# Patient Record
Sex: FEMALE | Race: WHITE | NOT HISPANIC OR LATINO
[De-identification: names, ages, dates, MRNs, and addresses within clinical notes are randomized per-mention and may not be internally consistent; named-entity substitution may affect disease eponyms.]

---

## 2017-03-31 ENCOUNTER — APPOINTMENT (OUTPATIENT)
Dept: OBGYN | Facility: CLINIC | Age: 82
End: 2017-03-31

## 2017-03-31 VITALS
BODY MASS INDEX: 35.99 KG/M2 | DIASTOLIC BLOOD PRESSURE: 60 MMHG | HEART RATE: 71 BPM | HEIGHT: 69 IN | SYSTOLIC BLOOD PRESSURE: 142 MMHG | OXYGEN SATURATION: 99 % | WEIGHT: 243 LBS

## 2017-04-02 LAB — HPV HIGH+LOW RISK DNA PNL CVX: NEGATIVE

## 2017-04-09 LAB — CYTOLOGY CVX/VAG DOC THIN PREP: NORMAL

## 2017-12-31 ENCOUNTER — TRANSCRIPTION ENCOUNTER (OUTPATIENT)
Age: 82
End: 2017-12-31

## 2018-01-25 ENCOUNTER — OUTPATIENT (OUTPATIENT)
Dept: OUTPATIENT SERVICES | Facility: HOSPITAL | Age: 83
LOS: 1 days | End: 2018-01-25
Payer: MEDICARE

## 2018-01-25 DIAGNOSIS — M54.16 RADICULOPATHY, LUMBAR REGION: ICD-10-CM

## 2018-01-25 PROCEDURE — 77003 FLUOROGUIDE FOR SPINE INJECT: CPT

## 2018-01-25 PROCEDURE — 62323 NJX INTERLAMINAR LMBR/SAC: CPT

## 2018-02-13 ENCOUNTER — OUTPATIENT (OUTPATIENT)
Dept: OUTPATIENT SERVICES | Facility: HOSPITAL | Age: 83
LOS: 1 days | End: 2018-02-13
Payer: MEDICARE

## 2018-02-13 DIAGNOSIS — M54.16 RADICULOPATHY, LUMBAR REGION: ICD-10-CM

## 2018-02-13 PROCEDURE — 77003 FLUOROGUIDE FOR SPINE INJECT: CPT

## 2018-02-13 PROCEDURE — 62323 NJX INTERLAMINAR LMBR/SAC: CPT

## 2018-03-30 ENCOUNTER — APPOINTMENT (OUTPATIENT)
Dept: OBGYN | Facility: CLINIC | Age: 83
End: 2018-03-30

## 2018-04-16 ENCOUNTER — APPOINTMENT (OUTPATIENT)
Dept: ORTHOPEDIC SURGERY | Facility: CLINIC | Age: 83
End: 2018-04-16
Payer: MEDICARE

## 2018-04-16 VITALS
BODY MASS INDEX: 34.8 KG/M2 | WEIGHT: 235 LBS | HEART RATE: 83 BPM | SYSTOLIC BLOOD PRESSURE: 148 MMHG | HEIGHT: 69 IN | DIASTOLIC BLOOD PRESSURE: 69 MMHG

## 2018-04-16 DIAGNOSIS — Z82.61 FAMILY HISTORY OF ARTHRITIS: ICD-10-CM

## 2018-04-16 DIAGNOSIS — M51.26 OTHER INTERVERTEBRAL DISC DISPLACEMENT, LUMBAR REGION: ICD-10-CM

## 2018-04-16 DIAGNOSIS — Z78.9 OTHER SPECIFIED HEALTH STATUS: ICD-10-CM

## 2018-04-16 PROCEDURE — 99204 OFFICE O/P NEW MOD 45 MIN: CPT

## 2018-04-16 RX ORDER — OXYCODONE AND ACETAMINOPHEN 5; 325 MG/1; MG/1
5-325 TABLET ORAL
Qty: 60 | Refills: 0 | Status: DISCONTINUED | COMMUNITY
Start: 2018-01-16

## 2018-04-16 RX ORDER — MULTIVIT-MIN/FA/LYCOPEN/LUTEIN .4-300-25
TABLET ORAL
Refills: 0 | Status: ACTIVE | COMMUNITY

## 2018-04-16 RX ORDER — DOXYCYCLINE HYCLATE 100 MG/1
100 TABLET ORAL
Qty: 14 | Refills: 0 | Status: DISCONTINUED | COMMUNITY
Start: 2017-11-17

## 2018-04-16 RX ORDER — TRAMADOL HYDROCHLORIDE 50 MG/1
50 TABLET, COATED ORAL
Qty: 30 | Refills: 0 | Status: DISCONTINUED | COMMUNITY
Start: 2017-12-15

## 2018-04-16 RX ORDER — OXYCODONE 10 MG/1
10 TABLET ORAL
Qty: 50 | Refills: 0 | Status: DISCONTINUED | COMMUNITY
Start: 2018-02-21

## 2018-04-16 RX ORDER — BETAMETHASONE DIPROPIONATE 0.5 MG/G
0.05 CREAM, AUGMENTED TOPICAL
Qty: 30 | Refills: 0 | Status: DISCONTINUED | COMMUNITY
Start: 2017-11-17

## 2018-04-16 RX ORDER — METHYLPREDNISOLONE 4 MG/1
4 TABLET ORAL
Qty: 21 | Refills: 0 | Status: DISCONTINUED | COMMUNITY
Start: 2017-12-15

## 2018-04-16 RX ORDER — LEVOTHYROXINE SODIUM 0.12 MG/1
125 TABLET ORAL
Qty: 90 | Refills: 0 | Status: DISCONTINUED | COMMUNITY
Start: 2018-01-16

## 2018-04-16 RX ORDER — ATORVASTATIN CALCIUM 10 MG/1
10 TABLET, FILM COATED ORAL
Qty: 90 | Refills: 0 | Status: DISCONTINUED | COMMUNITY
Start: 2018-01-16

## 2018-07-23 ENCOUNTER — APPOINTMENT (OUTPATIENT)
Dept: OBGYN | Facility: CLINIC | Age: 83
End: 2018-07-23
Payer: MEDICARE

## 2018-07-23 VITALS
RESPIRATION RATE: 16 BRPM | OXYGEN SATURATION: 98 % | DIASTOLIC BLOOD PRESSURE: 68 MMHG | HEIGHT: 69 IN | SYSTOLIC BLOOD PRESSURE: 142 MMHG | HEART RATE: 76 BPM | WEIGHT: 235 LBS | BODY MASS INDEX: 34.8 KG/M2

## 2018-07-23 PROCEDURE — 99213 OFFICE O/P EST LOW 20 MIN: CPT

## 2018-08-20 ENCOUNTER — APPOINTMENT (OUTPATIENT)
Dept: OBGYN | Facility: CLINIC | Age: 83
End: 2018-08-20
Payer: MEDICARE

## 2018-08-20 VITALS
SYSTOLIC BLOOD PRESSURE: 128 MMHG | WEIGHT: 235 LBS | HEART RATE: 84 BPM | DIASTOLIC BLOOD PRESSURE: 62 MMHG | HEIGHT: 69 IN | RESPIRATION RATE: 16 BRPM | OXYGEN SATURATION: 98 % | BODY MASS INDEX: 34.8 KG/M2

## 2018-08-20 DIAGNOSIS — N64.4 MASTODYNIA: ICD-10-CM

## 2018-08-20 DIAGNOSIS — B00.89 OTHER HERPESVIRAL INFECTION: ICD-10-CM

## 2018-08-20 PROCEDURE — 99213 OFFICE O/P EST LOW 20 MIN: CPT

## 2019-04-01 ENCOUNTER — APPOINTMENT (OUTPATIENT)
Dept: OBGYN | Facility: CLINIC | Age: 84
End: 2019-04-01

## 2019-04-16 ENCOUNTER — APPOINTMENT (OUTPATIENT)
Dept: OBGYN | Facility: CLINIC | Age: 84
End: 2019-04-16
Payer: MEDICARE

## 2019-04-16 VITALS
HEIGHT: 69 IN | BODY MASS INDEX: 35.7 KG/M2 | WEIGHT: 241 LBS | HEART RATE: 76 BPM | DIASTOLIC BLOOD PRESSURE: 80 MMHG | OXYGEN SATURATION: 96 % | SYSTOLIC BLOOD PRESSURE: 124 MMHG

## 2019-04-16 DIAGNOSIS — Z01.419 ENCOUNTER FOR GYNECOLOGICAL EXAMINATION (GENERAL) (ROUTINE) W/OUT ABNORMAL FINDINGS: ICD-10-CM

## 2019-04-16 PROCEDURE — G0101: CPT

## 2019-04-16 NOTE — CHIEF COMPLAINT
[Annual Visit] : annual visit [FreeTextEntry1] : CHeck up  Without complaint  Well since last visit Bilateral knee joint injections pending

## 2019-04-16 NOTE — REVIEW OF SYSTEMS
[Nasal Discharge] : nasal discharge [Diarrhea] : diarrhea [Joint Pain] : joint pain [Skin Lesions] : skin lesion [Fever] : no fever [Chills] : no chills [Feeling Tired] : not feeling tired [Recent Wt Gain ___ Lbs] : no recent weight gain [Pain] : no pain [Redness] : no redness [Sight Problems] : no sight problems [Discharge] : no discharge [Dec Hearing] : no hearing loss [Nosebleeds] : no nosebleeds [Sore Throat] : no sore throat [Heart Rate Is Fast] : the heart rate was not fast [Chest Pain] : no chest pain [Palpitations] : no palpitations [Lower Ext Edema] : no lower extremity edema [Dyspnea] : no shortness of breath [Wheezing] : no wheezing [Cough] : no cough [SOB on Exertion] : no shortness of breath during exertion [Abdominal Pain] : no abdominal pain [Constipation] : no constipation [Vomiting] : no vomiting [Heartburn] : no heartburn [Melena] : no melena [Dysuria] : no dysuria [Incontinence] : no incontinence [Pelvic Pain] : no pelvic pain [Frequency] : no frequency [Abn Vag Bleeding] : no abnormal vaginal bleeding [Urgency] : no urgency [Urethral Discharge] : no abnormal urethral discharge [Arthralgias] : no arthralgias [Joint Swelling] : no joint swelling [Change In A Mole] : no change in a mole [Joint Stiffness] : no joint stiffness [Breast Pain] : no breast pain [Breast Lump] : no breast lump [Convulsions] : no convulsions [Dizziness] : no dizziness [Fainting] : no fainting [Headache] : no headache [Sleep Disturbances] : no sleep disturbances [Anxiety] : no anxiety [Depression] : no depression [Hot Flashes] : no hot flashes [Muscle Weakness] : no muscle weakness [Deepening Voice] : no deepening of the voice [Easy Bleeding] : does not bleed easily [Easy Bruising] : does not bruise easily [Swollen Glands] : no swollen glands [Swollen Glands In The Neck] : no swollen glands in the neck [Feeling Weak] : no feelings of weakness

## 2019-04-16 NOTE — HISTORY OF PRESENT ILLNESS
[Hot Flashes] : no hot flashes [Night Sweats] : no night sweats [Vaginal Itching] : no vaginal itching [Dyspareunia] : no dyspareunia [Mood Changes] : no mood changes [Headache] : no headache [Fatigue] : no fatigue [Weight Change] : no weight change [Irritability] : no irritability [Forgetfulness] : no forgetfulness [Loss of Libido] : no loss of libido [Depression] : no depression [Anxiety] : no anxiety [FreeTextEntry8] : + vaginal dryness [Normal Amount/Duration] : was of a normal amount and duration [Regular Cycle Intervals] : periods have been regular [Menarche Age: ____] : age at menarche was [unfilled] [Menopause Age: ____] : age at menopause was [unfilled] [Sexually Active] : is not sexually active

## 2019-04-19 LAB — HPV HIGH+LOW RISK DNA PNL CVX: NOT DETECTED

## 2019-04-20 LAB — CYTOLOGY CVX/VAG DOC THIN PREP: NORMAL

## 2019-07-16 ENCOUNTER — LABORATORY RESULT (OUTPATIENT)
Age: 84
End: 2019-07-16

## 2019-07-17 ENCOUNTER — APPOINTMENT (OUTPATIENT)
Dept: INTERNAL MEDICINE | Facility: CLINIC | Age: 84
End: 2019-07-17
Payer: MEDICARE

## 2019-07-17 VITALS
DIASTOLIC BLOOD PRESSURE: 81 MMHG | OXYGEN SATURATION: 95 % | HEART RATE: 78 BPM | RESPIRATION RATE: 16 BRPM | HEIGHT: 68 IN | BODY MASS INDEX: 37.89 KG/M2 | WEIGHT: 250 LBS | TEMPERATURE: 98.5 F | SYSTOLIC BLOOD PRESSURE: 145 MMHG

## 2019-07-17 VITALS — SYSTOLIC BLOOD PRESSURE: 140 MMHG | DIASTOLIC BLOOD PRESSURE: 80 MMHG

## 2019-07-17 PROCEDURE — 36415 COLL VENOUS BLD VENIPUNCTURE: CPT

## 2019-07-17 PROCEDURE — 99213 OFFICE O/P EST LOW 20 MIN: CPT | Mod: 25

## 2019-07-17 NOTE — HISTORY OF PRESENT ILLNESS
[FreeTextEntry8] : Patient comes for FBW and med renewal\par  Pt feels well currently no respiratory problems

## 2019-07-17 NOTE — PHYSICAL EXAM
[Well Nourished] : well nourished [Normal Sclera/Conjunctiva] : normal sclera/conjunctiva [Normal Outer Ear/Nose] : the outer ears and nose were normal in appearance [No Lymphadenopathy] : no lymphadenopathy [No Respiratory Distress] : no respiratory distress  [No Accessory Muscle Use] : no accessory muscle use [Clear to Auscultation] : lungs were clear to auscultation bilaterally [Normal Rate] : normal rate  [Regular Rhythm] : with a regular rhythm [No Edema] : there was no peripheral edema [Soft] : abdomen soft [Non Tender] : non-tender [Non-distended] : non-distended

## 2019-07-17 NOTE — REVIEW OF SYSTEMS
[Fever] : no fever [Chills] : no chills [Chest Pain] : no chest pain [Palpitations] : no palpitations [Lower Ext Edema] : no lower extremity edema [Shortness Of Breath] : no shortness of breath [Wheezing] : no wheezing [Cough] : no cough [Abdominal Pain] : no abdominal pain

## 2019-07-23 ENCOUNTER — TRANSCRIPTION ENCOUNTER (OUTPATIENT)
Age: 84
End: 2019-07-23

## 2019-08-12 LAB
25(OH)D3 SERPL-MCNC: 35.2 NG/ML
ALBUMIN SERPL ELPH-MCNC: 4.3 G/DL
ALP BLD-CCNC: 81 U/L
ALT SERPL-CCNC: 15 U/L
ANION GAP SERPL CALC-SCNC: 16 MMOL/L
AST SERPL-CCNC: 26 U/L
BASOPHILS # BLD AUTO: 0.05 K/UL
BASOPHILS NFR BLD AUTO: 0.9 %
BILIRUB SERPL-MCNC: 0.3 MG/DL
BUN SERPL-MCNC: 24 MG/DL
CALCIUM SERPL-MCNC: 9.9 MG/DL
CHLORIDE SERPL-SCNC: 102 MMOL/L
CHOLEST SERPL-MCNC: 192 MG/DL
CHOLEST/HDLC SERPL: 4.8 RATIO
CO2 SERPL-SCNC: 29 MMOL/L
CREAT SERPL-MCNC: 1.12 MG/DL
EOSINOPHIL # BLD AUTO: 0.62 K/UL
EOSINOPHIL NFR BLD AUTO: 11.2 %
ESTIMATED AVERAGE GLUCOSE: 137 MG/DL
GLUCOSE SERPL-MCNC: 147 MG/DL
HBA1C MFR BLD HPLC: 6.4 %
HCT VFR BLD CALC: 47.3 %
HDLC SERPL-MCNC: 40 MG/DL
HGB BLD-MCNC: 13.5 G/DL
IMM GRANULOCYTES NFR BLD AUTO: 0.2 %
LDLC SERPL CALC-MCNC: 104 MG/DL
LYMPHOCYTES # BLD AUTO: 1.21 K/UL
LYMPHOCYTES NFR BLD AUTO: 21.8 %
MAN DIFF?: NORMAL
MCHC RBC-ENTMCNC: 28.5 GM/DL
MCHC RBC-ENTMCNC: 31 PG
MCV RBC AUTO: 108.5 FL
MONOCYTES # BLD AUTO: 0.42 K/UL
MONOCYTES NFR BLD AUTO: 7.6 %
NEUTROPHILS # BLD AUTO: 3.23 K/UL
NEUTROPHILS NFR BLD AUTO: 58.3 %
PLATELET # BLD AUTO: 223 K/UL
POTASSIUM SERPL-SCNC: 4.1 MMOL/L
PROT SERPL-MCNC: 7 G/DL
RBC # BLD: 4.36 M/UL
RBC # FLD: 14.6 %
SODIUM SERPL-SCNC: 147 MMOL/L
T4 SERPL-MCNC: 8 UG/DL
TRIGL SERPL-MCNC: 238 MG/DL
TSH SERPL-ACNC: 9.29 UIU/ML
WBC # FLD AUTO: 5.54 K/UL

## 2019-09-18 ENCOUNTER — APPOINTMENT (OUTPATIENT)
Dept: INTERNAL MEDICINE | Facility: CLINIC | Age: 84
End: 2019-09-18
Payer: MEDICARE

## 2019-09-18 VITALS — OXYGEN SATURATION: 92 % | WEIGHT: 250 LBS | BODY MASS INDEX: 37.89 KG/M2 | HEART RATE: 74 BPM | HEIGHT: 68 IN

## 2019-09-18 VITALS — DIASTOLIC BLOOD PRESSURE: 80 MMHG | SYSTOLIC BLOOD PRESSURE: 128 MMHG

## 2019-09-18 PROCEDURE — 36415 COLL VENOUS BLD VENIPUNCTURE: CPT

## 2019-09-18 PROCEDURE — 99213 OFFICE O/P EST LOW 20 MIN: CPT | Mod: 25

## 2019-09-18 PROCEDURE — 90662 IIV NO PRSV INCREASED AG IM: CPT

## 2019-09-18 PROCEDURE — G0008: CPT

## 2019-09-18 NOTE — PHYSICAL EXAM
[Normal Sclera/Conjunctiva] : normal sclera/conjunctiva [No Acute Distress] : no acute distress [No JVD] : no jugular venous distention [No Lymphadenopathy] : no lymphadenopathy [Supple] : supple [No Respiratory Distress] : no respiratory distress  [No Accessory Muscle Use] : no accessory muscle use [Regular Rhythm] : with a regular rhythm [Normal Rate] : normal rate  [No Edema] : there was no peripheral edema [Normal S1, S2] : normal S1 and S2 [Soft] : abdomen soft [No Extremity Clubbing/Cyanosis] : no extremity clubbing/cyanosis [Non-distended] : non-distended [Non Tender] : non-tender [No Masses] : no abdominal mass palpated

## 2019-09-18 NOTE — REVIEW OF SYSTEMS
[Fever] : no fever [Hoarseness] : no hoarseness [Chills] : no chills [Palpitations] : no palpitations [Chest Pain] : no chest pain [Shortness Of Breath] : no shortness of breath [Lower Ext Edema] : no lower extremity edema [Wheezing] : no wheezing [Cough] : no cough [Abdominal Pain] : no abdominal pain

## 2019-09-20 LAB
T4 SERPL-MCNC: 9 UG/DL
TSH SERPL-ACNC: 1.68 UIU/ML

## 2019-10-16 ENCOUNTER — APPOINTMENT (OUTPATIENT)
Dept: INTERNAL MEDICINE | Facility: CLINIC | Age: 84
End: 2019-10-16
Payer: MEDICARE

## 2019-10-16 VITALS
RESPIRATION RATE: 16 BRPM | WEIGHT: 247 LBS | HEART RATE: 85 BPM | HEIGHT: 68 IN | BODY MASS INDEX: 37.44 KG/M2 | OXYGEN SATURATION: 97 %

## 2019-10-16 VITALS — DIASTOLIC BLOOD PRESSURE: 80 MMHG | SYSTOLIC BLOOD PRESSURE: 128 MMHG

## 2019-10-16 PROCEDURE — 90732 PPSV23 VACC 2 YRS+ SUBQ/IM: CPT

## 2019-10-16 PROCEDURE — G0009: CPT

## 2019-10-16 PROCEDURE — 36415 COLL VENOUS BLD VENIPUNCTURE: CPT

## 2019-10-16 PROCEDURE — 99213 OFFICE O/P EST LOW 20 MIN: CPT | Mod: 25

## 2019-10-16 NOTE — PHYSICAL EXAM
[No Acute Distress] : no acute distress [Normal Sclera/Conjunctiva] : normal sclera/conjunctiva [Normal Outer Ear/Nose] : the outer ears and nose were normal in appearance [Normal Oropharynx] : the oropharynx was normal [No JVD] : no jugular venous distention [No Lymphadenopathy] : no lymphadenopathy [Supple] : supple [No Respiratory Distress] : no respiratory distress  [No Accessory Muscle Use] : no accessory muscle use [Clear to Auscultation] : lungs were clear to auscultation bilaterally [Normal Rate] : normal rate  [Regular Rhythm] : with a regular rhythm [Normal S1, S2] : normal S1 and S2 [No Edema] : there was no peripheral edema [No Extremity Clubbing/Cyanosis] : no extremity clubbing/cyanosis [Soft] : abdomen soft [Non Tender] : non-tender [Non-distended] : non-distended [No Masses] : no abdominal mass palpated [Normal Posterior Cervical Nodes] : no posterior cervical lymphadenopathy [Normal Anterior Cervical Nodes] : no anterior cervical lymphadenopathy

## 2019-10-16 NOTE — REVIEW OF SYSTEMS
[Hoarseness] : hoarseness [Postnasal Drip] : postnasal drip [Fever] : no fever [Chills] : no chills [Chest Pain] : no chest pain [Palpitations] : no palpitations [Lower Ext Edema] : no lower extremity edema [Shortness Of Breath] : no shortness of breath [Wheezing] : no wheezing [Cough] : no cough [Abdominal Pain] : no abdominal pain [Nausea] : no nausea

## 2019-10-18 LAB
25(OH)D3 SERPL-MCNC: 42.2 NG/ML
ALBUMIN SERPL ELPH-MCNC: 4.5 G/DL
ALP BLD-CCNC: 87 U/L
ALT SERPL-CCNC: 14 U/L
ANION GAP SERPL CALC-SCNC: 16 MMOL/L
AST SERPL-CCNC: 23 U/L
BASOPHILS # BLD AUTO: 0.07 K/UL
BASOPHILS NFR BLD AUTO: 1 %
BILIRUB SERPL-MCNC: 0.3 MG/DL
BUN SERPL-MCNC: 23 MG/DL
CALCIUM SERPL-MCNC: 10.3 MG/DL
CHLORIDE SERPL-SCNC: 100 MMOL/L
CHOLEST SERPL-MCNC: 186 MG/DL
CHOLEST/HDLC SERPL: 4.3 RATIO
CO2 SERPL-SCNC: 29 MMOL/L
CREAT SERPL-MCNC: 1 MG/DL
EOSINOPHIL # BLD AUTO: 0.3 K/UL
EOSINOPHIL NFR BLD AUTO: 4.5 %
ESTIMATED AVERAGE GLUCOSE: 140 MG/DL
GLUCOSE SERPL-MCNC: 106 MG/DL
HBA1C MFR BLD HPLC: 6.5 %
HCT VFR BLD CALC: 45.3 %
HDLC SERPL-MCNC: 43 MG/DL
HGB BLD-MCNC: 13.6 G/DL
IMM GRANULOCYTES NFR BLD AUTO: 0.3 %
LDLC SERPL CALC-MCNC: 103 MG/DL
LYMPHOCYTES # BLD AUTO: 1.4 K/UL
LYMPHOCYTES NFR BLD AUTO: 21 %
MAN DIFF?: NORMAL
MCHC RBC-ENTMCNC: 30 GM/DL
MCHC RBC-ENTMCNC: 30 PG
MCV RBC AUTO: 99.8 FL
MONOCYTES # BLD AUTO: 0.45 K/UL
MONOCYTES NFR BLD AUTO: 6.7 %
NEUTROPHILS # BLD AUTO: 4.43 K/UL
NEUTROPHILS NFR BLD AUTO: 66.5 %
PLATELET # BLD AUTO: 270 K/UL
POTASSIUM SERPL-SCNC: 4.1 MMOL/L
PROT SERPL-MCNC: 7.1 G/DL
RBC # BLD: 4.54 M/UL
RBC # FLD: 14.1 %
SODIUM SERPL-SCNC: 145 MMOL/L
T4 SERPL-MCNC: 9.2 UG/DL
TRIGL SERPL-MCNC: 198 MG/DL
TSH SERPL-ACNC: 3.46 UIU/ML
WBC # FLD AUTO: 6.67 K/UL

## 2019-11-12 ENCOUNTER — APPOINTMENT (OUTPATIENT)
Dept: ORTHOPEDIC SURGERY | Facility: CLINIC | Age: 84
End: 2019-11-12
Payer: MEDICARE

## 2019-11-12 ENCOUNTER — OTHER (OUTPATIENT)
Age: 84
End: 2019-11-12

## 2019-11-12 VITALS
DIASTOLIC BLOOD PRESSURE: 74 MMHG | HEART RATE: 83 BPM | HEIGHT: 69 IN | SYSTOLIC BLOOD PRESSURE: 141 MMHG | BODY MASS INDEX: 36.58 KG/M2 | WEIGHT: 247 LBS

## 2019-11-12 PROCEDURE — 73562 X-RAY EXAM OF KNEE 3: CPT | Mod: 50

## 2019-11-12 PROCEDURE — 99213 OFFICE O/P EST LOW 20 MIN: CPT

## 2019-11-12 PROCEDURE — 99204 OFFICE O/P NEW MOD 45 MIN: CPT

## 2019-11-12 NOTE — END OF VISIT
[FreeTextEntry3] : I saw and evaluated this patient. I discussed with the PA and agree with the PAs findings and plans as documented in the PAs note.\par \par ________________\par Neil Juárez MD

## 2019-11-12 NOTE — HISTORY OF PRESENT ILLNESS
[Worsening] : worsening [7] : an average pain level of 7/10 [9] : a current pain level of 9/10 [10] : a maximum pain level of 10/10 [6] : a minimum pain level of 6/10 [Standing] : standing [Daily] : ~He/She~ states the symptoms seem to be occuring daily [Direct Pressure] : worsened by direct pressure [Bending] : worsened by bending [Lifting] : worsened by lifting [Sitting] : worsened by sitting [Running] : worsened by running [Knee Flexion] : worsened with knee flexion [Walking] : worsened by walking [Knee Extension] : worsened with knee extension [Acetaminophen] : relieved by acetaminophen [Ice] : relieved by ice [Exercise Regimen] : relieved by exercise regimen [NSAIDs] : relieved by nonsteroidal anti-inflammatory drugs [Physical Therapy] : relieved by physical therapy [Rest] : relieved by rest [None] : No relieving factors are noted [de-identified] : The patient is an 84-year-old female who presents today with bilateral knee pain right more so than left many years getting progressively worse over the past several months. The patient is ambulating with a cane. She has difficulty standing for long periods of time, walking for long periods of time, negotiating stairs. The patient is taking anti-inflammatories, all without relief. [Incontinence] : no incontinence [Ataxia] : no ataxia [Loss of Dexterity] : good dexterity [Urinary Ret.] : no urinary retention

## 2019-11-12 NOTE — PHYSICAL EXAM
[Antalgic] : antalgic [Cane] : ambulates with cane [UE/LE] : Sensory: Intact in bilateral upper & lower extremities [2+] : left 2+ [1+] : left 1+ [Normal RLE] : Right Lower Extremity: No scars, rashes, lesions, ulcers, skin intact [Normal RUE] : Right Upper Extremity: No scars, rashes, lesions, ulcers, skin intact [Normal LUE] : Left Upper Extremity: No scars, rashes, lesions, ulcers, skin intact [Obese] : obese [Normal LLE] : Left Lower Extremity: No scars, rashes, lesions, ulcers, skin intact [Normal] : Oriented to person, place, and time, insight and judgement were intact and the affect was normal [de-identified] : Impact [de-identified] : Left Knee: Skin is clean, dry, and intact\par Swelling: Moderate\par Effusion: Moderate no extensor lag. No flexion contractures. Negative anteroposterior draw.\par Alignment: Varus malalignment\par Tenderness: All 3 compartments\par ROM: Flexion: 130deg.; Extension: 0 deg,\par Laxity: 6° laxity with varus valgus tibial stress\par PF crepitus: Trent crepitation; associated pain\par Quadricep formation: Attenuated in Extension & Flexion:\par Quad/Ham St:4/5 slight lymphedema left lower extremity calves soft, nontender, no evidence of DVT at this time. Diminished distal pulses. Slight erythema left lower extremity consistent with lymphedemat keratosisLower extremity\par \par Right  Knee: Skin is clean, dry, intact of the knee. Psoriatic-type lesions right lower extremity\par Swelling: Moderate\par Effusion: Moderate negative anterior-posterior drawer\par Alignment: Varus malalignment no extension lag, no flexion contractures\par Tenderness: All 3 compartments\par ROM: Flexion: 1:30 deg.; Extension: 0deg,\par Laxity: 6° laxity with varus valgus tibial stress\par PF crepitus: Marked crepitation; associated pain\par Quadricep formation: Attenuated in Extension & Flexion:\par Quad/Ham St: 4/5 calf soft, nontender, no evidence of DVT at this time. Patient has diminished pulses right lower extremity. 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

## 2019-11-12 NOTE — DISCUSSION/SUMMARY
[Medication Risks Reviewed] : Medication risks reviewed [Surgical risks reviewed] : Surgical risks reviewed [de-identified] : Dr. Juárez evaluated this patient and is guiding this patient's entire orthopedic management plan. The patient was advised that based upon their clinical presentation and radiographic findings they meet inclusion criteria for benefitting from a right total knee arthroplasty as a treatment option for severe arthritis of the knee.\par The spectrum of treatments for severe knee DJD were reviewed in detail. Dr. Juárez reviewed in detail the goals, alternatives, risks and benefits of total knee arthroplasty. \par The patient was advised of the possible complications which are inclusive of but not exclusive to VTE-related, infectious-related, anesthetic-related, surgically-related, medically-related, and implant-related. \par The patient was advised of the goals, alternatives, risks and benefits of autologous, directed and banked blood product transfusions for perioperative blood losses.\par The patient was advised that Dr. Juárez operates as a surgical team with his associate Dr. MICHELLE Sheppard.\par The patient was advised that they will require a medical preoperative risk evaluation by their PCP. Further medical subspecialty clearances such as cardiac may be indicated if felt needed by their PCP.\par The patient was advised he/she may call our office after careful consideration of their treatment options and further consultation with any other physician, to begin the process of preoperative planning for elective right TKR at a time of their choosing. The patient was educated using models and the actual implant. The patient need clearance from medicine, her immunologist, and vascular clearance. All of her questions were answered to her satisfaction. She would like to do the right knee and at a later date which is healed to the left side. Patient agrees to the plan of care and the use of implants are in the right total knee replacement surgery\par

## 2019-11-12 NOTE — REASON FOR VISIT
[Initial Visit] : an initial visit for [Knee Pain] : knee pain [Osteoarthritis, Knee] : osteoarthritis, knee [FreeTextEntry2] : Bilateral knee pain right more so thanleft

## 2019-12-31 ENCOUNTER — OUTPATIENT (OUTPATIENT)
Dept: OUTPATIENT SERVICES | Facility: HOSPITAL | Age: 84
LOS: 1 days | End: 2019-12-31
Payer: MEDICARE

## 2019-12-31 VITALS
HEART RATE: 74 BPM | OXYGEN SATURATION: 97 % | HEIGHT: 66 IN | TEMPERATURE: 98 F | SYSTOLIC BLOOD PRESSURE: 110 MMHG | WEIGHT: 245.82 LBS | DIASTOLIC BLOOD PRESSURE: 70 MMHG | RESPIRATION RATE: 16 BRPM

## 2019-12-31 DIAGNOSIS — Z90.49 ACQUIRED ABSENCE OF OTHER SPECIFIED PARTS OF DIGESTIVE TRACT: Chronic | ICD-10-CM

## 2019-12-31 DIAGNOSIS — M17.11 UNILATERAL PRIMARY OSTEOARTHRITIS, RIGHT KNEE: ICD-10-CM

## 2019-12-31 DIAGNOSIS — Z98.890 OTHER SPECIFIED POSTPROCEDURAL STATES: Chronic | ICD-10-CM

## 2019-12-31 DIAGNOSIS — Z92.241 PERSONAL HISTORY OF SYSTEMIC STEROID THERAPY: Chronic | ICD-10-CM

## 2019-12-31 DIAGNOSIS — Z01.818 ENCOUNTER FOR OTHER PREPROCEDURAL EXAMINATION: ICD-10-CM

## 2019-12-31 DIAGNOSIS — Z90.12 ACQUIRED ABSENCE OF LEFT BREAST AND NIPPLE: Chronic | ICD-10-CM

## 2019-12-31 LAB
ALBUMIN SERPL ELPH-MCNC: 3.6 G/DL — SIGNIFICANT CHANGE UP (ref 3.3–5)
ALP SERPL-CCNC: 83 U/L — SIGNIFICANT CHANGE UP (ref 30–120)
ALT FLD-CCNC: 26 U/L DA — SIGNIFICANT CHANGE UP (ref 10–60)
ANION GAP SERPL CALC-SCNC: 7 MMOL/L — SIGNIFICANT CHANGE UP (ref 5–17)
APTT BLD: 30.1 SEC — SIGNIFICANT CHANGE UP (ref 28.5–37)
AST SERPL-CCNC: 27 U/L — SIGNIFICANT CHANGE UP (ref 10–40)
BILIRUB SERPL-MCNC: 0.4 MG/DL — SIGNIFICANT CHANGE UP (ref 0.2–1.2)
BLD GP AB SCN SERPL QL: SIGNIFICANT CHANGE UP
BUN SERPL-MCNC: 35 MG/DL — HIGH (ref 7–23)
CALCIUM SERPL-MCNC: 9.2 MG/DL — SIGNIFICANT CHANGE UP (ref 8.4–10.5)
CHLORIDE SERPL-SCNC: 104 MMOL/L — SIGNIFICANT CHANGE UP (ref 96–108)
CO2 SERPL-SCNC: 32 MMOL/L — HIGH (ref 22–31)
CREAT SERPL-MCNC: 1.26 MG/DL — SIGNIFICANT CHANGE UP (ref 0.5–1.3)
GLUCOSE SERPL-MCNC: 149 MG/DL — HIGH (ref 70–99)
HCT VFR BLD CALC: 41.7 % — SIGNIFICANT CHANGE UP (ref 34.5–45)
HGB BLD-MCNC: 13.1 G/DL — SIGNIFICANT CHANGE UP (ref 11.5–15.5)
INR BLD: 0.98 RATIO — SIGNIFICANT CHANGE UP (ref 0.88–1.16)
MCHC RBC-ENTMCNC: 30.4 PG — SIGNIFICANT CHANGE UP (ref 27–34)
MCHC RBC-ENTMCNC: 31.4 GM/DL — LOW (ref 32–36)
MCV RBC AUTO: 96.8 FL — SIGNIFICANT CHANGE UP (ref 80–100)
MRSA PCR RESULT.: SIGNIFICANT CHANGE UP
NRBC # BLD: 0 /100 WBCS — SIGNIFICANT CHANGE UP (ref 0–0)
PLATELET # BLD AUTO: 216 K/UL — SIGNIFICANT CHANGE UP (ref 150–400)
POTASSIUM SERPL-MCNC: 3.8 MMOL/L — SIGNIFICANT CHANGE UP (ref 3.5–5.3)
POTASSIUM SERPL-SCNC: 3.8 MMOL/L — SIGNIFICANT CHANGE UP (ref 3.5–5.3)
PROT SERPL-MCNC: 7.3 G/DL — SIGNIFICANT CHANGE UP (ref 6–8.3)
PROTHROM AB SERPL-ACNC: 10.7 SEC — SIGNIFICANT CHANGE UP (ref 10–12.9)
RBC # BLD: 4.31 M/UL — SIGNIFICANT CHANGE UP (ref 3.8–5.2)
RBC # FLD: 13.9 % — SIGNIFICANT CHANGE UP (ref 10.3–14.5)
S AUREUS DNA NOSE QL NAA+PROBE: SIGNIFICANT CHANGE UP
SODIUM SERPL-SCNC: 143 MMOL/L — SIGNIFICANT CHANGE UP (ref 135–145)
WBC # BLD: 5.95 K/UL — SIGNIFICANT CHANGE UP (ref 3.8–10.5)
WBC # FLD AUTO: 5.95 K/UL — SIGNIFICANT CHANGE UP (ref 3.8–10.5)

## 2019-12-31 PROCEDURE — 71046 X-RAY EXAM CHEST 2 VIEWS: CPT

## 2019-12-31 PROCEDURE — 71046 X-RAY EXAM CHEST 2 VIEWS: CPT | Mod: 26

## 2019-12-31 PROCEDURE — G0463: CPT

## 2019-12-31 PROCEDURE — 93010 ELECTROCARDIOGRAM REPORT: CPT

## 2019-12-31 PROCEDURE — 93005 ELECTROCARDIOGRAM TRACING: CPT

## 2019-12-31 RX ORDER — HUMAN IMMUNOGLOBULIN G 0.2 G/ML
11 LIQUID SUBCUTANEOUS
Qty: 0 | Refills: 0 | DISCHARGE

## 2019-12-31 NOTE — H&P PST ADULT - NSICDXFAMILYHX_GEN_ALL_CORE_FT
FAMILY HISTORY:  Family history of hypertension in mother,   Family history of hypertension in son, son- living - age 57yo  Family history of lung cancer, sister-   Family history of lymphoma, brother-  Family history of ulcerative colitis, son- living- age 58yo  FH: dementia, father-   FHx: cerebral aneurysm, mother-  @ age 66 FAMILY HISTORY:  Family history of hypertension in mother,   Family history of hypertension in son, son- living - age 57yo  Family history of lung cancer, sister-   Family history of lymphoma, brother-  Family history of ulcerative colitis, son- living- age 56yo  FH: dementia, father-   FHx: cerebral aneurysm, mother-  @ age 66

## 2019-12-31 NOTE — H&P PST ADULT - OTHER CARE PROVIDERS
Vascular: Dr. Chavarria- [phone] Vascular: Dr. Chavarria- [phone]/ Dr. Arellano- [phone] Vascular: Dr. Chavarria- [phone]/ Immunologist: Dr. Arellano- [phone]

## 2019-12-31 NOTE — H&P PST ADULT - HISTORY OF PRESENT ILLNESS
83yo female patient with approximately 18 month history of progressively worsening right knee pain. She had 4 gel injections this year, with no improvement. She has not had any other treatment. She rates the pain at 0/10 when seated, but with ambulation it goes to 9-10/10. She is taking Tylenol 1000mg if needed for pain with some relief, up to twice daily. She was told that TKR is recommended and she presents today for PSTs.

## 2019-12-31 NOTE — H&P PST ADULT - NSICDXPASTMEDICALHX_GEN_ALL_CORE_FT
PAST MEDICAL HISTORY:  Bronchitis     Cancer of female breast 2012- left breast- tx with surgery, radiation and Letrozole    Chronic cough     Class 2 obesity with body mass index (BMI) of 39.0 to 39.9 in adult     DDD (degenerative disc disease), lumbar     Environmental allergies     H/O actinic keratosis     Hyperlipidemia     Hypothyroidism     Osteoarthritis     Pneumonia     Primary immunodeficiency disorder being treated with Hizentra    Skin cancer     Vitamin D deficiency PAST MEDICAL HISTORY:  Bronchitis     Cancer of female breast 2012- left breast- tx with surgery, radiation and Letrozole    Chronic cough     Class 2 obesity with body mass index (BMI) of 39.0 to 39.9 in adult     DDD (degenerative disc disease), lumbar     Environmental allergies     H/O actinic keratosis     H/O seborrheic keratosis     Hyperlipidemia     Hypothyroidism     Osteoarthritis     Pneumonia     Primary immunodeficiency disorder being treated with Hizentra    Skin cancer     Vitamin D deficiency

## 2019-12-31 NOTE — H&P PST ADULT - NSICDXPASTSURGICALHX_GEN_ALL_CORE_FT
PAST SURGICAL HISTORY:  S/P appendectomy 1950    S/P cataract surgery approx 2006- bilateral    S/P epidural steroid injection x2- Dr. Martin- for Lumbar Disc Disease    S/P partial mastectomy, left 2012    S/P skin cancer resection x11

## 2019-12-31 NOTE — H&P PST ADULT - ASSESSMENT
83yo female patient scheduled for surgery on 1/13/20. She will obtain Medical and Vascular Clearances. She will be NPO as per Anesthesia and will take Levothyroxine on AM of surgery with a sip of water. All pre-op instructions reviewed with patient. She denies any metal allergies. 85yo female patient scheduled for surgery on 1/13/20. She will obtain Medical and Vascular Clearances. She will be NPO as per Anesthesia and will take Levothyroxine on AM of surgery with a sip of water. All pre-op instructions reviewed with patient. She denies any metal allergies. She will meet with Respiratory and Pharmacy today. 83yo female patient scheduled for surgery on 1/13/20. She will obtain Medical and Vascular Clearances. She will be NPO as per Anesthesia and will take Levothyroxine on AM of surgery with a sip of water. All pre-op instructions reviewed with patient. She denies any metal allergies. She will meet with Respiratory and Pharmacy today. She denies any metal allergies.

## 2019-12-31 NOTE — H&P PST ADULT - NSICDXPROBLEM_GEN_ALL_CORE_FT
PROBLEM DIAGNOSES  Problem: Primary osteoarthritis of right knee  Assessment and Plan: RIGHT Total Knee Replacement on  01/13/20

## 2019-12-31 NOTE — H&P PST ADULT - MUSCULOSKELETAL
details… detailed exam decreased ROM/no calf tenderness/right knee/decreased ROM due to pain/joint swelling/diminished strength/no joint erythema/no joint warmth

## 2020-01-06 ENCOUNTER — APPOINTMENT (OUTPATIENT)
Dept: INTERNAL MEDICINE | Facility: CLINIC | Age: 85
End: 2020-01-06
Payer: MEDICARE

## 2020-01-06 VITALS
HEART RATE: 80 BPM | HEIGHT: 69 IN | WEIGHT: 247 LBS | BODY MASS INDEX: 36.58 KG/M2 | OXYGEN SATURATION: 97 % | TEMPERATURE: 97.9 F

## 2020-01-06 VITALS — SYSTOLIC BLOOD PRESSURE: 134 MMHG | DIASTOLIC BLOOD PRESSURE: 80 MMHG

## 2020-01-06 DIAGNOSIS — Z01.818 ENCOUNTER FOR OTHER PREPROCEDURAL EXAMINATION: ICD-10-CM

## 2020-01-06 PROCEDURE — 99214 OFFICE O/P EST MOD 30 MIN: CPT

## 2020-01-06 NOTE — PHYSICAL EXAM
[Normal Sclera/Conjunctiva] : normal sclera/conjunctiva [No Acute Distress] : no acute distress [No JVD] : no jugular venous distention [Normal Outer Ear/Nose] : the outer ears and nose were normal in appearance [No Respiratory Distress] : no respiratory distress  [Clear to Auscultation] : lungs were clear to auscultation bilaterally [No Accessory Muscle Use] : no accessory muscle use [Regular Rhythm] : with a regular rhythm [Normal Rate] : normal rate  [Normal S1, S2] : normal S1 and S2 [No Focal Deficits] : no focal deficits [de-identified] : brawny edema

## 2020-01-06 NOTE — REVIEW OF SYSTEMS
[Fever] : no fever [Chills] : no chills [Chest Pain] : no chest pain [Palpitations] : no palpitations [Wheezing] : no wheezing [Shortness Of Breath] : no shortness of breath [Cough] : no cough [Abdominal Pain] : no abdominal pain [Dysuria] : no dysuria [FreeTextEntry5] : brwany edema

## 2020-01-13 ENCOUNTER — INPATIENT (INPATIENT)
Facility: HOSPITAL | Age: 85
LOS: 1 days | Discharge: NURSING, REHAB OR LONG-TERM CARE | DRG: 470 | End: 2020-01-15
Attending: ORTHOPAEDIC SURGERY | Admitting: ORTHOPAEDIC SURGERY
Payer: MEDICARE

## 2020-01-13 ENCOUNTER — APPOINTMENT (OUTPATIENT)
Dept: ORTHOPEDIC SURGERY | Facility: HOSPITAL | Age: 85
End: 2020-01-13

## 2020-01-13 ENCOUNTER — RESULT REVIEW (OUTPATIENT)
Age: 85
End: 2020-01-13

## 2020-01-13 ENCOUNTER — TRANSCRIPTION ENCOUNTER (OUTPATIENT)
Age: 85
End: 2020-01-13

## 2020-01-13 VITALS
WEIGHT: 238.1 LBS | HEART RATE: 68 BPM | OXYGEN SATURATION: 98 % | DIASTOLIC BLOOD PRESSURE: 68 MMHG | SYSTOLIC BLOOD PRESSURE: 138 MMHG | RESPIRATION RATE: 19 BRPM | TEMPERATURE: 98 F | HEIGHT: 69 IN

## 2020-01-13 DIAGNOSIS — Z92.241 PERSONAL HISTORY OF SYSTEMIC STEROID THERAPY: Chronic | ICD-10-CM

## 2020-01-13 DIAGNOSIS — Z90.12 ACQUIRED ABSENCE OF LEFT BREAST AND NIPPLE: Chronic | ICD-10-CM

## 2020-01-13 DIAGNOSIS — Z90.49 ACQUIRED ABSENCE OF OTHER SPECIFIED PARTS OF DIGESTIVE TRACT: Chronic | ICD-10-CM

## 2020-01-13 DIAGNOSIS — Z98.890 OTHER SPECIFIED POSTPROCEDURAL STATES: Chronic | ICD-10-CM

## 2020-01-13 DIAGNOSIS — M17.11 UNILATERAL PRIMARY OSTEOARTHRITIS, RIGHT KNEE: ICD-10-CM

## 2020-01-13 PROBLEM — E78.5 HYPERLIPIDEMIA, UNSPECIFIED: Chronic | Status: ACTIVE | Noted: 2019-12-31

## 2020-01-13 PROBLEM — Z91.09 OTHER ALLERGY STATUS, OTHER THAN TO DRUGS AND BIOLOGICAL SUBSTANCES: Chronic | Status: ACTIVE | Noted: 2019-12-31

## 2020-01-13 PROBLEM — C50.919 MALIGNANT NEOPLASM OF UNSPECIFIED SITE OF UNSPECIFIED FEMALE BREAST: Chronic | Status: ACTIVE | Noted: 2019-12-31

## 2020-01-13 PROBLEM — Z87.2 PERSONAL HISTORY OF DISEASES OF THE SKIN AND SUBCUTANEOUS TISSUE: Chronic | Status: ACTIVE | Noted: 2019-12-31

## 2020-01-13 PROBLEM — D84.9 IMMUNODEFICIENCY, UNSPECIFIED: Chronic | Status: ACTIVE | Noted: 2019-12-31

## 2020-01-13 PROBLEM — M51.36 OTHER INTERVERTEBRAL DISC DEGENERATION, LUMBAR REGION: Chronic | Status: ACTIVE | Noted: 2019-12-31

## 2020-01-13 PROBLEM — E55.9 VITAMIN D DEFICIENCY, UNSPECIFIED: Chronic | Status: ACTIVE | Noted: 2019-12-31

## 2020-01-13 PROBLEM — E66.9 OBESITY, UNSPECIFIED: Chronic | Status: ACTIVE | Noted: 2019-12-31

## 2020-01-13 LAB
ABO RH CONFIRMATION: SIGNIFICANT CHANGE UP
ANION GAP SERPL CALC-SCNC: 10 MMOL/L — SIGNIFICANT CHANGE UP (ref 5–17)
BUN SERPL-MCNC: 24 MG/DL — HIGH (ref 7–23)
CALCIUM SERPL-MCNC: 8.9 MG/DL — SIGNIFICANT CHANGE UP (ref 8.4–10.5)
CHLORIDE SERPL-SCNC: 103 MMOL/L — SIGNIFICANT CHANGE UP (ref 96–108)
CO2 SERPL-SCNC: 26 MMOL/L — SIGNIFICANT CHANGE UP (ref 22–31)
CREAT SERPL-MCNC: 1.31 MG/DL — HIGH (ref 0.5–1.3)
GLUCOSE SERPL-MCNC: 268 MG/DL — HIGH (ref 70–99)
HCT VFR BLD CALC: 37.5 % — SIGNIFICANT CHANGE UP (ref 34.5–45)
HGB BLD-MCNC: 11.9 G/DL — SIGNIFICANT CHANGE UP (ref 11.5–15.5)
POTASSIUM SERPL-MCNC: 4.2 MMOL/L — SIGNIFICANT CHANGE UP (ref 3.5–5.3)
POTASSIUM SERPL-SCNC: 4.2 MMOL/L — SIGNIFICANT CHANGE UP (ref 3.5–5.3)
SODIUM SERPL-SCNC: 139 MMOL/L — SIGNIFICANT CHANGE UP (ref 135–145)
TROPONIN I SERPL-MCNC: 0.01 NG/ML — LOW (ref 0.02–0.06)

## 2020-01-13 PROCEDURE — 73562 X-RAY EXAM OF KNEE 3: CPT | Mod: 26,RT

## 2020-01-13 PROCEDURE — 27447 TOTAL KNEE ARTHROPLASTY: CPT | Mod: 82,RT

## 2020-01-13 PROCEDURE — 93010 ELECTROCARDIOGRAM REPORT: CPT

## 2020-01-13 PROCEDURE — 88311 DECALCIFY TISSUE: CPT | Mod: 26

## 2020-01-13 PROCEDURE — 99223 1ST HOSP IP/OBS HIGH 75: CPT

## 2020-01-13 PROCEDURE — 88305 TISSUE EXAM BY PATHOLOGIST: CPT | Mod: 26

## 2020-01-13 PROCEDURE — 27447 TOTAL KNEE ARTHROPLASTY: CPT | Mod: RT

## 2020-01-13 RX ORDER — PANTOPRAZOLE SODIUM 20 MG/1
40 TABLET, DELAYED RELEASE ORAL
Refills: 0 | Status: DISCONTINUED | OUTPATIENT
Start: 2020-01-13 | End: 2020-01-15

## 2020-01-13 RX ORDER — ATORVASTATIN CALCIUM 80 MG/1
10 TABLET, FILM COATED ORAL AT BEDTIME
Refills: 0 | Status: DISCONTINUED | OUTPATIENT
Start: 2020-01-13 | End: 2020-01-15

## 2020-01-13 RX ORDER — OXYCODONE HYDROCHLORIDE 5 MG/1
5 TABLET ORAL
Refills: 0 | Status: DISCONTINUED | OUTPATIENT
Start: 2020-01-13 | End: 2020-01-13

## 2020-01-13 RX ORDER — TRANEXAMIC ACID 100 MG/ML
1000 INJECTION, SOLUTION INTRAVENOUS ONCE
Refills: 0 | Status: COMPLETED | OUTPATIENT
Start: 2020-01-13 | End: 2020-01-13

## 2020-01-13 RX ORDER — ACETAMINOPHEN 500 MG
1000 TABLET ORAL ONCE
Refills: 0 | Status: COMPLETED | OUTPATIENT
Start: 2020-01-13 | End: 2020-01-13

## 2020-01-13 RX ORDER — TRAMADOL HYDROCHLORIDE 50 MG/1
50 TABLET ORAL EVERY 4 HOURS
Refills: 0 | Status: DISCONTINUED | OUTPATIENT
Start: 2020-01-13 | End: 2020-01-15

## 2020-01-13 RX ORDER — CEFAZOLIN SODIUM 1 G
2000 VIAL (EA) INJECTION ONCE
Refills: 0 | Status: COMPLETED | OUTPATIENT
Start: 2020-01-13 | End: 2020-01-13

## 2020-01-13 RX ORDER — TRAMADOL HYDROCHLORIDE 50 MG/1
100 TABLET ORAL EVERY 4 HOURS
Refills: 0 | Status: DISCONTINUED | OUTPATIENT
Start: 2020-01-13 | End: 2020-01-15

## 2020-01-13 RX ORDER — POLYETHYLENE GLYCOL 3350 17 G/17G
17 POWDER, FOR SOLUTION ORAL DAILY
Refills: 0 | Status: DISCONTINUED | OUTPATIENT
Start: 2020-01-13 | End: 2020-01-15

## 2020-01-13 RX ORDER — SENNA PLUS 8.6 MG/1
2 TABLET ORAL AT BEDTIME
Refills: 0 | Status: DISCONTINUED | OUTPATIENT
Start: 2020-01-13 | End: 2020-01-15

## 2020-01-13 RX ORDER — GABAPENTIN 400 MG/1
200 CAPSULE ORAL THREE TIMES A DAY
Refills: 0 | Status: DISCONTINUED | OUTPATIENT
Start: 2020-01-13 | End: 2020-01-15

## 2020-01-13 RX ORDER — ACETAMINOPHEN 500 MG
1000 TABLET ORAL EVERY 8 HOURS
Refills: 0 | Status: DISCONTINUED | OUTPATIENT
Start: 2020-01-14 | End: 2020-01-15

## 2020-01-13 RX ORDER — CELECOXIB 200 MG/1
200 CAPSULE ORAL EVERY 12 HOURS
Refills: 0 | Status: DISCONTINUED | OUTPATIENT
Start: 2020-01-13 | End: 2020-01-15

## 2020-01-13 RX ORDER — CEFAZOLIN SODIUM 1 G
2000 VIAL (EA) INJECTION EVERY 8 HOURS
Refills: 0 | Status: COMPLETED | OUTPATIENT
Start: 2020-01-13 | End: 2020-01-13

## 2020-01-13 RX ORDER — APREPITANT 80 MG/1
40 CAPSULE ORAL ONCE
Refills: 0 | Status: COMPLETED | OUTPATIENT
Start: 2020-01-13 | End: 2020-01-13

## 2020-01-13 RX ORDER — CHLORHEXIDINE GLUCONATE 213 G/1000ML
1 SOLUTION TOPICAL ONCE
Refills: 0 | Status: COMPLETED | OUTPATIENT
Start: 2020-01-13 | End: 2020-01-13

## 2020-01-13 RX ORDER — OXYCODONE HYDROCHLORIDE 5 MG/1
10 TABLET ORAL
Refills: 0 | Status: DISCONTINUED | OUTPATIENT
Start: 2020-01-13 | End: 2020-01-13

## 2020-01-13 RX ORDER — MAGNESIUM HYDROXIDE 400 MG/1
30 TABLET, CHEWABLE ORAL DAILY
Refills: 0 | Status: DISCONTINUED | OUTPATIENT
Start: 2020-01-13 | End: 2020-01-15

## 2020-01-13 RX ORDER — LEVOTHYROXINE SODIUM 125 MCG
125 TABLET ORAL DAILY
Refills: 0 | Status: DISCONTINUED | OUTPATIENT
Start: 2020-01-13 | End: 2020-01-15

## 2020-01-13 RX ORDER — FLUTICASONE PROPIONATE 50 MCG
1 SPRAY, SUSPENSION NASAL
Refills: 0 | Status: DISCONTINUED | OUTPATIENT
Start: 2020-01-13 | End: 2020-01-15

## 2020-01-13 RX ORDER — ONDANSETRON 8 MG/1
4 TABLET, FILM COATED ORAL ONCE
Refills: 0 | Status: DISCONTINUED | OUTPATIENT
Start: 2020-01-13 | End: 2020-01-13

## 2020-01-13 RX ORDER — SODIUM CHLORIDE 9 MG/ML
1000 INJECTION, SOLUTION INTRAVENOUS
Refills: 0 | Status: DISCONTINUED | OUTPATIENT
Start: 2020-01-13 | End: 2020-01-13

## 2020-01-13 RX ORDER — APIXABAN 2.5 MG/1
2.5 TABLET, FILM COATED ORAL EVERY 12 HOURS
Refills: 0 | Status: DISCONTINUED | OUTPATIENT
Start: 2020-01-14 | End: 2020-01-15

## 2020-01-13 RX ORDER — ACETAMINOPHEN 500 MG
1000 TABLET ORAL EVERY 6 HOURS
Refills: 0 | Status: COMPLETED | OUTPATIENT
Start: 2020-01-13 | End: 2020-01-14

## 2020-01-13 RX ORDER — SODIUM CHLORIDE 9 MG/ML
1000 INJECTION, SOLUTION INTRAVENOUS
Refills: 0 | Status: DISCONTINUED | OUTPATIENT
Start: 2020-01-13 | End: 2020-01-15

## 2020-01-13 RX ORDER — ONDANSETRON 8 MG/1
4 TABLET, FILM COATED ORAL EVERY 6 HOURS
Refills: 0 | Status: DISCONTINUED | OUTPATIENT
Start: 2020-01-13 | End: 2020-01-15

## 2020-01-13 RX ORDER — HYDROMORPHONE HYDROCHLORIDE 2 MG/ML
0.5 INJECTION INTRAMUSCULAR; INTRAVENOUS; SUBCUTANEOUS
Refills: 0 | Status: DISCONTINUED | OUTPATIENT
Start: 2020-01-13 | End: 2020-01-15

## 2020-01-13 RX ORDER — HYDROMORPHONE HYDROCHLORIDE 2 MG/ML
0.5 INJECTION INTRAMUSCULAR; INTRAVENOUS; SUBCUTANEOUS
Refills: 0 | Status: DISCONTINUED | OUTPATIENT
Start: 2020-01-13 | End: 2020-01-13

## 2020-01-13 RX ADMIN — CELECOXIB 200 MILLIGRAM(S): 200 CAPSULE ORAL at 21:15

## 2020-01-13 RX ADMIN — ATORVASTATIN CALCIUM 10 MILLIGRAM(S): 80 TABLET, FILM COATED ORAL at 21:15

## 2020-01-13 RX ADMIN — CELECOXIB 200 MILLIGRAM(S): 200 CAPSULE ORAL at 21:51

## 2020-01-13 RX ADMIN — Medication 1 SPRAY(S): at 19:06

## 2020-01-13 RX ADMIN — Medication 1000 MILLIGRAM(S): at 15:28

## 2020-01-13 RX ADMIN — SODIUM CHLORIDE 100 MILLILITER(S): 9 INJECTION, SOLUTION INTRAVENOUS at 21:16

## 2020-01-13 RX ADMIN — ONDANSETRON 4 MILLIGRAM(S): 8 TABLET, FILM COATED ORAL at 17:30

## 2020-01-13 RX ADMIN — Medication 400 MILLIGRAM(S): at 20:19

## 2020-01-13 RX ADMIN — TRAMADOL HYDROCHLORIDE 50 MILLIGRAM(S): 50 TABLET ORAL at 15:15

## 2020-01-13 RX ADMIN — GABAPENTIN 200 MILLIGRAM(S): 400 CAPSULE ORAL at 21:15

## 2020-01-13 RX ADMIN — Medication 100 MILLIGRAM(S): at 23:42

## 2020-01-13 RX ADMIN — APREPITANT 40 MILLIGRAM(S): 80 CAPSULE ORAL at 07:00

## 2020-01-13 RX ADMIN — SODIUM CHLORIDE 100 MILLILITER(S): 9 INJECTION, SOLUTION INTRAVENOUS at 14:14

## 2020-01-13 RX ADMIN — SODIUM CHLORIDE 75 MILLILITER(S): 9 INJECTION, SOLUTION INTRAVENOUS at 10:52

## 2020-01-13 RX ADMIN — CHLORHEXIDINE GLUCONATE 1 APPLICATION(S): 213 SOLUTION TOPICAL at 07:00

## 2020-01-13 RX ADMIN — Medication 400 MILLIGRAM(S): at 14:35

## 2020-01-13 RX ADMIN — TRAMADOL HYDROCHLORIDE 50 MILLIGRAM(S): 50 TABLET ORAL at 14:14

## 2020-01-13 RX ADMIN — Medication 1000 MILLIGRAM(S): at 21:51

## 2020-01-13 RX ADMIN — SENNA PLUS 2 TABLET(S): 8.6 TABLET ORAL at 21:15

## 2020-01-13 RX ADMIN — Medication 100 MILLIGRAM(S): at 15:42

## 2020-01-13 RX ADMIN — Medication 30 MILLILITER(S): at 17:35

## 2020-01-13 NOTE — CONSULT NOTE ADULT - SUBJECTIVE AND OBJECTIVE BOX
Patient is a 84y old  Female who presents with a chief complaint of Total knee replacement (13 Jan 2020 12:55)  83yo female patient with approximately 18 month history of progressively worsening right knee pain. She had 4 gel injections this year, with no improvement. She has not had any other treatment. She rates the pain at 0/10 when seated, but with ambulation it goes to 9-10/10. She is taking Tylenol 1000mg if needed for pain with some relief, up to twice daily.     INTERVAL HPI/OVERNIGHT EVENTS:  Pt is seen and examined.    Pain Location & Control:     MEDICATIONS  (STANDING):  acetaminophen  IVPB .. 1000 milliGRAM(s) IV Intermittent every 6 hours  atorvastatin 10 milliGRAM(s) Oral at bedtime  ceFAZolin   IVPB 2000 milliGRAM(s) IV Intermittent every 8 hours  celecoxib 200 milliGRAM(s) Oral every 12 hours  fluticasone propionate 50 MICROgram(s)/spray Nasal Spray 1 Spray(s) Both Nostrils two times a day  gabapentin 200 milliGRAM(s) Oral three times a day  lactated ringers. 1000 milliLiter(s) (100 mL/Hr) IV Continuous <Continuous>  levothyroxine 125 MICROGram(s) Oral daily  pantoprazole    Tablet 40 milliGRAM(s) Oral before breakfast  polyethylene glycol 3350 17 Gram(s) Oral daily  senna 2 Tablet(s) Oral at bedtime    MEDICATIONS  (PRN):  aluminum hydroxide/magnesium hydroxide/simethicone Suspension 30 milliLiter(s) Oral four times a day PRN Indigestion  HYDROmorphone  Injectable 0.5 milliGRAM(s) IV Push every 3 hours PRN Severe Pain (7 - 10)  magnesium hydroxide Suspension 30 milliLiter(s) Oral daily PRN Constipation  ondansetron Injectable 4 milliGRAM(s) IV Push every 6 hours PRN Nausea and/or Vomiting  traMADol 50 milliGRAM(s) Oral every 4 hours PRN Mild Pain (1 - 3)  traMADol 100 milliGRAM(s) Oral every 4 hours PRN Moderate Pain (4 - 6)      Allergies    No Known Drug Allergies  Vinegar- hands break out in a rash (Rash)    Intolerances            Vital Signs Last 24 Hrs  T(C): 36.3 (13 Jan 2020 11:59), Max: 36.6 (13 Jan 2020 06:33)  T(F): 97.4 (13 Jan 2020 11:59), Max: 97.8 (13 Jan 2020 06:33)  HR: 64 (13 Jan 2020 11:59) (64 - 68)  BP: 136/58 (13 Jan 2020 11:59) (99/41 - 138/68)  BP(mean): --  RR: 16 (13 Jan 2020 11:59) (12 - 20)  SpO2: 99% (13 Jan 2020 11:59) (94% - 99%)        RADIOLOGY & ADDITIONAL TESTS:    Imaging Personally Reviewed:  [ ] YES  [ ] NO    Consultant(s) Notes Reviewed:  [ ] YES  [ ] NO    Care Discussed with Consultants/Other Providers [x YES  [ ] NO Patient is a 84y old  Female who presents with a chief complaint of Total knee replacement (2020 12:55)  83yo female patient with approximately 18 month history of progressively worsening right knee pain. She had 4 gel injections this year, with no improvement. She has not had any other treatment. She rates the pain at 0/10 when seated, but with ambulation it goes to 9-10/10. She is taking Tylenol 1000mg if needed for pain with some relief, up to twice daily.     HPI:  Patient is seen and examined.  Pain is controlled.      PAST MEDICAL & SURGICAL HISTORY:  H/O seborrheic keratosis  H/O actinic keratosis  Primary immunodeficiency disorder: being treated with Hizentra  DDD (degenerative disc disease), lumbar  Cancer of female breast: 2012- left breast- tx with surgery, radiation and Letrozole  Class 2 obesity with body mass index (BMI) of 39.0 to 39.9 in adult  Hyperlipidemia  Vitamin D deficiency  Environmental allergies  Osteoarthritis  Chronic cough  Pneumonia  Bronchitis  Hypothyroidism  Skin cancer  S/P skin cancer resection: x11  S/P epidural steroid injection: x2- Dr. Martin- for Lumbar Disc Disease  S/P partial mastectomy, left:   S/P appendectomy: 1950  S/P cataract surgery: approx 2006- bilateral      MEDICATIONS  (STANDING):  acetaminophen  IVPB .. 1000 milliGRAM(s) IV Intermittent every 6 hours  atorvastatin 10 milliGRAM(s) Oral at bedtime  ceFAZolin   IVPB 2000 milliGRAM(s) IV Intermittent every 8 hours  celecoxib 200 milliGRAM(s) Oral every 12 hours  fluticasone propionate 50 MICROgram(s)/spray Nasal Spray 1 Spray(s) Both Nostrils two times a day  gabapentin 200 milliGRAM(s) Oral three times a day  lactated ringers. 1000 milliLiter(s) (100 mL/Hr) IV Continuous <Continuous>  levothyroxine 125 MICROGram(s) Oral daily  pantoprazole    Tablet 40 milliGRAM(s) Oral before breakfast  polyethylene glycol 3350 17 Gram(s) Oral daily  senna 2 Tablet(s) Oral at bedtime    MEDICATIONS  (PRN):  aluminum hydroxide/magnesium hydroxide/simethicone Suspension 30 milliLiter(s) Oral four times a day PRN Indigestion  HYDROmorphone  Injectable 0.5 milliGRAM(s) IV Push every 3 hours PRN Severe Pain (7 - 10)  magnesium hydroxide Suspension 30 milliLiter(s) Oral daily PRN Constipation  ondansetron Injectable 4 milliGRAM(s) IV Push every 6 hours PRN Nausea and/or Vomiting  traMADol 50 milliGRAM(s) Oral every 4 hours PRN Mild Pain (1 - 3)  traMADol 100 milliGRAM(s) Oral every 4 hours PRN Moderate Pain (4 - 6)      Allergies    No Known Drug Allergies  Vinegar- hands break out in a rash (Rash)    Intolerances    SOCIAL HISTORY:  Smoker:   NO        PACK YEARS:                         WHEN QUIT?  ETOH use:   NO               FREQUENCY / QUANTITY:  Ilicit Drug use:  NO        FAMILY HISTORY:  Family history of ulcerative colitis: son- living- age 56yo  Family history of hypertension in son: son- living - age 57yo  Family history of lymphoma: brother-  Family history of lung cancer: sister-   Family history of hypertension in mother:   FHx: cerebral aneurysm: mother-  @ age 66  FH: dementia: father-       Vital Signs Last 24 Hrs  T(C): 36.3 (2020 11:59), Max: 36.6 (2020 06:33)  T(F): 97.4 (2020 11:59), Max: 97.8 (2020 06:33)  HR: 64 (2020 11:59) (64 - 68)  BP: 136/58 (2020 11:59) (99/41 - 138/68)  BP(mean): --  RR: 16 (2020 11:59) (12 - 20)  SpO2: 99% (2020 11:59) (94% - 99%)

## 2020-01-13 NOTE — DISCHARGE NOTE PROVIDER - NSDCCPTREATMENT_GEN_ALL_CORE_FT
PRINCIPAL PROCEDURE  Procedure: Right total knee replacement  Findings and Treatment: PRINCIPAL PROCEDURE  Procedure: Right total knee replacement  Findings and Treatment: end stage DJD

## 2020-01-13 NOTE — DISCHARGE NOTE PROVIDER - NSDCFUADDAPPT_GEN_ALL_CORE_FT
1/27 @ 1:40pm  Neil Juárez MD  [address] [facility],   [address]  [place] [address]  Tel: [phone]  Fax: [phone]

## 2020-01-13 NOTE — DISCHARGE NOTE PROVIDER - NSDCMRMEDTOKEN_GEN_ALL_CORE_FT
atorvastatin 10 mg oral tablet: 1 tab(s) orally once a day (at bedtime)  Flonase 50 mcg/inh nasal spray: 1 spray(s) nasal once a day  gabapentin: 200 milligram(s) orally 3 times a day  Hizentra 20% subcutaneous solution: 11 gram(s) subcutaneous once a week- every Saturday.  letrozole 2.5 mg oral tablet: 1 tab(s) orally once a day  levothyroxine 125 mcg (0.125 mg) oral tablet: 1 tab(s) orally once a day  triamterene: 25 milligram(s) orally once a day  Tylenol Extra Strength 500 mg oral tablet: 2 tab(s) orally every 8 hours, As Needed  Vitamin D3 2000 intl units (50 mcg) oral tablet: 1 tab(s) orally once a day acetaminophen 500 mg oral tablet: 2 tab(s) orally every 12 hours for 2 to 3 weeks.  apixaban 2.5 mg oral tablet: 1 tab(s) orally every 12 hours for 12 days TOTAL (started 1/14); then start    Ecotrin 81mg po every 12 hours for 4 additional weeks.  atorvastatin 10 mg oral tablet: 1 tab(s) orally once a day (at bedtime)  celecoxib 200 mg oral capsule: 1 cap(s) orally every 12 hours  Flonase 50 mcg/inh nasal spray: 1 spray(s) nasal once a day  gabapentin: 200 milligram(s) orally 3 times a day  Hizentra 20% subcutaneous solution: 11 gram(s) subcutaneous once a week- every Saturday.  HYDROmorphone 2 mg oral tablet: 1 tab(s) orally every 4 hours, As Needed for mild to moderate pain - 3)  HYDROmorphone 4 mg oral tablet: 1 tab(s) orally every 4 hours, As Needed for moderate to severe pain - 6)  letrozole 2.5 mg oral tablet: 1 tab(s) orally once a day  levothyroxine 125 mcg (0.125 mg) oral tablet: 1 tab(s) orally once a day  pantoprazole 40 mg oral delayed release tablet: 1 tab(s) orally once a day (in the morning) while on DVT prevention  polyethylene glycol 3350 oral powder for reconstitution: 17 gram(s) orally once a day  senna oral tablet: 2 tab(s) orally once a day (at bedtime)  triamterene-hydrochlorothiazide 37.5 mg-25 mg oral tablet: 1 tab(s) orally once a day  Vitamin D3 2000 intl units (50 mcg) oral tablet: 1 tab(s) orally once a day

## 2020-01-13 NOTE — PHYSICAL THERAPY INITIAL EVALUATION ADULT - RANGE OF MOTION EXAMINATION, REHAB EVAL
Left LE ROM was WFL (within functional limits)/deficits as listed below/right knee flexion 60 deg in sitting

## 2020-01-13 NOTE — BRIEF OPERATIVE NOTE - NSICDXBRIEFPOSTOP_GEN_ALL_CORE_FT
POST-OP DIAGNOSIS:  Primary localized osteoarthritis of right knee 13-Jan-2020 10:14:58  Cristobal Bright

## 2020-01-13 NOTE — DISCHARGE NOTE PROVIDER - NSDCACTIVITY_GEN_ALL_CORE
Walking - Outdoors allowed/Showering allowed/Do not drive or operate machinery/Stairs allowed/Walking - Indoors allowed

## 2020-01-13 NOTE — DISCHARGE NOTE PROVIDER - NSDCFUADDINST_GEN_ALL_CORE_FT
Physical Therapy/Occupational Therapy for: ambulation, transfers, stairs, ADL's (activities of daily living), and range of motion exercises  -Activity  • Weight Bearing as tolerated with rolling walker.  • Take short, frequent walks increasing the distance that you walk each day as tolerated.  • Change your position every hour to decrease pain and stiffness.  • Continue the exercises taught to you by your physical therapist.  • No driving until cleared by the doctor.  • No tub baths, hot tubs, or swimming pools until instructed by your doctor.  • Do not squat down on the floor.  • Do not kneel or twist your knee.  • Range of Motion Goals: Flexion= 120 degrees, Extension = 0 degrees  Keep incision area clean and dry.  You may shower post operative day 5 if no drainage present.  Prineo removal in 2 weeks in Surgeon's office  Follow up with your primary care physician within 2-3 weeks of discharge home Call surgeon ASAP for fever/chills, excessive pain, or persistent wound drainage or redness.    It is advisable to follow up with your primary care provider within the next 2-3 weeks to ensure your medications are appropriate and there are no underlying problems after your procedure.

## 2020-01-13 NOTE — PROGRESS NOTE ADULT - SUBJECTIVE AND OBJECTIVE BOX
Ortho PA - Post Op Check - S/P - TKR- right           Pt alert and comfortable with no complaints, pain controlled  Denies nausea     Vital Signs Last 24 Hrs  T(C): 36.3 (01-13-20 @ 11:59), Max: 36.4 (01-13-20 @ 09:36)  T(F): 97.4 (01-13-20 @ 11:59), Max: 97.5 (01-13-20 @ 09:36)  HR: 64 (01-13-20 @ 11:59) (64 - 68)  BP: 136/58 (01-13-20 @ 11:59) (99/41 - 136/58)  BP(mean): --  RR: 16 (01-13-20 @ 11:59) (12 - 20)  SpO2: 99% (01-13-20 @ 11:59) (94% - 99%)  I&O's Detail    13 Jan 2020 07:01  -  13 Jan 2020 12:55  --------------------------------------------------------  IN:    lactated ringers.: 1800 mL    Oral Fluid: 75 mL  Total IN: 1875 mL    OUT:    Estimated Blood Loss: 150 mL  Total OUT: 150 mL    Total NET: 1725 mL        I&O's Summary    13 Jan 2020 07:01  -  13 Jan 2020 12:55  --------------------------------------------------------  IN: 1875 mL / OUT: 150 mL / NET: 1725 mL                 PE:  *Knee-primary surgical bandage dry and intact.  Feet mobile and sensate.  *EHLs/ant.tibs. 5/5  PAS on LE's.  Calves soft and nontender.    A/P: Ortho stable  - Continue post-op orders; pain management with  - Check labs today and in A.M.  - DVT prevention with   - PT /OT for OOB, full WBAT  - Medical consult  -Discharge planning- TBD  -Will continue to monitor closely with attendings.  -Changed oxycodone to tramadol as per patient stating she does not do well with oxycodone.  We discussed changing tramadol to dilaudid if tramadol is not providing enough pain control.  Patient and family agree to this plan.

## 2020-01-13 NOTE — OCCUPATIONAL THERAPY INITIAL EVALUATION ADULT - ADDITIONAL COMMENTS
Lives with 2 sons, one who at home and can assist.   3steps to enter with handrail  6 inside with handrail. Tub with doors. Has a Rw and cane

## 2020-01-13 NOTE — DISCHARGE NOTE PROVIDER - CARE PROVIDER_API CALL
Neil Juárez)  Orthopaedic Surgery  [address] [facility], [address]  [place] [address]  Phone: [phone]  Fax: [phone]  Follow Up Time:

## 2020-01-13 NOTE — DISCHARGE NOTE PROVIDER - NSDCCPCAREPLAN_GEN_ALL_CORE_FT
PRINCIPAL DISCHARGE DIAGNOSIS  Diagnosis: Primary osteoarthritis of right knee  Assessment and Plan of Treatment: PRINCIPAL DISCHARGE DIAGNOSIS  Diagnosis: Primary osteoarthritis of right knee  Assessment and Plan of Treatment: Your new total knee requires proper care.  Your care provider is your best resource for care information.  Please follow these basic guidelines.  Use pain medication if needed as prescribed.  Ice packs are a helpful addition to your comfort.  Applying a  waterproof ice pack over a cloth or thin towel to the site for 30 minutes per hour may provide benefit by reducing swelling and discomfort.  Your Physical Therapy/Occupational Therapy may include ambulation, transfers, stairs, ADL's (activities of daily living), range of motion exercises, and isometrics.  Your participation is vital to your recovery.  -Your Activity  • Weight Bearing as tolerated with rolling walker.  • Take short, frequent walks increasing the distance that you walk each day as tolerated.  • Change your position every hour to decrease pain and stiffness.  • Continue the exercises taught to you by your physical therapist.  • No driving until cleared by the doctor.  • No tub baths, hot tubs, or swimming pools until instructed by your doctor.  • Do not squat down on the floor.  • Do not kneel or twist your knee.  Keep incision clean and dry. Change the dressing daily if there is drainage noted from surgical wound. When there is no drainage, the wound may be left open to air.  Salves, ointments or other topical treatments are not to be used on the wound unless specifically recommended by your doctor.   If you have sutures (stiches) and no drainage form the incision you may shower allowing water to rinse the incision and pat it  dry afterward with a clean towel.  If you have Prineo (tape/glue) you may shower and pat the wound dry.  Prineo removal is done in the office 2 weeks after surgery.  If you have further questions or problems call your doctor's office.

## 2020-01-13 NOTE — PHYSICAL THERAPY INITIAL EVALUATION ADULT - GAIT TRAINING, PT EVAL
Goals 2-4 days, Pt will ambulate 150 ft w/ rolling walker independently.    Pt will negotiate 10 steps with rail and straight cane with supervision

## 2020-01-13 NOTE — DISCHARGE NOTE PROVIDER - NSDCFUSCHEDAPPT_GEN_ALL_CORE_FT
MARGARITA POWERS ; 01/27/2020 ; [facility] [address] [place] [address]  MARGARITA POWERS ; 03/17/2020 ; [facility] MARGARITA POWERS ; 01/27/2020 ; hospitals [address] [place] [address]  MARGARITA POWERS ; 03/17/2020 ; [facility] MARGARITA POWERS ; 01/27/2020 ; [facility] [address] [place] [address]  MARGARITA POWERS ; 03/17/2020 ; [facility]

## 2020-01-13 NOTE — BRIEF OPERATIVE NOTE - NSICDXBRIEFPREOP_GEN_ALL_CORE_FT
PRE-OP DIAGNOSIS:  Primary localized osteoarthritis of right knee 13-Jan-2020 10:15:02  Cristobal Bright

## 2020-01-13 NOTE — DISCHARGE NOTE PROVIDER - HOSPITAL COURSE
This patient was admitted to [facility] with a history of severe degenerative joint disease of the right knee.  Patient went to Pre-Surgical Testing at [facility] and was medically cleared to undergo a right total knee replacement by Dr. Juárez.  No operative or gladys-operative complications arose during patients hospital course.  Patient received antibiotic according to SCIP guidelines for infection prevention.  Eliquis was given for DVT prophylaxis.  Anesthesia, Medical Hospitalist, Physical Therapy and Occupational Therapy were consulted. Patient is stable for discharge with a good prognosis.  Appropriate discharge instructions and medications are provided in this document. This 85yo female patient was admitted to [facility] on 1/13/20 with a history of severe degenerative joint disease of the right knee and for elective total joint replacement.  Patient had appropriate preop medical evaluation and testing.    Patient received antibiotics according to SCIP guidelines for infection prevention.  The patient underwent an uncomplicated right total knee replacement by Dr. Neil Juárez on 1/13.   Eliquis was given for DVT prophylaxis.  Anesthesia, Medical Hospitalist, Physical Therapy and Occupational Therapy were consulted.  Patient is stable for discharge to Rehab on 1/15/20 with a good prognosis.  Appropriate discharge instructions and medications are provided in this document.

## 2020-01-13 NOTE — DISCHARGE NOTE PROVIDER - INSTRUCTIONS
For Constipation :   • Increase your water intake. Drink at least 8 glasses of water daily.  • Try adding fiber to your diet by eating fruits, vegetables and foods that are rich in grains.  • If you do experience constipation, you may take an over-the-counter stool softener/laxative such as Stephanie Colace, Senekot or Milk of Magnesia.  - Call your doctor if you experience:  • An increase in pain not controlled by pain medication or change in activity or  position.  • Temperature greater than 101° F.  • Redness, increased swelling or foul smelling drainage from or around the  incision.  • Numbness, tingling or a change in color or temperature of the operative leg.  • Call your doctor immediately if you experience chest pain, shortness of breath or calf pain. Regular diet  For Constipation :   • Increase your water intake. Drink at least 8 glasses of water daily.  • Try adding fiber to your diet by eating fruits, vegetables and foods that are rich in grains.  • If you do experience constipation, you may take an over-the-counter stool softener/laxative such as Colace, Senokot or Milk of Magnesia.  medication or change in activity or  position.

## 2020-01-13 NOTE — RESEARCH COMMUNICATION NOTE - NS AS RESEARCH COMMUNICATION NOTE FT
Patient seen in the preoperative unit.  Patient agrees to continue participation in the Bipolar Sealer Clinical Trial.    Re-consent obtained by the surgeon as the previous consent was >28  days from the date of surgery.  A copy of the consent was provided to the patient and a copy was placed in the patient chart.   Inclusion /Exclusion Criteria reviewed with the surgeon.  The patient meets the study criteria for enrollment.  Randomization completed via the [employer] database.  The surgeon and operating room notified of the treatment arm. Laurie Griffin, Research RN

## 2020-01-13 NOTE — CONSULT NOTE ADULT - ASSESSMENT
Aftercare following surgery    pain meds dilaudid and oxycodone. Moniter for respiratory depression and lethargy.  PT/OT.  DVT prophylaxis.  DVT ppx: [ ]ASA81 [ ] CHI464 [ ] Lovenox [ ] Coumadin   [x ] Eliquis [  ] Heparin  Dispo:     Home [ ]     Acute Rehab [ ]     LYNETTE [ ]     TBD [x ]    Dyslipidemia  simvastatin.    Obesity    Hypothyroidism  synthroid. Aftercare following right TKR 1/13    pain meds dilaudid and oxycodone. Moniter for respiratory depression and lethargy.  PT/OT.  DVT prophylaxis.  DVT ppx: [ ]ASA81 [ ] TDT708 [ ] Lovenox [ ] Coumadin   [x ] Eliquis [  ] Heparin  Dispo:     Home [ ]     Acute Rehab [ ]     LYNETTE [ ]     TBD [x ]    Dyslipidemia  simvastatin.    Obesity    Hypothyroidism  synthroid.    Plan of care was discuss with patient, all questions were answered, seems understand and in agreement. Aftercare following right TKR 1/13    pain meds dilaudid and oxycodone. Moniter for respiratory depression and lethargy.  PT/OT.  DVT prophylaxis.  DVT ppx: [ ]ASA81 [ ] DOL907 [ ] Lovenox [ ] Coumadin   [x ] Eliquis [  ] Heparin  Dispo:     Home [ ]     Acute Rehab [ ]     LYNETTE [ ]     TBD [x ]    Dyslipidemia  simvastatin.    Obesity    Hypothyroidism  synthroid.    Plan of care was discuss with patient, all questions were answered, seems understand and in agreement.      Addendem- c/o retrosternal burning chest pain, nausea.  likly due to GERD.  EKG- poor quality but no acute ST T wave changes.  protonix, mallox.  check troponin level.

## 2020-01-13 NOTE — PHYSICAL THERAPY INITIAL EVALUATION ADULT - ADDITIONAL COMMENTS
Pt lives with 2 sons in a house with 2 steps to enter with bilateral rails, 6+6 steps inside with rail.  Pt has straight cane and rolling walker

## 2020-01-14 LAB
ANION GAP SERPL CALC-SCNC: 3 MMOL/L — LOW (ref 5–17)
BUN SERPL-MCNC: 25 MG/DL — HIGH (ref 7–23)
CALCIUM SERPL-MCNC: 8.6 MG/DL — SIGNIFICANT CHANGE UP (ref 8.4–10.5)
CHLORIDE SERPL-SCNC: 103 MMOL/L — SIGNIFICANT CHANGE UP (ref 96–108)
CO2 SERPL-SCNC: 34 MMOL/L — HIGH (ref 22–31)
CREAT SERPL-MCNC: 1.1 MG/DL — SIGNIFICANT CHANGE UP (ref 0.5–1.3)
GLUCOSE SERPL-MCNC: 138 MG/DL — HIGH (ref 70–99)
HCT VFR BLD CALC: 32.3 % — LOW (ref 34.5–45)
HGB BLD-MCNC: 10.4 G/DL — LOW (ref 11.5–15.5)
MCHC RBC-ENTMCNC: 30.7 PG — SIGNIFICANT CHANGE UP (ref 27–34)
MCHC RBC-ENTMCNC: 32.2 GM/DL — SIGNIFICANT CHANGE UP (ref 32–36)
MCV RBC AUTO: 95.3 FL — SIGNIFICANT CHANGE UP (ref 80–100)
NRBC # BLD: 0 /100 WBCS — SIGNIFICANT CHANGE UP (ref 0–0)
PLATELET # BLD AUTO: 216 K/UL — SIGNIFICANT CHANGE UP (ref 150–400)
POTASSIUM SERPL-MCNC: 4 MMOL/L — SIGNIFICANT CHANGE UP (ref 3.5–5.3)
POTASSIUM SERPL-SCNC: 4 MMOL/L — SIGNIFICANT CHANGE UP (ref 3.5–5.3)
RBC # BLD: 3.39 M/UL — LOW (ref 3.8–5.2)
RBC # FLD: 13.8 % — SIGNIFICANT CHANGE UP (ref 10.3–14.5)
SODIUM SERPL-SCNC: 140 MMOL/L — SIGNIFICANT CHANGE UP (ref 135–145)
WBC # BLD: 9.32 K/UL — SIGNIFICANT CHANGE UP (ref 3.8–10.5)
WBC # FLD AUTO: 9.32 K/UL — SIGNIFICANT CHANGE UP (ref 3.8–10.5)

## 2020-01-14 PROCEDURE — 99233 SBSQ HOSP IP/OBS HIGH 50: CPT

## 2020-01-14 RX ADMIN — TRAMADOL HYDROCHLORIDE 100 MILLIGRAM(S): 50 TABLET ORAL at 07:26

## 2020-01-14 RX ADMIN — SODIUM CHLORIDE 100 MILLILITER(S): 9 INJECTION, SOLUTION INTRAVENOUS at 15:57

## 2020-01-14 RX ADMIN — Medication 400 MILLIGRAM(S): at 02:27

## 2020-01-14 RX ADMIN — CELECOXIB 200 MILLIGRAM(S): 200 CAPSULE ORAL at 21:47

## 2020-01-14 RX ADMIN — Medication 1000 MILLIGRAM(S): at 15:45

## 2020-01-14 RX ADMIN — GABAPENTIN 200 MILLIGRAM(S): 400 CAPSULE ORAL at 21:47

## 2020-01-14 RX ADMIN — CELECOXIB 200 MILLIGRAM(S): 200 CAPSULE ORAL at 21:30

## 2020-01-14 RX ADMIN — Medication 1000 MILLIGRAM(S): at 08:12

## 2020-01-14 RX ADMIN — Medication 1000 MILLIGRAM(S): at 15:19

## 2020-01-14 RX ADMIN — POLYETHYLENE GLYCOL 3350 17 GRAM(S): 17 POWDER, FOR SOLUTION ORAL at 08:10

## 2020-01-14 RX ADMIN — GABAPENTIN 200 MILLIGRAM(S): 400 CAPSULE ORAL at 15:18

## 2020-01-14 RX ADMIN — Medication 1000 MILLIGRAM(S): at 02:28

## 2020-01-14 RX ADMIN — ONDANSETRON 4 MILLIGRAM(S): 8 TABLET, FILM COATED ORAL at 09:25

## 2020-01-14 RX ADMIN — ATORVASTATIN CALCIUM 10 MILLIGRAM(S): 80 TABLET, FILM COATED ORAL at 21:47

## 2020-01-14 RX ADMIN — Medication 1000 MILLIGRAM(S): at 08:09

## 2020-01-14 RX ADMIN — TRAMADOL HYDROCHLORIDE 100 MILLIGRAM(S): 50 TABLET ORAL at 14:15

## 2020-01-14 RX ADMIN — Medication 125 MICROGRAM(S): at 05:22

## 2020-01-14 RX ADMIN — TRAMADOL HYDROCHLORIDE 100 MILLIGRAM(S): 50 TABLET ORAL at 13:35

## 2020-01-14 RX ADMIN — GABAPENTIN 200 MILLIGRAM(S): 400 CAPSULE ORAL at 05:22

## 2020-01-14 RX ADMIN — PANTOPRAZOLE SODIUM 40 MILLIGRAM(S): 20 TABLET, DELAYED RELEASE ORAL at 05:22

## 2020-01-14 RX ADMIN — ONDANSETRON 4 MILLIGRAM(S): 8 TABLET, FILM COATED ORAL at 17:31

## 2020-01-14 RX ADMIN — SENNA PLUS 2 TABLET(S): 8.6 TABLET ORAL at 21:47

## 2020-01-14 RX ADMIN — APIXABAN 2.5 MILLIGRAM(S): 2.5 TABLET, FILM COATED ORAL at 21:46

## 2020-01-14 RX ADMIN — Medication 1 SPRAY(S): at 05:22

## 2020-01-14 RX ADMIN — TRAMADOL HYDROCHLORIDE 100 MILLIGRAM(S): 50 TABLET ORAL at 07:07

## 2020-01-14 RX ADMIN — CELECOXIB 200 MILLIGRAM(S): 200 CAPSULE ORAL at 08:10

## 2020-01-14 RX ADMIN — APIXABAN 2.5 MILLIGRAM(S): 2.5 TABLET, FILM COATED ORAL at 09:22

## 2020-01-14 RX ADMIN — CELECOXIB 200 MILLIGRAM(S): 200 CAPSULE ORAL at 08:12

## 2020-01-14 RX ADMIN — Medication 200 MILLIGRAM(S): at 15:20

## 2020-01-14 NOTE — PROGRESS NOTE ADULT - SUBJECTIVE AND OBJECTIVE BOX
Patient is a 84y old  Female who presents with a chief complaint of Total knee replacement (13 Jan 2020 12:55)      INTERVAL HPI/OVERNIGHT EVENTS:  Pt is seen and examined.  pain is controlled. c/o nausea this morning.    Pain Location & Control:     MEDICATIONS  (STANDING):  acetaminophen   Tablet .. 1000 milliGRAM(s) Oral every 8 hours  apixaban 2.5 milliGRAM(s) Oral every 12 hours  atorvastatin 10 milliGRAM(s) Oral at bedtime  celecoxib 200 milliGRAM(s) Oral every 12 hours  fluticasone propionate 50 MICROgram(s)/spray Nasal Spray 1 Spray(s) Both Nostrils two times a day  gabapentin 200 milliGRAM(s) Oral three times a day  lactated ringers. 1000 milliLiter(s) (100 mL/Hr) IV Continuous <Continuous>  levothyroxine 125 MICROGram(s) Oral daily  pantoprazole    Tablet 40 milliGRAM(s) Oral before breakfast  polyethylene glycol 3350 17 Gram(s) Oral daily  senna 2 Tablet(s) Oral at bedtime    MEDICATIONS  (PRN):  aluminum hydroxide/magnesium hydroxide/simethicone Suspension 30 milliLiter(s) Oral four times a day PRN Indigestion  HYDROmorphone  Injectable 0.5 milliGRAM(s) IV Push every 3 hours PRN Severe Pain (7 - 10)  magnesium hydroxide Suspension 30 milliLiter(s) Oral daily PRN Constipation  ondansetron Injectable 4 milliGRAM(s) IV Push every 6 hours PRN Nausea and/or Vomiting  traMADol 50 milliGRAM(s) Oral every 4 hours PRN Mild Pain (1 - 3)  traMADol 100 milliGRAM(s) Oral every 4 hours PRN Moderate Pain (4 - 6)      Allergies    No Known Drug Allergies  Vinegar- hands break out in a rash (Rash)    Intolerances        Vital Signs Last 24 Hrs  T(C): 36.7 (14 Jan 2020 08:05), Max: 36.7 (14 Jan 2020 08:05)  T(F): 98.1 (14 Jan 2020 08:05), Max: 98.1 (14 Jan 2020 08:05)  HR: 68 (14 Jan 2020 08:05) (64 - 92)  BP: 100/54 (14 Jan 2020 08:05) (90/48 - 154/88)  BP(mean): --  RR: 18 (14 Jan 2020 08:05) (15 - 20)  SpO2: 90% (14 Jan 2020 08:05) (90% - 99%)        LABS:                        10.4   9.32  )-----------( 216      ( 14 Jan 2020 07:18 )             32.3     14 Jan 2020 07:18    140    |  103    |  25     ----------------------------<  138    4.0     |  34     |  1.10     Ca    8.6        14 Jan 2020 07:18          CAPILLARY BLOOD GLUCOSE        CARDIAC MARKERS ( 13 Jan 2020 17:51 )  .012 ng/mL / x     / x     / x     / x          Cultures          RADIOLOGY & ADDITIONAL TESTS:    Imaging Personally Reviewed:  [ ] YES  [ ] NO    Consultant(s) Notes Reviewed:  [ ] YES  [ ] NO    Care Discussed with Consultants/Other Providers [x ] YES  [ ] NO

## 2020-01-14 NOTE — PROGRESS NOTE ADULT - SUBJECTIVE AND OBJECTIVE BOX
POST OPERATIVE DAY #:  [ 1]   STATUS POST: [ ] Left [x ] Right  [x ]TKR [ ]THR                        Patient is a 84y old  Female who presents with a chief complaint of Total knee replacement (13 Jan 2020 12:55)      Pain well controlled    OBJECTIVE:     Vital Signs Last 24 Hrs  T(C): 36.5 (14 Jan 2020 03:06), Max: 36.6 (13 Jan 2020 15:00)  T(F): 97.7 (14 Jan 2020 03:06), Max: 97.8 (13 Jan 2020 15:00)  HR: 67 (14 Jan 2020 03:49) (64 - 92)  BP: 94/56 (14 Jan 2020 03:49) (90/48 - 154/88)  BP(mean): --  RR: 16 (14 Jan 2020 03:06) (12 - 20)  SpO2: 90% (14 Jan 2020 03:06) (90% - 99%)    Affected extremity:          Dressing:  clean/dry/intact         Sensation; intact to light touch           Motor exam: Toes warm and mobile well perfused;  +capillary refill         No calf tenderness bilateral LE's    LABS:                        10.4   9.32  )-----------( 216      ( 14 Jan 2020 07:18 )             32.3     01-13    139  |  103  |  24<H>  ----------------------------<  268<H>  4.2   |  26  |  1.31<H>    Ca    8.9      13 Jan 2020 17:49              A/P :    -    Analgesics PRN  -    DVT ppx: [ ]ASA 81 bid [ ] Lovenox [ ] Coumadin   [ x] Eliquis   -    Check AM labs  -    Weight bearing status: WBAT [x ]        PWB    [ ]     TTWB  [ ]      NWB  [ ]  -    Physical Therapy  -    Occupational Therapy  -    Dispo: Home [ ]     Rehab [ ]      LYNETTE [ ]      To be determined [x ]

## 2020-01-15 ENCOUNTER — TRANSCRIPTION ENCOUNTER (OUTPATIENT)
Age: 85
End: 2020-01-15

## 2020-01-15 VITALS
TEMPERATURE: 97 F | DIASTOLIC BLOOD PRESSURE: 63 MMHG | SYSTOLIC BLOOD PRESSURE: 107 MMHG | OXYGEN SATURATION: 90 % | RESPIRATION RATE: 18 BRPM | HEART RATE: 66 BPM

## 2020-01-15 LAB
ANION GAP SERPL CALC-SCNC: 1 MMOL/L — LOW (ref 5–17)
BUN SERPL-MCNC: 23 MG/DL — SIGNIFICANT CHANGE UP (ref 7–23)
CALCIUM SERPL-MCNC: 8.5 MG/DL — SIGNIFICANT CHANGE UP (ref 8.4–10.5)
CHLORIDE SERPL-SCNC: 107 MMOL/L — SIGNIFICANT CHANGE UP (ref 96–108)
CO2 SERPL-SCNC: 34 MMOL/L — HIGH (ref 22–31)
CREAT SERPL-MCNC: 0.98 MG/DL — SIGNIFICANT CHANGE UP (ref 0.5–1.3)
GLUCOSE SERPL-MCNC: 119 MG/DL — HIGH (ref 70–99)
HCT VFR BLD CALC: 29.4 % — LOW (ref 34.5–45)
HGB BLD-MCNC: 9.1 G/DL — LOW (ref 11.5–15.5)
MCHC RBC-ENTMCNC: 30.1 PG — SIGNIFICANT CHANGE UP (ref 27–34)
MCHC RBC-ENTMCNC: 31 GM/DL — LOW (ref 32–36)
MCV RBC AUTO: 97.4 FL — SIGNIFICANT CHANGE UP (ref 80–100)
NRBC # BLD: 0 /100 WBCS — SIGNIFICANT CHANGE UP (ref 0–0)
PLATELET # BLD AUTO: 156 K/UL — SIGNIFICANT CHANGE UP (ref 150–400)
POTASSIUM SERPL-MCNC: 3.6 MMOL/L — SIGNIFICANT CHANGE UP (ref 3.5–5.3)
POTASSIUM SERPL-SCNC: 3.6 MMOL/L — SIGNIFICANT CHANGE UP (ref 3.5–5.3)
RBC # BLD: 3.02 M/UL — LOW (ref 3.8–5.2)
RBC # FLD: 14.1 % — SIGNIFICANT CHANGE UP (ref 10.3–14.5)
SODIUM SERPL-SCNC: 142 MMOL/L — SIGNIFICANT CHANGE UP (ref 135–145)
WBC # BLD: 7.22 K/UL — SIGNIFICANT CHANGE UP (ref 3.8–10.5)
WBC # FLD AUTO: 7.22 K/UL — SIGNIFICANT CHANGE UP (ref 3.8–10.5)

## 2020-01-15 PROCEDURE — 88311 DECALCIFY TISSUE: CPT

## 2020-01-15 PROCEDURE — 73562 X-RAY EXAM OF KNEE 3: CPT

## 2020-01-15 PROCEDURE — 94640 AIRWAY INHALATION TREATMENT: CPT

## 2020-01-15 PROCEDURE — 85027 COMPLETE CBC AUTOMATED: CPT

## 2020-01-15 PROCEDURE — 88305 TISSUE EXAM BY PATHOLOGIST: CPT

## 2020-01-15 PROCEDURE — 80048 BASIC METABOLIC PNL TOTAL CA: CPT

## 2020-01-15 PROCEDURE — 84484 ASSAY OF TROPONIN QUANT: CPT

## 2020-01-15 PROCEDURE — C1713: CPT

## 2020-01-15 PROCEDURE — 99238 HOSP IP/OBS DSCHRG MGMT 30/<: CPT

## 2020-01-15 PROCEDURE — C1889: CPT

## 2020-01-15 PROCEDURE — 97165 OT EVAL LOW COMPLEX 30 MIN: CPT

## 2020-01-15 PROCEDURE — 97161 PT EVAL LOW COMPLEX 20 MIN: CPT

## 2020-01-15 PROCEDURE — 97116 GAIT TRAINING THERAPY: CPT

## 2020-01-15 PROCEDURE — C1776: CPT

## 2020-01-15 PROCEDURE — 97530 THERAPEUTIC ACTIVITIES: CPT

## 2020-01-15 PROCEDURE — 97535 SELF CARE MNGMENT TRAINING: CPT

## 2020-01-15 PROCEDURE — 93005 ELECTROCARDIOGRAM TRACING: CPT

## 2020-01-15 PROCEDURE — 36415 COLL VENOUS BLD VENIPUNCTURE: CPT

## 2020-01-15 PROCEDURE — 97110 THERAPEUTIC EXERCISES: CPT

## 2020-01-15 PROCEDURE — 85018 HEMOGLOBIN: CPT

## 2020-01-15 PROCEDURE — 85014 HEMATOCRIT: CPT

## 2020-01-15 RX ORDER — ACETAMINOPHEN 500 MG
2 TABLET ORAL
Qty: 0 | Refills: 0 | DISCHARGE

## 2020-01-15 RX ORDER — ONDANSETRON 8 MG/1
4 TABLET, FILM COATED ORAL ONCE
Refills: 0 | Status: COMPLETED | OUTPATIENT
Start: 2020-01-15 | End: 2020-01-15

## 2020-01-15 RX ORDER — HYDROMORPHONE HYDROCHLORIDE 2 MG/ML
1 INJECTION INTRAMUSCULAR; INTRAVENOUS; SUBCUTANEOUS
Qty: 30 | Refills: 0
Start: 2020-01-15 | End: 2020-01-19

## 2020-01-15 RX ORDER — HYDROMORPHONE HYDROCHLORIDE 2 MG/ML
1 INJECTION INTRAMUSCULAR; INTRAVENOUS; SUBCUTANEOUS
Qty: 0 | Refills: 0 | DISCHARGE
Start: 2020-01-15

## 2020-01-15 RX ORDER — HYDROMORPHONE HYDROCHLORIDE 2 MG/ML
2 INJECTION INTRAMUSCULAR; INTRAVENOUS; SUBCUTANEOUS
Refills: 0 | Status: DISCONTINUED | OUTPATIENT
Start: 2020-01-15 | End: 2020-01-15

## 2020-01-15 RX ORDER — TRIAMTERENE 100 MG/1
25 CAPSULE ORAL
Qty: 0 | Refills: 0 | DISCHARGE

## 2020-01-15 RX ORDER — ACETAMINOPHEN 500 MG
2 TABLET ORAL
Qty: 0 | Refills: 0 | DISCHARGE
Start: 2020-01-15

## 2020-01-15 RX ORDER — HYDROMORPHONE HYDROCHLORIDE 2 MG/ML
4 INJECTION INTRAMUSCULAR; INTRAVENOUS; SUBCUTANEOUS
Refills: 0 | Status: DISCONTINUED | OUTPATIENT
Start: 2020-01-15 | End: 2020-01-15

## 2020-01-15 RX ORDER — APIXABAN 2.5 MG/1
1 TABLET, FILM COATED ORAL
Qty: 0 | Refills: 0 | DISCHARGE
Start: 2020-01-15

## 2020-01-15 RX ORDER — SENNA PLUS 8.6 MG/1
2 TABLET ORAL
Qty: 0 | Refills: 0 | DISCHARGE
Start: 2020-01-15

## 2020-01-15 RX ORDER — PANTOPRAZOLE SODIUM 20 MG/1
1 TABLET, DELAYED RELEASE ORAL
Qty: 0 | Refills: 0 | DISCHARGE
Start: 2020-01-15

## 2020-01-15 RX ORDER — CELECOXIB 200 MG/1
1 CAPSULE ORAL
Qty: 0 | Refills: 0 | DISCHARGE
Start: 2020-01-15

## 2020-01-15 RX ORDER — POLYETHYLENE GLYCOL 3350 17 G/17G
17 POWDER, FOR SOLUTION ORAL
Qty: 0 | Refills: 0 | DISCHARGE
Start: 2020-01-15

## 2020-01-15 RX ADMIN — GABAPENTIN 200 MILLIGRAM(S): 400 CAPSULE ORAL at 05:39

## 2020-01-15 RX ADMIN — Medication 1000 MILLIGRAM(S): at 05:39

## 2020-01-15 RX ADMIN — Medication 1 SPRAY(S): at 05:38

## 2020-01-15 RX ADMIN — ONDANSETRON 4 MILLIGRAM(S): 8 TABLET, FILM COATED ORAL at 12:10

## 2020-01-15 RX ADMIN — CELECOXIB 200 MILLIGRAM(S): 200 CAPSULE ORAL at 09:03

## 2020-01-15 RX ADMIN — Medication 1000 MILLIGRAM(S): at 06:15

## 2020-01-15 RX ADMIN — HYDROMORPHONE HYDROCHLORIDE 2 MILLIGRAM(S): 2 INJECTION INTRAMUSCULAR; INTRAVENOUS; SUBCUTANEOUS at 09:27

## 2020-01-15 RX ADMIN — APIXABAN 2.5 MILLIGRAM(S): 2.5 TABLET, FILM COATED ORAL at 09:02

## 2020-01-15 RX ADMIN — CELECOXIB 200 MILLIGRAM(S): 200 CAPSULE ORAL at 09:02

## 2020-01-15 RX ADMIN — PANTOPRAZOLE SODIUM 40 MILLIGRAM(S): 20 TABLET, DELAYED RELEASE ORAL at 05:39

## 2020-01-15 RX ADMIN — ONDANSETRON 4 MILLIGRAM(S): 8 TABLET, FILM COATED ORAL at 11:32

## 2020-01-15 RX ADMIN — HYDROMORPHONE HYDROCHLORIDE 2 MILLIGRAM(S): 2 INJECTION INTRAMUSCULAR; INTRAVENOUS; SUBCUTANEOUS at 09:57

## 2020-01-15 RX ADMIN — Medication 125 MICROGRAM(S): at 05:39

## 2020-01-15 NOTE — PROGRESS NOTE ADULT - ASSESSMENT
83 YO F with osteoarthriis , breast ca, hyperlipidemia,  obesity, Vitamin D deficiency, Hypothyroidism admited to [facility] for Aftercare following right TKR 1/13      Osteoarthritis of Right Knee  Aftercare following Right TKR  WBAT  PT/OT  Early ambulation  Pain management  Incentive Spirometry  DVT ppx as per ortho  Dyslipidemia  simvastatin.      Hypothyroidism  synthroid.    GERD  Patient had some chest pain two days ago  Trop negative  Chest pain resolved  started protonix - doing well      Post op anemia due to acute blood loss  trend cbc. asymptomatics.    CKD 3  trend bmp.  avoid nephrotoxic meds.    Dispo: as per PT
Neurovascular status in tact
Aftercare following right TKR 1/13    pain meds dilaudid and oxycodone. Moniter for respiratory depression and lethargy.  PT/OT.  DVT prophylaxis.  DVT ppx: [ ]ASA81 [ ] JYA666 [ ] Lovenox [ ] Coumadin   [x ] Eliquis [  ] Heparin  Dispo:     Home [ ]     Acute Rehab [ ]     LYNETTE [ ]     TBD [x ]    Dyslipidemia  simvastatin.    Obesity    Hypothyroidism  synthroid.    Nausea  zofran prn.    Post op anemia due to acute blood loss  trend cbc. asymptomatics.    CKD 3  trend bmp.  avoid nephrotoxic meds.    Plan of care was discuss with patient, all questions were answered, seems understand and in agreement.      Addendem- c/o retrosternal burning chest pain, nausea 1/13.  likly due to GERD.  EKG- poor quality but no acute ST T wave changes.  protonix, mallox.  troponin -negative.

## 2020-01-15 NOTE — DISCHARGE NOTE NURSING/CASE MANAGEMENT/SOCIAL WORK - NSDCFUADDAPPT_GEN_ALL_CORE_FT
1/27 @ 1:40pm  Neil Juárez MD  [address] [facility],   [address]  [place] [address]  Tel: [phone]  Fax: [phone]

## 2020-01-15 NOTE — PROGRESS NOTE ADULT - SUBJECTIVE AND OBJECTIVE BOX
Patient seen and examined at bedside. doing well    Review of Systems:  General:denies fever chills, headache, weakness  HEENT: denies blurry vision,diffculty swallowing, difficulty hearing, tinnitus  Cardiovascular: denies chest pain  ,palpitations  Pulmonary:denies shortness of breath, cough, wheezing, hemoptysis  Gastrointestinal: denies abdominal pain, constipation, diarrhea,nausea , vomiting, hematochezia  : denies hematuria, dysuria, or incontinence  Neurological: denies weakness, numbness , tingling, dizziness, tremors  MSK: denies muscle pain, difficulty ambulating, swelling, back pain  skin: denies skin rash, itching, burning, or  skin lesions  Psychiatrical: denies mood disturbances, anxierty, feeling depressed, depression , or difficulty sleeping    Objective:  Vitals  T(C): 36.2 (01-15-20 @ 07:53), Max: 37.1 (01-14-20 @ 19:00)  HR: 66 (01-15-20 @ 07:53) (60 - 72)  BP: 107/63 (01-15-20 @ 07:53) (107/61 - 127/65)  RR: 18 (01-15-20 @ 07:53) (16 - 18)  SpO2: 90% (01-15-20 @ 07:53) (83% - 97%)    Physical Exam:  General: comfortable, no acute distress, well nourished  HEENT: Atraumatic, no LAD, trachea midline, PERRLA  Cardiovascular: normal s1s2, no murmurs, gallops or fricition rubs  Pulmonary: clear to ausculation Bilaterally, no wheezing , rhonchi  Gastrointestinal: soft non tender non distended, no masses felt, no organomegally  Muscloskeletal: no lower extremity edema, intact bilateral lower extremity pulses  Neurological: CN II-12 intact. No focal weakness  Psychiatrical: normal mood, cooperative  SKIN: no rash, lesions or ulcers        Labs:                          9.1    7.22  )-----------( 156      ( 15 Zoltan 2020 07:07 )             29.4     01-14    140  |  103  |  25<H>  ----------------------------<  138<H>  4.0   |  34<H>  |  1.10    Ca    8.6      14 Jan 2020 07:18              Active Medications  MEDICATIONS  (STANDING):  acetaminophen   Tablet .. 1000 milliGRAM(s) Oral every 8 hours  apixaban 2.5 milliGRAM(s) Oral every 12 hours  atorvastatin 10 milliGRAM(s) Oral at bedtime  celecoxib 200 milliGRAM(s) Oral every 12 hours  fluticasone propionate 50 MICROgram(s)/spray Nasal Spray 1 Spray(s) Both Nostrils two times a day  gabapentin 200 milliGRAM(s) Oral three times a day  lactated ringers. 1000 milliLiter(s) (100 mL/Hr) IV Continuous <Continuous>  levothyroxine 125 MICROGram(s) Oral daily  pantoprazole    Tablet 40 milliGRAM(s) Oral before breakfast  polyethylene glycol 3350 17 Gram(s) Oral daily  senna 2 Tablet(s) Oral at bedtime    MEDICATIONS  (PRN):  aluminum hydroxide/magnesium hydroxide/simethicone Suspension 30 milliLiter(s) Oral four times a day PRN Indigestion  bisacodyl Suppository 10 milliGRAM(s) Rectal daily PRN If no bowel movement by postoperative day #2  HYDROmorphone   Tablet 2 milliGRAM(s) Oral every 3 hours PRN Mild Pain (1 - 3)  HYDROmorphone   Tablet 4 milliGRAM(s) Oral every 3 hours PRN Moderate Pain (4 - 6)  HYDROmorphone  Injectable 0.5 milliGRAM(s) IV Push every 3 hours PRN Severe Pain (7 - 10)  magnesium hydroxide Suspension 30 milliLiter(s) Oral daily PRN Constipation  ondansetron Injectable 4 milliGRAM(s) IV Push every 6 hours PRN Nausea and/or Vomiting

## 2020-01-15 NOTE — PROGRESS NOTE ADULT - SUBJECTIVE AND OBJECTIVE BOX
MARGARITA POWERS  [id number]    Pt is a 84y year old Female POD#2 s/p right TKR. Pain is described as moderate to severe while at rest. c/o nausea after taking oxycodone but tramadol is ineffective.  (+) Voids, tolerating regular diet. Denies chest pain/shortness of breath.     Vital Signs Last 24 Hrs  T(C): 36.7 (15 Zoltan 2020 03:30), Max: 37.1 (14 Jan 2020 19:00)  T(F): 98 (15 Zoltan 2020 03:30), Max: 98.7 (14 Jan 2020 19:00)  HR: 63 (15 Zoltan 2020 03:30) (60 - 72)  BP: 120/60 (15 Zoltan 2020 03:30) (100/54 - 127/65)  BP(mean): --  RR: 16 (15 Zoltan 2020 03:30) (16 - 18)  SpO2: 91% (15 Zoltan 2020 03:30) (83% - 97%)      01-14 @ 07:01  -  01-15 @ 07:00  --------------------------------------------------------  IN: 3050 mL / OUT: 2000 mL / NET: 1050 mL                              9.1    7.22  )-----------( 156      ( 15 Zoltan 2020 07:07 )             29.4     01-14    140  |  103  |  25<H>  ----------------------------<  138<H>  4.0   |  34<H>  |  1.10    Ca    8.6      14 Jan 2020 07:18          PE:   RLE: Dressing changed, incision clean with scant bloody drainage, no erythema, right foot intact SILT distally, (+2) DP pulses, EHL/FHL/TA intact, . negative calf tenderness. PAS on.      A: 84y year old Female s/p right TKR POD#2    Plan:   -DVT ppx = Eliquis  -PT/OT = OOB  -Pain control - trial of dilaudid as pain control on tramadol is poor and patient does not tolerate oxycodone.  She says she has tried dilaudid before with good effect and minimal side effects.  -Medicine to follow- BUN/creatinine stable while on celebrex.  Blood loss anemia with stable vital signs.     -continue to follow Labs  -continue use of PAS  -Dispo: LYNETTE

## 2020-01-15 NOTE — PROGRESS NOTE ADULT - SUBJECTIVE AND OBJECTIVE BOX
Discharge medication calendar:  Eliquis 2.5mg q12h x 12 days then ASA EC 81mg q12h x 4 weeks  APAP 1000mg q12h x 2-3 weeks  Celecoxib 200mg q12h x 2-3 weeks  Pantoprazole 40mg QAM x 6 weeks  Narcotic PRN  Docusate 100mg TID while taking narcotic  Miralax, Senna, or Bisacodyl PRN for treatment of constipation

## 2020-01-15 NOTE — DISCHARGE NOTE NURSING/CASE MANAGEMENT/SOCIAL WORK - PATIENT PORTAL LINK FT
You can access the FollowMyHealth Patient Portal offered by [facility] by registering at the following website: [email or web address]. By joining [facility]’s FollowMyHealth portal, you will also be able to view your health information using other applications (apps) compatible with our system.

## 2020-01-23 RX ORDER — PANTOPRAZOLE SODIUM 20 MG/1
1 TABLET, DELAYED RELEASE ORAL
Qty: 30 | Refills: 0
Start: 2020-01-23 | End: 2020-02-21

## 2020-01-23 RX ORDER — HYDROCODONE BITARTRATE AND ACETAMINOPHEN 7.5; 325 MG/15ML; MG/15ML
1 SOLUTION ORAL
Qty: 28 | Refills: 0
Start: 2020-01-23 | End: 2020-01-29

## 2020-01-23 RX ORDER — CELECOXIB 200 MG/1
1 CAPSULE ORAL
Qty: 60 | Refills: 0
Start: 2020-01-23 | End: 2020-02-21

## 2020-01-23 RX ORDER — ATORVASTATIN CALCIUM 80 MG/1
1 TABLET, FILM COATED ORAL
Qty: 30 | Refills: 0
Start: 2020-01-23 | End: 2020-02-21

## 2020-01-23 RX ORDER — ATORVASTATIN CALCIUM 80 MG/1
1 TABLET, FILM COATED ORAL
Qty: 0 | Refills: 0 | DISCHARGE

## 2020-01-23 RX ORDER — APIXABAN 2.5 MG/1
1 TABLET, FILM COATED ORAL
Qty: 6 | Refills: 0
Start: 2020-01-23 | End: 2020-01-25

## 2020-01-27 ENCOUNTER — APPOINTMENT (OUTPATIENT)
Dept: ORTHOPEDIC SURGERY | Facility: CLINIC | Age: 85
End: 2020-01-27
Payer: MEDICARE

## 2020-01-27 VITALS — DIASTOLIC BLOOD PRESSURE: 74 MMHG | SYSTOLIC BLOOD PRESSURE: 134 MMHG | HEART RATE: 80 BPM

## 2020-01-27 PROCEDURE — 99024 POSTOP FOLLOW-UP VISIT: CPT

## 2020-01-27 PROCEDURE — 73562 X-RAY EXAM OF KNEE 3: CPT | Mod: 26,RT

## 2020-02-14 ENCOUNTER — APPOINTMENT (OUTPATIENT)
Dept: INTERNAL MEDICINE | Facility: CLINIC | Age: 85
End: 2020-02-14
Payer: MEDICARE

## 2020-02-14 ENCOUNTER — OUTPATIENT (OUTPATIENT)
Dept: OUTPATIENT SERVICES | Facility: HOSPITAL | Age: 85
LOS: 1 days | End: 2020-02-14
Payer: MEDICARE

## 2020-02-14 ENCOUNTER — APPOINTMENT (OUTPATIENT)
Dept: RADIOLOGY | Facility: CLINIC | Age: 85
End: 2020-02-14
Payer: MEDICARE

## 2020-02-14 VITALS
DIASTOLIC BLOOD PRESSURE: 67 MMHG | HEIGHT: 69 IN | HEART RATE: 86 BPM | OXYGEN SATURATION: 92 % | WEIGHT: 247 LBS | RESPIRATION RATE: 16 BRPM | SYSTOLIC BLOOD PRESSURE: 158 MMHG | BODY MASS INDEX: 36.58 KG/M2 | TEMPERATURE: 98.3 F

## 2020-02-14 DIAGNOSIS — Z90.12 ACQUIRED ABSENCE OF LEFT BREAST AND NIPPLE: Chronic | ICD-10-CM

## 2020-02-14 DIAGNOSIS — Z90.49 ACQUIRED ABSENCE OF OTHER SPECIFIED PARTS OF DIGESTIVE TRACT: Chronic | ICD-10-CM

## 2020-02-14 DIAGNOSIS — Z85.3 PERSONAL HISTORY OF MALIGNANT NEOPLASM OF BREAST: ICD-10-CM

## 2020-02-14 DIAGNOSIS — Z98.890 OTHER SPECIFIED POSTPROCEDURAL STATES: Chronic | ICD-10-CM

## 2020-02-14 DIAGNOSIS — Z92.241 PERSONAL HISTORY OF SYSTEMIC STEROID THERAPY: Chronic | ICD-10-CM

## 2020-02-14 DIAGNOSIS — J40 BRONCHITIS, NOT SPECIFIED AS ACUTE OR CHRONIC: ICD-10-CM

## 2020-02-14 PROCEDURE — 99213 OFFICE O/P EST LOW 20 MIN: CPT

## 2020-02-14 PROCEDURE — 71046 X-RAY EXAM CHEST 2 VIEWS: CPT | Mod: 26

## 2020-02-14 PROCEDURE — 71046 X-RAY EXAM CHEST 2 VIEWS: CPT

## 2020-02-14 NOTE — PHYSICAL EXAM
[No Acute Distress] : no acute distress [Well Nourished] : well nourished [Well Developed] : well developed [Well-Appearing] : well-appearing [Normal Sclera/Conjunctiva] : normal sclera/conjunctiva [PERRL] : pupils equal round and reactive to light [EOMI] : extraocular movements intact [No Accessory Muscle Use] : no accessory muscle use [Normal Rate] : normal rate  [Regular Rhythm] : with a regular rhythm [Normal S1, S2] : normal S1 and S2 [No Murmur] : no murmur heard [No Abdominal Bruit] : a ~M bruit was not heard ~T in the abdomen [No Varicosities] : no varicosities [No Edema] : there was no peripheral edema [No Palpable Aorta] : no palpable aorta [No Extremity Clubbing/Cyanosis] : no extremity clubbing/cyanosis [Coordination Grossly Intact] : coordination grossly intact [No Focal Deficits] : no focal deficits [Normal Affect] : the affect was normal [Normal Insight/Judgement] : insight and judgment were intact [de-identified] : uses walker [de-identified] : low wheezing in all lung fields, productive cough [de-identified] : Right knee surgical scar, healing.

## 2020-02-14 NOTE — REVIEW OF SYSTEMS
[Fever] : fever [Wheezing] : wheezing [Cough] : cough [Joint Pain] : joint pain [Negative] : Psychiatric [Chills] : no chills [Fatigue] : no fatigue [Hot Flashes] : no hot flashes [Night Sweats] : no night sweats [Recent Change In Weight] : ~T no recent weight change [Shortness Of Breath] : no shortness of breath [Dyspnea on Exertion] : no dyspnea on exertion [Joint Stiffness] : no joint stiffness [Joint Swelling] : no joint swelling [Muscle Weakness] : no muscle weakness [Muscle Pain] : no muscle pain [Back Pain] : no back pain

## 2020-02-14 NOTE — HISTORY OF PRESENT ILLNESS
[FreeTextEntry8] : Pt is a 84 year F with PMH HTN, hypothyroidism, primary immunodeficiency, and s/p right knee replacement presenting with cough. Pt states that she had her right knee replaced on 1/13/2020 and was in rehab from 1/13/2020-1/23/2020 and during that time she was not allowed to use her medication Hyzentra. She has been on Hyzentra for several years due to a low immune system and has not had bronchitis in 5 years. She noted symptoms on Monday (5 days ago) and attributes to not being able to use medication and being in the rehab facility. She had fever 100.5 F yesterday and the day before. She has productive cough. She denies congestion. Pt denies CP/SOB, n/v/d/c. She would like to be treated for the cough.

## 2020-02-22 ENCOUNTER — APPOINTMENT (OUTPATIENT)
Dept: INTERNAL MEDICINE | Facility: CLINIC | Age: 85
End: 2020-02-22

## 2020-02-26 ENCOUNTER — APPOINTMENT (OUTPATIENT)
Dept: INTERNAL MEDICINE | Facility: CLINIC | Age: 85
End: 2020-02-26
Payer: MEDICARE

## 2020-02-26 ENCOUNTER — EMERGENCY (EMERGENCY)
Facility: HOSPITAL | Age: 85
LOS: 1 days | Discharge: HOME | End: 2020-02-26
Attending: EMERGENCY MEDICINE | Admitting: EMERGENCY MEDICINE
Payer: MEDICARE

## 2020-02-26 VITALS
SYSTOLIC BLOOD PRESSURE: 123 MMHG | HEART RATE: 78 BPM | RESPIRATION RATE: 18 BRPM | TEMPERATURE: 98 F | OXYGEN SATURATION: 95 % | DIASTOLIC BLOOD PRESSURE: 79 MMHG

## 2020-02-26 VITALS
OXYGEN SATURATION: 96 % | RESPIRATION RATE: 16 BRPM | TEMPERATURE: 98.1 F | SYSTOLIC BLOOD PRESSURE: 138 MMHG | DIASTOLIC BLOOD PRESSURE: 80 MMHG | HEART RATE: 85 BPM

## 2020-02-26 VITALS
RESPIRATION RATE: 16 BRPM | HEART RATE: 81 BPM | SYSTOLIC BLOOD PRESSURE: 124 MMHG | TEMPERATURE: 98 F | DIASTOLIC BLOOD PRESSURE: 81 MMHG | OXYGEN SATURATION: 94 % | WEIGHT: 244.93 LBS | HEIGHT: 69 IN

## 2020-02-26 DIAGNOSIS — Z90.12 ACQUIRED ABSENCE OF LEFT BREAST AND NIPPLE: Chronic | ICD-10-CM

## 2020-02-26 DIAGNOSIS — Z90.49 ACQUIRED ABSENCE OF OTHER SPECIFIED PARTS OF DIGESTIVE TRACT: Chronic | ICD-10-CM

## 2020-02-26 DIAGNOSIS — Z98.890 OTHER SPECIFIED POSTPROCEDURAL STATES: Chronic | ICD-10-CM

## 2020-02-26 DIAGNOSIS — Z92.241 PERSONAL HISTORY OF SYSTEMIC STEROID THERAPY: Chronic | ICD-10-CM

## 2020-02-26 DIAGNOSIS — J40 BRONCHITIS, NOT SPECIFIED AS ACUTE OR CHRONIC: ICD-10-CM

## 2020-02-26 PROCEDURE — 99214 OFFICE O/P EST MOD 30 MIN: CPT

## 2020-02-26 PROCEDURE — 99283 EMERGENCY DEPT VISIT LOW MDM: CPT

## 2020-02-26 PROCEDURE — 73564 X-RAY EXAM KNEE 4 OR MORE: CPT | Mod: 26,RT

## 2020-02-26 PROCEDURE — 99283 EMERGENCY DEPT VISIT LOW MDM: CPT | Mod: 25

## 2020-02-26 PROCEDURE — 73564 X-RAY EXAM KNEE 4 OR MORE: CPT

## 2020-02-26 NOTE — ED ADULT NURSE NOTE - NSIMPLEMENTINTERV_GEN_ALL_ED
Implemented All Universal Safety Interventions:  [place] to call system. Call bell, personal items and telephone within reach. Instruct patient to call for assistance. Room bathroom lighting operational. Non-slip footwear when patient is off stretcher. Physically safe environment: no spills, clutter or unnecessary equipment. Stretcher in lowest position, wheels locked, appropriate side rails in place.

## 2020-02-26 NOTE — HISTORY OF PRESENT ILLNESS
[de-identified] : Pt comes for recheck of atelectasis/bronchitis but also just fell out of bed while dreaming and struck rt knee and it is swollen.

## 2020-02-26 NOTE — ED PROVIDER NOTE - CLINICAL SUMMARY MEDICAL DECISION MAKING FREE TEXT BOX
right knee injury, fall out of bed 2 days ago, hx of right knee sx in January,  ortho consult, xray, follow up with orthopedic surgeon

## 2020-02-26 NOTE — PLAN
[FreeTextEntry1] : to see ortho for assessment rt knee\par deep breaths  \par Still has therapeutic zithromax\par promethazine\par Call to ortho PA placed

## 2020-02-26 NOTE — ED ADULT NURSE NOTE - PMH
Bronchitis    Cancer of female breast  2012- left breast- tx with surgery, radiation and Letrozole  Chronic cough    Class 2 obesity with body mass index (BMI) of 39.0 to 39.9 in adult    DDD (degenerative disc disease), lumbar    Environmental allergies    H/O actinic keratosis    H/O seborrheic keratosis    Hyperlipidemia    Hypothyroidism    Osteoarthritis    Pneumonia    Primary immunodeficiency disorder  being treated with Hizentra  Skin cancer    Vitamin D deficiency

## 2020-02-26 NOTE — ED PROVIDER NOTE - OBJECTIVE STATEMENT
84 y female presents with right knee pain with rom and walking, states 2 nights ago was dreaming , and fell out of bed,  struck her right knee, denies head injury , no loc,  denies any other injuries.  states she has been ambulating with her walker at home.  took otc tylenol today for pain,  and did relieve the pain.  states she had right knee sx on january 13, orthopedic Dr Bryant.

## 2020-02-26 NOTE — ED PROVIDER NOTE - PATIENT PORTAL LINK FT
You can access the FollowMyHealth Patient Portal offered by [facility] by registering at the following website: [email or web address]. By joining [facility]’s FollowMyHealth portal, you will also be able to view your health information using other applications (apps) compatible with our system.

## 2020-02-26 NOTE — PHYSICAL EXAM
[No Acute Distress] : no acute distress [Normal Sclera/Conjunctiva] : normal sclera/conjunctiva [Normal Outer Ear/Nose] : the outer ears and nose were normal in appearance [No Lymphadenopathy] : no lymphadenopathy [No JVD] : no jugular venous distention [No Extremity Clubbing/Cyanosis] : no extremity clubbing/cyanosis [Regular Rhythm] : with a regular rhythm [Normal Rate] : normal rate  [de-identified] : minor rhonchi rt base [de-identified] : rt knee swollen with bruise  wound is clean and dry

## 2020-02-26 NOTE — ED PROVIDER NOTE - PROGRESS NOTE DETAILS
patient seen and evaluated by Orthopedic PA, advised patient can be discharged, patient ambulated with walker, will take otc tylenol for pain.  xray no fx, no acute findings.  PA will set up follow up appt with Dr Bryant, copy of xray given Lisa BURNS for ED attending, Dr. Tariq : 85 yo with hx of right knee replacement, c/o right knee pain and swelling s/p fall out of bed 2 nights ago, no head injury, HA, dizziness, n/v, cp, sob, abd pain, arm pain, back pain. pt sent to ED by PMD for XR and to see her orthopedist.   PE: wd. wn, nc/at, PERRL, mmm, s1s2, rrr, nl pulses, lungs cta, ribs nontender, abd soft, nontender, no cvat, UE's nontender FROM, LLE nontender FROM, RLE hip nontender FROM, knee tender with swelling, decreased ROM, ankle nontender FROM, neck and back nontender, skin warm dry intact, well healed surgical scar right knee

## 2020-02-26 NOTE — ED ADULT TRIAGE NOTE - CHIEF COMPLAINT QUOTE
" I here to have x ray of right knee - Dr Zhang sent me here " Pt stated that she fell off her bed 2 days ago

## 2020-02-26 NOTE — ED PROVIDER NOTE - NSFOLLOWUPINSTRUCTIONS_ED_ALL_ED_FT
follow up with Dr Bryant  recommend over the counter tylenol as directed for pain  any concerns return to ED

## 2020-02-26 NOTE — ED ADULT NURSE NOTE - PSH
S/P appendectomy  1950  S/P cataract surgery  approx 2006- bilateral  S/P epidural steroid injection  x2- Dr. Martin- for Lumbar Disc Disease  S/P partial mastectomy, left  2012  S/P skin cancer resection  x11

## 2020-02-26 NOTE — ED ADULT NURSE NOTE - OBJECTIVE STATEMENT
recd pt  A/Ox3, pt c/o Right Knee pain s/p fall 2 days ago, no blood thinners, no loc, + swelling and ecchymosis noted to right knee denies chest pain or sob. Respirations are even and unlabored, lungs cta, +bowel x4 quads, abdomen soft, nontender/nondistended, no guarding, rebound or rigidity noted, skin w/d/i.

## 2020-02-26 NOTE — REVIEW OF SYSTEMS
[Fever] : no fever [Chills] : no chills [Lower Ext Edema] : lower extremity edema [Chest Pain] : no chest pain [Palpitations] : no palpitations [Cough] : cough [Shortness Of Breath] : no shortness of breath [Wheezing] : no wheezing [Nausea] : no nausea [Abdominal Pain] : no abdominal pain [Joint Stiffness] : joint stiffness [Joint Swelling] : joint swelling

## 2020-03-10 ENCOUNTER — APPOINTMENT (OUTPATIENT)
Dept: ORTHOPEDIC SURGERY | Facility: CLINIC | Age: 85
End: 2020-03-10
Payer: MEDICARE

## 2020-03-10 VITALS
SYSTOLIC BLOOD PRESSURE: 132 MMHG | DIASTOLIC BLOOD PRESSURE: 68 MMHG | HEART RATE: 88 BPM | WEIGHT: 247 LBS | HEIGHT: 69 IN | BODY MASS INDEX: 36.58 KG/M2

## 2020-03-10 DIAGNOSIS — Z96.651 PRESENCE OF RIGHT ARTIFICIAL KNEE JOINT: ICD-10-CM

## 2020-03-10 PROCEDURE — 73562 X-RAY EXAM OF KNEE 3: CPT | Mod: RT

## 2020-03-10 PROCEDURE — 99024 POSTOP FOLLOW-UP VISIT: CPT

## 2020-03-10 RX ORDER — AZITHROMYCIN 250 MG/1
250 TABLET, FILM COATED ORAL
Qty: 1 | Refills: 0 | Status: DISCONTINUED | COMMUNITY
Start: 2020-02-14 | End: 2020-03-10

## 2020-07-20 ENCOUNTER — RX RENEWAL (OUTPATIENT)
Age: 85
End: 2020-07-20

## 2020-08-10 ENCOUNTER — APPOINTMENT (OUTPATIENT)
Dept: INTERNAL MEDICINE | Facility: CLINIC | Age: 85
End: 2020-08-10
Payer: MEDICARE

## 2020-08-10 VITALS — BODY MASS INDEX: 35.55 KG/M2 | OXYGEN SATURATION: 95 % | HEIGHT: 69 IN | HEART RATE: 86 BPM | WEIGHT: 240 LBS

## 2020-08-10 VITALS — DIASTOLIC BLOOD PRESSURE: 78 MMHG | SYSTOLIC BLOOD PRESSURE: 132 MMHG

## 2020-08-10 PROCEDURE — 99213 OFFICE O/P EST LOW 20 MIN: CPT

## 2020-08-10 NOTE — PHYSICAL EXAM
[No Acute Distress] : no acute distress [Normal Sclera/Conjunctiva] : normal sclera/conjunctiva [Normal Outer Ear/Nose] : the outer ears and nose were normal in appearance [No JVD] : no jugular venous distention [No Respiratory Distress] : no respiratory distress  [No Accessory Muscle Use] : no accessory muscle use [Clear to Auscultation] : lungs were clear to auscultation bilaterally [Regular Rhythm] : with a regular rhythm [Normal Rate] : normal rate  [No Edema] : there was no peripheral edema [Normal S1, S2] : normal S1 and S2 [No Extremity Clubbing/Cyanosis] : no extremity clubbing/cyanosis [Soft] : abdomen soft [Non Tender] : non-tender [Non-distended] : non-distended

## 2020-08-10 NOTE — REVIEW OF SYSTEMS
[Fever] : no fever [Chills] : no chills [Lower Ext Edema] : no lower extremity edema [Chest Pain] : no chest pain [Palpitations] : no palpitations [Wheezing] : no wheezing [Shortness Of Breath] : no shortness of breath [Cough] : no cough

## 2020-10-26 ENCOUNTER — NON-APPOINTMENT (OUTPATIENT)
Age: 85
End: 2020-10-26

## 2020-10-26 ENCOUNTER — APPOINTMENT (OUTPATIENT)
Dept: INTERNAL MEDICINE | Facility: CLINIC | Age: 85
End: 2020-10-26
Payer: MEDICARE

## 2020-10-26 VITALS
WEIGHT: 240 LBS | HEIGHT: 69 IN | RESPIRATION RATE: 16 BRPM | OXYGEN SATURATION: 94 % | HEART RATE: 100 BPM | TEMPERATURE: 98.5 F | BODY MASS INDEX: 35.55 KG/M2

## 2020-10-26 VITALS — SYSTOLIC BLOOD PRESSURE: 130 MMHG | DIASTOLIC BLOOD PRESSURE: 78 MMHG

## 2020-10-26 PROCEDURE — 99214 OFFICE O/P EST MOD 30 MIN: CPT | Mod: 25

## 2020-10-26 PROCEDURE — G0008: CPT

## 2020-10-26 PROCEDURE — 90662 IIV NO PRSV INCREASED AG IM: CPT

## 2020-10-26 PROCEDURE — 93000 ELECTROCARDIOGRAM COMPLETE: CPT

## 2020-10-26 PROCEDURE — 36415 COLL VENOUS BLD VENIPUNCTURE: CPT

## 2020-10-26 NOTE — HISTORY OF PRESENT ILLNESS
[de-identified] : Pt comes for fbw and med renewal and EKG   Has been breathing well  Needs FLu vaccination

## 2020-10-26 NOTE — PHYSICAL EXAM
[No Acute Distress] : no acute distress [Normal Sclera/Conjunctiva] : normal sclera/conjunctiva [Normal Outer Ear/Nose] : the outer ears and nose were normal in appearance [Normal Oropharynx] : the oropharynx was normal [No JVD] : no jugular venous distention [No Lymphadenopathy] : no lymphadenopathy [Supple] : supple [No Respiratory Distress] : no respiratory distress  [No Accessory Muscle Use] : no accessory muscle use [Clear to Auscultation] : lungs were clear to auscultation bilaterally [Normal Rate] : normal rate  [Regular Rhythm] : with a regular rhythm [No Edema] : there was no peripheral edema [No Extremity Clubbing/Cyanosis] : no extremity clubbing/cyanosis [Soft] : abdomen soft [Non Tender] : non-tender [Non-distended] : non-distended [Normal Posterior Cervical Nodes] : no posterior cervical lymphadenopathy [Normal Anterior Cervical Nodes] : no anterior cervical lymphadenopathy [Grossly Normal Strength/Tone] : grossly normal strength/tone [Normal Affect] : the affect was normal [de-identified] : venous stasis dermatitis

## 2020-10-26 NOTE — REVIEW OF SYSTEMS
[Fever] : no fever [Chills] : no chills [Chest Pain] : no chest pain [Palpitations] : no palpitations [Lower Ext Edema] : no lower extremity edema [Shortness Of Breath] : no shortness of breath [Wheezing] : no wheezing [Cough] : no cough [Abdominal Pain] : no abdominal pain [de-identified] : dry skin on lower legs

## 2020-10-27 LAB
25(OH)D3 SERPL-MCNC: 43.5 NG/ML
ALBUMIN SERPL ELPH-MCNC: 4.4 G/DL
ALP BLD-CCNC: 97 U/L
ALT SERPL-CCNC: 14 U/L
ANION GAP SERPL CALC-SCNC: 14 MMOL/L
AST SERPL-CCNC: 21 U/L
BASOPHILS # BLD AUTO: 0.05 K/UL
BASOPHILS NFR BLD AUTO: 0.6 %
BILIRUB SERPL-MCNC: 0.4 MG/DL
BUN SERPL-MCNC: 26 MG/DL
CALCIUM SERPL-MCNC: 10 MG/DL
CHLORIDE SERPL-SCNC: 97 MMOL/L
CHOLEST SERPL-MCNC: 175 MG/DL
CO2 SERPL-SCNC: 29 MMOL/L
CREAT SERPL-MCNC: 1.13 MG/DL
EOSINOPHIL # BLD AUTO: 0.29 K/UL
EOSINOPHIL NFR BLD AUTO: 3.2 %
ESTIMATED AVERAGE GLUCOSE: 189 MG/DL
GLUCOSE SERPL-MCNC: 150 MG/DL
HBA1C MFR BLD HPLC: 8.2 %
HCT VFR BLD CALC: 42.6 %
HDLC SERPL-MCNC: 38 MG/DL
HGB BLD-MCNC: 13.4 G/DL
IMM GRANULOCYTES NFR BLD AUTO: 0.3 %
LDLC SERPL CALC-MCNC: 89 MG/DL
LYMPHOCYTES # BLD AUTO: 1.82 K/UL
LYMPHOCYTES NFR BLD AUTO: 20.3 %
MAN DIFF?: NORMAL
MCHC RBC-ENTMCNC: 31.5 GM/DL
MCHC RBC-ENTMCNC: 32.1 PG
MCV RBC AUTO: 101.9 FL
MONOCYTES # BLD AUTO: 0.56 K/UL
MONOCYTES NFR BLD AUTO: 6.3 %
NEUTROPHILS # BLD AUTO: 6.21 K/UL
NEUTROPHILS NFR BLD AUTO: 69.3 %
NONHDLC SERPL-MCNC: 137 MG/DL
PLATELET # BLD AUTO: 252 K/UL
POTASSIUM SERPL-SCNC: 4.5 MMOL/L
PROT SERPL-MCNC: 6.9 G/DL
RBC # BLD: 4.18 M/UL
RBC # FLD: 14.1 %
SODIUM SERPL-SCNC: 140 MMOL/L
TRIGL SERPL-MCNC: 240 MG/DL
TSH SERPL-ACNC: 5.56 UIU/ML
WBC # FLD AUTO: 8.96 K/UL

## 2020-11-08 ENCOUNTER — TRANSCRIPTION ENCOUNTER (OUTPATIENT)
Age: 85
End: 2020-11-08

## 2020-11-09 ENCOUNTER — TRANSCRIPTION ENCOUNTER (OUTPATIENT)
Age: 85
End: 2020-11-09

## 2020-12-14 NOTE — ED PROVIDER NOTE - CARE PROVIDER_API CALL
#16 Bahamian urinary cather placed, pt with 800ml output. Dr Brown made aware
Neil Juárez)  Orthopaedic Surgery  [address], [address]  [place] [address]  Phone: [phone]  Fax: [phone]  Follow Up Time:

## 2021-01-25 ENCOUNTER — APPOINTMENT (OUTPATIENT)
Dept: INTERNAL MEDICINE | Facility: CLINIC | Age: 86
End: 2021-01-25
Payer: MEDICARE

## 2021-01-25 VITALS — SYSTOLIC BLOOD PRESSURE: 124 MMHG | DIASTOLIC BLOOD PRESSURE: 78 MMHG

## 2021-01-25 VITALS
WEIGHT: 240 LBS | OXYGEN SATURATION: 97 % | BODY MASS INDEX: 35.55 KG/M2 | TEMPERATURE: 97.9 F | RESPIRATION RATE: 16 BRPM | HEART RATE: 88 BPM | HEIGHT: 69 IN

## 2021-01-25 DIAGNOSIS — G47.30 SLEEP APNEA, UNSPECIFIED: ICD-10-CM

## 2021-01-25 PROCEDURE — 36415 COLL VENOUS BLD VENIPUNCTURE: CPT

## 2021-01-25 PROCEDURE — 99214 OFFICE O/P EST MOD 30 MIN: CPT | Mod: 25

## 2021-01-25 PROCEDURE — G0444 DEPRESSION SCREEN ANNUAL: CPT | Mod: 59

## 2021-01-25 NOTE — HISTORY OF PRESENT ILLNESS
[de-identified] : Pt comes for FBW and med renewal  Pt follows with immunologist regularly and gives herself hizentra injection and feels well stable.

## 2021-01-25 NOTE — PHYSICAL EXAM
[No Acute Distress] : no acute distress [Normal Sclera/Conjunctiva] : normal sclera/conjunctiva [Normal Outer Ear/Nose] : the outer ears and nose were normal in appearance [No JVD] : no jugular venous distention [No Lymphadenopathy] : no lymphadenopathy [No Respiratory Distress] : no respiratory distress  [No Accessory Muscle Use] : no accessory muscle use [Clear to Auscultation] : lungs were clear to auscultation bilaterally [Normal Rate] : normal rate  [Regular Rhythm] : with a regular rhythm [No Edema] : there was no peripheral edema [No Extremity Clubbing/Cyanosis] : no extremity clubbing/cyanosis [Soft] : abdomen soft [Non Tender] : non-tender [Non-distended] : non-distended [No Masses] : no abdominal mass palpated [Normal Affect] : the affect was normal [Normal Insight/Judgement] : insight and judgment were intact

## 2021-01-25 NOTE — HEALTH RISK ASSESSMENT
[Never (0 pts)] : Never (0 points) [No] : In the past 12 months have you used drugs other than those required for medical reasons? No [No falls in past year] : Patient reported no falls in the past year [0] : 2) Feeling down, depressed, or hopeless: Not at all (0) [] : No [Audit-CScore] : 0 [[id number]] : 0 [[id number]] : 0

## 2021-01-26 LAB
25(OH)D3 SERPL-MCNC: 45.8 NG/ML
ALBUMIN SERPL ELPH-MCNC: 4.5 G/DL
ALP BLD-CCNC: 81 U/L
ALT SERPL-CCNC: 13 U/L
ANION GAP SERPL CALC-SCNC: 13 MMOL/L
AST SERPL-CCNC: 19 U/L
BASOPHILS # BLD AUTO: 0.06 K/UL
BASOPHILS NFR BLD AUTO: 0.7 %
BILIRUB SERPL-MCNC: 0.3 MG/DL
BUN SERPL-MCNC: 24 MG/DL
CALCIUM SERPL-MCNC: 10 MG/DL
CHLORIDE SERPL-SCNC: 102 MMOL/L
CO2 SERPL-SCNC: 28 MMOL/L
CREAT SERPL-MCNC: 1.15 MG/DL
EOSINOPHIL # BLD AUTO: 0.23 K/UL
EOSINOPHIL NFR BLD AUTO: 2.7 %
ESTIMATED AVERAGE GLUCOSE: 137 MG/DL
GLUCOSE SERPL-MCNC: 101 MG/DL
HBA1C MFR BLD HPLC: 6.4 %
HCT VFR BLD CALC: 43 %
HGB BLD-MCNC: 12.9 G/DL
IMM GRANULOCYTES NFR BLD AUTO: 0.2 %
LYMPHOCYTES # BLD AUTO: 1.71 K/UL
LYMPHOCYTES NFR BLD AUTO: 20.2 %
MAN DIFF?: NORMAL
MCHC RBC-ENTMCNC: 29.9 PG
MCHC RBC-ENTMCNC: 30 GM/DL
MCV RBC AUTO: 99.8 FL
MONOCYTES # BLD AUTO: 0.59 K/UL
MONOCYTES NFR BLD AUTO: 7 %
NEUTROPHILS # BLD AUTO: 5.87 K/UL
NEUTROPHILS NFR BLD AUTO: 69.2 %
PLATELET # BLD AUTO: 244 K/UL
POTASSIUM SERPL-SCNC: 4.3 MMOL/L
PROT SERPL-MCNC: 6.7 G/DL
RBC # BLD: 4.31 M/UL
RBC # FLD: 13.9 %
SODIUM SERPL-SCNC: 144 MMOL/L
TSH SERPL-ACNC: 0.73 UIU/ML
WBC # FLD AUTO: 8.48 K/UL

## 2021-02-04 LAB
CHOLEST SERPL-MCNC: 159 MG/DL
HDLC SERPL-MCNC: 43 MG/DL
LDLC SERPL CALC-MCNC: 82 MG/DL
NONHDLC SERPL-MCNC: 116 MG/DL
TRIGL SERPL-MCNC: 173 MG/DL

## 2021-04-13 NOTE — ED ADULT NURSE NOTE - WEIGHT BEARING STATUS MAINTAINED
partial weight bearing right Initiate Treatment: Seysara 150mg daily Continue Regimen: OCP (managed by outside office), Spironolactone 50mg BID, Aczone qAM, CeraVe PM (OTC) Discontinue Regimen: tretinoin 0.05% qHS (patient couldn't tolerate) Plan: Instructed patient to call with decision on if she wishes to have trade size of [place] is not irritating; could consider sending in adapalene. Detail Level: Zone Render In Strict Bullet Format?: No Samples Given: Alfreda

## 2021-04-26 ENCOUNTER — APPOINTMENT (OUTPATIENT)
Dept: INTERNAL MEDICINE | Facility: CLINIC | Age: 86
End: 2021-04-26
Payer: MEDICARE

## 2021-04-26 VITALS — DIASTOLIC BLOOD PRESSURE: 78 MMHG | SYSTOLIC BLOOD PRESSURE: 124 MMHG

## 2021-04-26 VITALS — TEMPERATURE: 97 F | BODY MASS INDEX: 33.97 KG/M2 | OXYGEN SATURATION: 96 % | HEART RATE: 94 BPM | WEIGHT: 230 LBS

## 2021-04-26 PROCEDURE — 36415 COLL VENOUS BLD VENIPUNCTURE: CPT

## 2021-04-26 PROCEDURE — 99214 OFFICE O/P EST MOD 30 MIN: CPT | Mod: 25

## 2021-04-26 NOTE — HISTORY OF PRESENT ILLNESS
[de-identified] : Pt comes for FBW and med renewal  Pt received both covid vaccines  Needs handicap parking permit completed

## 2021-04-26 NOTE — PHYSICAL EXAM
[No Acute Distress] : no acute distress [Normal Sclera/Conjunctiva] : normal sclera/conjunctiva [Normal Outer Ear/Nose] : the outer ears and nose were normal in appearance [No JVD] : no jugular venous distention [No Respiratory Distress] : no respiratory distress  [No Accessory Muscle Use] : no accessory muscle use [Clear to Auscultation] : lungs were clear to auscultation bilaterally [Normal Rate] : normal rate  [Regular Rhythm] : with a regular rhythm [Normal S1, S2] : normal S1 and S2 [No Edema] : there was no peripheral edema [No Extremity Clubbing/Cyanosis] : no extremity clubbing/cyanosis [Soft] : abdomen soft [Non Tender] : non-tender [Non-distended] : non-distended [Grossly Normal Strength/Tone] : grossly normal strength/tone

## 2021-04-26 NOTE — HEALTH RISK ASSESSMENT
[No] : In the past 12 months have you used drugs other than those required for medical reasons? No [No falls in past year] : Patient reported no falls in the past year [0] : 2) Feeling down, depressed, or hopeless: Not at all (0) [] : No [Audit-CScore] : 0 [[id number]] : 0

## 2021-04-27 LAB
ALBUMIN SERPL ELPH-MCNC: 4.5 G/DL
ALP BLD-CCNC: 92 U/L
ALT SERPL-CCNC: 12 U/L
ANION GAP SERPL CALC-SCNC: 12 MMOL/L
AST SERPL-CCNC: 17 U/L
BASOPHILS # BLD AUTO: 0.06 K/UL
BASOPHILS NFR BLD AUTO: 0.8 %
BILIRUB SERPL-MCNC: 0.4 MG/DL
BUN SERPL-MCNC: 26 MG/DL
CALCIUM SERPL-MCNC: 10.5 MG/DL
CHLORIDE SERPL-SCNC: 98 MMOL/L
CHOLEST SERPL-MCNC: 186 MG/DL
CO2 SERPL-SCNC: 30 MMOL/L
CREAT SERPL-MCNC: 1.18 MG/DL
EOSINOPHIL # BLD AUTO: 0.27 K/UL
EOSINOPHIL NFR BLD AUTO: 3.5 %
ESTIMATED AVERAGE GLUCOSE: 131 MG/DL
GLUCOSE SERPL-MCNC: 103 MG/DL
HBA1C MFR BLD HPLC: 6.2 %
HCT VFR BLD CALC: 43.6 %
HDLC SERPL-MCNC: 43 MG/DL
HGB BLD-MCNC: 13.6 G/DL
IMM GRANULOCYTES NFR BLD AUTO: 0.3 %
LDLC SERPL CALC-MCNC: 103 MG/DL
LYMPHOCYTES # BLD AUTO: 1.98 K/UL
LYMPHOCYTES NFR BLD AUTO: 25.9 %
MAN DIFF?: NORMAL
MCHC RBC-ENTMCNC: 30 PG
MCHC RBC-ENTMCNC: 31.2 GM/DL
MCV RBC AUTO: 96.2 FL
MONOCYTES # BLD AUTO: 0.63 K/UL
MONOCYTES NFR BLD AUTO: 8.2 %
NEUTROPHILS # BLD AUTO: 4.68 K/UL
NEUTROPHILS NFR BLD AUTO: 61.3 %
NONHDLC SERPL-MCNC: 143 MG/DL
PLATELET # BLD AUTO: 249 K/UL
POTASSIUM SERPL-SCNC: 4.7 MMOL/L
PROT SERPL-MCNC: 7.1 G/DL
RBC # BLD: 4.53 M/UL
RBC # FLD: 14.1 %
SODIUM SERPL-SCNC: 141 MMOL/L
TRIGL SERPL-MCNC: 198 MG/DL
TSH SERPL-ACNC: 1.19 UIU/ML
WBC # FLD AUTO: 7.64 K/UL

## 2021-05-28 NOTE — PATIENT PROFILE ADULT - NSPRONUTRITIONRISK_GEN_A_NUR
Attempted to see patient for Physical and Occupational Therapy session but unable at this time. Patient was not available for their therapy session; per discussion with RN, request to hold therapy evaluations at this time due to pt having significant pain.      Will re-attempt later today and Will re-attempt tomorrow.   No indicators present

## 2021-07-30 ENCOUNTER — APPOINTMENT (OUTPATIENT)
Dept: INTERNAL MEDICINE | Facility: CLINIC | Age: 86
End: 2021-07-30
Payer: MEDICARE

## 2021-07-30 VITALS
HEART RATE: 84 BPM | HEIGHT: 69 IN | WEIGHT: 230 LBS | BODY MASS INDEX: 34.07 KG/M2 | TEMPERATURE: 98 F | OXYGEN SATURATION: 92 %

## 2021-07-30 VITALS — SYSTOLIC BLOOD PRESSURE: 130 MMHG | DIASTOLIC BLOOD PRESSURE: 78 MMHG

## 2021-07-30 PROCEDURE — 99214 OFFICE O/P EST MOD 30 MIN: CPT | Mod: 25

## 2021-07-30 PROCEDURE — 36415 COLL VENOUS BLD VENIPUNCTURE: CPT

## 2021-07-30 NOTE — HISTORY OF PRESENT ILLNESS
[de-identified] : Pt comes for FBW and med renewal  Pt has received vaccine but cautious about going out due to immune deficiency  Pt has stable ckd 3 b based upon labs

## 2021-07-30 NOTE — REVIEW OF SYSTEMS
[Skin Rash] : skin rash [Fever] : no fever [Chest Pain] : no chest pain [Palpitations] : no palpitations [Lower Ext Edema] : no lower extremity edema [Shortness Of Breath] : no shortness of breath [Wheezing] : no wheezing [Cough] : no cough [Abdominal Pain] : no abdominal pain [Headache] : no headache [Dizziness] : no dizziness [Fainting] : no fainting [Confusion] : no confusion [Memory Loss] : no memory loss [Easy Bleeding] : no easy bleeding [Easy Bruising] : no easy bruising

## 2021-07-30 NOTE — PHYSICAL EXAM
[No Acute Distress] : no acute distress [Normal Sclera/Conjunctiva] : normal sclera/conjunctiva [Normal Outer Ear/Nose] : the outer ears and nose were normal in appearance [No JVD] : no jugular venous distention [No Lymphadenopathy] : no lymphadenopathy [Supple] : supple [No Respiratory Distress] : no respiratory distress  [No Accessory Muscle Use] : no accessory muscle use [Clear to Auscultation] : lungs were clear to auscultation bilaterally [Normal Rate] : normal rate  [Regular Rhythm] : with a regular rhythm [No Carotid Bruits] : no carotid bruits [No Edema] : there was no peripheral edema [Grossly Normal Strength/Tone] : grossly normal strength/tone [de-identified] : uses cane for ambulation

## 2021-08-03 ENCOUNTER — TRANSCRIPTION ENCOUNTER (OUTPATIENT)
Age: 86
End: 2021-08-03

## 2021-08-04 LAB
25(OH)D3 SERPL-MCNC: 55.4 NG/ML
ALBUMIN SERPL ELPH-MCNC: 4.3 G/DL
ALP BLD-CCNC: 90 U/L
ALT SERPL-CCNC: 18 U/L
ANION GAP SERPL CALC-SCNC: 15 MMOL/L
AST SERPL-CCNC: 23 U/L
BASOPHILS # BLD AUTO: 0.03 K/UL
BASOPHILS NFR BLD AUTO: 0.5 %
BILIRUB SERPL-MCNC: 0.3 MG/DL
BUN SERPL-MCNC: 27 MG/DL
CALCIUM SERPL-MCNC: 9.9 MG/DL
CHLORIDE SERPL-SCNC: 103 MMOL/L
CHOLEST SERPL-MCNC: 177 MG/DL
CO2 SERPL-SCNC: 28 MMOL/L
CREAT SERPL-MCNC: 1.01 MG/DL
EOSINOPHIL # BLD AUTO: 0.21 K/UL
EOSINOPHIL NFR BLD AUTO: 3.8 %
ESTIMATED AVERAGE GLUCOSE: 131 MG/DL
GLUCOSE SERPL-MCNC: 99 MG/DL
HBA1C MFR BLD HPLC: 6.2 %
HCT VFR BLD CALC: 43.5 %
HDLC SERPL-MCNC: 43 MG/DL
HGB BLD-MCNC: 13.3 G/DL
IMM GRANULOCYTES NFR BLD AUTO: 0.4 %
LDLC SERPL CALC-MCNC: 101 MG/DL
LYMPHOCYTES # BLD AUTO: 1.48 K/UL
LYMPHOCYTES NFR BLD AUTO: 26.7 %
MAN DIFF?: NORMAL
MCHC RBC-ENTMCNC: 30.6 GM/DL
MCHC RBC-ENTMCNC: 30.9 PG
MCV RBC AUTO: 100.9 FL
MONOCYTES # BLD AUTO: 0.44 K/UL
MONOCYTES NFR BLD AUTO: 7.9 %
NEUTROPHILS # BLD AUTO: 3.37 K/UL
NEUTROPHILS NFR BLD AUTO: 60.7 %
NONHDLC SERPL-MCNC: 135 MG/DL
PLATELET # BLD AUTO: 215 K/UL
POTASSIUM SERPL-SCNC: 4.6 MMOL/L
PROT SERPL-MCNC: 6.8 G/DL
RBC # BLD: 4.31 M/UL
RBC # FLD: 14.6 %
SODIUM SERPL-SCNC: 146 MMOL/L
TRIGL SERPL-MCNC: 167 MG/DL
TSH SERPL-ACNC: 1.05 UIU/ML
WBC # FLD AUTO: 5.55 K/UL

## 2021-09-21 ENCOUNTER — TRANSCRIPTION ENCOUNTER (OUTPATIENT)
Age: 86
End: 2021-09-21

## 2021-10-29 ENCOUNTER — APPOINTMENT (OUTPATIENT)
Dept: INTERNAL MEDICINE | Facility: CLINIC | Age: 86
End: 2021-10-29
Payer: MEDICARE

## 2021-10-29 VITALS
OXYGEN SATURATION: 94 % | WEIGHT: 231 LBS | HEIGHT: 69 IN | TEMPERATURE: 98.1 F | BODY MASS INDEX: 34.21 KG/M2 | RESPIRATION RATE: 16 BRPM | HEART RATE: 91 BPM

## 2021-10-29 VITALS — DIASTOLIC BLOOD PRESSURE: 80 MMHG | SYSTOLIC BLOOD PRESSURE: 132 MMHG

## 2021-10-29 DIAGNOSIS — R89.4 ABNORMAL IMMUNOLOGICAL FINDINGS IN SPECIMENS FROM OTHER ORGANS, SYSTEMS AND TISSUES: ICD-10-CM

## 2021-10-29 PROCEDURE — 90662 IIV NO PRSV INCREASED AG IM: CPT

## 2021-10-29 PROCEDURE — 99214 OFFICE O/P EST MOD 30 MIN: CPT | Mod: 25

## 2021-10-29 PROCEDURE — G0008: CPT

## 2021-10-29 PROCEDURE — 36415 COLL VENOUS BLD VENIPUNCTURE: CPT

## 2021-10-29 NOTE — PHYSICAL EXAM
[No Acute Distress] : no acute distress [Well Nourished] : well nourished [Normal Sclera/Conjunctiva] : normal sclera/conjunctiva [PERRL] : pupils equal round and reactive to light [Normal Outer Ear/Nose] : the outer ears and nose were normal in appearance [No JVD] : no jugular venous distention [No Lymphadenopathy] : no lymphadenopathy [Supple] : supple [No Respiratory Distress] : no respiratory distress  [No Accessory Muscle Use] : no accessory muscle use [Clear to Auscultation] : lungs were clear to auscultation bilaterally [Normal Rate] : normal rate  [Regular Rhythm] : with a regular rhythm [No Carotid Bruits] : no carotid bruits [Pedal Pulses Present] : the pedal pulses are present [No Edema] : there was no peripheral edema [No Extremity Clubbing/Cyanosis] : no extremity clubbing/cyanosis [Soft] : abdomen soft [Non Tender] : non-tender [Non-distended] : non-distended [Normal Bowel Sounds] : normal bowel sounds [Grossly Normal Strength/Tone] : grossly normal strength/tone [No Focal Deficits] : no focal deficits [Normal Insight/Judgement] : insight and judgment were intact

## 2021-10-29 NOTE — HISTORY OF PRESENT ILLNESS
[de-identified] : Pt comes for FBW and flu shot and med renewal  Pt is breathing well and has had no recent infections  Pt has stable ckd 3 a and blood was drawn today

## 2021-10-29 NOTE — HEALTH RISK ASSESSMENT
[Never (0 pts)] : Never (0 points) [No] : In the past 12 months have you used drugs other than those required for medical reasons? No [No falls in past year] : Patient reported no falls in the past year [0] : 2) Feeling down, depressed, or hopeless: Not at all (0) [PHQ-2 Negative - No further assessment needed] : PHQ-2 Negative - No further assessment needed [] : No [Audit-CScore] : 0 [[id number]] : 0

## 2021-10-29 NOTE — REVIEW OF SYSTEMS
No [Joint Pain] : joint pain [Joint Stiffness] : joint stiffness [Back Pain] : back pain [Fever] : no fever [Chills] : no chills [Earache] : no earache [Nasal Discharge] : no nasal discharge [Chest Pain] : no chest pain [Palpitations] : no palpitations [Lower Ext Edema] : no lower extremity edema [Shortness Of Breath] : no shortness of breath [Wheezing] : no wheezing [Cough] : no cough [Abdominal Pain] : no abdominal pain [Vomiting] : no vomiting [Dysuria] : no dysuria [Dizziness] : no dizziness [Fainting] : no fainting [Confusion] : no confusion [Anxiety] : no anxiety [Depression] : no depression [Easy Bleeding] : no easy bleeding [Easy Bruising] : no easy bruising

## 2021-11-01 ENCOUNTER — RX RENEWAL (OUTPATIENT)
Age: 86
End: 2021-11-01

## 2021-11-01 LAB
25(OH)D3 SERPL-MCNC: 53.4 NG/ML
ALBUMIN SERPL ELPH-MCNC: 4.3 G/DL
ALP BLD-CCNC: 75 U/L
ALT SERPL-CCNC: 19 U/L
ANION GAP SERPL CALC-SCNC: 15 MMOL/L
AST SERPL-CCNC: 23 U/L
BASOPHILS # BLD AUTO: 0.04 K/UL
BASOPHILS NFR BLD AUTO: 0.6 %
BILIRUB SERPL-MCNC: 0.3 MG/DL
BUN SERPL-MCNC: 26 MG/DL
CALCIUM SERPL-MCNC: 10.1 MG/DL
CHLORIDE SERPL-SCNC: 102 MMOL/L
CHOLEST SERPL-MCNC: 171 MG/DL
CO2 SERPL-SCNC: 26 MMOL/L
CREAT SERPL-MCNC: 0.95 MG/DL
EOSINOPHIL # BLD AUTO: 0.23 K/UL
EOSINOPHIL NFR BLD AUTO: 3.3 %
ESTIMATED AVERAGE GLUCOSE: 137 MG/DL
GLUCOSE SERPL-MCNC: 125 MG/DL
HBA1C MFR BLD HPLC: 6.4 %
HCT VFR BLD CALC: 43.1 %
HDLC SERPL-MCNC: 42 MG/DL
HGB BLD-MCNC: 13.1 G/DL
IMM GRANULOCYTES NFR BLD AUTO: 0.3 %
LDLC SERPL CALC-MCNC: 98 MG/DL
LYMPHOCYTES # BLD AUTO: 1.49 K/UL
LYMPHOCYTES NFR BLD AUTO: 21.6 %
MAN DIFF?: NORMAL
MCHC RBC-ENTMCNC: 30.2 PG
MCHC RBC-ENTMCNC: 30.4 GM/DL
MCV RBC AUTO: 99.3 FL
MONOCYTES # BLD AUTO: 0.55 K/UL
MONOCYTES NFR BLD AUTO: 8 %
NEUTROPHILS # BLD AUTO: 4.58 K/UL
NEUTROPHILS NFR BLD AUTO: 66.2 %
NONHDLC SERPL-MCNC: 128 MG/DL
PLATELET # BLD AUTO: 208 K/UL
POTASSIUM SERPL-SCNC: 4.4 MMOL/L
PROT SERPL-MCNC: 6.8 G/DL
RBC # BLD: 4.34 M/UL
RBC # FLD: 13.9 %
SODIUM SERPL-SCNC: 144 MMOL/L
TRIGL SERPL-MCNC: 150 MG/DL
TSH SERPL-ACNC: 0.83 UIU/ML
WBC # FLD AUTO: 6.91 K/UL

## 2022-01-24 ENCOUNTER — APPOINTMENT (OUTPATIENT)
Dept: INTERNAL MEDICINE | Facility: CLINIC | Age: 87
End: 2022-01-24
Payer: MEDICARE

## 2022-01-24 VITALS
TEMPERATURE: 98 F | HEIGHT: 69 IN | BODY MASS INDEX: 34.07 KG/M2 | OXYGEN SATURATION: 95 % | HEART RATE: 86 BPM | WEIGHT: 230 LBS

## 2022-01-24 VITALS — DIASTOLIC BLOOD PRESSURE: 80 MMHG | SYSTOLIC BLOOD PRESSURE: 134 MMHG

## 2022-01-24 PROCEDURE — 99214 OFFICE O/P EST MOD 30 MIN: CPT | Mod: 25

## 2022-01-24 PROCEDURE — 36415 COLL VENOUS BLD VENIPUNCTURE: CPT

## 2022-01-24 NOTE — PHYSICAL EXAM
[No Acute Distress] : no acute distress [Well Nourished] : well nourished [Normal Sclera/Conjunctiva] : normal sclera/conjunctiva [PERRL] : pupils equal round and reactive to light [Normal Outer Ear/Nose] : the outer ears and nose were normal in appearance [No JVD] : no jugular venous distention [No Lymphadenopathy] : no lymphadenopathy [No Respiratory Distress] : no respiratory distress  [No Accessory Muscle Use] : no accessory muscle use [Clear to Auscultation] : lungs were clear to auscultation bilaterally [Normal Rate] : normal rate  [Regular Rhythm] : with a regular rhythm [No Extremity Clubbing/Cyanosis] : no extremity clubbing/cyanosis [Soft] : abdomen soft [Non Tender] : non-tender [Non-distended] : non-distended [Grossly Normal Strength/Tone] : grossly normal strength/tone [No Focal Deficits] : no focal deficits [Normal Affect] : the affect was normal [Normal Insight/Judgement] : insight and judgment were intact [de-identified] : unchanged edema

## 2022-01-24 NOTE — REVIEW OF SYSTEMS
[Lower Ext Edema] : lower extremity edema [Joint Pain] : joint pain [Joint Stiffness] : joint stiffness [Fever] : no fever [Chills] : no chills [Discharge] : no discharge [Chest Pain] : no chest pain [Palpitations] : no palpitations [Shortness Of Breath] : no shortness of breath [Wheezing] : no wheezing [Cough] : no cough [Abdominal Pain] : no abdominal pain [Dysuria] : no dysuria [Hematuria] : no hematuria [Joint Swelling] : no joint swelling [Fainting] : no fainting [Confusion] : no confusion

## 2022-01-24 NOTE — HISTORY OF PRESENT ILLNESS
[de-identified] : Pt comes for FBW and med renewal  Pt has stable ckd 3 a and stable immune deficiency disorder.

## 2022-01-25 ENCOUNTER — RX RENEWAL (OUTPATIENT)
Age: 87
End: 2022-01-25

## 2022-01-25 LAB
ALBUMIN SERPL ELPH-MCNC: 4.4 G/DL
ALP BLD-CCNC: 79 U/L
ALT SERPL-CCNC: 15 U/L
ANION GAP SERPL CALC-SCNC: 13 MMOL/L
AST SERPL-CCNC: 22 U/L
BASOPHILS # BLD AUTO: 0.03 K/UL
BASOPHILS NFR BLD AUTO: 0.4 %
BILIRUB SERPL-MCNC: 0.4 MG/DL
BUN SERPL-MCNC: 31 MG/DL
CALCIUM SERPL-MCNC: 10 MG/DL
CHLORIDE SERPL-SCNC: 101 MMOL/L
CHOLEST SERPL-MCNC: 163 MG/DL
CO2 SERPL-SCNC: 27 MMOL/L
CREAT SERPL-MCNC: 0.96 MG/DL
EOSINOPHIL # BLD AUTO: 0.27 K/UL
EOSINOPHIL NFR BLD AUTO: 4 %
ESTIMATED AVERAGE GLUCOSE: 128 MG/DL
GLUCOSE SERPL-MCNC: 113 MG/DL
HBA1C MFR BLD HPLC: 6.1 %
HCT VFR BLD CALC: 42.7 %
HDLC SERPL-MCNC: 41 MG/DL
HGB BLD-MCNC: 13.5 G/DL
IMM GRANULOCYTES NFR BLD AUTO: 0.3 %
LDLC SERPL CALC-MCNC: 92 MG/DL
LYMPHOCYTES # BLD AUTO: 1.74 K/UL
LYMPHOCYTES NFR BLD AUTO: 25.6 %
MAN DIFF?: NORMAL
MCHC RBC-ENTMCNC: 30.8 PG
MCHC RBC-ENTMCNC: 31.6 GM/DL
MCV RBC AUTO: 97.3 FL
MONOCYTES # BLD AUTO: 0.55 K/UL
MONOCYTES NFR BLD AUTO: 8.1 %
NEUTROPHILS # BLD AUTO: 4.19 K/UL
NEUTROPHILS NFR BLD AUTO: 61.6 %
NONHDLC SERPL-MCNC: 122 MG/DL
PLATELET # BLD AUTO: 230 K/UL
POTASSIUM SERPL-SCNC: 4.3 MMOL/L
PROT SERPL-MCNC: 6.7 G/DL
RBC # BLD: 4.39 M/UL
RBC # FLD: 13.9 %
SODIUM SERPL-SCNC: 142 MMOL/L
T4 FREE SERPL-MCNC: 1.7 NG/DL
TRIGL SERPL-MCNC: 152 MG/DL
TSH SERPL-ACNC: 0.27 UIU/ML
WBC # FLD AUTO: 6.8 K/UL

## 2022-05-03 ENCOUNTER — APPOINTMENT (OUTPATIENT)
Dept: INTERNAL MEDICINE | Facility: CLINIC | Age: 87
End: 2022-05-03
Payer: MEDICARE

## 2022-05-03 VITALS
BODY MASS INDEX: 34.07 KG/M2 | HEART RATE: 82 BPM | WEIGHT: 230 LBS | TEMPERATURE: 98.2 F | HEIGHT: 69 IN | OXYGEN SATURATION: 95 %

## 2022-05-03 VITALS — SYSTOLIC BLOOD PRESSURE: 134 MMHG | DIASTOLIC BLOOD PRESSURE: 80 MMHG

## 2022-05-03 PROCEDURE — 36415 COLL VENOUS BLD VENIPUNCTURE: CPT

## 2022-05-03 PROCEDURE — 99214 OFFICE O/P EST MOD 30 MIN: CPT | Mod: 25

## 2022-05-03 NOTE — REVIEW OF SYSTEMS
[Lower Ext Edema] : lower extremity edema [Joint Pain] : joint pain [Joint Stiffness] : joint stiffness [Back Pain] : back pain [Fever] : no fever [Chills] : no chills [Chest Pain] : no chest pain [Palpitations] : no palpitations [Shortness Of Breath] : no shortness of breath [Wheezing] : no wheezing [Cough] : no cough [Abdominal Pain] : no abdominal pain [Vomiting] : no vomiting [Dysuria] : no dysuria [Joint Swelling] : no joint swelling

## 2022-05-03 NOTE — HISTORY OF PRESENT ILLNESS
[de-identified] : Pt comes for FBW and med renewal  Feeling some seasonal allergies but otherwise feels well  Pt staying indoors to keep away from covid due to her immune deficiency

## 2022-05-03 NOTE — PHYSICAL EXAM
[No Acute Distress] : no acute distress [Normal Sclera/Conjunctiva] : normal sclera/conjunctiva [Normal Outer Ear/Nose] : the outer ears and nose were normal in appearance [No JVD] : no jugular venous distention [No Respiratory Distress] : no respiratory distress  [No Accessory Muscle Use] : no accessory muscle use [Normal Rate] : normal rate  [Regular Rhythm] : with a regular rhythm [No Carotid Bruits] : no carotid bruits [No Edema] : there was no peripheral edema [Soft] : abdomen soft [Non Tender] : non-tender [Non-distended] : non-distended [No Masses] : no abdominal mass palpated [No Spinal Tenderness] : no spinal tenderness [Grossly Normal Strength/Tone] : grossly normal strength/tone [de-identified] : unchanged leg edema

## 2022-05-04 LAB
ALBUMIN SERPL ELPH-MCNC: 4.6 G/DL
ALP BLD-CCNC: 80 U/L
ALT SERPL-CCNC: 16 U/L
ANION GAP SERPL CALC-SCNC: 15 MMOL/L
AST SERPL-CCNC: 22 U/L
BASOPHILS # BLD AUTO: 0.05 K/UL
BASOPHILS NFR BLD AUTO: 0.7 %
BILIRUB SERPL-MCNC: 0.4 MG/DL
BUN SERPL-MCNC: 29 MG/DL
CALCIUM SERPL-MCNC: 9.8 MG/DL
CHLORIDE SERPL-SCNC: 100 MMOL/L
CHOLEST SERPL-MCNC: 170 MG/DL
CO2 SERPL-SCNC: 27 MMOL/L
CREAT SERPL-MCNC: 0.94 MG/DL
EGFR: 59 ML/MIN/1.73M2
EOSINOPHIL # BLD AUTO: 0.2 K/UL
EOSINOPHIL NFR BLD AUTO: 2.9 %
ESTIMATED AVERAGE GLUCOSE: 134 MG/DL
GLUCOSE SERPL-MCNC: 87 MG/DL
HBA1C MFR BLD HPLC: 6.3 %
HCT VFR BLD CALC: 43.5 %
HDLC SERPL-MCNC: 45 MG/DL
HGB BLD-MCNC: 13.4 G/DL
IMM GRANULOCYTES NFR BLD AUTO: 0.1 %
LDLC SERPL CALC-MCNC: 93 MG/DL
LYMPHOCYTES # BLD AUTO: 1.9 K/UL
LYMPHOCYTES NFR BLD AUTO: 27.5 %
MAN DIFF?: NORMAL
MCHC RBC-ENTMCNC: 29.6 PG
MCHC RBC-ENTMCNC: 30.8 GM/DL
MCV RBC AUTO: 96.2 FL
MONOCYTES # BLD AUTO: 0.59 K/UL
MONOCYTES NFR BLD AUTO: 8.6 %
NEUTROPHILS # BLD AUTO: 4.15 K/UL
NEUTROPHILS NFR BLD AUTO: 60.2 %
NONHDLC SERPL-MCNC: 125 MG/DL
PLATELET # BLD AUTO: 200 K/UL
POTASSIUM SERPL-SCNC: 4.5 MMOL/L
PROT SERPL-MCNC: 7 G/DL
RBC # BLD: 4.52 M/UL
RBC # FLD: 14 %
SODIUM SERPL-SCNC: 142 MMOL/L
TRIGL SERPL-MCNC: 158 MG/DL
TSH SERPL-ACNC: 0.89 UIU/ML
WBC # FLD AUTO: 6.9 K/UL

## 2022-08-01 ENCOUNTER — RX RENEWAL (OUTPATIENT)
Age: 87
End: 2022-08-01

## 2022-08-05 ENCOUNTER — APPOINTMENT (OUTPATIENT)
Dept: INTERNAL MEDICINE | Facility: CLINIC | Age: 87
End: 2022-08-05

## 2022-08-05 VITALS
RESPIRATION RATE: 16 BRPM | HEIGHT: 69 IN | HEART RATE: 96 BPM | WEIGHT: 215.31 LBS | TEMPERATURE: 98 F | OXYGEN SATURATION: 99 % | BODY MASS INDEX: 31.89 KG/M2

## 2022-08-05 VITALS — DIASTOLIC BLOOD PRESSURE: 74 MMHG | SYSTOLIC BLOOD PRESSURE: 134 MMHG

## 2022-08-05 DIAGNOSIS — R19.7 DIARRHEA, UNSPECIFIED: ICD-10-CM

## 2022-08-05 PROCEDURE — 36415 COLL VENOUS BLD VENIPUNCTURE: CPT

## 2022-08-05 PROCEDURE — 99214 OFFICE O/P EST MOD 30 MIN: CPT | Mod: 25

## 2022-08-05 RX ORDER — HALOBETASOL PROPIONATE 0.5 MG/G
0.05 CREAM TOPICAL TWICE DAILY
Qty: 1 | Refills: 3 | Status: ACTIVE | COMMUNITY
Start: 2020-10-26 | End: 1900-01-01

## 2022-08-05 NOTE — REVIEW OF SYSTEMS
[Skin Rash] : skin rash [Fever] : no fever [Chills] : no chills [Chest Pain] : no chest pain [Palpitations] : no palpitations [Lower Ext Edema] : no lower extremity edema [Shortness Of Breath] : no shortness of breath [Wheezing] : no wheezing [Cough] : no cough [Abdominal Pain] : no abdominal pain

## 2022-08-05 NOTE — PHYSICAL EXAM
[No Acute Distress] : no acute distress [Normal Sclera/Conjunctiva] : normal sclera/conjunctiva [Normal Outer Ear/Nose] : the outer ears and nose were normal in appearance [No JVD] : no jugular venous distention [No Lymphadenopathy] : no lymphadenopathy [No Respiratory Distress] : no respiratory distress  [No Accessory Muscle Use] : no accessory muscle use [Clear to Auscultation] : lungs were clear to auscultation bilaterally [Normal Rate] : normal rate  [Regular Rhythm] : with a regular rhythm [No Carotid Bruits] : no carotid bruits [No Edema] : there was no peripheral edema [No Extremity Clubbing/Cyanosis] : no extremity clubbing/cyanosis [Soft] : abdomen soft [Non Tender] : non-tender [Non-distended] : non-distended [No Masses] : no abdominal mass palpated [No Spinal Tenderness] : no spinal tenderness [No Focal Deficits] : no focal deficits

## 2022-08-05 NOTE — HISTORY OF PRESENT ILLNESS
[de-identified] : Pt comes for FBW and med renewal  Also concerned that son has c diff and she would like to be tested

## 2022-08-06 ENCOUNTER — APPOINTMENT (OUTPATIENT)
Dept: INTERNAL MEDICINE | Facility: CLINIC | Age: 87
End: 2022-08-06

## 2022-08-06 LAB
ALBUMIN SERPL ELPH-MCNC: 4.4 G/DL
ALP BLD-CCNC: 76 U/L
ALT SERPL-CCNC: 14 U/L
ANION GAP SERPL CALC-SCNC: 12 MMOL/L
AST SERPL-CCNC: 22 U/L
BASOPHILS # BLD AUTO: 0.04 K/UL
BASOPHILS NFR BLD AUTO: 0.7 %
BILIRUB SERPL-MCNC: 0.4 MG/DL
BUN SERPL-MCNC: 13 MG/DL
CALCIUM SERPL-MCNC: 9.9 MG/DL
CHLORIDE SERPL-SCNC: 103 MMOL/L
CHOLEST SERPL-MCNC: 169 MG/DL
CO2 SERPL-SCNC: 31 MMOL/L
CREAT SERPL-MCNC: 0.92 MG/DL
EGFR: 61 ML/MIN/1.73M2
EOSINOPHIL # BLD AUTO: 0.15 K/UL
EOSINOPHIL NFR BLD AUTO: 2.6 %
ESTIMATED AVERAGE GLUCOSE: 126 MG/DL
GLUCOSE SERPL-MCNC: 108 MG/DL
HBA1C MFR BLD HPLC: 6 %
HCT VFR BLD CALC: 45.4 %
HDLC SERPL-MCNC: 46 MG/DL
HGB BLD-MCNC: 13.6 G/DL
IMM GRANULOCYTES NFR BLD AUTO: 0.3 %
LDLC SERPL CALC-MCNC: 92 MG/DL
LYMPHOCYTES # BLD AUTO: 1.47 K/UL
LYMPHOCYTES NFR BLD AUTO: 25.7 %
MAN DIFF?: NORMAL
MCHC RBC-ENTMCNC: 29.8 PG
MCHC RBC-ENTMCNC: 30 GM/DL
MCV RBC AUTO: 99.6 FL
MONOCYTES # BLD AUTO: 0.4 K/UL
MONOCYTES NFR BLD AUTO: 7 %
NEUTROPHILS # BLD AUTO: 3.64 K/UL
NEUTROPHILS NFR BLD AUTO: 63.7 %
NONHDLC SERPL-MCNC: 123 MG/DL
PLATELET # BLD AUTO: 217 K/UL
POTASSIUM SERPL-SCNC: 5.2 MMOL/L
PROT SERPL-MCNC: 7 G/DL
RBC # BLD: 4.56 M/UL
RBC # FLD: 14.8 %
SODIUM SERPL-SCNC: 146 MMOL/L
TRIGL SERPL-MCNC: 151 MG/DL
TSH SERPL-ACNC: 2.62 UIU/ML
WBC # FLD AUTO: 5.72 K/UL

## 2022-08-13 LAB
C DIFF TOXIN B QL PCR REFLEX: NORMAL
GDH ANTIGEN: NOT DETECTED
TOXIN A AND B: NOT DETECTED

## 2022-11-01 ENCOUNTER — APPOINTMENT (OUTPATIENT)
Dept: INTERNAL MEDICINE | Facility: CLINIC | Age: 87
End: 2022-11-01

## 2022-11-01 VITALS
HEIGHT: 69 IN | TEMPERATURE: 97.3 F | HEART RATE: 100 BPM | BODY MASS INDEX: 31.84 KG/M2 | OXYGEN SATURATION: 98 % | WEIGHT: 215 LBS

## 2022-11-01 VITALS — SYSTOLIC BLOOD PRESSURE: 132 MMHG | DIASTOLIC BLOOD PRESSURE: 78 MMHG

## 2022-11-01 DIAGNOSIS — L71.9 ROSACEA, UNSPECIFIED: ICD-10-CM

## 2022-11-01 DIAGNOSIS — Z23 ENCOUNTER FOR IMMUNIZATION: ICD-10-CM

## 2022-11-01 PROCEDURE — 99214 OFFICE O/P EST MOD 30 MIN: CPT | Mod: 25

## 2022-11-01 PROCEDURE — G0008: CPT

## 2022-11-01 PROCEDURE — 90662 IIV NO PRSV INCREASED AG IM: CPT

## 2022-11-01 PROCEDURE — 90677 PCV20 VACCINE IM: CPT

## 2022-11-01 PROCEDURE — G0009: CPT

## 2022-11-01 PROCEDURE — 36415 COLL VENOUS BLD VENIPUNCTURE: CPT

## 2022-11-01 RX ORDER — METRONIDAZOLE 10 MG/G
1 GEL TOPICAL TWICE DAILY
Qty: 1 | Refills: 0 | Status: ACTIVE | COMMUNITY
Start: 2022-11-01 | End: 1900-01-01

## 2022-11-01 NOTE — PHYSICAL EXAM
[No Acute Distress] : no acute distress [Well Nourished] : well nourished [Normal Sclera/Conjunctiva] : normal sclera/conjunctiva [PERRL] : pupils equal round and reactive to light [Normal Outer Ear/Nose] : the outer ears and nose were normal in appearance [No JVD] : no jugular venous distention [No Lymphadenopathy] : no lymphadenopathy [No Respiratory Distress] : no respiratory distress  [No Accessory Muscle Use] : no accessory muscle use [Normal Rate] : normal rate  [Regular Rhythm] : with a regular rhythm [No Edema] : there was no peripheral edema [No Extremity Clubbing/Cyanosis] : no extremity clubbing/cyanosis [Soft] : abdomen soft [Non Tender] : non-tender [Non-distended] : non-distended [No Focal Deficits] : no focal deficits [Normal Affect] : the affect was normal [Normal Insight/Judgement] : insight and judgment were intact [de-identified] : red on cheeks

## 2022-11-01 NOTE — HISTORY OF PRESENT ILLNESS
[de-identified] : Pt comes for FBW and med renewal and flu and prevnar vaccines  Doing well except for arthritis issues

## 2022-11-01 NOTE — REVIEW OF SYSTEMS
[Joint Pain] : joint pain [Joint Stiffness] : joint stiffness [Fever] : no fever [Chills] : no chills [Chest Pain] : no chest pain [Palpitations] : no palpitations [Lower Ext Edema] : no lower extremity edema [Shortness Of Breath] : no shortness of breath [Wheezing] : no wheezing [Cough] : no cough [Abdominal Pain] : no abdominal pain [Dysuria] : no dysuria [Joint Swelling] : no joint swelling [Anxiety] : no anxiety [Depression] : no depression [de-identified] : rosacea cheeks

## 2022-11-05 LAB
ALBUMIN SERPL ELPH-MCNC: 4.4 G/DL
ALP BLD-CCNC: 94 U/L
ALT SERPL-CCNC: 13 U/L
ANION GAP SERPL CALC-SCNC: 13 MMOL/L
AST SERPL-CCNC: 20 U/L
BASOPHILS # BLD AUTO: 0.05 K/UL
BASOPHILS NFR BLD AUTO: 0.6 %
BILIRUB SERPL-MCNC: 0.3 MG/DL
BUN SERPL-MCNC: 31 MG/DL
CALCIUM SERPL-MCNC: 9.8 MG/DL
CHLORIDE SERPL-SCNC: 100 MMOL/L
CHOLEST SERPL-MCNC: 175 MG/DL
CO2 SERPL-SCNC: 29 MMOL/L
CREAT SERPL-MCNC: 0.91 MG/DL
EGFR: 61 ML/MIN/1.73M2
EOSINOPHIL # BLD AUTO: 0.27 K/UL
EOSINOPHIL NFR BLD AUTO: 3.1 %
ESTIMATED AVERAGE GLUCOSE: 123 MG/DL
GLUCOSE SERPL-MCNC: 86 MG/DL
HBA1C MFR BLD HPLC: 5.9 %
HCT VFR BLD CALC: 42.4 %
HDLC SERPL-MCNC: 49 MG/DL
HGB BLD-MCNC: 13.3 G/DL
IMM GRANULOCYTES NFR BLD AUTO: 0.2 %
LDLC SERPL CALC-MCNC: 92 MG/DL
LYMPHOCYTES # BLD AUTO: 2.12 K/UL
LYMPHOCYTES NFR BLD AUTO: 24.6 %
MAN DIFF?: NORMAL
MCHC RBC-ENTMCNC: 30.6 PG
MCHC RBC-ENTMCNC: 31.4 GM/DL
MCV RBC AUTO: 97.5 FL
MONOCYTES # BLD AUTO: 0.7 K/UL
MONOCYTES NFR BLD AUTO: 8.1 %
NEUTROPHILS # BLD AUTO: 5.46 K/UL
NEUTROPHILS NFR BLD AUTO: 63.4 %
NONHDLC SERPL-MCNC: 126 MG/DL
PLATELET # BLD AUTO: 254 K/UL
POTASSIUM SERPL-SCNC: 4.3 MMOL/L
PROT SERPL-MCNC: 7.1 G/DL
RBC # BLD: 4.35 M/UL
RBC # FLD: 14.6 %
SODIUM SERPL-SCNC: 143 MMOL/L
TRIGL SERPL-MCNC: 172 MG/DL
TSH SERPL-ACNC: 1.24 UIU/ML
WBC # FLD AUTO: 8.62 K/UL

## 2022-11-30 NOTE — H&P PST ADULT - PRO TOBACCO TYPE
Patient is asking if she is needing a CT scan   Before her next appt . Please let her know . She also is having nerve blocks put into both of her hips  on 12/14/22 . 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 Patient had a MRI done on the lower back .  She is also wondering if she could make the next appt vv quit 1978/cigarettes

## 2022-12-09 ENCOUNTER — TRANSCRIPTION ENCOUNTER (OUTPATIENT)
Age: 87
End: 2022-12-09

## 2022-12-27 ENCOUNTER — APPOINTMENT (OUTPATIENT)
Dept: INTERNAL MEDICINE | Facility: CLINIC | Age: 87
End: 2022-12-27

## 2023-01-09 ENCOUNTER — APPOINTMENT (OUTPATIENT)
Dept: INTERNAL MEDICINE | Facility: CLINIC | Age: 88
End: 2023-01-09
Payer: MEDICARE

## 2023-01-09 VITALS
BODY MASS INDEX: 34.07 KG/M2 | WEIGHT: 230 LBS | TEMPERATURE: 97.9 F | OXYGEN SATURATION: 95 % | RESPIRATION RATE: 16 BRPM | HEIGHT: 69 IN | HEART RATE: 85 BPM

## 2023-01-09 VITALS — DIASTOLIC BLOOD PRESSURE: 80 MMHG | SYSTOLIC BLOOD PRESSURE: 132 MMHG

## 2023-01-09 DIAGNOSIS — M17.12 UNILATERAL PRIMARY OSTEOARTHRITIS, LEFT KNEE: ICD-10-CM

## 2023-01-09 DIAGNOSIS — M17.11 UNILATERAL PRIMARY OSTEOARTHRITIS, RIGHT KNEE: ICD-10-CM

## 2023-01-09 PROCEDURE — 99213 OFFICE O/P EST LOW 20 MIN: CPT

## 2023-01-09 NOTE — PHYSICAL EXAM
[No Acute Distress] : no acute distress [Normal Sclera/Conjunctiva] : normal sclera/conjunctiva [Normal Outer Ear/Nose] : the outer ears and nose were normal in appearance [No JVD] : no jugular venous distention [No Respiratory Distress] : no respiratory distress  [de-identified] : stiff jints  both hands tender at base of thumb  some decreased ROM

## 2023-01-09 NOTE — HISTORY OF PRESENT ILLNESS
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 Pt has primary immunodeficiency disorder stable and CKD3 a stable

## 2023-02-07 ENCOUNTER — APPOINTMENT (OUTPATIENT)
Dept: INTERNAL MEDICINE | Facility: CLINIC | Age: 88
End: 2023-02-07
Payer: MEDICARE

## 2023-02-07 VITALS
HEART RATE: 102 BPM | RESPIRATION RATE: 16 BRPM | WEIGHT: 230 LBS | HEIGHT: 69 IN | OXYGEN SATURATION: 95 % | TEMPERATURE: 98.2 F | BODY MASS INDEX: 34.07 KG/M2

## 2023-02-07 VITALS — DIASTOLIC BLOOD PRESSURE: 82 MMHG | SYSTOLIC BLOOD PRESSURE: 140 MMHG

## 2023-02-07 PROCEDURE — 99214 OFFICE O/P EST MOD 30 MIN: CPT

## 2023-02-07 NOTE — PLAN
[FreeTextEntry1] : all med needed renewwed  Etiology of hand pain unclear  will get ortho eval and give prednisone and increase gabapentin for now  pt agreeable and will have blood work next visit as she is uncomfortable

## 2023-02-07 NOTE — PHYSICAL EXAM
[No Acute Distress] : no acute distress [Normal Sclera/Conjunctiva] : normal sclera/conjunctiva [Normal Outer Ear/Nose] : the outer ears and nose were normal in appearance [No JVD] : no jugular venous distention [No Respiratory Distress] : no respiratory distress  [No Accessory Muscle Use] : no accessory muscle use [Clear to Auscultation] : lungs were clear to auscultation bilaterally [Regular Rhythm] : with a regular rhythm [No Edema] : there was no peripheral edema [No Extremity Clubbing/Cyanosis] : no extremity clubbing/cyanosis [Soft] : abdomen soft [Non Tender] : non-tender [Non-distended] : non-distended [Speech Grossly Normal] : speech grossly normal [Memory Grossly Normal] : memory grossly normal [de-identified] : decreased rom   stiffness [de-identified] : left hand strength slightly weaker

## 2023-02-07 NOTE — REVIEW OF SYSTEMS
[Joint Pain] : joint pain [Joint Stiffness] : joint stiffness [Muscle Weakness] : muscle weakness [Fever] : no fever [Chills] : no chills [Chest Pain] : no chest pain [Palpitations] : no palpitations [Lower Ext Edema] : no lower extremity edema [Shortness Of Breath] : no shortness of breath [Wheezing] : no wheezing [Cough] : no cough [Abdominal Pain] : no abdominal pain [Dysuria] : no dysuria

## 2023-02-07 NOTE — HISTORY OF PRESENT ILLNESS
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  Pt also suffers from arthritis.

## 2023-02-13 ENCOUNTER — APPOINTMENT (OUTPATIENT)
Dept: ORTHOPEDIC SURGERY | Facility: CLINIC | Age: 88
End: 2023-02-13
Payer: MEDICARE

## 2023-02-13 PROCEDURE — 99214 OFFICE O/P EST MOD 30 MIN: CPT

## 2023-02-13 NOTE — PHYSICAL EXAM
[de-identified] : - Constitutional: This is a female in no obvious distress.  \par - Psych: Patient is alert and oriented to person, place and time.  Patient has a normal mood and affect.\par - Cardiovascular: Normal pulses throughout the upper extremities.  No significant varicosities are noted in the upper extremities. \par - Neuro: Patient has normal coordination.\par - Respiratory:  Patient exhibits no evidence of shortness of breath or difficulty breathing.\par - Skin: No rashes, lesions, or other abnormalities are noted in the upper extremities.\par \par ---\par \par Examination of both hands demonstrates no obvious swelling.  She has thenar atrophy on the right and no obvious thenar atrophy on the left.  There is no evidence of a trigger finger.  She has full flexion of the digits bilaterally with some limitation of terminal extension of the digits bilaterally.  She has intact sensation to light touch throughout the digits bilaterally.  Provocative signs for carpal tunnel syndrome are equivocal.

## 2023-02-13 NOTE — DISCUSSION/SUMMARY
[FreeTextEntry1] : She has findings consistent with bilateral carpal tunnel syndrome with symptoms for the past 2 months, particularly at night, with improvement since a Medrol Dosepak.\par \par I had a discussion regarding today's visit, the prognosis of this diagnosis and treatment recommendations and options.  At this time, I recommended giving this time to see how long she notes relief from the Medrol Dosepak.  She was written a prescription for bilateral carpal tunnel splints.  If her symptoms recur, then I would first recommend trying a cortisone injection.  If this does not ultimately provide her with long-term relief, the possible need for carpal tunnel release in the future was discussed as well.\par \par The patient has agreed to this plan of management and has expressed full understanding.  All questions were fully answered to the patient's satisfaction.\par \par My cumulative time spent on this patient's visit included: Preparation for the visit, review of the medical records, review of pertinent diagnostic studies, examination and counseling of the patient on the above diagnosis, treatment plan and prognosis, orders of diagnostic tests, medications and/or appropriate procedures and documentation in the medical records of today's visit.

## 2023-02-13 NOTE — CONSULT LETTER
[Consult Letter:] : I had the pleasure of evaluating your patient, [unfilled]. [Please see my note below.] : Please see my note below. [Consult Closing:] : Thank you very much for allowing me to participate in the care of this patient.  If you have any questions, please do not hesitate to contact me. [Sincerely,] : Sincerely, [FreeTextEntry3] : Jair Morris M.D.\par Surgery of the Hand & Upper Extremity\par Orthopaedic Surgery\par Chief, Hand Service, [facility]\par Director, Hand Service, [facility]\par  of Orthopedic Surgery, [facility] [employer] [facility] at [facility] \par [facility]\par \par Email: [email or web address] Office Phone: [phone]\par

## 2023-02-13 NOTE — HISTORY OF PRESENT ILLNESS
[Right] : right hand dominant [FreeTextEntry1] : She comes in today for evaluation of bilateral hand numbness and tingling and burning, which is increased at night.  She has had symptoms for the past 2 months.  She was recently prescribed a Medrol Dosepak and she has noted improvement since then.  She will be finishing off the Medrol Dosepak in the next day.\par \par She was referred by Dr. Benjamin.

## 2023-03-27 ENCOUNTER — APPOINTMENT (OUTPATIENT)
Dept: ORTHOPEDIC SURGERY | Facility: CLINIC | Age: 88
End: 2023-03-27
Payer: MEDICARE

## 2023-03-27 PROCEDURE — 20606 DRAIN/INJ JOINT/BURSA W/US: CPT | Mod: 50

## 2023-03-27 PROCEDURE — 73130 X-RAY EXAM OF HAND: CPT | Mod: 50

## 2023-03-27 PROCEDURE — 73110 X-RAY EXAM OF WRIST: CPT | Mod: 50

## 2023-03-27 PROCEDURE — 99214 OFFICE O/P EST MOD 30 MIN: CPT | Mod: 25

## 2023-03-27 NOTE — HISTORY OF PRESENT ILLNESS
[FreeTextEntry1] : Follow-up regarding bilateral carpal tunnel syndrome.  See note from when she was seen in the office 6 weeks ago.  Prior to that, she had been prescribed a Medrol Dosepak with improvement.  I recommended use of carpal tunnel splints and observation.\par \par She returns today, as she has noted worsening bilateral hand pain burning and numbness and tingling.\par \par She takes Tylenol 3 for the pain.  She is also wearing braces most of the time.\par \par She was accompanied by her son today.\par \par She was referred by Dr. Benjamin.

## 2023-03-27 NOTE — DISCUSSION/SUMMARY
[FreeTextEntry1] : I had a discussion regarding today's visit, the diagnosis and treatment recommendations and options.  We also discussed changes since the last visit.  \par \par She is quite symptomatic secondary to bilateral carpal tunnel syndrome, in addition to a flare of underlying arthritis.  She and her son agreed to proceed with bilateral carpal tunnel cortisone injections, understanding that this may not provide with long-term relief.\par \par The patient has agreed to the above plan of management and has expressed full understanding.  All questions were fully answered to the patient's satisfaction.\par \par My cumulative time spent on today's visit was greater than 30 minutes and included: Preparation for the visit, review of the medical records, review of pertinent diagnostic studies, examination and counseling of the patient on the above diagnosis, treatment plan and prognosis, orders of diagnostic tests, medications and/or appropriate procedures and documentation in the medical records of today's visit.

## 2023-03-27 NOTE — PROCEDURE
[FreeTextEntry1] : -  After a discussion of risks and benefits, the patient agreed to proceed with a cortisone injection.  \par -  Side injected: Right and left carpal tunnels.\par -  Medications injected:1 cc of 1% Lidocaine and 1 cc of Celestone Soluspan 6mg/cc, using sterile technique.\par -  Ultrasound Guidance: Ultrasound confirmed proper needle localization within the carpal tunnel, thereby protecting the median nerve, prior to the injection.  \par -  Patient tolerated the procedure well, without complications.\par -  Instructions: The patient was was instructed on the use of a carpal tunnel splint, especially at night.\par -  Follow-up: Within 3-4 weeks to assess response to the injection.

## 2023-03-27 NOTE — PHYSICAL EXAM
[de-identified] : - Constitutional: This is a female in no obvious distress.  She was accompanied by her son today.\par - Psych: Patient is alert and oriented to person, place and time.  Patient has a normal mood and affect.\par - Cardiovascular: Normal pulses throughout the upper extremities.  No significant varicosities are noted in the upper extremities. \par - Neuro: Patient has normal coordination.\par - Respiratory:  Patient exhibits no evidence of shortness of breath or difficulty breathing.\par - Skin: No rashes, lesions, or other abnormalities are noted in the upper extremities.\par \par ---\par \par Examination of both hands demonstrates bilateral carpal tunnel splints which were removed.  They were obviously tight and there is swelling of the MCP joints.  She has limitation of flexion and extension of the digits.  She has decreased sensation to light touch bilaterally at the thumbs.  She has intact sensation to light touch throughout the remainder of the digits.  Provocative signs for carpal tunnel syndrome are equivocal. 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  There is also evidence of arthritis at the right wrist DRUJ.

## 2023-04-14 ENCOUNTER — NON-APPOINTMENT (OUTPATIENT)
Age: 88
End: 2023-04-14

## 2023-04-17 PROBLEM — G56.01 CARPAL TUNNEL SYNDROME OF RIGHT WRIST: Status: ACTIVE | Noted: 2023-02-13

## 2023-04-17 PROBLEM — G56.02 CARPAL TUNNEL SYNDROME OF LEFT WRIST: Status: ACTIVE | Noted: 2023-02-13

## 2023-04-24 ENCOUNTER — APPOINTMENT (OUTPATIENT)
Dept: ORTHOPEDIC SURGERY | Facility: CLINIC | Age: 88
End: 2023-04-24
Payer: MEDICARE

## 2023-04-24 DIAGNOSIS — G56.01 CARPAL TUNNEL SYNDROME, RIGHT UPPER LIMB: ICD-10-CM

## 2023-04-24 DIAGNOSIS — G56.02 CARPAL TUNNEL SYNDROME, LEFT UPPER LIMB: ICD-10-CM

## 2023-04-24 PROCEDURE — 99214 OFFICE O/P EST MOD 30 MIN: CPT | Mod: 25

## 2023-04-24 PROCEDURE — 76942 ECHO GUIDE FOR BIOPSY: CPT

## 2023-04-24 RX ORDER — LIDOCAINE HYDROCHLORIDE 10 MG/ML
1 INJECTION, SOLUTION INFILTRATION; PERINEURAL
Refills: 0 | Status: COMPLETED | OUTPATIENT
Start: 2023-04-24

## 2023-04-24 RX ORDER — BETAMETHA AC,SOD PHOS/WATER/PF 6 MG/ML
6 (3-3) VIAL (ML) INJECTION
Qty: 1 | Refills: 0 | Status: COMPLETED | OUTPATIENT
Start: 2023-04-24

## 2023-04-24 RX ADMIN — LIDOCAINE HYDROCHLORIDE 1 %: 10 INJECTION, SOLUTION INFILTRATION; PERINEURAL at 00:00

## 2023-04-24 RX ADMIN — BETAMETHASONE SODIUM PHOSPHATE AND BETAMETHASONE ACETATE 1 MG/ML: 3; 3 INJECTION, SUSPENSION INTRA-ARTICULAR; INTRALESIONAL; INTRAMUSCULAR; SOFT TISSUE at 00:00

## 2023-04-24 NOTE — PROCEDURE
[FreeTextEntry1] : -  After a discussion of risks and benefits, the patient agreed to proceed with a cortisone injection.  \par -  Side injected: Right and left carpal tunnels\par -  Medications injected:1 cc of 1% Lidocaine and 1 cc of Celestone Soluspan 6mg/cc, using sterile technique.\par -  Ultrasound Guidance: Ultrasound confirmed proper needle localization within the carpal tunnel, thereby protecting the median nerve, prior to the injection.  \par -  Patient tolerated the procedure well, without complications.\par -  Instructions: The patient was was instructed on the use of a carpal tunnel splint, especially at night.\par -  Follow-up: If her symptoms persist or recur in the future.

## 2023-04-24 NOTE — PHYSICAL EXAM
[de-identified] : - Constitutional: This is a female in no obvious distress.  She was accompanied by her son today.\par - Psych: Patient is alert and oriented to person, place and time.  Patient has a normal mood and affect.\par - Cardiovascular: Normal pulses throughout the upper extremities.  No significant varicosities are noted in the upper extremities. \par - Neuro: Patient has normal coordination.\par - Respiratory:  Patient exhibits no evidence of shortness of breath or difficulty breathing.\par - Skin: No rashes, lesions, or other abnormalities are noted in the upper extremities.\par \par ---\par \par Examination of both hands demonstrates bilateral carpal tunnel splints which were removed.  There is obvious arthritis throughout the digits and there is swelling of the MCP joints.  She has limitation of flexion and extension of the digits.  She has decreased sensation to light touch bilaterally at the thumbs.  She has intact sensation to light touch throughout the remainder of the digits.  Provocative signs for carpal tunnel syndrome are equivocal. 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  There is also evidence of arthritis at the right wrist DRUJ.

## 2023-04-24 NOTE — HISTORY OF PRESENT ILLNESS
[FreeTextEntry1] : Follow-up regarding bilateral carpal tunnel syndrome and hand arthritis. \par \par She was seen in the office 4 weeks ago and given bilateral carpal tunnel cortisone injections.\par \par She states that her symptoms significantly improved after the injections for several weeks but then began to recur.  She is quite bothered by her symptoms.\par \par She was accompanied by her son today.

## 2023-05-01 RX ORDER — PROMETHAZINE HYDROCHLORIDE AND DEXTROMETHORPHAN HYDROBROMIDE ORAL SOLUTION 15; 6.25 MG/5ML; MG/5ML
6.25-15 SOLUTION ORAL EVERY 6 HOURS
Qty: 280 | Refills: 0 | Status: DISCONTINUED | COMMUNITY
Start: 2020-02-14 | End: 2023-05-01

## 2023-05-01 RX ORDER — NYSTATIN 100000 [USP'U]/G
100000 CREAM TOPICAL TWICE DAILY
Qty: 1 | Refills: 1 | Status: DISCONTINUED | COMMUNITY
Start: 2020-01-06 | End: 2023-05-01

## 2023-05-02 ENCOUNTER — APPOINTMENT (OUTPATIENT)
Dept: INTERNAL MEDICINE | Facility: CLINIC | Age: 88
End: 2023-05-02
Payer: MEDICARE

## 2023-05-02 VITALS — DIASTOLIC BLOOD PRESSURE: 80 MMHG | SYSTOLIC BLOOD PRESSURE: 134 MMHG

## 2023-05-02 VITALS
HEIGHT: 69 IN | TEMPERATURE: 98.4 F | OXYGEN SATURATION: 94 % | WEIGHT: 230 LBS | BODY MASS INDEX: 34.07 KG/M2 | HEART RATE: 86 BPM | RESPIRATION RATE: 16 BRPM

## 2023-05-02 DIAGNOSIS — M19.041 PRIMARY OSTEOARTHRITIS, RIGHT HAND: ICD-10-CM

## 2023-05-02 DIAGNOSIS — M19.042 PRIMARY OSTEOARTHRITIS, LEFT HAND: ICD-10-CM

## 2023-05-02 PROCEDURE — 99214 OFFICE O/P EST MOD 30 MIN: CPT | Mod: 25

## 2023-05-02 PROCEDURE — 36415 COLL VENOUS BLD VENIPUNCTURE: CPT

## 2023-05-02 NOTE — HISTORY OF PRESENT ILLNESS
[de-identified] : Pt comes for FBW and med renewal  Pt feels well  breathing well  Just has bad OA in hands and back and hips   Seeing Dr Morris for hands

## 2023-05-02 NOTE — HEALTH RISK ASSESSMENT
[No] : In the past 12 months have you used drugs other than those required for medical reasons? No [0] : 2) Feeling down, depressed, or hopeless: Not at all (0) [PHQ-2 Negative - No further assessment needed] : PHQ-2 Negative - No further assessment needed [[id number]] : 0

## 2023-05-02 NOTE — PHYSICAL EXAM
[No Acute Distress] : no acute distress [Normal Sclera/Conjunctiva] : normal sclera/conjunctiva [Normal Outer Ear/Nose] : the outer ears and nose were normal in appearance [No JVD] : no jugular venous distention [No Lymphadenopathy] : no lymphadenopathy [No Respiratory Distress] : no respiratory distress  [No Accessory Muscle Use] : no accessory muscle use [Clear to Auscultation] : lungs were clear to auscultation bilaterally [Normal Rate] : normal rate  [Regular Rhythm] : with a regular rhythm [No Edema] : there was no peripheral edema [No Extremity Clubbing/Cyanosis] : no extremity clubbing/cyanosis [Soft] : abdomen soft [Non Tender] : non-tender [Non-distended] : non-distended [No Masses] : no abdominal mass palpated [de-identified] : decreased rom lower back  both hands in braces [de-identified] : walks with cane

## 2023-05-02 NOTE — PLAN
[FreeTextEntry1] : Risks and benefits of long term opioid use is discussed with the patient, and alternative therapies are offered.  There is no evidence of abuse or diversion.   is reviewed and is consistent.  The use of the opioids had increased the patient's functional capacity.\par

## 2023-05-02 NOTE — REVIEW OF SYSTEMS
[Joint Pain] : joint pain [Joint Stiffness] : joint stiffness [Back Pain] : back pain [Fever] : no fever [Chills] : no chills [Chest Pain] : no chest pain [Palpitations] : no palpitations [Lower Ext Edema] : no lower extremity edema [Shortness Of Breath] : no shortness of breath [Wheezing] : no wheezing [Cough] : no cough [Abdominal Pain] : no abdominal pain [Dysuria] : no dysuria

## 2023-05-05 RX ORDER — NYSTATIN 100000 [USP'U]/G
100000 CREAM TOPICAL TWICE DAILY
Qty: 1 | Refills: 3 | Status: ACTIVE | COMMUNITY
Start: 2023-05-05 | End: 1900-01-01

## 2023-05-09 LAB
ALBUMIN SERPL ELPH-MCNC: 4.1 G/DL
ALP BLD-CCNC: 84 U/L
ALT SERPL-CCNC: 14 U/L
ANION GAP SERPL CALC-SCNC: 13 MMOL/L
AST SERPL-CCNC: 18 U/L
BASOPHILS # BLD AUTO: 0.06 K/UL
BASOPHILS NFR BLD AUTO: 0.6 %
BILIRUB SERPL-MCNC: 0.3 MG/DL
BUN SERPL-MCNC: 30 MG/DL
CALCIUM SERPL-MCNC: 9.7 MG/DL
CHLORIDE SERPL-SCNC: 102 MMOL/L
CHOLEST SERPL-MCNC: 170 MG/DL
CO2 SERPL-SCNC: 30 MMOL/L
CREAT SERPL-MCNC: 0.89 MG/DL
EGFR: 63 ML/MIN/1.73M2
EOSINOPHIL # BLD AUTO: 0.22 K/UL
EOSINOPHIL NFR BLD AUTO: 2.1 %
ESTIMATED AVERAGE GLUCOSE: 126 MG/DL
GLUCOSE SERPL-MCNC: 107 MG/DL
HBA1C MFR BLD HPLC: 6 %
HCT VFR BLD CALC: 41.9 %
HDLC SERPL-MCNC: 55 MG/DL
HGB BLD-MCNC: 12.6 G/DL
IMM GRANULOCYTES NFR BLD AUTO: 0.5 %
LDLC SERPL CALC-MCNC: 82 MG/DL
LYMPHOCYTES # BLD AUTO: 2.45 K/UL
LYMPHOCYTES NFR BLD AUTO: 23.7 %
MAN DIFF?: NORMAL
MCHC RBC-ENTMCNC: 29.5 PG
MCHC RBC-ENTMCNC: 30.1 GM/DL
MCV RBC AUTO: 98.1 FL
MONOCYTES # BLD AUTO: 0.79 K/UL
MONOCYTES NFR BLD AUTO: 7.6 %
NEUTROPHILS # BLD AUTO: 6.77 K/UL
NEUTROPHILS NFR BLD AUTO: 65.5 %
NONHDLC SERPL-MCNC: 114 MG/DL
PLATELET # BLD AUTO: 294 K/UL
POTASSIUM SERPL-SCNC: 4.8 MMOL/L
PROT SERPL-MCNC: 6.5 G/DL
RBC # BLD: 4.27 M/UL
RBC # FLD: 15.7 %
SODIUM SERPL-SCNC: 145 MMOL/L
TRIGL SERPL-MCNC: 164 MG/DL
TSH SERPL-ACNC: 1.17 UIU/ML
WBC # FLD AUTO: 10.34 K/UL

## 2023-08-08 ENCOUNTER — APPOINTMENT (OUTPATIENT)
Dept: INTERNAL MEDICINE | Facility: CLINIC | Age: 88
End: 2023-08-08
Payer: MEDICARE

## 2023-08-08 VITALS
WEIGHT: 230 LBS | HEART RATE: 92 BPM | HEIGHT: 69 IN | BODY MASS INDEX: 34.07 KG/M2 | OXYGEN SATURATION: 94 % | TEMPERATURE: 96.9 F

## 2023-08-08 VITALS — DIASTOLIC BLOOD PRESSURE: 80 MMHG | SYSTOLIC BLOOD PRESSURE: 132 MMHG

## 2023-08-08 PROCEDURE — 99214 OFFICE O/P EST MOD 30 MIN: CPT | Mod: 25

## 2023-08-08 PROCEDURE — 36415 COLL VENOUS BLD VENIPUNCTURE: CPT

## 2023-08-08 NOTE — PLAN
[FreeTextEntry1] : will try different cleansing agent   Metamucil  wafer for occ diarrhea  meds renewed labs obtain  may need to call imunologist or see derm if rash continues

## 2023-08-08 NOTE — HISTORY OF PRESENT ILLNESS
[de-identified] : Pt comes for FBW and med renewal  PT has been getting a slight rash on both thighs where she injects her weekly hizentra.  no change in wiping material  Pt alternates thighs

## 2023-08-08 NOTE — PHYSICAL EXAM
[No Acute Distress] : no acute distress [Normal Sclera/Conjunctiva] : normal sclera/conjunctiva [Normal Outer Ear/Nose] : the outer ears and nose were normal in appearance [No JVD] : no jugular venous distention [No Lymphadenopathy] : no lymphadenopathy [No Respiratory Distress] : no respiratory distress  [No Accessory Muscle Use] : no accessory muscle use [Clear to Auscultation] : lungs were clear to auscultation bilaterally [Normal Rate] : normal rate  [Regular Rhythm] : with a regular rhythm [No Edema] : there was no peripheral edema [No Extremity Clubbing/Cyanosis] : no extremity clubbing/cyanosis [Soft] : abdomen soft [Non Tender] : non-tender [Non-distended] : non-distended [Normal Bowel Sounds] : normal bowel sounds [Normal Anterior Cervical Nodes] : no anterior cervical lymphadenopathy [Memory Grossly Normal] : memory grossly normal [Normal Insight/Judgement] : insight and judgment were intact [de-identified] : using cane  [de-identified] : both thighs  superficial red rash

## 2023-08-08 NOTE — REVIEW OF SYSTEMS
[Fever] : no fever [Chills] : no chills [Chest Pain] : no chest pain [Palpitations] : no palpitations [Lower Ext Edema] : no lower extremity edema [Shortness Of Breath] : no shortness of breath [Wheezing] : no wheezing [Cough] : no cough [Abdominal Pain] : no abdominal pain [Dysuria] : no dysuria [Joint Pain] : no joint pain [Joint Stiffness] : no joint stiffness [Itching] : no itching [Skin Rash] : skin rash [Headache] : no headache [Dizziness] : no dizziness [Confusion] : no confusion [Unsteady Walking] : no ataxia [Depression] : no depression

## 2023-08-10 LAB
ALBUMIN SERPL ELPH-MCNC: 4.2 G/DL
ALP BLD-CCNC: 80 U/L
ALT SERPL-CCNC: 14 U/L
ANION GAP SERPL CALC-SCNC: 14 MMOL/L
AST SERPL-CCNC: 22 U/L
BILIRUB SERPL-MCNC: 0.3 MG/DL
BUN SERPL-MCNC: 28 MG/DL
CALCIUM SERPL-MCNC: 9.9 MG/DL
CHLORIDE SERPL-SCNC: 102 MMOL/L
CHOLEST SERPL-MCNC: 165 MG/DL
CO2 SERPL-SCNC: 28 MMOL/L
CREAT SERPL-MCNC: 0.95 MG/DL
EGFR: 58 ML/MIN/1.73M2
ESTIMATED AVERAGE GLUCOSE: 123 MG/DL
GLUCOSE SERPL-MCNC: 145 MG/DL
HBA1C MFR BLD HPLC: 5.9 %
HDLC SERPL-MCNC: 44 MG/DL
LDLC SERPL CALC-MCNC: 95 MG/DL
NONHDLC SERPL-MCNC: 121 MG/DL
POTASSIUM SERPL-SCNC: 4.4 MMOL/L
PROT SERPL-MCNC: 6.6 G/DL
SODIUM SERPL-SCNC: 143 MMOL/L
T4 FREE SERPL-MCNC: 1.4 NG/DL
TRIGL SERPL-MCNC: 151 MG/DL
TSH SERPL-ACNC: 1.18 UIU/ML

## 2023-08-26 NOTE — H&P PST ADULT - NS PRO FEM REPRO HEALTH SCREEN
Exclude Pending Lab, Cardiology, and Radiology orders from printing on the Patient's Discharge Instructions, due to Privacy Concerns. mammogram

## 2023-10-31 ENCOUNTER — APPOINTMENT (OUTPATIENT)
Dept: INTERNAL MEDICINE | Facility: CLINIC | Age: 88
End: 2023-10-31
Payer: MEDICARE

## 2023-10-31 ENCOUNTER — NON-APPOINTMENT (OUTPATIENT)
Age: 88
End: 2023-10-31

## 2023-10-31 VITALS
HEART RATE: 99 BPM | BODY MASS INDEX: 34.07 KG/M2 | WEIGHT: 230 LBS | OXYGEN SATURATION: 99 % | HEIGHT: 69 IN | TEMPERATURE: 97.1 F

## 2023-10-31 VITALS — SYSTOLIC BLOOD PRESSURE: 134 MMHG | DIASTOLIC BLOOD PRESSURE: 82 MMHG

## 2023-10-31 PROCEDURE — G0439: CPT

## 2023-10-31 PROCEDURE — G0008: CPT

## 2023-10-31 PROCEDURE — 36415 COLL VENOUS BLD VENIPUNCTURE: CPT

## 2023-10-31 PROCEDURE — 99214 OFFICE O/P EST MOD 30 MIN: CPT | Mod: 25

## 2023-10-31 PROCEDURE — 90662 IIV NO PRSV INCREASED AG IM: CPT

## 2023-10-31 PROCEDURE — 93000 ELECTROCARDIOGRAM COMPLETE: CPT

## 2023-11-14 ENCOUNTER — OUTPATIENT (OUTPATIENT)
Dept: OUTPATIENT SERVICES | Facility: HOSPITAL | Age: 88
LOS: 1 days | End: 2023-11-14
Payer: MEDICARE

## 2023-11-14 ENCOUNTER — APPOINTMENT (OUTPATIENT)
Dept: ULTRASOUND IMAGING | Facility: CLINIC | Age: 88
End: 2023-11-14
Payer: MEDICARE

## 2023-11-14 DIAGNOSIS — Z90.12 ACQUIRED ABSENCE OF LEFT BREAST AND NIPPLE: Chronic | ICD-10-CM

## 2023-11-14 DIAGNOSIS — Z98.890 OTHER SPECIFIED POSTPROCEDURAL STATES: Chronic | ICD-10-CM

## 2023-11-14 DIAGNOSIS — I10 ESSENTIAL (PRIMARY) HYPERTENSION: ICD-10-CM

## 2023-11-14 DIAGNOSIS — Z90.49 ACQUIRED ABSENCE OF OTHER SPECIFIED PARTS OF DIGESTIVE TRACT: Chronic | ICD-10-CM

## 2023-11-14 DIAGNOSIS — Z92.241 PERSONAL HISTORY OF SYSTEMIC STEROID THERAPY: Chronic | ICD-10-CM

## 2023-11-14 DIAGNOSIS — E78.00 PURE HYPERCHOLESTEROLEMIA, UNSPECIFIED: ICD-10-CM

## 2023-11-14 PROCEDURE — 93880 EXTRACRANIAL BILAT STUDY: CPT | Mod: 26

## 2023-11-14 PROCEDURE — 93880 EXTRACRANIAL BILAT STUDY: CPT

## 2023-11-17 ENCOUNTER — APPOINTMENT (OUTPATIENT)
Dept: CARDIOLOGY | Facility: CLINIC | Age: 88
End: 2023-11-17
Payer: MEDICARE

## 2023-11-17 VITALS — OXYGEN SATURATION: 96 % | DIASTOLIC BLOOD PRESSURE: 80 MMHG | SYSTOLIC BLOOD PRESSURE: 124 MMHG | HEART RATE: 81 BPM

## 2023-11-17 DIAGNOSIS — R01.1 CARDIAC MURMUR, UNSPECIFIED: ICD-10-CM

## 2023-11-17 PROCEDURE — 99204 OFFICE O/P NEW MOD 45 MIN: CPT

## 2023-11-27 ENCOUNTER — NON-APPOINTMENT (OUTPATIENT)
Age: 88
End: 2023-11-27

## 2023-11-27 ENCOUNTER — APPOINTMENT (OUTPATIENT)
Dept: CARDIOLOGY | Facility: CLINIC | Age: 88
End: 2023-11-27
Payer: MEDICARE

## 2023-11-27 LAB
ALBUMIN SERPL ELPH-MCNC: 4.5 G/DL
ALP BLD-CCNC: 84 U/L
ALT SERPL-CCNC: 10 U/L
ANION GAP SERPL CALC-SCNC: 11 MMOL/L
APPEARANCE: ABNORMAL
AST SERPL-CCNC: 19 U/L
BACTERIA: ABNORMAL /HPF
BASOPHILS # BLD AUTO: 0.03 K/UL
BASOPHILS NFR BLD AUTO: 0.4 %
BILIRUB SERPL-MCNC: 0.4 MG/DL
BILIRUBIN URINE: NEGATIVE
BLOOD URINE: ABNORMAL
BUN SERPL-MCNC: 27 MG/DL
CALCIUM SERPL-MCNC: 9.9 MG/DL
CAST: 1 /LPF
CHLORIDE SERPL-SCNC: 101 MMOL/L
CHOLEST SERPL-MCNC: 172 MG/DL
CO2 SERPL-SCNC: 32 MMOL/L
COLOR: YELLOW
CREAT SERPL-MCNC: 0.91 MG/DL
EGFR: 61 ML/MIN/1.73M2
EOSINOPHIL # BLD AUTO: 0.16 K/UL
EOSINOPHIL NFR BLD AUTO: 2.1 %
EPITHELIAL CELLS: 3 /HPF
ESTIMATED AVERAGE GLUCOSE: 126 MG/DL
GLUCOSE QUALITATIVE U: NEGATIVE MG/DL
GLUCOSE SERPL-MCNC: 98 MG/DL
HBA1C MFR BLD HPLC: 6 %
HCT VFR BLD CALC: 43 %
HDLC SERPL-MCNC: 53 MG/DL
HGB BLD-MCNC: 13 G/DL
IMM GRANULOCYTES NFR BLD AUTO: 0.3 %
KETONES URINE: NEGATIVE MG/DL
LDLC SERPL CALC-MCNC: 95 MG/DL
LEUKOCYTE ESTERASE URINE: ABNORMAL
LYMPHOCYTES # BLD AUTO: 2.19 K/UL
LYMPHOCYTES NFR BLD AUTO: 28.5 %
MAN DIFF?: NORMAL
MCHC RBC-ENTMCNC: 29.7 PG
MCHC RBC-ENTMCNC: 30.2 GM/DL
MCV RBC AUTO: 98.2 FL
MICROSCOPIC-UA: NORMAL
MONOCYTES # BLD AUTO: 0.52 K/UL
MONOCYTES NFR BLD AUTO: 6.8 %
NEUTROPHILS # BLD AUTO: 4.77 K/UL
NEUTROPHILS NFR BLD AUTO: 61.9 %
NITRITE URINE: POSITIVE
NONHDLC SERPL-MCNC: 119 MG/DL
PH URINE: 6.5
PLATELET # BLD AUTO: 233 K/UL
POTASSIUM SERPL-SCNC: 4.5 MMOL/L
PROT SERPL-MCNC: 7 G/DL
PROTEIN URINE: 30 MG/DL
RBC # BLD: 4.38 M/UL
RBC # FLD: 14.4 %
RED BLOOD CELLS URINE: 1 /HPF
SODIUM SERPL-SCNC: 144 MMOL/L
SPECIFIC GRAVITY URINE: 1.02
TRIGL SERPL-MCNC: 141 MG/DL
TSH SERPL-ACNC: 1.11 UIU/ML
UROBILINOGEN URINE: 0.2 MG/DL
WBC # FLD AUTO: 7.69 K/UL
WHITE BLOOD CELLS URINE: 116 /HPF

## 2023-11-27 PROCEDURE — 93306 TTE W/DOPPLER COMPLETE: CPT

## 2024-02-06 ENCOUNTER — APPOINTMENT (OUTPATIENT)
Dept: INTERNAL MEDICINE | Facility: CLINIC | Age: 89
End: 2024-02-06
Payer: MEDICARE

## 2024-02-06 VITALS
WEIGHT: 230 LBS | TEMPERATURE: 98.2 F | HEART RATE: 84 BPM | OXYGEN SATURATION: 96 % | BODY MASS INDEX: 34.07 KG/M2 | HEIGHT: 69 IN

## 2024-02-06 VITALS — DIASTOLIC BLOOD PRESSURE: 78 MMHG | SYSTOLIC BLOOD PRESSURE: 128 MMHG

## 2024-02-06 DIAGNOSIS — M54.16 RADICULOPATHY, LUMBAR REGION: ICD-10-CM

## 2024-02-06 PROCEDURE — 36415 COLL VENOUS BLD VENIPUNCTURE: CPT

## 2024-02-06 PROCEDURE — 99214 OFFICE O/P EST MOD 30 MIN: CPT | Mod: 25

## 2024-02-06 RX ORDER — ACETAMINOPHEN AND CODEINE PHOSPHATE 300; 30 MG/1; MG/1
300-30 TABLET ORAL 3 TIMES DAILY
Qty: 90 | Refills: 0 | Status: ACTIVE | COMMUNITY
Start: 2020-01-27 | End: 1900-01-01

## 2024-02-06 RX ORDER — LIDOCAINE 5 G/100G
5 OINTMENT TOPICAL
Qty: 35.44 | Refills: 3 | Status: ACTIVE | COMMUNITY
Start: 2024-02-06 | End: 1900-01-01

## 2024-02-06 RX ORDER — AMOXICILLIN 500 MG/1
500 CAPSULE ORAL
Qty: 30 | Refills: 0 | Status: ACTIVE | COMMUNITY
Start: 2024-02-06 | End: 1900-01-01

## 2024-02-06 NOTE — REVIEW OF SYSTEMS
[Fever] : no fever [Chills] : no chills [Chest Pain] : no chest pain [Palpitations] : no palpitations [Lower Ext Edema] : no lower extremity edema [Shortness Of Breath] : no shortness of breath [Wheezing] : no wheezing [Cough] : no cough [Abdominal Pain] : no abdominal pain [FreeTextEntry4] : toooth pain

## 2024-02-06 NOTE — PLAN
[FreeTextEntry1] : labs performed  meds renewed  Physically tiring but does not want to see dentist at this time in hopes that amoxicillin will  help

## 2024-02-06 NOTE — PHYSICAL EXAM
[No Acute Distress] : no acute distress [Normal Sclera/Conjunctiva] : normal sclera/conjunctiva [Normal Outer Ear/Nose] : the outer ears and nose were normal in appearance [No JVD] : no jugular venous distention [No Lymphadenopathy] : no lymphadenopathy [No Respiratory Distress] : no respiratory distress  [No Accessory Muscle Use] : no accessory muscle use [Clear to Auscultation] : lungs were clear to auscultation bilaterally [Normal Rate] : normal rate  [Regular Rhythm] : with a regular rhythm [No Edema] : there was no peripheral edema [No Extremity Clubbing/Cyanosis] : no extremity clubbing/cyanosis [Declined Breast Exam] : declined breast exam  [Soft] : abdomen soft [Non Tender] : non-tender [Non-distended] : non-distended [Grossly Normal Strength/Tone] : grossly normal strength/tone [Speech Grossly Normal] : speech grossly normal [Memory Grossly Normal] : memory grossly normal [de-identified] : uses cane to get to standing position

## 2024-02-08 LAB
ALBUMIN SERPL ELPH-MCNC: 4.2 G/DL
ALP BLD-CCNC: 87 U/L
ALT SERPL-CCNC: 7 U/L
ANION GAP SERPL CALC-SCNC: 10 MMOL/L
AST SERPL-CCNC: 14 U/L
BASOPHILS # BLD AUTO: 0.04 K/UL
BASOPHILS NFR BLD AUTO: 0.4 %
BILIRUB SERPL-MCNC: 0.4 MG/DL
BUN SERPL-MCNC: 26 MG/DL
CALCIUM SERPL-MCNC: 9.9 MG/DL
CHLORIDE SERPL-SCNC: 99 MMOL/L
CHOLEST SERPL-MCNC: 172 MG/DL
CO2 SERPL-SCNC: 32 MMOL/L
CREAT SERPL-MCNC: 0.86 MG/DL
EGFR: 65 ML/MIN/1.73M2
EOSINOPHIL # BLD AUTO: 0.19 K/UL
EOSINOPHIL NFR BLD AUTO: 1.9 %
ESTIMATED AVERAGE GLUCOSE: 123 MG/DL
GLUCOSE SERPL-MCNC: 110 MG/DL
HBA1C MFR BLD HPLC: 5.9 %
HCT VFR BLD CALC: 40.5 %
HDLC SERPL-MCNC: 51 MG/DL
HGB BLD-MCNC: 12.2 G/DL
IMM GRANULOCYTES NFR BLD AUTO: 0.3 %
LDLC SERPL CALC-MCNC: 100 MG/DL
LYMPHOCYTES # BLD AUTO: 1.95 K/UL
LYMPHOCYTES NFR BLD AUTO: 19.1 %
MAN DIFF?: NORMAL
MCHC RBC-ENTMCNC: 29.3 PG
MCHC RBC-ENTMCNC: 30.1 GM/DL
MCV RBC AUTO: 97.4 FL
MONOCYTES # BLD AUTO: 0.79 K/UL
MONOCYTES NFR BLD AUTO: 7.7 %
NEUTROPHILS # BLD AUTO: 7.23 K/UL
NEUTROPHILS NFR BLD AUTO: 70.6 %
NONHDLC SERPL-MCNC: 120 MG/DL
PLATELET # BLD AUTO: 362 K/UL
POTASSIUM SERPL-SCNC: 4.3 MMOL/L
PROT SERPL-MCNC: 6.7 G/DL
RBC # BLD: 4.16 M/UL
RBC # FLD: 14.9 %
SODIUM SERPL-SCNC: 141 MMOL/L
TRIGL SERPL-MCNC: 112 MG/DL
TSH SERPL-ACNC: 2.84 UIU/ML
WBC # FLD AUTO: 10.23 K/UL

## 2024-03-04 ENCOUNTER — NON-APPOINTMENT (OUTPATIENT)
Age: 89
End: 2024-03-04

## 2024-03-06 ENCOUNTER — INPATIENT (INPATIENT)
Facility: HOSPITAL | Age: 89
LOS: 3 days | Discharge: HOME HEALTH | DRG: 202 | End: 2024-03-10
Attending: HOSPITALIST | Admitting: HOSPITALIST
Payer: MEDICARE

## 2024-03-06 ENCOUNTER — APPOINTMENT (OUTPATIENT)
Dept: INTERNAL MEDICINE | Facility: CLINIC | Age: 89
End: 2024-03-06
Payer: MEDICARE

## 2024-03-06 VITALS
DIASTOLIC BLOOD PRESSURE: 72 MMHG | OXYGEN SATURATION: 92 % | SYSTOLIC BLOOD PRESSURE: 166 MMHG | HEIGHT: 69 IN | TEMPERATURE: 99 F | WEIGHT: 229.94 LBS | HEART RATE: 83 BPM | RESPIRATION RATE: 20 BRPM

## 2024-03-06 VITALS — SYSTOLIC BLOOD PRESSURE: 128 MMHG | DIASTOLIC BLOOD PRESSURE: 78 MMHG

## 2024-03-06 VITALS
WEIGHT: 230 LBS | BODY MASS INDEX: 34.07 KG/M2 | OXYGEN SATURATION: 93 % | HEART RATE: 103 BPM | TEMPERATURE: 98.4 F | HEIGHT: 69 IN

## 2024-03-06 DIAGNOSIS — Z98.890 OTHER SPECIFIED POSTPROCEDURAL STATES: Chronic | ICD-10-CM

## 2024-03-06 DIAGNOSIS — R41.82 ALTERED MENTAL STATUS, UNSPECIFIED: ICD-10-CM

## 2024-03-06 DIAGNOSIS — Z90.12 ACQUIRED ABSENCE OF LEFT BREAST AND NIPPLE: Chronic | ICD-10-CM

## 2024-03-06 DIAGNOSIS — R73.9 HYPERGLYCEMIA, UNSPECIFIED: ICD-10-CM

## 2024-03-06 DIAGNOSIS — J40 BRONCHITIS, NOT SPECIFIED AS ACUTE OR CHRONIC: ICD-10-CM

## 2024-03-06 DIAGNOSIS — Z90.49 ACQUIRED ABSENCE OF OTHER SPECIFIED PARTS OF DIGESTIVE TRACT: Chronic | ICD-10-CM

## 2024-03-06 DIAGNOSIS — G47.33 OBSTRUCTIVE SLEEP APNEA (ADULT) (PEDIATRIC): ICD-10-CM

## 2024-03-06 DIAGNOSIS — D84.89 OTHER IMMUNODEFICIENCIES: ICD-10-CM

## 2024-03-06 DIAGNOSIS — J45.909 UNSPECIFIED ASTHMA, UNCOMPLICATED: ICD-10-CM

## 2024-03-06 DIAGNOSIS — Z92.241 PERSONAL HISTORY OF SYSTEMIC STEROID THERAPY: Chronic | ICD-10-CM

## 2024-03-06 LAB
ALBUMIN SERPL ELPH-MCNC: 2.6 G/DL — LOW (ref 3.3–5)
ALP SERPL-CCNC: 63 U/L — SIGNIFICANT CHANGE UP (ref 30–120)
ALT FLD-CCNC: 12 U/L — SIGNIFICANT CHANGE UP (ref 10–60)
ANION GAP SERPL CALC-SCNC: 10 MMOL/L — SIGNIFICANT CHANGE UP (ref 5–17)
APTT BLD: 32.1 SEC — SIGNIFICANT CHANGE UP (ref 24.5–35.6)
AST SERPL-CCNC: 13 U/L — SIGNIFICANT CHANGE UP (ref 10–40)
BASE EXCESS BLDA CALC-SCNC: 10.8 MMOL/L — HIGH (ref -2–3)
BASOPHILS # BLD AUTO: 0.04 K/UL — SIGNIFICANT CHANGE UP (ref 0–0.2)
BASOPHILS NFR BLD AUTO: 0.4 % — SIGNIFICANT CHANGE UP (ref 0–2)
BILIRUB SERPL-MCNC: 0.4 MG/DL — SIGNIFICANT CHANGE UP (ref 0.2–1.2)
BUN SERPL-MCNC: 26 MG/DL — HIGH (ref 7–23)
CALCIUM SERPL-MCNC: 9.9 MG/DL — SIGNIFICANT CHANGE UP (ref 8.4–10.5)
CHLORIDE SERPL-SCNC: 100 MMOL/L — SIGNIFICANT CHANGE UP (ref 96–108)
CO2 SERPL-SCNC: 30 MMOL/L — SIGNIFICANT CHANGE UP (ref 22–31)
CREAT SERPL-MCNC: 0.97 MG/DL — SIGNIFICANT CHANGE UP (ref 0.5–1.3)
D DIMER BLD IA.RAPID-MCNC: 470 NG/ML DDU — HIGH
EGFR: 57 ML/MIN/1.73M2 — LOW
EOSINOPHIL # BLD AUTO: 0.19 K/UL — SIGNIFICANT CHANGE UP (ref 0–0.5)
EOSINOPHIL NFR BLD AUTO: 1.8 % — SIGNIFICANT CHANGE UP (ref 0–6)
ERYTHROCYTE [SEDIMENTATION RATE] IN BLOOD: 101 MM/HR — HIGH (ref 0–20)
GAS PNL BLDA: SIGNIFICANT CHANGE UP
GLUCOSE BLDC GLUCOMTR-MCNC: 185 MG/DL — HIGH (ref 70–99)
GLUCOSE BLDC GLUCOMTR-MCNC: 257 MG/DL — HIGH (ref 70–99)
GLUCOSE SERPL-MCNC: 116 MG/DL — HIGH (ref 70–99)
HCO3 BLDA-SCNC: 34 MMOL/L — HIGH (ref 21–28)
HCT VFR BLD CALC: 33.8 % — LOW (ref 34.5–45)
HGB BLD-MCNC: 10.5 G/DL — LOW (ref 11.5–15.5)
HOROWITZ INDEX BLDA+IHG-RTO: 32 — SIGNIFICANT CHANGE UP
IMM GRANULOCYTES NFR BLD AUTO: 1.1 % — HIGH (ref 0–0.9)
INR BLD: 1.1 RATIO — SIGNIFICANT CHANGE UP (ref 0.85–1.18)
LACTATE SERPL-SCNC: 1.1 MMOL/L — SIGNIFICANT CHANGE UP (ref 0.7–2)
LYMPHOCYTES # BLD AUTO: 1.26 K/UL — SIGNIFICANT CHANGE UP (ref 1–3.3)
LYMPHOCYTES # BLD AUTO: 12.1 % — LOW (ref 13–44)
MCHC RBC-ENTMCNC: 28 PG — SIGNIFICANT CHANGE UP (ref 27–34)
MCHC RBC-ENTMCNC: 31.1 GM/DL — LOW (ref 32–36)
MCV RBC AUTO: 90.1 FL — SIGNIFICANT CHANGE UP (ref 80–100)
MONOCYTES # BLD AUTO: 0.81 K/UL — SIGNIFICANT CHANGE UP (ref 0–0.9)
MONOCYTES NFR BLD AUTO: 7.8 % — SIGNIFICANT CHANGE UP (ref 2–14)
NEUTROPHILS # BLD AUTO: 7.99 K/UL — HIGH (ref 1.8–7.4)
NEUTROPHILS NFR BLD AUTO: 76.8 % — SIGNIFICANT CHANGE UP (ref 43–77)
NRBC # BLD: 0 /100 WBCS — SIGNIFICANT CHANGE UP (ref 0–0)
NT-PROBNP SERPL-SCNC: 243 PG/ML — SIGNIFICANT CHANGE UP (ref 0–450)
PCO2 BLDA: 46 MMHG — HIGH (ref 32–35)
PH BLDA: 7.48 — HIGH (ref 7.35–7.45)
PLATELET # BLD AUTO: 428 K/UL — HIGH (ref 150–400)
PO2 BLDA: 85 MMHG — SIGNIFICANT CHANGE UP (ref 83–108)
POTASSIUM SERPL-MCNC: 3.8 MMOL/L — SIGNIFICANT CHANGE UP (ref 3.5–5.3)
POTASSIUM SERPL-SCNC: 3.8 MMOL/L — SIGNIFICANT CHANGE UP (ref 3.5–5.3)
PROCALCITONIN SERPL-MCNC: 0.11 NG/ML — HIGH (ref 0.02–0.1)
PROT SERPL-MCNC: 7.3 G/DL — SIGNIFICANT CHANGE UP (ref 6–8.3)
PROTHROM AB SERPL-ACNC: 12 SEC — SIGNIFICANT CHANGE UP (ref 9.5–13)
RAPID RVP RESULT: SIGNIFICANT CHANGE UP
RBC # BLD: 3.75 M/UL — LOW (ref 3.8–5.2)
RBC # FLD: 14.7 % — HIGH (ref 10.3–14.5)
SAO2 % BLDA: 98.6 % — HIGH (ref 94–98)
SARS-COV-2 RNA SPEC QL NAA+PROBE: SIGNIFICANT CHANGE UP
SODIUM SERPL-SCNC: 140 MMOL/L — SIGNIFICANT CHANGE UP (ref 135–145)
TROPONIN I, HIGH SENSITIVITY RESULT: 10 NG/L — SIGNIFICANT CHANGE UP
WBC # BLD: 10.4 K/UL — SIGNIFICANT CHANGE UP (ref 3.8–10.5)
WBC # FLD AUTO: 10.4 K/UL — SIGNIFICANT CHANGE UP (ref 3.8–10.5)

## 2024-03-06 PROCEDURE — 99214 OFFICE O/P EST MOD 30 MIN: CPT

## 2024-03-06 PROCEDURE — 70450 CT HEAD/BRAIN W/O DYE: CPT | Mod: 26,MC,59

## 2024-03-06 PROCEDURE — 99223 1ST HOSP IP/OBS HIGH 75: CPT | Mod: AI

## 2024-03-06 PROCEDURE — 70496 CT ANGIOGRAPHY HEAD: CPT | Mod: 26,MC

## 2024-03-06 PROCEDURE — 70498 CT ANGIOGRAPHY NECK: CPT | Mod: 26,MC

## 2024-03-06 PROCEDURE — 99285 EMERGENCY DEPT VISIT HI MDM: CPT

## 2024-03-06 PROCEDURE — 93010 ELECTROCARDIOGRAM REPORT: CPT

## 2024-03-06 PROCEDURE — G2211 COMPLEX E/M VISIT ADD ON: CPT

## 2024-03-06 PROCEDURE — 71045 X-RAY EXAM CHEST 1 VIEW: CPT | Mod: 26

## 2024-03-06 RX ORDER — LEVOTHYROXINE SODIUM 125 MCG
250 TABLET ORAL
Refills: 0 | Status: DISCONTINUED | OUTPATIENT
Start: 2024-03-06 | End: 2024-03-10

## 2024-03-06 RX ORDER — CEFTRIAXONE 500 MG/1
1000 INJECTION, POWDER, FOR SOLUTION INTRAMUSCULAR; INTRAVENOUS ONCE
Refills: 0 | Status: COMPLETED | OUTPATIENT
Start: 2024-03-06 | End: 2024-03-06

## 2024-03-06 RX ORDER — AZITHROMYCIN 500 MG/1
500 TABLET, FILM COATED ORAL ONCE
Refills: 0 | Status: COMPLETED | OUTPATIENT
Start: 2024-03-06 | End: 2024-03-06

## 2024-03-06 RX ORDER — LETROZOLE 2.5 MG/1
2.5 TABLET, FILM COATED ORAL DAILY
Refills: 0 | Status: DISCONTINUED | OUTPATIENT
Start: 2024-03-06 | End: 2024-03-10

## 2024-03-06 RX ORDER — INSULIN LISPRO 100/ML
VIAL (ML) SUBCUTANEOUS
Refills: 0 | Status: DISCONTINUED | OUTPATIENT
Start: 2024-03-06 | End: 2024-03-10

## 2024-03-06 RX ORDER — TRIAMTERENE/HYDROCHLOROTHIAZID 75 MG-50MG
1 TABLET ORAL DAILY
Refills: 0 | Status: DISCONTINUED | OUTPATIENT
Start: 2024-03-06 | End: 2024-03-10

## 2024-03-06 RX ORDER — SODIUM CHLORIDE 9 MG/ML
1000 INJECTION, SOLUTION INTRAVENOUS
Refills: 0 | Status: DISCONTINUED | OUTPATIENT
Start: 2024-03-06 | End: 2024-03-10

## 2024-03-06 RX ORDER — SODIUM CHLORIDE 9 MG/ML
2100 INJECTION INTRAMUSCULAR; INTRAVENOUS; SUBCUTANEOUS ONCE
Refills: 0 | Status: COMPLETED | OUTPATIENT
Start: 2024-03-06 | End: 2024-03-06

## 2024-03-06 RX ORDER — DEXTROSE 50 % IN WATER 50 %
25 SYRINGE (ML) INTRAVENOUS ONCE
Refills: 0 | Status: DISCONTINUED | OUTPATIENT
Start: 2024-03-06 | End: 2024-03-10

## 2024-03-06 RX ORDER — ACETAMINOPHEN 500 MG
650 TABLET ORAL EVERY 6 HOURS
Refills: 0 | Status: DISCONTINUED | OUTPATIENT
Start: 2024-03-06 | End: 2024-03-10

## 2024-03-06 RX ORDER — GLUCAGON INJECTION, SOLUTION 0.5 MG/.1ML
1 INJECTION, SOLUTION SUBCUTANEOUS ONCE
Refills: 0 | Status: DISCONTINUED | OUTPATIENT
Start: 2024-03-06 | End: 2024-03-10

## 2024-03-06 RX ORDER — ATORVASTATIN CALCIUM 80 MG/1
10 TABLET, FILM COATED ORAL AT BEDTIME
Refills: 0 | Status: DISCONTINUED | OUTPATIENT
Start: 2024-03-06 | End: 2024-03-10

## 2024-03-06 RX ORDER — DEXTROSE 50 % IN WATER 50 %
12.5 SYRINGE (ML) INTRAVENOUS ONCE
Refills: 0 | Status: DISCONTINUED | OUTPATIENT
Start: 2024-03-06 | End: 2024-03-10

## 2024-03-06 RX ORDER — CHOLECALCIFEROL (VITAMIN D3) 125 MCG
2000 CAPSULE ORAL DAILY
Refills: 0 | Status: DISCONTINUED | OUTPATIENT
Start: 2024-03-06 | End: 2024-03-10

## 2024-03-06 RX ORDER — PANTOPRAZOLE SODIUM 20 MG/1
40 TABLET, DELAYED RELEASE ORAL
Refills: 0 | Status: DISCONTINUED | OUTPATIENT
Start: 2024-03-06 | End: 2024-03-10

## 2024-03-06 RX ORDER — INSULIN LISPRO 100/ML
VIAL (ML) SUBCUTANEOUS AT BEDTIME
Refills: 0 | Status: DISCONTINUED | OUTPATIENT
Start: 2024-03-06 | End: 2024-03-10

## 2024-03-06 RX ORDER — ONDANSETRON 8 MG/1
4 TABLET, FILM COATED ORAL EVERY 8 HOURS
Refills: 0 | Status: DISCONTINUED | OUTPATIENT
Start: 2024-03-06 | End: 2024-03-10

## 2024-03-06 RX ORDER — LANOLIN ALCOHOL/MO/W.PET/CERES
3 CREAM (GRAM) TOPICAL AT BEDTIME
Refills: 0 | Status: DISCONTINUED | OUTPATIENT
Start: 2024-03-06 | End: 2024-03-10

## 2024-03-06 RX ORDER — IPRATROPIUM/ALBUTEROL SULFATE 18-103MCG
3 AEROSOL WITH ADAPTER (GRAM) INHALATION ONCE
Refills: 0 | Status: COMPLETED | OUTPATIENT
Start: 2024-03-06 | End: 2024-03-06

## 2024-03-06 RX ORDER — DEXTROSE 50 % IN WATER 50 %
15 SYRINGE (ML) INTRAVENOUS ONCE
Refills: 0 | Status: DISCONTINUED | OUTPATIENT
Start: 2024-03-06 | End: 2024-03-10

## 2024-03-06 RX ORDER — LEVOTHYROXINE SODIUM 125 MCG
125 TABLET ORAL
Refills: 0 | Status: DISCONTINUED | OUTPATIENT
Start: 2024-03-06 | End: 2024-03-10

## 2024-03-06 RX ORDER — GABAPENTIN 400 MG/1
200 CAPSULE ORAL THREE TIMES A DAY
Refills: 0 | Status: DISCONTINUED | OUTPATIENT
Start: 2024-03-06 | End: 2024-03-10

## 2024-03-06 RX ADMIN — Medication 2000 UNIT(S): at 15:07

## 2024-03-06 RX ADMIN — LETROZOLE 2.5 MILLIGRAM(S): 2.5 TABLET, FILM COATED ORAL at 15:07

## 2024-03-06 RX ADMIN — SODIUM CHLORIDE 2100 MILLILITER(S): 9 INJECTION INTRAMUSCULAR; INTRAVENOUS; SUBCUTANEOUS at 11:12

## 2024-03-06 RX ADMIN — Medication 1: at 16:36

## 2024-03-06 RX ADMIN — Medication 1: at 22:45

## 2024-03-06 RX ADMIN — GABAPENTIN 200 MILLIGRAM(S): 400 CAPSULE ORAL at 22:44

## 2024-03-06 RX ADMIN — AZITHROMYCIN 255 MILLIGRAM(S): 500 TABLET, FILM COATED ORAL at 11:13

## 2024-03-06 RX ADMIN — Medication 1 TABLET(S): at 15:07

## 2024-03-06 RX ADMIN — GABAPENTIN 200 MILLIGRAM(S): 400 CAPSULE ORAL at 15:07

## 2024-03-06 RX ADMIN — Medication 3 MILLILITER(S): at 11:26

## 2024-03-06 RX ADMIN — SODIUM CHLORIDE 2100 MILLILITER(S): 9 INJECTION INTRAMUSCULAR; INTRAVENOUS; SUBCUTANEOUS at 13:28

## 2024-03-06 RX ADMIN — Medication 125 MILLIGRAM(S): at 11:13

## 2024-03-06 RX ADMIN — ATORVASTATIN CALCIUM 10 MILLIGRAM(S): 80 TABLET, FILM COATED ORAL at 22:44

## 2024-03-06 RX ADMIN — CEFTRIAXONE 1000 MILLIGRAM(S): 500 INJECTION, POWDER, FOR SOLUTION INTRAMUSCULAR; INTRAVENOUS at 13:28

## 2024-03-06 RX ADMIN — AZITHROMYCIN 500 MILLIGRAM(S): 500 TABLET, FILM COATED ORAL at 13:28

## 2024-03-06 RX ADMIN — CEFTRIAXONE 100 MILLIGRAM(S): 500 INJECTION, POWDER, FOR SOLUTION INTRAMUSCULAR; INTRAVENOUS at 11:12

## 2024-03-06 NOTE — ED PROVIDER NOTE - CLINICAL SUMMARY MEDICAL DECISION MAKING FREE TEXT BOX
88-year-old female with history of immunodeficiency syndrome, chronic bronchitis, former smoker, brought by son from Dr. Benjamin's office for evaluation of cough low-grade fever shortness of breath and double vision for the past few days.  Was seen by optometrist who recommended ophthalmology evaluation for possible optic nerve issue but patient missed her appointment last week.  Denies headache chest pain difficulty walking or speaking or other symptom.  Her pulmonologist is Dr. Betancur.    Physical exam reveals mild hypoxia and rhonchi in the lungs.  Impression is rule out pneumonia.  Plan is we will get sepsis workup and give IV antibiotics and pulmonary consult.  Also will need ophthalmology consult for evaluation of double vision and optic nerve issue diagnosed last week.  Patient will likely require admission for further care.  I personally spoke with Dr. Benjamin and he spoke with consultants about this case.

## 2024-03-06 NOTE — CONSULT NOTE ADULT - SUBJECTIVE AND OBJECTIVE BOX
PULMONARY/CRITICAL CARE        Patient is a 88y old  Female who presents with a chief complaint of fever, SOB, weakness (06 Mar 2024 13:16)    BRIEF HOSPITAL COURSE: ***  88F with hx of primary immunodeficiency disorder on Hizentra, pre-DM on metformn, HLD, HTN, sciatica on gabapentin, hypothyroidism who presents with weakness, SOB, and fevers.  Per son, symptoms started about 3 weeks ago with nonstop non-bloody diarrhea.  Has had chronic diarrhea for the past 3 years but said she this episode was for 3-4 days which resulted in her being very weak and fatigued.  Despite having normal BMs now, patient still feels weak.  Son also reports patient having a low grade temp of 100.8F about 4-5 days ago.  Patient reports having light-headedness and occasional headaches as well.  Some nausea but no vomiting.  Also complained of intermittent memory loss and intermittent earaches.  Denied any chest pain or abdominal pain.  Patient unclear if she has SOB but son thinks she does have some but has been compensating.  Has also been coughing for the past couple of weeks with thick white/yellow sputum.  No recent travel.  No swelling of her BLE (chronically swollen but not worse).  Also of note, patient complaining of left eye vision changes that started about 2.5 weeks ago.  Said she has double vision when she looks far and also has a "patch" in the lower half of her left field of vision.  Went to see an optometrist for glasses last week and was told she had an inflamed optic nerve but recommended an ophthamology evaluation.  Patient went to Dr. Benjamin's office for evaluation of cough, low grade fever, SOB, and double vision, and was sent here for further evaluation.      In the ED, patient's triage vitals were /72  HR 83  T 99.4F RR 20, 92%  - labs significant for slight anemia, some thrombocytopenia  - CXR with both lung clear  - CT of brain and CTA of head neck showed "No hydrocephalus, acute intracranial hemorrhage, mass effect, or brain   edema.  No flow-limiting stenosis. No AVM.  Probable left P-comm infundibulum. Because the left P-comm is not well   visualized, differential diagnosis includes left P-comm level ICA  aneurysm. The dome measures 2 mm in size. The neck measures 3 mm in size. 1.2 x 2.4 cm right anterior temporal extra-axial homogeneously enhancing lesion, likely a meningioma.  No flow-limiting stenosis, evidence for arterial dissection, or vascular aneurysm."  - ophthamology consulted for her vision changes    Currently patient with no SOB but still feels weak overall.    Events last 24 hours: ***    PAST MEDICAL & SURGICAL HISTORY:  PATRICIA--noncompliant with CPAP  Skin cancer      Hypothyroidism      Bronchitis      Pneumonia      Chronic cough      Osteoarthritis      Environmental allergies      Vitamin D deficiency      Hyperlipidemia      Class 2 obesity with body mass index (BMI) of 39.0 to 39.9 in adult      Cancer of female breast  - left breast- tx with surgery, radiation and Letrozole      DDD (degenerative disc disease), lumbar      Primary immunodeficiency disorder  being treated with Hizentra      H/O actinic keratosis      H/O seborrheic keratosis      S/P cataract surgery  approx 2006- bilateral      S/P appendectomy  1950      S/P partial mastectomy, left  2012      S/P epidural steroid injection  x2- Dr. Martin- for Lumbar Disc Disease      S/P skin cancer resection  x11        Allergies    soft shell seafood (Unknown)  No Known Drug Allergies  Vinegar- hands break out in a rash (Rash)  Shrimp (Hives)    Intolerances      FAMILY HISTORY: no cigs, etoh  FH: dementia  father-     FHx: cerebral aneurysm  mother-  @ age 66    Family history of hypertension in mother      Family history of lung cancer  sister-     Family history of lymphoma  brother-    Family history of hypertension in son  son- living - age 57yo    Family history of ulcerative colitis  son- living- age 56yo        Review of Systems:  CONSTITUTIONAL: No fever, chills, or fatigue  EYES: No eye pain, visual disturbances, or discharge  ENMT:  No difficulty hearing, tinnitus, vertigo; No sinus or throat pain  NECK: No pain or stiffness  RESPIRATORY: No cough, wheezing, chills or hemoptysis; had shortness of breath  CARDIOVASCULAR: No chest pain, palpitations, dizziness, or leg swelling  GASTROINTESTINAL: No abdominal or epigastric pain. No nausea, vomiting, or hematemesis; No diarrhea or constipation. No melena or hematochezia.  GENITOURINARY: No dysuria, frequency, hematuria, or incontinence  NEUROLOGICAL: some loss of strength, numbness, or tremors  SKIN: No itching, burning, rashes, or lesions   MUSCULOSKELETAL: No joint pain or swelling; No muscle, back, or extremity pain  PSYCHIATRIC: No depression, anxiety, mood swings, or difficulty sleeping      Medications:    triamterene 37.5 mG/hydrochlorothiazide 25 mG Tablet 1 Tablet(s) Oral daily      acetaminophen     Tablet .. 650 milliGRAM(s) Oral every 6 hours PRN  gabapentin 200 milliGRAM(s) Oral three times a day  melatonin 3 milliGRAM(s) Oral at bedtime PRN  ondansetron Injectable 4 milliGRAM(s) IV Push every 8 hours PRN    letrozole 2.5 milliGRAM(s) Oral daily      aluminum hydroxide/magnesium hydroxide/simethicone Suspension 30 milliLiter(s) Oral every 4 hours PRN  pantoprazole    Tablet 40 milliGRAM(s) Oral before breakfast      atorvastatin 10 milliGRAM(s) Oral at bedtime  dextrose 50% Injectable 25 Gram(s) IV Push once  dextrose 50% Injectable 25 Gram(s) IV Push once  dextrose 50% Injectable 12.5 Gram(s) IV Push once  dextrose Oral Gel 15 Gram(s) Oral once PRN  glucagon  Injectable 1 milliGRAM(s) IntraMuscular once  insulin lispro (ADMELOG) corrective regimen sliding scale   SubCutaneous three times a day before meals  insulin lispro (ADMELOG) corrective regimen sliding scale   SubCutaneous at bedtime  levothyroxine 250 MICROGram(s) Oral <User Schedule>  levothyroxine 125 MICROGram(s) Oral <User Schedule>    cholecalciferol 2000 Unit(s) Oral daily  dextrose 5%. 1000 milliLiter(s) IV Continuous <Continuous>  dextrose 5%. 1000 milliLiter(s) IV Continuous <Continuous>                ICU Vital Signs Last 24 Hrs  T(C): 37.4 (06 Mar 2024 10:29), Max: 37.4 (06 Mar 2024 10:29)  T(F): 99.4 (06 Mar 2024 10:29), Max: 99.4 (06 Mar 2024 10:29)  HR: 71 (06 Mar 2024 11:26) (71 - 83)  BP: 166/72 (06 Mar 2024 10:29) (166/72 - 166/72)  BP(mean): --  ABP: --  ABP(mean): --  RR: 20 (06 Mar 2024 10:29) (20 - 20)  SpO2: 96% (06 Mar 2024 11:26) (92% - 96%)    O2 Parameters below as of 06 Mar 2024 10:29  Patient On (Oxygen Delivery Method): room air          Vital Signs Last 24 Hrs  T(C): 37.4 (06 Mar 2024 10:29), Max: 37.4 (06 Mar 2024 10:29)  T(F): 99.4 (06 Mar 2024 10:29), Max: 99.4 (06 Mar 2024 10:29)  HR: 71 (06 Mar 2024 11:26) (71 - 83)  BP: 166/72 (06 Mar 2024 10:29) (166/72 - 166/72)  BP(mean): --  RR: 20 (06 Mar 2024 10:) (20 - 20)  SpO2: 96% (06 Mar 2024 11:26) (92% - 96%)    Parameters below as of 06 Mar 2024 10:29  Patient On (Oxygen Delivery Method): room air            I&O's Detail        LABS:                        10.5   10.40 )-----------( 428      ( 06 Mar 2024 11:07 )             33.8     03-06    140  |  100  |  26<H>  ----------------------------<  116<H>  3.8   |  30  |  0.97    Ca    9.9      06 Mar 2024 11:07    TPro  7.3  /  Alb  2.6<L>  /  TBili  0.4  /  DBili  x   /  AST  13  /  ALT  12  /  AlkPhos  63  03-06          CAPILLARY BLOOD GLUCOSE      POCT Blood Glucose.: 185 mg/dL (06 Mar 2024 16:01)    PT/INR - ( 06 Mar 2024 11:07 )   PT: 12.0 sec;   INR: 1.10 ratio         PTT - ( 06 Mar 2024 11:07 )  PTT:32.1 sec  Urinalysis Basic - ( 06 Mar 2024 11:07 )    Color: x / Appearance: x / SG: x / pH: x  Gluc: 116 mg/dL / Ketone: x  / Bili: x / Urobili: x   Blood: x / Protein: x / Nitrite: x   Leuk Esterase: x / RBC: x / WBC x   Sq Epi: x / Non Sq Epi: x / Bacteria: x      CULTURES:      Physical Examination:    General: No acute distress.  elderly female    HEENT: Pupils equal, reactive to light.  Symmetric.    PULM: Clear to auscultation bilaterally, no wheeze rhonchi rales no change percussion    CVS: Regular rate and rhythm, no murmurs, rubs, or gallops    ABD: Soft, nondistended, nontender, normoactive bowel sounds, no masses    EXT:nontender calves; brawny edema    SKIN: Warm and well perfused, no rashes noted.    NEURO: Alert, oriented, interactive, nonfocal moves all ext    RADIOLOGY: ***  rads< from: CT Angio Neck w/ IV Cont (24 @ 12:13) >  ACC: [id number] EXAM:  CT BRAIN   ORDERED BY: KELLEE JOHNSON     ACC: [id number] EXAM:  CT ANGIO NECK (W)AW IC   ORDERED BY: KELLEE JOHNSON     ACC: [id number] EXAM:  CT ANGIO BRAIN (W)AW IC   ORDERED BY: KELLEE JOHNSON     PROCEDURE DATE:  2024          INTERPRETATION:  CT angiography of the brain and neck    CLINICAL INDICATION: Left eye vision loss    TECHNIQUE: Direct axial CT scanning of the brain and neck was obtained   from the vertex to the level of the clavicular heads after the dynamic   intravenous injection of 90 cc of Omnipaque 350. 10 cc discarded.   Sagittal and coronal maximum intensity projection reformats were   provided.  Three-dimensional reconstructions were performed by the   radiologist using the [employer] workstation.    COMPARISON: None available    FINDINGS:    CT BRAIN:    No hydrocephalus, mass effect, midline shift, acute intracranial   hemorrhage, or brain edema.    1.1 x 2.4 cm (AP by TV) right anterior temporal extra-axial isointense to   gray matter lesion, likely a meningioma.    Mild white matter microvascular ischemic disease.    Status post bilateral ocular lens replacement. The orbital contents are   otherwise unremarkable.    Visualized paranasal sinuses and mastoid air cells are clear.    CTA BRAIN:    Redemonstration of 1.2 x 2.4 cm right anterior temporal extra-axial   homogeneously enhancing lesion, likely a meningioma.    Probable left P-comm infundibulum. Because the left P-comm is not well   visualized, differential diagnosis includes left P-comm level ICA   aneurysm. The dome measures 2 mm in size. The neck measures 3 mm in size.    Normal flow-related enhancement of the skull base/intracranial internal   carotid arteries.    Normal flow-related enhancement of the bilateral anterior, middle, and   posterior cerebral arteries.    Normal flow-related enhancement of the bilateral intradural vertebral   arteries and the basilar artery.    No flow-limiting stenosis. No AVM.    Venous system is well opacified, no evidence for venous sinus or cortical   vein thrombosis.    CTA NECK:    Normal flow-related enhancement of the bilateral common and internal   carotid arteries.    Normal flow-related enhancement of the bilateral vertebral arteries.    No flow-limiting stenosis, evidence for arterial dissection, or vascular   aneurysm.    IMPRESSION:    CT brain:  No hydrocephalus, acute intracranial hemorrhage, mass effect, or brain   edema.    CTA brain:  No flow-limiting stenosis. No AVM.    Probable left P-comm infundibulum. Because the left P-comm is not well   visualized, differential diagnosis includes left P-comm level ICA   aneurysm. The dome measures 2 mm in size. The neck measures 3 mm in size.    1.2 x 2.4 cm right anterior temporal extra-axialhomogeneously enhancing   lesion, likely a meningioma.    CTA neck:  No flow-limiting stenosis, evidence for arterial dissection, or vascular   aneurysm.    --- End of Report ---            NATHAN COOPER MD; Attending Radiologist  This document has beenelectronically signed. Mar  6 2024 12:43PM    < end of copied text >  < from: Xray Chest 1 View- PORTABLE-Urgent (24 @ 11:08) >  ACC: [id number] EXAM:  XR CHEST PORTABLE URGENT 1V   ORDERED BY: KELLEE JOHNSON     PROCEDURE DATE:  2024          INTERPRETATION:  EXAM: XR CHEST URGENT    INDICATION: Sepsis SXR    COMPARISON: 2020    IMPRESSION: Left Chest is clear with surgical clips related to the left   breast region. Right lung is clear. Heart is within normal limits in its   transthoracic diameter. Some sclerotic changes related to the left   humeral head. Follow-up suggested as indicated clinically.    --- End of Report ---            LISSA KISER MD; Attending Radiologist  This document has been electronically signed. Mar  6 2024 11:11AM    < end of copied text >    CRITICAL CARE TIME SPENT: ***

## 2024-03-06 NOTE — CONSULT NOTE ADULT - ASSESSMENT
Assessment and Recommendations:  89 yo F PMHx primary immunodeficiency disorder on Hizentra, pre-DM on metformn, HLD, HTN, sciatica on gabapentin, hypothyroidism presenting to ED with weakness, fevers, and SOB. Ophthalmology consulted for diplopia.    #Disc Edema, OS  -Patient reports that she was seen by an optometrist 1 week ago who told her that her left optic nerve was swollen. Was unable to seek follow up.   -Also reports that earlier this evening, she experienced transient episode of horizontal diplopia (unclear whether monocular or binocular)  -Reports frontal headache yesterday, otherwise no temporal headache, black/white out vision, pulsatile tinnitus, new medications, rapid weight gain/loss, or pain with movement of the eyes  -On exam, noted to have 20/30 VA OS with +rAPD. Color plates intact, but noted to have constriction of CVF sparing the superonasal quadrant.   -On fundus exam, noted to have C-shaped disc edema with 1-2 peripapillary hemorrhages OS.  -Differential includes compressive optic neuropathy (particularly given evidence of temporal meningiom) vs optic neuritis 2/2 demyelinating disease or inflammatory disorder  -Recommend MRI brain & orbits w/ and w/o contrast   -Recommend anti-MOG and anti-AQP4 antibodies  -Recommend ESR/CRP, ACE, Lyme titers, FTA-ABS, RPR or VRDL, and CXR     #Visual Disturbance, OS  -Patient reports that while using glasses only, she notes an inferior wavy line in the left eye   -Suspect that disturbance likely secondary to bifocals, as not present when not using glasses (tested at bedside). Also noted to have difficulty reading while using bifocals, but not OTC readers.  -No tear, holes or detachment on exam OS     Discussed with Dr. Oliveira     Outpatient follow-up: Patient should follow-up with his/her ophthalmologist or with [facility] Department of Ophthalmology at the address below     [address] [place] [address]. [address]  [place] [address]  [phone]     Assessment and Recommendations:  89 yo F PMHx primary immunodeficiency disorder on Hizentra, pre-DM on metformn, HLD, HTN, sciatica on gabapentin, hypothyroidism presenting to ED with weakness, fevers, and SOB. Ophthalmology consulted for diplopia.    #Disc Edema, OS  -Patient reports that she was seen by an optometrist 1 week ago who told her that her left optic nerve was swollen. Was unable to seek follow up.   -Also reports that earlier this evening, she experienced transient episode of horizontal diplopia (unclear whether monocular or binocular)  -Reports frontal headache yesterday, otherwise no temporal headache, black/white out vision, pulsatile tinnitus, new medications, rapid weight gain/loss, or pain with movement of the eyes  -On exam, noted to have 20/30 VA OS with +rAPD. Color plates intact, but noted to have constriction of CVF sparing the superonasal quadrant.   -On fundus exam, noted to have C-shaped disc edema with 1-2 peripapillary hemorrhages OS.  -Differential includes compressive optic neuropathy (particularly given evidence of temporal meningiom) vs optic neuritis 2/2 demyelinating disease or inflammatory disorder vs NAION, although the latter less likely given lack of disc at risk appearance.   -Recommend MRI brain & orbits w/ and w/o contrast   -Recommend anti-MOG and anti-AQP4 antibodies  -Recommend ESR/CRP, ACE, Lyme titers, FTA-ABS, RPR or VRDL, and CXR     #Visual Disturbance, OS  -Patient reports that while using glasses only, she notes an inferior wavy line in the left eye   -Suspect that disturbance likely secondary to bifocals, as not present when not using glasses (tested at bedside). Also noted to have difficulty reading while using bifocals, but not OTC readers.  -No tear, holes or detachment on exam OS     Discussed with Dr. Oliveira     Outpatient follow-up: Patient should follow-up with his/her ophthalmologist or with [facility] Department of Ophthalmology at the address below     [address] [place] [address]. [address]  [place] [address]  [phone]     Assessment and Recommendations:  87 yo F PMHx primary immunodeficiency disorder on Hizentra, pre-DM on metformn, HLD, HTN, sciatica on gabapentin, hypothyroidism presenting to ED with weakness, fevers, and SOB. Ophthalmology consulted for diplopia.    #Disc Edema, OS  -Patient reports that she was seen by an optometrist 1 week ago who told her that her left optic nerve was swollen. Was unable to seek follow up.   -Also reports that earlier this evening, she experienced transient episode of horizontal diplopia (unclear whether monocular or binocular)  -Reports frontal headache yesterday, otherwise no temporal headache, black/white out vision, pulsatile tinnitus, new medications, rapid weight gain/loss, or pain with movement of the eyes  -On exam, noted to have 20/30 VA OS with +rAPD. Color plates intact, but noted to have constriction of CVF sparing the superonasal quadrant. Also noted to have trace adduction deficit OS (possibly JOI?)  -On fundus exam, noted to have C-shaped disc edema with 1-2 peripapillary hemorrhages OS.  -Differential includes compressive optic neuropathy (particularly given evidence of temporal meningiom) vs optic neuritis 2/2 demyelinating disease or inflammatory disorder vs NAION, although the latter less likely given lack of disc at risk appearance.   -Recommend MRI brain & orbits w/ and w/o contrast   -Recommend anti-MOG and anti-AQP4 antibodies  -Recommend ESR/CRP, ACE, Lyme titers, FTA-ABS, RPR or VRDL, and CXR     #Visual Disturbance, OS  -Patient reports that while using glasses only, she notes an inferior wavy line in the left eye   -Suspect that disturbance likely secondary to bifocals, as not present when not using glasses (tested at bedside). Also noted to have difficulty reading while using bifocals, but not OTC readers.  -No tear, holes or detachment on exam OS     Discussed with Dr. Oliveira     Outpatient follow-up: Patient should follow-up with his/her ophthalmologist or with [facility] Department of Ophthalmology at the address below     [address] [place] [address]. [address]  [place] [address]  [phone]

## 2024-03-06 NOTE — H&P ADULT - NSHPPHYSICALEXAM_GEN_ALL_CORE
PHYSICAL EXAM:  Vital Signs Last 24 Hrs  T(C): 37.4 (06 Mar 2024 10:29), Max: 37.4 (06 Mar 2024 10:29)  T(F): 99.4 (06 Mar 2024 10:29), Max: 99.4 (06 Mar 2024 10:29)  HR: 71 (06 Mar 2024 11:26) (71 - 83)  BP: 166/72 (06 Mar 2024 10:29) (166/72 - 166/72)  BP(mean): --  RR: 20 (06 Mar 2024 10:29) (20 - 20)  SpO2: 96% (06 Mar 2024 11:26) (92% - 96%)    Parameters below as of 06 Mar 2024 10:29  Patient On (Oxygen Delivery Method): room air      GENERAL:    tired appearing female in NAD  HEAD:     atraumatic, normocephalic  EYES:     EOMI, conjunctiva and sclera clear  RESPIRATORY:     no gross crackles or wheezing heard  CARDIOVASCULAR:     regular rate and rhythm, soft i/vi systolic murmur in LUSB, no murmurs or rubs or gallops  GASTROINTESTINAL:     soft, nontender, nondistended, bowel sounds present  EXTREMITIES:     no clubbing or cyanosis or edema  MUSCULOSKELETAL:     no joint pain or swelling or deformities  NERVOUS SYSTEM:     moves all 4s, no sensory deficits noted  PSYCH:     appropriate, alert and orientated x3, good concentration

## 2024-03-06 NOTE — ED PROVIDER NOTE - CARE PLAN
Principal Discharge DX:	Bronchitis  Secondary Diagnosis:	Acute respiratory failure with hypoxia  Secondary Diagnosis:	Diplopia   1

## 2024-03-06 NOTE — ED PROVIDER NOTE - OBJECTIVE STATEMENT
88-year-old female with history of immunodeficiency syndrome, chronic bronchitis, former smoker, brought by son from Dr. Benjamin's office for evaluation of cough low-grade fever shortness of breath and double vision for the past few days.  Was seen by optometrist who recommended ophthalmology evaluation for possible optic nerve issue but patient missed her appointment last week.  Denies headache chest pain difficulty walking or speaking or other symptom.  Her pulmonologist is Dr. Betancur.

## 2024-03-06 NOTE — HISTORY OF PRESENT ILLNESS
[FreeTextEntry8] : Pt comes for 2 weeks of low grade fever and cough and poor vision left eye  Saw Dr Chavez and advised to see Dr Walters neuro ophth but missed appt   Pt very tired and has been having dental problems   Feels very weak every morning

## 2024-03-06 NOTE — CONSULT NOTE ADULT - ASSESSMENT
Pt. with PATRICIA, noncompliant , with some hypoxia and intermittent AMS.  R/o hypercarbia.  ??Viral infection    Suggest check oximetry RA--will need fu sleep study as outpt.  Check d dimer, bnp.  Dw son in detail.

## 2024-03-06 NOTE — H&P ADULT - HISTORY OF PRESENT ILLNESS
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  Per son, symptoms started about 3 weeks ago with nonstop non-bloody diarrhea.  Has had chronic diarrhea for the past 3 years but said she this episode was for 3-4 days which resulted in her being very weak and fatigued.  Despite having normal BMs now, patient still feels weak.  Son also reports patient having a low grade temp of 100.8F about 4-5 days ago.  Patient reports having light-headedness and occasional headaches as well.  Some nausea but no vomiting.  Also complained of intermittent memory loss and intermittent earaches.  Denied any chest pain or abdominal pain.  Patient unclear if she has SOB but son thinks she does have some but has been compensating.  Has also been coughing for the past couple of weeks with thick white/yellow sputum.  No recent travel.  No swelling of her BLE (chronically swollen but not worse).  Also of note, patient complaining of left eye vision changes that started about 2.5 weeks ago.  Said she has double vision when she looks far and also has a "patch" in the lower half of her left field of vision.  Went to see an optometrist for glasses last week and was told she had an inflamed optic nerve but recommended an ophthamology evaluation.  Patient went to Dr. Benjamin's office for evaluation of cough, low grade fever, SOB, and double vision, and was sent here for further evaluation.      In the ED, patient's triage vitals were /72  HR 83  T 99.4F RR 20, 92%  - labs significant for slight anemia, some thrombocytopenia  - CXR with both lung clear  - CT of brain and CTA of head neck showed "No hydrocephalus, acute intracranial hemorrhage, mass effect, or brain   edema.  No flow-limiting stenosis. No AVM.  Probable left P-comm infundibulum. Because the left P-comm is not well   visualized, differential diagnosis includes left P-comm level ICA  aneurysm. The dome measures 2 mm in size. The neck measures 3 mm in size. 1.2 x 2.4 cm right anterior temporal extra-axial homogeneously enhancing lesion, likely a meningioma.  No flow-limiting stenosis, evidence for arterial dissection, or vascular aneurysm."  - ophthamology consulted for her vision changes    Currently patient with no SOB but still feels weak overall.

## 2024-03-06 NOTE — ED PROVIDER NOTE - NSICDXPASTMEDICALHX_GEN_ALL_CORE_FT
PAST MEDICAL HISTORY:  Bronchitis     Cancer of female breast 2012- left breast- tx with surgery, radiation and Letrozole    Chronic cough     Class 2 obesity with body mass index (BMI) of 39.0 to 39.9 in adult     DDD (degenerative disc disease), lumbar     Environmental allergies     H/O actinic keratosis     H/O seborrheic keratosis     Hyperlipidemia     Hypothyroidism     Osteoarthritis     Pneumonia     Primary immunodeficiency disorder being treated with Hizentra    Skin cancer     Vitamin D deficiency

## 2024-03-06 NOTE — PATIENT PROFILE ADULT - FALL HARM RISK - HARM RISK INTERVENTIONS

## 2024-03-06 NOTE — H&P ADULT - NSHPREVIEWOFSYSTEMS_GEN_ALL_CORE
REVIEW OF SYSTEMS:  CONSTITUTIONAL:    +fever, +weakness  EYES:    +left eye vision changes  ENMT:     +bilateral earaches   NECK:    no pain or stiffness  RESPIRATORY:    +cough, +SOB  CARDIOVASCULAR:    +chronic leg swelling (no changes), no chest pain or palpitations or dizziness  GASTROINTESTINAL:    +diarrhea, +nausea, no abdominal or epigastric pain  GENITOURINARY:    no dysuria or frequency or hematuria or incontinence  NEUROLOGICAL:    +occasional memory loss, no headaches or memory loss or loss of strength or numbness or tremors  SKIN:    no itching or burning or rashes or lesions   LYMPH NODES:    no enlarged glands  ENDOCRINE:    no heat or cold intolerance, no hair loss, no polydipsia or polyuria  MUSCULOSKELETAL:    no joint pain or swelling, no muscle or back or extremity pain  PSYCHIATRIC:    no depression or anxiety or mood swings or difficulty sleeping  HEME/LYMPH:    no easy bruising or bleeding gums  ALLERGY AND IMMUNOLOGIC:    no hives or eczema

## 2024-03-06 NOTE — ED ADULT NURSE NOTE - NSICDXFAMILYHX_GEN_ALL_CORE_FT
FAMILY HISTORY:  Family history of hypertension in mother,   Family history of hypertension in son, son- living - age 57yo  Family history of lung cancer, sister-   Family history of lymphoma, brother-  Family history of ulcerative colitis, son- living- age 56yo  FH: dementia, father-   FHx: cerebral aneurysm, mother-  @ age 66

## 2024-03-06 NOTE — PHYSICAL EXAM
[Ill-Appearing] : ill-appearing [No JVD] : no jugular venous distention [No Edema] : there was no peripheral edema [No Extremity Clubbing/Cyanosis] : no extremity clubbing/cyanosis [Soft] : abdomen soft [Non Tender] : non-tender [Non-distended] : non-distended [de-identified] : scattered rhonchi [de-identified] : yumiko

## 2024-03-06 NOTE — ED PROVIDER NOTE - NSICDXFAMILYHX_GEN_ALL_CORE_FT
FAMILY HISTORY:  Family history of hypertension in mother,   Family history of hypertension in son, son- living - age 57yo  Family history of lung cancer, sister-   Family history of lymphoma, brother-  Family history of ulcerative colitis, son- living- age 58yo  FH: dementia, father-   FHx: cerebral aneurysm, mother-  @ age 66

## 2024-03-06 NOTE — REVIEW OF SYSTEMS
[Fever] : fever [Chills] : no chills [Fatigue] : fatigue [Vision Problems] : vision problems [Chest Pain] : no chest pain [Palpitations] : no palpitations [Lower Ext Edema] : no lower extremity edema [Shortness Of Breath] : no shortness of breath [Wheezing] : wheezing [Cough] : cough [Abdominal Pain] : no abdominal pain [FreeTextEntry4] : dental pain

## 2024-03-06 NOTE — H&P ADULT - NSHPLABSRESULTS_GEN_ALL_CORE
LABS:                            10.5<L>  10.40 )-----------( 428<H>    ( 06 Mar 2024 11:07 )             33.8<L>    140    |  100    |  26<H>  ----------------------------<  116<H>    06 Mar 2024 11:07  3.8     |  30     |  0.97         Ca 9.9           06 Mar 2024 11:07        TPro  7.3    /  Alb  2.6<L>  /  TBili  0.4    /  DBili  x      /  AST  13     /  ALT  12     /  AlkPhos  63     06 Mar 2024 11:07    PT/INR - ( 06 Mar 2024 11:07 )   PT: 12.0 ;   INR: 1.10          PTT - ( 06 Mar 2024 11:07 )  PTT:32.1     Urinalysis Basic - ( 06 Mar 2024 11:07 )    Color: x / Appearance: x / SG: x / pH: x  Gluc: 116 mg/dL / Ketone: x  / Bili: x / Urobili: x   Blood: x / Protein: x / Nitrite: x   Leuk Esterase: x / RBC: x / WBC x   Sq Epi: x / Non Sq Epi: x / Bacteria: x        Troponin trend:  Troponin I, High Sensitivity Result: 10.0 ng/L (03-06-24 @ 11:07)      Lactate, Blood: 1.1 mmol/L (03-06-24 @ 11:07)      Radiology:  < from: CT Angio Neck w/ IV Cont (03.06.24 @ 12:13) >    IMPRESSION:    CT brain:  No hydrocephalus, acute intracranial hemorrhage, mass effect, or brain   edema.    CTA brain:  No flow-limiting stenosis. No AVM.    Probable left P-comm infundibulum. Because the left P-comm is not well   visualized, differential diagnosis includes left P-comm level ICA   aneurysm. The dome measures 2 mm in size. The neck measures 3 mm in size.    1.2 x 2.4 cm right anterior temporal extra-axialhomogeneously enhancing   lesion, likely a meningioma.    CTA neck:  No flow-limiting stenosis, evidence for arterial dissection, or vascular   aneurysm.    < end of copied text >

## 2024-03-06 NOTE — ED ADULT NURSE NOTE - OBJECTIVE STATEMENT
89 y/o female received axo4 via wheelchair referred to the ED by Dr Zhang for weakness/sepsis eval. pt reports increasing fatiigue/weakness over the past few weeks

## 2024-03-06 NOTE — ED ADULT NURSE NOTE - NSFALLHARMRISKINTERV_ED_ALL_ED

## 2024-03-06 NOTE — CONSULT NOTE ADULT - SUBJECTIVE AND OBJECTIVE BOX
[facility] DEPARTMENT OF OPHTHALMOLOGY - INITIAL ADULT CONSULT  ----------------------------------------------------------------------------------------------------  Cindy Pratt MD, PGY-2  -------------------------------------------------------------------------------------------------    HPI:  88F with hx of primary immunodeficiency disorder on Hizentra, pre-DM on metformn, HLD, HTN, sciatica on gabapentin, hypothyroidism who presents with weakness, SOB, and fevers.  Per son, symptoms started about 3 weeks ago with nonstop non-bloody diarrhea.  Has had chronic diarrhea for the past 3 years but said she this episode was for 3-4 days which resulted in her being very weak and fatigued.  Despite having normal BMs now, patient still feels weak.  Son also reports patient having a low grade temp of 100.8F about 4-5 days ago.  Patient reports having light-headedness and occasional headaches as well.  Some nausea but no vomiting.  Also complained of intermittent memory loss and intermittent earaches.  Denied any chest pain or abdominal pain.  Patient unclear if she has SOB but son thinks she does have some but has been compensating.  Has also been coughing for the past couple of weeks with thick white/yellow sputum.  No recent travel.  No swelling of her BLE (chronically swollen but not worse).  Also of note, patient complaining of left eye vision changes that started about 2.5 weeks ago.  Said she has double vision when she looks far and also has a "patch" in the lower half of her left field of vision.  Went to see an optometrist for glasses last week and was told she had an inflamed optic nerve but recommended an ophthamology evaluation.  Patient went to Dr. Benjamin's office for evaluation of cough, low grade fever, SOB, and double vision, and was sent here for further evaluation.      In the ED, patient's triage vitals were /72  HR 83  T 99.4F RR 20, 92%  - labs significant for slight anemia, some thrombocytopenia  - CXR with both lung clear  - CT of brain and CTA of head neck showed "No hydrocephalus, acute intracranial hemorrhage, mass effect, or brain   edema.  No flow-limiting stenosis. No AVM.  Probable left P-comm infundibulum. Because the left P-comm is not well   visualized, differential diagnosis includes left P-comm level ICA  aneurysm. The dome measures 2 mm in size. The neck measures 3 mm in size. 1.2 x 2.4 cm right anterior temporal extra-axial homogeneously enhancing lesion, likely a meningioma.  No flow-limiting stenosis, evidence for arterial dissection, or vascular aneurysm."  - ophthamology consulted for her vision changes    Currently patient with no SOB but still feels weak overall.     (06 Mar 2024 13:16)    Interval History: Patient reports that 3 weeks ago, she started to noticed a dark spot in the temporal field of vision that would move around. Later noted a line in inferior visual field that was only present when wearing bifocal glasses. Went to an optometrist 1 week ago who said the nerve in her left eye was inflamed but was unable to seek follow up. Additionally, reports that on her way to the hospital today, developed an episode of horizontal diplopia. Unclear whether monocular or binocular and was brief in duration. Has not experienced any episode prior or after. Had a frontal headache yesterday, but no temporal headache, black/white out vision, pulsatile tinnitus, rapid weight gain/loss, scalp tenderness. Adds that jaw hurts when chewing but this has been ongoing issue due to teeth extraction. Denies blurry vision, eye pain, or pain with movement of the eyes.     PAST MEDICAL & SURGICAL HISTORY:  Skin cancer      Hypothyroidism      Bronchitis      Pneumonia      Chronic cough      Osteoarthritis      Environmental allergies      Vitamin D deficiency      Hyperlipidemia      Class 2 obesity with body mass index (BMI) of 39.0 to 39.9 in adult      Cancer of female breast  - left breast- tx with surgery, radiation and Letrozole      DDD (degenerative disc disease), lumbar      Primary immunodeficiency disorder  being treated with Hizentra      H/O actinic keratosis      H/O seborrheic keratosis      S/P cataract surgery  approx 2006- bilateral      S/P appendectomy  1950      S/P partial mastectomy, left  2012      S/P epidural steroid injection  x2- Dr. Martin- for Lumbar Disc Disease      S/P skin cancer resection  x11        Past Ocular History: CE PCIOL OU  Ophthalmic Medications: None  FAMILY HISTORY: No glaucoma or ARMD  FH: dementia  father-     FHx: cerebral aneurysm  mother-  @ age 66    Family history of hypertension in mother      Family history of lung cancer  sister-     Family history of lymphoma  brother-    Family history of hypertension in son  son- living - age 59yo    Family history of ulcerative colitis  son- living- age 58yo      MEDICATIONS  (STANDING):  atorvastatin 10 milliGRAM(s) Oral at bedtime  cholecalciferol 2000 Unit(s) Oral daily  dextrose 5%. 1000 milliLiter(s) (100 mL/Hr) IV Continuous <Continuous>  dextrose 5%. 1000 milliLiter(s) (50 mL/Hr) IV Continuous <Continuous>  dextrose 50% Injectable 25 Gram(s) IV Push once  dextrose 50% Injectable 25 Gram(s) IV Push once  dextrose 50% Injectable 12.5 Gram(s) IV Push once  gabapentin 200 milliGRAM(s) Oral three times a day  glucagon  Injectable 1 milliGRAM(s) IntraMuscular once  insulin lispro (ADMELOG) corrective regimen sliding scale   SubCutaneous three times a day before meals  insulin lispro (ADMELOG) corrective regimen sliding scale   SubCutaneous at bedtime  letrozole 2.5 milliGRAM(s) Oral daily  levothyroxine 250 MICROGram(s) Oral <User Schedule>  levothyroxine 125 MICROGram(s) Oral <User Schedule>  pantoprazole    Tablet 40 milliGRAM(s) Oral before breakfast  triamterene 37.5 mG/hydrochlorothiazide 25 mG Tablet 1 Tablet(s) Oral daily    MEDICATIONS  (PRN):  acetaminophen     Tablet .. 650 milliGRAM(s) Oral every 6 hours PRN Temp greater or equal to 38C (100.4F), Mild Pain (1 - 3)  aluminum hydroxide/magnesium hydroxide/simethicone Suspension 30 milliLiter(s) Oral every 4 hours PRN Dyspepsia  dextrose Oral Gel 15 Gram(s) Oral once PRN Blood Glucose LESS THAN 70 milliGRAM(s)/deciliter  melatonin 3 milliGRAM(s) Oral at bedtime PRN Insomnia  ondansetron Injectable 4 milliGRAM(s) IV Push every 8 hours PRN Nausea and/or Vomiting    Allergies & Intolerances:   soft shell seafood (Unknown)  No Known Drug Allergies  Vinegar- hands break out in a rash (Rash)  Shrimp (Hives)    Review of Systems:  Constitutional: No fever, chills  Eyes: As per above  Neuro: No tremors  Cardiovascular: No chest pain, palpitations  Respiratory: No SOB, no cough  GI: No nausea, vomiting, abdominal pain  : No dysuria  Skin: no rash  Psych: no depression  Endocrine: no polyuria, polydipsia  Heme/lymph: no swelling    VITALS: T(C): 36.3 (24 @ 17:32)  T(F): 97.4 (24 @ 17:32), Max: 99.4 (24 @ 10:29)  HR: 78 (24 @ 17:32) (71 - 83)  BP: 139/72 (24 @ 17:32) (139/72 - 166/72)  RR:  (16 - 20)  SpO2:  (92% - 96%)  Wt(kg): --  General: AAO x 3, appropriate mood and affect    Ophthalmology Exam:  Visual acuity (sc): 20/20 OD, 20/30 OS  Pupils: 3 OD, 3.5 OS (light); 3.5 OD, 4 OS (dark). + rAPD OS.   Ttono: 11 OD, 10 OS  Extraocular movements (EOMs): Trace adduction deficit OS, no pain  Confrontational Visual Field (CVF): Full OD, constriction inferiorly and superotemporally (able to partially count CF ST)  Color Plates: 10/12 OD, 11/12 OS    Pen Light Exam (PLE)  External: Flat OU  Lids/Lashes/Lacrimal Ducts: Flat OU    Sclera/Conjunctiva: W+Q OU  Cornea: Cl OU  Anterior Chamber: D+F OU    Iris: Flat OU. Multiple iris nevi OU.  Lens: Cl OU    Fundus Exam: dilated with 1% tropicamide and 2.5% phenylephrine  Approval obtained from primary team for dilation  Patient aware that pupils can remained dilated for at least 4-6 hours  Exam performed with 20D lens    Vitreous: wnl OU  Disc, cup/disc: sharp and pink, 0.4 OU  Macula: wnl OU  Vessels: wnl OU  Periphery: wnl OU    Labs/Imaging:  < from: CT Head No Cont (24 @ 12:12) >  CTA brain:  No flow-limiting stenosis. No AVM.    Probable left P-comm infundibulum. Because the left P-comm is not well   visualized, differential diagnosis includes left P-comm level ICA   aneurysm. The dome measures 2 mm in size. The neck measures 3 mm in size.    1.2 x 2.4 cm right anterior temporal extra-axialhomogeneously enhancing   lesion, likely a meningioma.    CTA neck:  No flow-limiting stenosis, evidence for arterial dissection, or vascular   aneurysm.    < end of copied text >   [facility] DEPARTMENT OF OPHTHALMOLOGY - INITIAL ADULT CONSULT  ----------------------------------------------------------------------------------------------------  Cindy Pratt MD, PGY-2  -------------------------------------------------------------------------------------------------    HPI:  88F with hx of primary immunodeficiency disorder on Hizentra, pre-DM on metformn, HLD, HTN, sciatica on gabapentin, hypothyroidism who presents with weakness, SOB, and fevers.  Per son, symptoms started about 3 weeks ago with nonstop non-bloody diarrhea.  Has had chronic diarrhea for the past 3 years but said she this episode was for 3-4 days which resulted in her being very weak and fatigued.  Despite having normal BMs now, patient still feels weak.  Son also reports patient having a low grade temp of 100.8F about 4-5 days ago.  Patient reports having light-headedness and occasional headaches as well.  Some nausea but no vomiting.  Also complained of intermittent memory loss and intermittent earaches.  Denied any chest pain or abdominal pain.  Patient unclear if she has SOB but son thinks she does have some but has been compensating.  Has also been coughing for the past couple of weeks with thick white/yellow sputum.  No recent travel.  No swelling of her BLE (chronically swollen but not worse).  Also of note, patient complaining of left eye vision changes that started about 2.5 weeks ago.  Said she has double vision when she looks far and also has a "patch" in the lower half of her left field of vision.  Went to see an optometrist for glasses last week and was told she had an inflamed optic nerve but recommended an ophthamology evaluation.  Patient went to Dr. Benjamin's office for evaluation of cough, low grade fever, SOB, and double vision, and was sent here for further evaluation.      In the ED, patient's triage vitals were /72  HR 83  T 99.4F RR 20, 92%  - labs significant for slight anemia, some thrombocytopenia  - CXR with both lung clear  - CT of brain and CTA of head neck showed "No hydrocephalus, acute intracranial hemorrhage, mass effect, or brain   edema.  No flow-limiting stenosis. No AVM.  Probable left P-comm infundibulum. Because the left P-comm is not well   visualized, differential diagnosis includes left P-comm level ICA  aneurysm. The dome measures 2 mm in size. The neck measures 3 mm in size. 1.2 x 2.4 cm right anterior temporal extra-axial homogeneously enhancing lesion, likely a meningioma.  No flow-limiting stenosis, evidence for arterial dissection, or vascular aneurysm."  - ophthamology consulted for her vision changes    Currently patient with no SOB but still feels weak overall.     (06 Mar 2024 13:16)    Interval History: Patient reports that 3 weeks ago, she started to noticed a dark spot in the temporal field of vision that would move around. Later noted a line in inferior visual field that was only present when wearing bifocal glasses. Went to an optometrist 1 week ago who said the nerve in her left eye was inflamed but was unable to seek follow up. Additionally, reports that on her way to the hospital today, developed an episode of horizontal diplopia. Unclear whether monocular or binocular and was brief in duration. Has not experienced any episode prior or after. Had a frontal headache yesterday, but no temporal headache, black/white out vision, pulsatile tinnitus, rapid weight gain/loss, scalp tenderness. Adds that jaw hurts when chewing but this has been ongoing issue due to teeth extraction. Denies blurry vision, eye pain, or pain with movement of the eyes.     PAST MEDICAL & SURGICAL HISTORY:  Skin cancer      Hypothyroidism      Bronchitis      Pneumonia      Chronic cough      Osteoarthritis      Environmental allergies      Vitamin D deficiency      Hyperlipidemia      Class 2 obesity with body mass index (BMI) of 39.0 to 39.9 in adult      Cancer of female breast  - left breast- tx with surgery, radiation and Letrozole      DDD (degenerative disc disease), lumbar      Primary immunodeficiency disorder  being treated with Hizentra      H/O actinic keratosis      H/O seborrheic keratosis      S/P cataract surgery  approx 2006- bilateral      S/P appendectomy  1950      S/P partial mastectomy, left  2012      S/P epidural steroid injection  x2- Dr. Martin- for Lumbar Disc Disease      S/P skin cancer resection  x11        Past Ocular History: CE PCIOL OU  Ophthalmic Medications: None  FAMILY HISTORY: No glaucoma or ARMD  FH: dementia  father-     FHx: cerebral aneurysm  mother-  @ age 66    Family history of hypertension in mother      Family history of lung cancer  sister-     Family history of lymphoma  brother-    Family history of hypertension in son  son- living - age 57yo    Family history of ulcerative colitis  son- living- age 58yo      MEDICATIONS  (STANDING):  atorvastatin 10 milliGRAM(s) Oral at bedtime  cholecalciferol 2000 Unit(s) Oral daily  dextrose 5%. 1000 milliLiter(s) (100 mL/Hr) IV Continuous <Continuous>  dextrose 5%. 1000 milliLiter(s) (50 mL/Hr) IV Continuous <Continuous>  dextrose 50% Injectable 25 Gram(s) IV Push once  dextrose 50% Injectable 25 Gram(s) IV Push once  dextrose 50% Injectable 12.5 Gram(s) IV Push once  gabapentin 200 milliGRAM(s) Oral three times a day  glucagon  Injectable 1 milliGRAM(s) IntraMuscular once  insulin lispro (ADMELOG) corrective regimen sliding scale   SubCutaneous three times a day before meals  insulin lispro (ADMELOG) corrective regimen sliding scale   SubCutaneous at bedtime  letrozole 2.5 milliGRAM(s) Oral daily  levothyroxine 250 MICROGram(s) Oral <User Schedule>  levothyroxine 125 MICROGram(s) Oral <User Schedule>  pantoprazole    Tablet 40 milliGRAM(s) Oral before breakfast  triamterene 37.5 mG/hydrochlorothiazide 25 mG Tablet 1 Tablet(s) Oral daily    MEDICATIONS  (PRN):  acetaminophen     Tablet .. 650 milliGRAM(s) Oral every 6 hours PRN Temp greater or equal to 38C (100.4F), Mild Pain (1 - 3)  aluminum hydroxide/magnesium hydroxide/simethicone Suspension 30 milliLiter(s) Oral every 4 hours PRN Dyspepsia  dextrose Oral Gel 15 Gram(s) Oral once PRN Blood Glucose LESS THAN 70 milliGRAM(s)/deciliter  melatonin 3 milliGRAM(s) Oral at bedtime PRN Insomnia  ondansetron Injectable 4 milliGRAM(s) IV Push every 8 hours PRN Nausea and/or Vomiting    Allergies & Intolerances:   soft shell seafood (Unknown)  No Known Drug Allergies  Vinegar- hands break out in a rash (Rash)  Shrimp (Hives)    Review of Systems:  Constitutional: No fever, chills  Eyes: As per above  Neuro: No tremors  Cardiovascular: No chest pain, palpitations  Respiratory: No SOB, no cough  GI: No nausea, vomiting, abdominal pain  : No dysuria  Skin: no rash  Psych: no depression  Endocrine: no polyuria, polydipsia  Heme/lymph: no swelling    VITALS: T(C): 36.3 (24 @ 17:32)  T(F): 97.4 (24 @ 17:32), Max: 99.4 (24 @ 10:29)  HR: 78 (24 @ 17:32) (71 - 83)  BP: 139/72 (24 @ 17:32) (139/72 - 166/72)  RR:  (16 - 20)  SpO2:  (92% - 96%)  Wt(kg): --  General: AAO x 3, appropriate mood and affect    Ophthalmology Exam:  Visual acuity (sc): 20/20 OD, 20/30 OS  Pupils: 3 OD, 3.5 OS (light); 3.5 OD, 4 OS (dark). + rAPD OS.   Ttono: 11 OD, 10 OS  Extraocular movements (EOMs): Trace adduction deficit OS, no pain  Confrontational Visual Field (CVF): Full OD, constriction inferiorly and superotemporally (able to partially count CF ST)  Color Plates: 10/12 OD, 11/12 OS    Pen Light Exam (PLE)  External: Flat OU  Lids/Lashes/Lacrimal Ducts: Flat OU    Sclera/Conjunctiva: W+Q OU  Cornea: Cl OU  Anterior Chamber: D+F OU    Iris: Flat OU. Multiple iris nevi OU.  Lens: Cl OU    Fundus Exam: dilated with 1% tropicamide and 2.5% phenylephrine  Approval obtained from primary team for dilation  Patient aware that pupils can remained dilated for at least 4-6 hours  Exam performed with 20D lens    Vitreous: wnl OU  Disc, cup/disc: sharp and pink, 0.25 OD with mild nasal elevation. OS with C-shaped disc edema and peripapillary hemorrhage, CDR 0.4.   Macula: wnl OU  Vessels: wnl OU  Periphery: wnl OU    Labs/Imaging:  < from: CT Head No Cont (24 @ 12:12) >  CTA brain:  No flow-limiting stenosis. No AVM.    Probable left P-comm infundibulum. Because the left P-comm is not well   visualized, differential diagnosis includes left P-comm level ICA   aneurysm. The dome measures 2 mm in size. The neck measures 3 mm in size.    1.2 x 2.4 cm right anterior temporal extra-axialhomogeneously enhancing   lesion, likely a meningioma.    CTA neck:  No flow-limiting stenosis, evidence for arterial dissection, or vascular   aneurysm.    < end of copied text >

## 2024-03-06 NOTE — H&P ADULT - NSICDXFAMILYHX_GEN_ALL_CORE_FT
FAMILY HISTORY:  Family history of hypertension in mother,   Family history of hypertension in son, son- living - age 59yo  Family history of lung cancer, sister-   Family history of lymphoma, brother-  Family history of ulcerative colitis, son- living- age 58yo  FH: dementia, father-   FHx: cerebral aneurysm, mother-  @ age 66

## 2024-03-06 NOTE — H&P ADULT - ASSESSMENT
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    Weakness/SOB/fevers - unclear etiology, possibly viral syndrome?  Her diarrhea seemed to have resolved as per patient  - admitted to medicine  - f/u viral panel  - f/u procalcitonin   - f/u urinalysis and if needed, urine culture  - Dr. Betancur consulted  - monitor fever and WBC (none here)  - hold abx for now  - if infectious cause, consider ID consult  - would consider CT chest and A/P for further workup if viral panel is negative    Left eye vision changes   - ophtho to see  - neurology consulted as well -> possible MRI    Pre-DM  - cont with metformin  - diabetic diet  - check a1c  - start low dose insulin coverage scale with FS qAC and qHS    Primary immunodeficiency disorder  - cont with Hizentra on Saturday (patient to bring her own)    Breast cancer  - cont with letrozole    HTN/HLD  - cont with triamterene-HCTZ with hold parameteres  - monitor electrolytes  - cont with atorvastatin    Hypothyroidism  - cont with levothyroxine 125mcg every day except 250mcg on Sunday    Preventives measures  - hold AC for now pending ophtho eval  - SCD only

## 2024-03-07 ENCOUNTER — OUTPATIENT (OUTPATIENT)
Dept: OUTPATIENT SERVICES | Facility: HOSPITAL | Age: 89
LOS: 1 days | End: 2024-03-07
Payer: COMMERCIAL

## 2024-03-07 DIAGNOSIS — Z90.49 ACQUIRED ABSENCE OF OTHER SPECIFIED PARTS OF DIGESTIVE TRACT: Chronic | ICD-10-CM

## 2024-03-07 DIAGNOSIS — J40 BRONCHITIS, NOT SPECIFIED AS ACUTE OR CHRONIC: ICD-10-CM

## 2024-03-07 DIAGNOSIS — Z98.890 OTHER SPECIFIED POSTPROCEDURAL STATES: Chronic | ICD-10-CM

## 2024-03-07 DIAGNOSIS — Z92.241 PERSONAL HISTORY OF SYSTEMIC STEROID THERAPY: Chronic | ICD-10-CM

## 2024-03-07 DIAGNOSIS — Z90.12 ACQUIRED ABSENCE OF LEFT BREAST AND NIPPLE: Chronic | ICD-10-CM

## 2024-03-07 LAB
A1C WITH ESTIMATED AVERAGE GLUCOSE RESULT: 6.1 % — HIGH (ref 4–5.6)
ALBUMIN SERPL ELPH-MCNC: 2.3 G/DL — LOW (ref 3.3–5)
ALP SERPL-CCNC: 56 U/L — SIGNIFICANT CHANGE UP (ref 30–120)
ALT FLD-CCNC: 13 U/L — SIGNIFICANT CHANGE UP (ref 10–60)
ANION GAP SERPL CALC-SCNC: 8 MMOL/L — SIGNIFICANT CHANGE UP (ref 5–17)
AST SERPL-CCNC: 13 U/L — SIGNIFICANT CHANGE UP (ref 10–40)
AUTO DIFF PNL BLD: NEGATIVE — SIGNIFICANT CHANGE UP
B BURGDOR C6 AB SER-ACNC: NEGATIVE — SIGNIFICANT CHANGE UP
B BURGDOR IGG+IGM SER-ACNC: 0.07 INDEX — SIGNIFICANT CHANGE UP (ref 0.01–0.89)
BASOPHILS # BLD AUTO: 0.02 K/UL — SIGNIFICANT CHANGE UP (ref 0–0.2)
BASOPHILS NFR BLD AUTO: 0.1 % — SIGNIFICANT CHANGE UP (ref 0–2)
BILIRUB SERPL-MCNC: 0.2 MG/DL — SIGNIFICANT CHANGE UP (ref 0.2–1.2)
BUN SERPL-MCNC: 23 MG/DL — SIGNIFICANT CHANGE UP (ref 7–23)
C-ANCA SER-ACNC: NEGATIVE — SIGNIFICANT CHANGE UP
CALCIUM SERPL-MCNC: 9.5 MG/DL — SIGNIFICANT CHANGE UP (ref 8.4–10.5)
CARDIOLIPIN AB SER-ACNC: POSITIVE
CARDIOLIPIN IGM SER-MCNC: 26.4 GPL — HIGH (ref 0–12.5)
CARDIOLIPIN IGM SER-MCNC: <5 MPL — SIGNIFICANT CHANGE UP (ref 0–12.5)
CHLORIDE SERPL-SCNC: 103 MMOL/L — SIGNIFICANT CHANGE UP (ref 96–108)
CO2 SERPL-SCNC: 31 MMOL/L — SIGNIFICANT CHANGE UP (ref 22–31)
CREAT SERPL-MCNC: 0.98 MG/DL — SIGNIFICANT CHANGE UP (ref 0.5–1.3)
CRP SERPL-MCNC: 174 MG/L — HIGH
DEPRECATED CARDIOLIPIN IGA SER: <5 APL — SIGNIFICANT CHANGE UP (ref 0–12.5)
EGFR: 56 ML/MIN/1.73M2 — LOW
EOSINOPHIL # BLD AUTO: 0 K/UL — SIGNIFICANT CHANGE UP (ref 0–0.5)
EOSINOPHIL NFR BLD AUTO: 0 % — SIGNIFICANT CHANGE UP (ref 0–6)
ESTIMATED AVERAGE GLUCOSE: 128 MG/DL — HIGH (ref 68–114)
FOLATE SERPL-MCNC: 8.2 NG/ML — SIGNIFICANT CHANGE UP
GLUCOSE BLDC GLUCOMTR-MCNC: 111 MG/DL — HIGH (ref 70–99)
GLUCOSE BLDC GLUCOMTR-MCNC: 113 MG/DL — HIGH (ref 70–99)
GLUCOSE BLDC GLUCOMTR-MCNC: 120 MG/DL — HIGH (ref 70–99)
GLUCOSE BLDC GLUCOMTR-MCNC: 131 MG/DL — HIGH (ref 70–99)
GLUCOSE SERPL-MCNC: 138 MG/DL — HIGH (ref 70–99)
HCT VFR BLD CALC: 31.8 % — LOW (ref 34.5–45)
HGB BLD-MCNC: 10 G/DL — LOW (ref 11.5–15.5)
IMM GRANULOCYTES NFR BLD AUTO: 0.9 % — SIGNIFICANT CHANGE UP (ref 0–0.9)
LYMPHOCYTES # BLD AUTO: 1.75 K/UL — SIGNIFICANT CHANGE UP (ref 1–3.3)
LYMPHOCYTES # BLD AUTO: 12.4 % — LOW (ref 13–44)
MAGNESIUM SERPL-MCNC: 1.4 MG/DL — LOW (ref 1.6–2.6)
MCHC RBC-ENTMCNC: 28.3 PG — SIGNIFICANT CHANGE UP (ref 27–34)
MCHC RBC-ENTMCNC: 31.4 GM/DL — LOW (ref 32–36)
MCV RBC AUTO: 90.1 FL — SIGNIFICANT CHANGE UP (ref 80–100)
MONOCYTES # BLD AUTO: 0.68 K/UL — SIGNIFICANT CHANGE UP (ref 0–0.9)
MONOCYTES NFR BLD AUTO: 4.8 % — SIGNIFICANT CHANGE UP (ref 2–14)
NEUTROPHILS # BLD AUTO: 11.53 K/UL — HIGH (ref 1.8–7.4)
NEUTROPHILS NFR BLD AUTO: 81.8 % — HIGH (ref 43–77)
NRBC # BLD: 0 /100 WBCS — SIGNIFICANT CHANGE UP (ref 0–0)
P-ANCA SER-ACNC: NEGATIVE — SIGNIFICANT CHANGE UP
PHOSPHATE SERPL-MCNC: 3 MG/DL — SIGNIFICANT CHANGE UP (ref 2.5–4.5)
PLATELET # BLD AUTO: 448 K/UL — HIGH (ref 150–400)
POTASSIUM SERPL-MCNC: 3.8 MMOL/L — SIGNIFICANT CHANGE UP (ref 3.5–5.3)
POTASSIUM SERPL-SCNC: 3.8 MMOL/L — SIGNIFICANT CHANGE UP (ref 3.5–5.3)
PROT SERPL-MCNC: 6.6 G/DL — SIGNIFICANT CHANGE UP (ref 6–8.3)
RBC # BLD: 3.53 M/UL — LOW (ref 3.8–5.2)
RBC # FLD: 14.6 % — HIGH (ref 10.3–14.5)
RHEUMATOID FACT SERPL-ACNC: 10 IU/ML — SIGNIFICANT CHANGE UP (ref 0–13)
SODIUM SERPL-SCNC: 142 MMOL/L — SIGNIFICANT CHANGE UP (ref 135–145)
TSH SERPL-MCNC: 1.05 UIU/ML — SIGNIFICANT CHANGE UP (ref 0.27–4.2)
VIT B12 SERPL-MCNC: 294 PG/ML — SIGNIFICANT CHANGE UP (ref 232–1245)
VIT B12 SERPL-MCNC: 320 PG/ML — SIGNIFICANT CHANGE UP (ref 232–1245)
WBC # BLD: 14.11 K/UL — HIGH (ref 3.8–10.5)
WBC # FLD AUTO: 14.11 K/UL — HIGH (ref 3.8–10.5)

## 2024-03-07 PROCEDURE — 70544 MR ANGIOGRAPHY HEAD W/O DYE: CPT

## 2024-03-07 PROCEDURE — 70543 MRI ORBT/FAC/NCK W/O &W/DYE: CPT | Mod: 26,MH

## 2024-03-07 PROCEDURE — 74177 CT ABD & PELVIS W/CONTRAST: CPT | Mod: 26

## 2024-03-07 PROCEDURE — 99233 SBSQ HOSP IP/OBS HIGH 50: CPT

## 2024-03-07 PROCEDURE — 70553 MRI BRAIN STEM W/O & W/DYE: CPT

## 2024-03-07 PROCEDURE — 71275 CT ANGIOGRAPHY CHEST: CPT | Mod: 26

## 2024-03-07 PROCEDURE — 70544 MR ANGIOGRAPHY HEAD W/O DYE: CPT | Mod: 26,MH,XU

## 2024-03-07 PROCEDURE — A9579: CPT

## 2024-03-07 PROCEDURE — 93970 EXTREMITY STUDY: CPT | Mod: 26

## 2024-03-07 PROCEDURE — 70543 MRI ORBT/FAC/NCK W/O &W/DYE: CPT

## 2024-03-07 PROCEDURE — 70553 MRI BRAIN STEM W/O & W/DYE: CPT | Mod: 26,MH

## 2024-03-07 RX ORDER — ACETAMINOPHEN WITH CODEINE 300MG-30MG
2 TABLET ORAL ONCE
Refills: 0 | Status: DISCONTINUED | OUTPATIENT
Start: 2024-03-07 | End: 2024-03-07

## 2024-03-07 RX ORDER — ENOXAPARIN SODIUM 100 MG/ML
40 INJECTION SUBCUTANEOUS EVERY 24 HOURS
Refills: 0 | Status: DISCONTINUED | OUTPATIENT
Start: 2024-03-07 | End: 2024-03-10

## 2024-03-07 RX ORDER — MAGNESIUM OXIDE 400 MG ORAL TABLET 241.3 MG
400 TABLET ORAL ONCE
Refills: 0 | Status: COMPLETED | OUTPATIENT
Start: 2024-03-07 | End: 2024-03-07

## 2024-03-07 RX ORDER — TRAMADOL HYDROCHLORIDE 50 MG/1
50 TABLET ORAL ONCE
Refills: 0 | Status: DISCONTINUED | OUTPATIENT
Start: 2024-03-07 | End: 2024-03-10

## 2024-03-07 RX ORDER — ONDANSETRON 8 MG/1
4 TABLET, FILM COATED ORAL ONCE
Refills: 0 | Status: COMPLETED | OUTPATIENT
Start: 2024-03-07 | End: 2024-03-07

## 2024-03-07 RX ADMIN — Medication 2000 UNIT(S): at 12:32

## 2024-03-07 RX ADMIN — Medication 125 MICROGRAM(S): at 05:08

## 2024-03-07 RX ADMIN — GABAPENTIN 200 MILLIGRAM(S): 400 CAPSULE ORAL at 21:23

## 2024-03-07 RX ADMIN — LETROZOLE 2.5 MILLIGRAM(S): 2.5 TABLET, FILM COATED ORAL at 12:04

## 2024-03-07 RX ADMIN — MAGNESIUM OXIDE 400 MG ORAL TABLET 400 MILLIGRAM(S): 241.3 TABLET ORAL at 18:06

## 2024-03-07 RX ADMIN — PANTOPRAZOLE SODIUM 40 MILLIGRAM(S): 20 TABLET, DELAYED RELEASE ORAL at 07:59

## 2024-03-07 RX ADMIN — Medication 2 TABLET(S): at 12:32

## 2024-03-07 RX ADMIN — ONDANSETRON 4 MILLIGRAM(S): 8 TABLET, FILM COATED ORAL at 12:39

## 2024-03-07 RX ADMIN — Medication 1 TABLET(S): at 06:13

## 2024-03-07 RX ADMIN — GABAPENTIN 200 MILLIGRAM(S): 400 CAPSULE ORAL at 06:13

## 2024-03-07 RX ADMIN — ATORVASTATIN CALCIUM 10 MILLIGRAM(S): 80 TABLET, FILM COATED ORAL at 21:24

## 2024-03-07 NOTE — CARE COORDINATION ASSESSMENT. - NSPASTMEDSURGHISTORY_GEN_ALL_CORE_FT
PAST MEDICAL & SURGICAL HISTORY:  Osteoarthritis      Chronic cough      Pneumonia      Bronchitis      Hypothyroidism      Skin cancer      S/P cataract surgery  approx 2006- bilateral      H/O seborrheic keratosis      H/O actinic keratosis      Primary immunodeficiency disorder  being treated with Hizentra      DDD (degenerative disc disease), lumbar      Cancer of female breast  2012- left breast- tx with surgery, radiation and Letrozole      Class 2 obesity with body mass index (BMI) of 39.0 to 39.9 in adult      Hyperlipidemia      Vitamin D deficiency      Environmental allergies      S/P skin cancer resection  x11      S/P epidural steroid injection  x2- Dr. Martin- for Lumbar Disc Disease      S/P partial mastectomy, left  2012      S/P appendectomy  1950

## 2024-03-07 NOTE — PROGRESS NOTE ADULT - ASSESSMENT
Weakness/SOB/fevers - unclear etiology, RVP and COVID negative.  Her diarrhea seemed to have resolved as per patient.  Possibly autoimmune disorders.  Inflammatory markers elevated.  Could also be due to transient hypoxia.    - f/u procalcitonin   - f/u urinalysis and if needed, urine culture  - Dr. Betancur consulted -> suggested DVT of BLE  - WBC elevated but no fever   - hold abx for now  - if infectious cause, consider ID consult  - would consider CT chest and A/P     Left eye vision changes   - ophtho to see  - neurology consulted as well -> possible MRI    Pre-DM  - cont with metformin  - diabetic diet  - check a1c  - start low dose insulin coverage scale with FS qAC and qHS    Primary immunodeficiency disorder  - cont with Hizentra on Saturday (patient to bring her own)    Breast cancer  - cont with letrozole    HTN/HLD  - cont with triamterene-HCTZ with hold parameteres  - monitor electrolytes  - cont with atorvastatin    Hypothyroidism  - cont with levothyroxine 125mcg every day except 250mcg on Sunday    Preventives measures  - hold AC for now pending ophtho eval  - SCD only   Weakness/SOB/fevers - unclear etiology, RVP and COVID negative.  Her diarrhea seemed to have resolved as per patient.  Possibly autoimmune disorders.  Inflammatory markers elevated.  Could also be due to transient hypoxia causing weakness.    - f/u procalcitonin   - f/u urinalysis and if needed, urine culture  - Dr. Betancur consulted -> suggested DVT of BLE  - WBC elevated but no fever   - hold abx for now  - if infectious cause, consider ID consult  - would consider CT chest and A/P     Left eye vision changes   - ophtho to see - >-Recommend MRI brain & orbits w/ and w/o contrast   -Recommend anti-MOG and anti-AQP4 antibodies  -Recommend ESR/CRP, ACE, Lyme titers, FTA-ABS, RPR or VRDL, and CXR   - neurology consulted as well -> possible MRI    Pre-DM  - cont with metformin  - diabetic diet  - check a1c  - start low dose insulin coverage scale with FS qAC and qHS    Primary immunodeficiency disorder  - cont with Hizentra on Saturday (patient to bring her own)    Breast cancer  - cont with letrozole    HTN/HLD  - cont with triamterene-HCTZ with hold parameteres  - monitor electrolytes  - cont with atorvastatin    Hypothyroidism  - cont with levothyroxine 125mcg every day except 250mcg on Sunday    Preventives measures  - start lovenox for DVT ppx  - SCD only

## 2024-03-07 NOTE — CARE COORDINATION ASSESSMENT. - NSDCPLANSERVICES_GEN_ALL_CORE
Per EMR MD note:   ED from home c/o SOB, Visual changes, weak, fever,.  Bronchitis o23LNC, WBC 14.11.   r/o cva 88 y hx  HTN, HL, immunodeficiency disorder, hypothyroidism who presents with visual changes, fevers, weakness. She has had these symptoms for the past few days. She has been short of breath at times. She has had diarrhea for the past few weeks. No chest pain. She has chronic mild leg swelling, unchanged.   Sourav - PMD- seen yesterday   [facility]  Pharmacy is [facility]        Possible Pre DM on Metformin at home - does not have a BS meter.    -----------------------    CM met with pt/ dtr Tania in ED bedside.  Pt. going for an MRI today for visual changes and Head.  CM introduced self and role of CM to assist with transition planning and home care if needed.  Daughter states she does not think pt. will need home care at this time - pending hospital course.     Pt. Lives in Private Home  with 2 sons Favian & Wilman.  Pat assist with meds/ meals , pt has split level house 3 steps to enter and 5-inside.   and pt. can do stairs with assist and walks with a cane in home and RW outside.  She is forgetful at times but A&O x3, Uses depends, self toilets and Showers herself .  Dtr from [facility] [phone] manages medical needs.      CM to watch for needs pending hospital course and a PT eval ./Anticipated Needs Unclear at Present

## 2024-03-07 NOTE — CONSULT NOTE ADULT - ASSESSMENT
The patient is an 88 year old female with a history of HTN, HL, immunodeficiency disorder, hypothyroidism who presents with visual changes, fevers, weakness.    Plan:  - ECG with low voltage, unchanged from prior  - CTA head and neck with meningioma; no acute pathology or stenosis. Cannot exclude ICA aneurysm.  - BNP normal  - CXR with grossly clear lungs  - Hold triamterene-HCTZ  - If concern for CVA, check echo  - Continue atorvastatin 10 mg daily  - Ophthalmology eval noted  - Neurology eval

## 2024-03-07 NOTE — CONSULT NOTE ADULT - SUBJECTIVE AND OBJECTIVE BOX
History of Present Illness: The patient is an 88 year old female with a history of HTN, HL, immunodeficiency disorder, hypothyroidism who presents with visual changes, fevers, weakness. She has had these symptoms for the past few days. She has been short of breath at times. She has had diarrhea for the past few weeks. No chest pain. She has chronic mild leg swelling, unchanged.    Past Medical/Surgical History:  HTN, HL, immunodeficiency disorder, hypothyroidism    Medications:  Home Medications:  acetaminophen 500 mg oral tablet: 2 tab(s) orally every 12 hours for 2 to 3 weeks. (15 Zoltan 2020 13:07)  Flonase 50 mcg/inh nasal spray: 1 spray(s) nasal once a day (15 Zoltan 2020 09:43)  gabapentin: 200 milligram(s) orally 3 times a day (15 Zoltan 2020 09:43)  Hizentra 20% subcutaneous solution: 11 gram(s) subcutaneous once a week- every Saturday. (15 Zoltan 2020 09:43)  letrozole 2.5 mg oral tablet: 1 tab(s) orally once a day (15 Zoltan 2020 09:43)  levothyroxine 125 mcg (0.125 mg) oral tablet: 1 tab(s) orally once a day (15 Zoltan 2020 09:43)  levothyroxine 125 mcg (0.125 mg) oral tablet: 2 tab(s) orally every 7 days on Sunday (06 Mar 2024 13:28)  polyethylene glycol 3350 oral powder for reconstitution: 17 gram(s) orally once a day (15 Zoltan 2020 13:08)  senna oral tablet: 2 tab(s) orally once a day (at bedtime) (15 Zoltan 2020 13:08)  triamterene-hydrochlorothiazide 37.5 mg-25 mg oral tablet: 1 tab(s) orally once a day (15 Zoltan 2020 09:43)  Vitamin D3 2000 intl units (50 mcg) oral tablet: 1 tab(s) orally once a day (15 Zoltan 2020 09:43)      Family History: Non-contributory family history of premature cardiovascular atherosclerotic disease    Social History: No tobacco, alcohol or drug use    Review of Systems:  General: No fevers, chills, weight gain  Skin: No rashes, color changes  Cardiovascular: No chest pain, orthopnea  Respiratory: +shortness of breath, +cough  Gastrointestinal: No nausea, abdominal pain  Genitourinary: No incontinence, pain with urination  Musculoskeletal: No pain, swelling, decreased range of motion  Neurological: No headache, +weakness  Psychiatric: No depression, anxiety  Endocrine: No weight gain, increased thirst  All other systems are comprehensively negative.    Physical Exam:  Vitals:        Vital Signs Last 24 Hrs  T(C): 36.4 (07 Mar 2024 08:06), Max: 37.4 (06 Mar 2024 10:29)  T(F): 97.6 (07 Mar 2024 08:06), Max: 99.4 (06 Mar 2024 10:29)  HR: 70 (07 Mar 2024 08:06) (60 - 83)  BP: 140/64 (07 Mar 2024 08:06) (126/65 - 166/72)  BP(mean): --  RR: 18 (07 Mar 2024 08:06) (16 - 20)  SpO2: 97% (07 Mar 2024 08:06) (92% - 97%)    Parameters below as of 07 Mar 2024 08:06  Patient On (Oxygen Delivery Method): nasal cannula  O2 Flow (L/min): 3    General: NAD  HEENT: MMM  Neck: No JVD, no carotid bruit  Lungs: CTAB  CV: RRR, nl S1/S2, no M/R/G  Abdomen: S/NT/ND, +BS  Extremities: No LE edema, no cyanosis  Neuro: AAOx3  Skin: No rash    Labs:                        10.0   14.11 )-----------( 448      ( 07 Mar 2024 05:54 )             31.8     03-07    142  |  103  |  23  ----------------------------<  138<H>  3.8   |  31  |  0.98    Ca    9.5      07 Mar 2024 05:54  Phos  3.0     03-07  Mg     1.4     03-07    TPro  6.6  /  Alb  2.3<L>  /  TBili  0.2  /  DBili  x   /  AST  13  /  ALT  13  /  AlkPhos  56  03-07        PT/INR - ( 06 Mar 2024 11:07 )   PT: 12.0 sec;   INR: 1.10 ratio         PTT - ( 06 Mar 2024 11:07 )  PTT:32.1 sec    ECG/Telemetry: NSR, normal axis, low voltage

## 2024-03-07 NOTE — CARE COORDINATION ASSESSMENT. - CURRENT MENTAL STATUS/COGNITIVE FUNCTIONING
Gets forgetful, needs help making her decisions per daughter/alert/oriented to person/oriented to place/oriented to time/oriented to situation

## 2024-03-07 NOTE — PROGRESS NOTE ADULT - ASSESSMENT
Pt. with PATRICIA, noncompliant , with some hypoxia and intermittent AMS.  R/o hypercarbia.  ??Viral infection  Meningioma; unclear ophthalmological issue--to have MRI    Suggest check oximetry RA--will need fu sleep study as outpt.    Venous doppler legs; hi d dimer likely normal for age.  Dw dtr in detail.

## 2024-03-07 NOTE — CARE COORDINATION ASSESSMENT. - LIVES WITH, PROFILE
Has several adult children lives with 2 sons Favian and Wilman ,  Wilman is main caregiver on day to day basis - provides meds, meals.   daughter Tania from [employer] is a nurse and takes care of medical needs .  Dtr at bedside in ED./children

## 2024-03-07 NOTE — CARE COORDINATION ASSESSMENT. - NSCAREPROVIDERS_GEN_ALL_CORE_FT
CARE PROVIDERS:  Accepting Physician: Mariano Durbin  Administration: Rossy Saleh  Administration: Ayesha Gottlieb  Administration: Liza Mcguire  Administration: Stefanie Hernandez  Administration: Beto Kim  Administration: Altagracia Arita  Admitting: Mariano Durbin  Attending: Mariano Durbin  Case Management: Yanique Garcia  Consultant: Fabio Torres  Consultant: Trent Kimball  Consultant: Ruchi Gudino  Consultant: Tommy Barillas  Consultant: Isacc Betancur  Covering Team: Mariano Durbin  ED Attending: Alen Blandon  ED Nurse: Olya Sun  Nurse: Olya Sun  Nurse: Titus Leon  Nurse: Demetria Aleman  Nurse: Lisa Kong  Nurse: Valentino, Samantha  Outpatient Provider: Tommy Barillas  Override: Valentino, Samantha  Override: Titus Leon  Physical Therapy: Cindy Key  Physical Therapy: Rogelio Álvarez  Primary Team: El Dave  Primary Team: Parish Hoang  Primary Team: Cindy Pratt  Registered Dietitian: Lucia Koehler  : Mirtha Shea  : Greta Sarabia  Student: Javon An  Team: ANUPAM  Hospitalists, Team  Team: ANUPAM Palliative Care, Team

## 2024-03-08 ENCOUNTER — TRANSCRIPTION ENCOUNTER (OUTPATIENT)
Age: 89
End: 2024-03-08

## 2024-03-08 LAB
ACE SERPL-CCNC: 28 U/L — SIGNIFICANT CHANGE UP (ref 14–82)
ALBUMIN SERPL ELPH-MCNC: 2.5 G/DL — LOW (ref 3.3–5)
ALP SERPL-CCNC: 55 U/L — SIGNIFICANT CHANGE UP (ref 30–120)
ALT FLD-CCNC: 15 U/L — SIGNIFICANT CHANGE UP (ref 10–60)
ANION GAP SERPL CALC-SCNC: 8 MMOL/L — SIGNIFICANT CHANGE UP (ref 5–17)
APPEARANCE UR: CLEAR — SIGNIFICANT CHANGE UP
AST SERPL-CCNC: 18 U/L — SIGNIFICANT CHANGE UP (ref 10–40)
BASOPHILS # BLD AUTO: 0.05 K/UL — SIGNIFICANT CHANGE UP (ref 0–0.2)
BASOPHILS NFR BLD AUTO: 0.4 % — SIGNIFICANT CHANGE UP (ref 0–2)
BILIRUB SERPL-MCNC: 0.2 MG/DL — SIGNIFICANT CHANGE UP (ref 0.2–1.2)
BILIRUB UR-MCNC: NEGATIVE — SIGNIFICANT CHANGE UP
BUN SERPL-MCNC: 25 MG/DL — HIGH (ref 7–23)
CALCIUM SERPL-MCNC: 9.9 MG/DL — SIGNIFICANT CHANGE UP (ref 8.4–10.5)
CHLORIDE SERPL-SCNC: 104 MMOL/L — SIGNIFICANT CHANGE UP (ref 96–108)
CO2 SERPL-SCNC: 32 MMOL/L — HIGH (ref 22–31)
COLOR SPEC: YELLOW — SIGNIFICANT CHANGE UP
CREAT SERPL-MCNC: 1.02 MG/DL — SIGNIFICANT CHANGE UP (ref 0.5–1.3)
DIFF PNL FLD: NEGATIVE — SIGNIFICANT CHANGE UP
EGFR: 53 ML/MIN/1.73M2 — LOW
EOSINOPHIL # BLD AUTO: 0.19 K/UL — SIGNIFICANT CHANGE UP (ref 0–0.5)
EOSINOPHIL NFR BLD AUTO: 1.6 % — SIGNIFICANT CHANGE UP (ref 0–6)
GLUCOSE BLDC GLUCOMTR-MCNC: 114 MG/DL — HIGH (ref 70–99)
GLUCOSE BLDC GLUCOMTR-MCNC: 132 MG/DL — HIGH (ref 70–99)
GLUCOSE BLDC GLUCOMTR-MCNC: 141 MG/DL — HIGH (ref 70–99)
GLUCOSE BLDC GLUCOMTR-MCNC: 159 MG/DL — HIGH (ref 70–99)
GLUCOSE SERPL-MCNC: 97 MG/DL — SIGNIFICANT CHANGE UP (ref 70–99)
GLUCOSE UR QL: NEGATIVE MG/DL — SIGNIFICANT CHANGE UP
HCT VFR BLD CALC: 33.1 % — LOW (ref 34.5–45)
HGB BLD-MCNC: 9.9 G/DL — LOW (ref 11.5–15.5)
IMM GRANULOCYTES NFR BLD AUTO: 0.9 % — SIGNIFICANT CHANGE UP (ref 0–0.9)
KETONES UR-MCNC: NEGATIVE MG/DL — SIGNIFICANT CHANGE UP
LEUKOCYTE ESTERASE UR-ACNC: NEGATIVE — SIGNIFICANT CHANGE UP
LYMPHOCYTES # BLD AUTO: 1.99 K/UL — SIGNIFICANT CHANGE UP (ref 1–3.3)
LYMPHOCYTES # BLD AUTO: 16.4 % — SIGNIFICANT CHANGE UP (ref 13–44)
MAGNESIUM SERPL-MCNC: 1.6 MG/DL — SIGNIFICANT CHANGE UP (ref 1.6–2.6)
MCHC RBC-ENTMCNC: 27.5 PG — SIGNIFICANT CHANGE UP (ref 27–34)
MCHC RBC-ENTMCNC: 29.9 GM/DL — LOW (ref 32–36)
MCV RBC AUTO: 91.9 FL — SIGNIFICANT CHANGE UP (ref 80–100)
MONOCYTES # BLD AUTO: 0.87 K/UL — SIGNIFICANT CHANGE UP (ref 0–0.9)
MONOCYTES NFR BLD AUTO: 7.2 % — SIGNIFICANT CHANGE UP (ref 2–14)
NEUTROPHILS # BLD AUTO: 8.92 K/UL — HIGH (ref 1.8–7.4)
NEUTROPHILS NFR BLD AUTO: 73.5 % — SIGNIFICANT CHANGE UP (ref 43–77)
NITRITE UR-MCNC: NEGATIVE — SIGNIFICANT CHANGE UP
NRBC # BLD: 0 /100 WBCS — SIGNIFICANT CHANGE UP (ref 0–0)
PH UR: 7 — SIGNIFICANT CHANGE UP (ref 5–8)
PHOSPHATE SERPL-MCNC: 4 MG/DL — SIGNIFICANT CHANGE UP (ref 2.5–4.5)
PLATELET # BLD AUTO: 433 K/UL — HIGH (ref 150–400)
POTASSIUM SERPL-MCNC: 3.7 MMOL/L — SIGNIFICANT CHANGE UP (ref 3.5–5.3)
POTASSIUM SERPL-SCNC: 3.7 MMOL/L — SIGNIFICANT CHANGE UP (ref 3.5–5.3)
PROT SERPL-MCNC: 6.7 G/DL — SIGNIFICANT CHANGE UP (ref 6–8.3)
PROT UR-MCNC: NEGATIVE MG/DL — SIGNIFICANT CHANGE UP
RBC # BLD: 3.6 M/UL — LOW (ref 3.8–5.2)
RBC # FLD: 14.8 % — HIGH (ref 10.3–14.5)
SODIUM SERPL-SCNC: 144 MMOL/L — SIGNIFICANT CHANGE UP (ref 135–145)
SP GR SPEC: 1.02 — SIGNIFICANT CHANGE UP (ref 1–1.03)
UROBILINOGEN FLD QL: 0.2 MG/DL — SIGNIFICANT CHANGE UP (ref 0.2–1)
WBC # BLD: 12.13 K/UL — HIGH (ref 3.8–10.5)
WBC # FLD AUTO: 12.13 K/UL — HIGH (ref 3.8–10.5)

## 2024-03-08 PROCEDURE — 99233 SBSQ HOSP IP/OBS HIGH 50: CPT

## 2024-03-08 RX ADMIN — LETROZOLE 2.5 MILLIGRAM(S): 2.5 TABLET, FILM COATED ORAL at 12:31

## 2024-03-08 RX ADMIN — GABAPENTIN 200 MILLIGRAM(S): 400 CAPSULE ORAL at 05:28

## 2024-03-08 RX ADMIN — GABAPENTIN 200 MILLIGRAM(S): 400 CAPSULE ORAL at 21:33

## 2024-03-08 RX ADMIN — Medication 125 MICROGRAM(S): at 05:28

## 2024-03-08 RX ADMIN — Medication 2000 UNIT(S): at 12:31

## 2024-03-08 RX ADMIN — ATORVASTATIN CALCIUM 10 MILLIGRAM(S): 80 TABLET, FILM COATED ORAL at 21:34

## 2024-03-08 RX ADMIN — PANTOPRAZOLE SODIUM 40 MILLIGRAM(S): 20 TABLET, DELAYED RELEASE ORAL at 05:29

## 2024-03-08 RX ADMIN — Medication 1 TABLET(S): at 05:28

## 2024-03-08 RX ADMIN — GABAPENTIN 200 MILLIGRAM(S): 400 CAPSULE ORAL at 16:53

## 2024-03-08 RX ADMIN — ENOXAPARIN SODIUM 40 MILLIGRAM(S): 100 INJECTION SUBCUTANEOUS at 16:52

## 2024-03-08 NOTE — DISCHARGE NOTE PROVIDER - CARE PROVIDER_API CALL
Kim Holley  Neurology  [address], [address]  [place] [address]  Phone: [phone]  Fax: [phone]  Follow Up Time:     Claudia Doan  Neurology  [address] [place] [address]  Phone: [phone]  Fax: [phone]  Follow Up Time:

## 2024-03-08 NOTE — DISCHARGE NOTE NURSING/CASE MANAGEMENT/SOCIAL WORK - NSSCNAMETXT_GEN_ALL_CORE
[facility] [place] [phone]/ [phone]. Home care agency will reach out to you within 24-72 hours of your discharge to schedule home care visit/eval appointment with you. Please call agency for any queries regarding home care services

## 2024-03-08 NOTE — CHART NOTE - NSCHARTNOTEFT_GEN_A_CORE
Attempted to see patient for ACP/consult.  Patient en route to [facility] for MRI and unable to see    will reattempt on subsequent date    Ruchi Gudino MD, [facility]; Palliative Care Attending, Ethicist. [phone]
Reviewed MRI findings with Dr. 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

## 2024-03-08 NOTE — DISCHARGE NOTE PROVIDER - NSDCCPCAREPLAN_GEN_ALL_CORE_FT
PRINCIPAL DISCHARGE DIAGNOSIS  Diagnosis: Bronchitis  Assessment and Plan of Treatment: resolving      SECONDARY DISCHARGE DIAGNOSES  Diagnosis: AMS (altered mental status)  Assessment and Plan of Treatment: mri of head mengigoma  outpt f/u    Diagnosis: Acute respiratory failure with hypoxia  Assessment and Plan of Treatment:     Diagnosis: Diplopia  Assessment and Plan of Treatment:

## 2024-03-08 NOTE — CASE MANAGEMENT PROGRESS NOTE - NSCMPROGRESSNOTE_GEN_ALL_CORE
RN/CM met with pt who gave permission that her daughter @ bedside, Tania [phone] be privy to all info. CM discussed with both pt and daughter about  need for home oxygen, referral process and delivery of durable medical equipment. CM discussed about portable home concentrator and stationary concentrator use and management @ home. Both pt and daughter stated that pt has had home oxygen in the past and verbalized understanding of order process. Pt does NOT have any preferred durable medical equipment company and is agreeable to utilize onsite company, [facility] [phone]. CM discussed about home care services, insurance provisions, choices of agencies and discussed about importance of pt having post- hospitalization follow-up with her community MD DR Christopher Zhang [phone]. Pt has opted for [facility] [phone]/ [phone], CM referred case with plan for transition home SUN 03/10/2024. DaughterTania opted to set-up follow-up appointment with above MD within 1 week of hospital discharge. Pt's daughter stated that family will transport pt home upon discharge. CM remains available and continues to follow case.  RN/CM met with pt who gave permission that her daughter @ bedside, Tania [phone] be privy to all info. CM discussed with both pt and daughter about  need for home oxygen, referral process and delivery of durable medical equipment. CM discussed about portable home concentrator and stationary concentrator use and management @ home. Both pt and daughter stated that pt has had home oxygen in the past and verbalized understanding of order process. Pt does NOT have any preferred durable medical equipment company and is agreeable to utilize onsite company, [facility] [phone]. CM sent referral to said durable medical equipment company and requested bedside delivery of portable oxygen concentrator and to coordinate stationary oxygen concentrator home delivery with son, Wilman. CM discussed about home care services, insurance provisions, choices of agencies and discussed about importance of pt having post- hospitalization follow-up with her community MD DR Christopher Zhang [phone]. Pt has opted for [facility] [phone]/ [phone], CM referred case with plan for transition home SUN 03/10/2024. Daughter, Tania opted to set-up follow-up appointment with above MD within 1 week of hospital discharge. Pt's daughter stated that family will transport pt home upon discharge. CM remains available and continues to follow case.  RN/CM met with pt who gave permission that her daughter @ bedside, Tania [phone] be privy to all info. CM discussed with both pt and daughter about  need for home oxygen, referral process and delivery of durable medical equipment. CM discussed about portable home concentrator and stationary concentrator use and management @ home. Both pt and daughter stated that pt has had home oxygen in the past and verbalized understanding of order process. Pt does NOT have any preferred durable medical equipment company and is agreeable to utilize onsite company, [employer] [phone]. CM sent referral to said durable medical equipment company and requested bedside delivery of portable oxygen concentrator (POC) and to coordinate stationary oxygen concentrator home delivery with son, Wilman. CM discussed about home care services, insurance provisions, choices of agencies and discussed about importance of pt having post- hospitalization follow-up with her community MD DR Christopher Zhang [phone]. Pt has opted for [facility] [phone]/ [phone], CM referred case with plan for transition home SUN 03/10/2024. Daughter, Tania opted to set-up follow-up appointment with above MD within 1 week of hospital discharge. Pt's daughter stated that family will transport pt home upon discharge. CM remains available and continues to follow case.     ADDENDUM: Noted above home oxygen POC (portable oxygen concentrator) delivered to bedside today by [facility] [phone].

## 2024-03-08 NOTE — PROGRESS NOTE ADULT - ASSESSMENT
The patient is an 88 year old female with a history of HTN, HL, immunodeficiency disorder, hypothyroidism who presents with visual changes, fevers, weakness.    Plan:  - ECG with low voltage, unchanged from prior  - CTA head and neck with meningioma; no acute pathology or stenosis. Cannot exclude ICA aneurysm.  - BNP normal  - CXR with grossly clear lungs  - Hold triamterene-HCTZ - resume on discharge  - If concern for CVA, check echo  - Continue atorvastatin 10 mg daily  - Ophthalmology eval noted  - Neurology follow-up  - MRI head with meningioma; otherwise unremarkable

## 2024-03-08 NOTE — PATIENT CHOICE NOTE. - NSPTCHOICESTATE_GEN_ALL_CORE

## 2024-03-08 NOTE — DISCHARGE NOTE PROVIDER - NSDCFUADDAPPT_GEN_ALL_CORE_FT
You/ your family are opting to schedule a post-hospital follow-up appointment with DR Christopher Zhang [phone] within a week of your hospital discharge.

## 2024-03-08 NOTE — DISCHARGE NOTE PROVIDER - HOSPITAL COURSE
Patient admitted on 3/6 for fevers, weakness, SOB.  Also complaints of left eye visual changes.    Infectious workup was negative with no growth on blood cultures.  Patient remained afebrile off antibiotics and her UA was negative.  Viral panel was negative.  Patient had a negative CTA for PE.  Also had a CT A/P that did now show any concerning pathology.  Neurology was consulted.  MRI of the brain/orbits showed "ORBITS:   Unremarkable MR of the orbits.  BRAIN:  Meningioma right middle cranial fossa.  Ischemic white matter disease and atrophy typical for age.  ANTERIOR CIRCULATION:   Intracranial atherosclerosis correlates segments of the internal carotid arteries, mild. 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 POSTERIOR CIRCULATION:  Intact."  Ophthalmology recommended anti-MOG and anti-AQP4 antibodies, ESR/CRP, ACE, Lyme titers, FTA-ABS, RPR or VRDL, and CXR.  She was noted to have elevated ESR/CRP but negative lyme.  Rest of the other labs are pending.  Patient also noted to have intermittent hypoxia on ambulation as well.  Patient continued to improve and reports feeling less and less weak.  Possible rheumatologic or auto-immune process causing her previous symptoms.  May need outpatient rheumatology workup.  Ophthalmology recommended _________________.   Patient admitted on 3/6 for fevers, weakness, SOB.  Also complaints of left eye visual changes.    Infectious workup was negative with no growth on blood cultures.  Patient remained afebrile off antibiotics and her UA was negative.  Viral panel was negative.  Patient had a negative CTA for PE.  Also had a CT A/P that did now show any concerning pathology.  Neurology was consulted.  MRI of the brain/orbits showed "ORBITS:   Unremarkable MR of the orbits.  BRAIN:  Meningioma right middle cranial fossa.  Ischemic white matter disease and atrophy typical for age.  ANTERIOR CIRCULATION:   Intracranial atherosclerosis correlates segments of the internal carotid arteries, mild. 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 POSTERIOR CIRCULATION:  Intact."  Ophthalmology recommended anti-MOG and anti-AQP4 antibodies, ESR/CRP, ACE, Lyme titers, FTA-ABS, RPR or VRDL, and CXR.  She was noted to have elevated ESR/CRP but negative lyme.  Rest of the other labs are pending.  Patient also noted to have intermittent hypoxia on ambulation as well.  Patient continued to improve and reports feeling less and less weak.  Possible rheumatologic or auto-immune process causing her previous symptoms.  May need outpatient rheumatology workup.  Ophthalmology recommended for outpt  can proceed with d/c home

## 2024-03-08 NOTE — DISCHARGE NOTE PROVIDER - NSDCMRMEDTOKEN_GEN_ALL_CORE_FT
acetaminophen 500 mg oral tablet: 2 tab(s) orally every 12 hours for 2 to 3 weeks.  atorvastatin 10 mg oral tablet: 1 tab(s) orally once a day (at bedtime)  Flonase 50 mcg/inh nasal spray: 1 spray(s) nasal once a day  gabapentin: 200 milligram(s) orally 3 times a day  Hizentra 20% subcutaneous solution: 11 gram(s) subcutaneous once a week- every Saturday.  letrozole 2.5 mg oral tablet: 1 tab(s) orally once a day  levothyroxine 125 mcg (0.125 mg) oral tablet: 1 tab(s) orally once a day  levothyroxine 125 mcg (0.125 mg) oral tablet: 2 tab(s) orally every 7 days on Sunday  pantoprazole 40 mg oral delayed release tablet: 1 tab(s) orally once a day (in the morning) while on DVT prevention  polyethylene glycol 3350 oral powder for reconstitution: 17 gram(s) orally once a day  senna oral tablet: 2 tab(s) orally once a day (at bedtime)  triamterene-hydrochlorothiazide 37.5 mg-25 mg oral tablet: 1 tab(s) orally once a day  Vitamin D3 2000 intl units (50 mcg) oral tablet: 1 tab(s) orally once a day   acetaminophen 500 mg oral tablet: 2 tab(s) orally every 12 hours for 2 to 3 weeks.  atorvastatin 10 mg oral tablet: 1 tab(s) orally once a day (at bedtime)  Flonase 50 mcg/inh nasal spray: 1 spray(s) nasal once a day  gabapentin: 200 milligram(s) orally 3 times a day  letrozole 2.5 mg oral tablet: 1 tab(s) orally once a day  levothyroxine 125 mcg (0.125 mg) oral tablet: 1 tab(s) orally once a day  levothyroxine 125 mcg (0.125 mg) oral tablet: 2 tab(s) orally every 7 days on Sunday  pantoprazole 40 mg oral delayed release tablet: 1 tab(s) orally once a day (in the morning) while on DVT prevention  polyethylene glycol 3350 oral powder for reconstitution: 17 gram(s) orally once a day  senna oral tablet: 2 tab(s) orally once a day (at bedtime)  triamterene-hydrochlorothiazide 37.5 mg-25 mg oral tablet: 1 tab(s) orally once a day  Vitamin D3 2000 intl units (50 mcg) oral tablet: 1 tab(s) orally once a day

## 2024-03-08 NOTE — DISCHARGE NOTE NURSING/CASE MANAGEMENT/SOCIAL WORK - PATIENT PORTAL LINK FT
You can access the FollowMyHealth Patient Portal offered by [facility] by registering at the following website: [email or web address]. By joining [facility]’s FollowMyHealth portal, you will also be able to view your health information using other applications (apps) compatible with our system.

## 2024-03-08 NOTE — DISCHARGE NOTE NURSING/CASE MANAGEMENT/SOCIAL WORK - NSDCFUADDAPPT_GEN_ALL_CORE_FT
You/ your family are opting to schedule a post-hospital follow-up appointment with DR Christopher Zhang [phone] within a week of your hospital discharge.

## 2024-03-08 NOTE — GOALS OF CARE CONVERSATION - ADVANCED CARE PLANNING - CONVERSATION DETAILS
Palliative care SW attempted to meet with patient at bedside in [place] [facility]. Patient currently at [facility] for MRI and will return to [place] later today. PC team to follow up.
Palliative care SW met with patient and her children at bedside. Reviewed patient's medical and social history as well as events leading to patient's hospitalization. Writer discussed patient's current diagnosis (Bronchitis, weakness, SOB, fevers, left eye changes, HX of Breast CA, primary immunodeficiency disorder, pre DM, HLD, HTN, sciatica, hypothyroid), medical condition and management. Inquired about advanced directives. Patient states she has a HCP with her daughter Tania as health care agent.  Inquired about patient's wishes regarding extent of medical care to be provided including thoughts regarding cardiopulmonary resuscitation and mechanical ventilation/intubation. Patient reports that she has not had these conversations with her family yet and was glad SW came in to discuss this topic. Patient states that her wishes are for CPR and intubation if needed for a short period of time. 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 She understands the possible outcomes of CPR and still would want it attempted. Patient showed insight into medical condition. All questions answered. She remains full code. Psychosocial support provided.

## 2024-03-08 NOTE — CAREGIVER ENGAGEMENT NOTE - CAREGIVER EDUCATION HOME CARE SERVICES - FREE TEXT
Skilled Nursing, elenita for Physical therapy services thru [facility] [place] [phone]/ [phone]

## 2024-03-08 NOTE — CAREGIVER ENGAGEMENT NOTE - CAREGIVER EDUCATION NOTES - FREE TEXT
Met with pt's daughter Tania in ED today.  With pt. permission spoke with daughter about transition planning.  Dtr is an RN in [place], [place] comes to visit monthly.  States sons Salina and rajeev work from home and are home all the time to assist pt. if needed or take to MD appointments.  Tania at this time does not think pt. has any home care needs.  Aware needs may change pending hospital course and PT eval -  Supportive family who will transport pt. home when cleared.  Pt. went for MRI head and eye for c/o visual disturbance.  Pt/family verbalizing understanding d/c plans TBD.  
RN/CM met with pt who gave permission that her daughter @ bedsideTania [phone] be privy to all info. CM discussed with both pt and daughter about  need for home oxygen, referral process and delivery of durable medical equipment. CM discussed about portable home concentrator and stationary concentrator use and management @ home. Both pt and daughter stated that pt has had home oxygen in the past and verbalized understanding of order process. Pt does NOT have any preferred durable medical equipment company and is agreeable to utilize onsite company, [facility] [phone]. CM discussed about home care services, insurance provisions, choices of agencies and discussed about importance of pt having post- hospitalization follow-up with her community MD DR Christopher Zhang [phone]. Pt has opted for [facility] [phone]/ [phone], CM referred case with plan for transition home SUN 03/10/2024. DaughterTania opted to set-up follow-up appointment with above MD within 1 week of hospital discharge. Pt's daughter stated that family will transport pt home upon discharge.

## 2024-03-08 NOTE — PHYSICAL THERAPY INITIAL EVALUATION ADULT - PERTINENT HX OF CURRENT PROBLEM, REHAB EVAL
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  Per son, symptoms started about 3 weeks ago with nonstop non-bloody diarrhea.  Has had chronic diarrhea for the past 3 years but said she this episode was for 3-4 days which resulted in her being very weak and fatigued.  Despite having normal BMs now, patient still feels weak.  Son also reports patient having a low grade temp of 100.8F about 4-5 days ago.  Patient reports having light-headedness and occasional headaches as well.  Some nausea but no vomiting.  Also complained of intermittent memory loss and intermittent earaches.  Denied any chest pain or abdominal pain.  Patient unclear if she has SOB but son thinks she does have some but has been compensating.  Has also been coughing for the past couple of weeks with thick white/yellow sputum.  No recent travel.  No swelling of her BLE (chronically swollen but not worse).  Also of note, patient complaining of left eye vision changes that started about 2.5 weeks ago.  Said she has double vision when she looks far and also has a "patch" in the lower half of her left field of vision.  Went to see an optometrist for glasses last week and was told she had an inflamed optic nerve but recommended an ophthamology evaluation.  Patient went to Dr. Benjamin's office for evaluation of cough, low grade fever, SOB, and double vision, and was sent here for further evaluation.

## 2024-03-08 NOTE — DISCHARGE NOTE PROVIDER - CARE PROVIDERS DIRECT ADDRESSES
,DirectAddress_Unknown,[email or web address]

## 2024-03-08 NOTE — PHYSICAL THERAPY INITIAL EVALUATION ADULT - ADDITIONAL COMMENTS
Pt lives in house with 3  steps to enter with handrail, 5 steps inside with handrail. Owns DME.-cane and RW. Uses cane indoors and RW outdoors.

## 2024-03-08 NOTE — PROGRESS NOTE ADULT - ASSESSMENT
Weakness/SOB/fevers - unclear etiology, RVP and COVID negative.  Her diarrhea seemed to have resolved as per patient.  Possibly autoimmune disorders.  Inflammatory markers elevated.  Could also be due to transient hypoxia causing weakness.  Improved already today.    - f/u procalcitonin -> barely elevated   - f/u urinalysis and if needed, urine culture -> normal UA  - Dr. Betancur consulted -> suggested US of BLE for DVT -> no DVT   - WBC elevated but no fever -> downtrended today  - hold abx for now (if infectious cause, consider ID consult)  - CTA chest and CT A/P -> no PE, no acute pathology on CT    Left eye vision changes   - MRI of orbits with no acute findings  - MRI brain showed "BRAIN:  Meningioma right middle cranial fossa.  Ischemic white matter disease and atrophy typical for age.  ANTERIOR CIRCULATION:   Intracranial atherosclerosis correlates segments of the internal carotid arteries, mild. 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  POSTERIOR CIRCULATION:  Intact."  - sent off anti-MOG and anti-AQP4 antibodies  - ESR/CRP elevated  - lyme titers neg  - per neurology, can hold off FTA-ABS and VRDL test    Pre-DM  - cont with metformin  - diabetic diet  - check a1c  - cont with low dose insulin coverage scale with FS qAC and qHS    Primary immunodeficiency disorder  - cont with Hizentra on Saturday (patient to bring her own)    Breast cancer  - cont with letrozole    HTN/HLD  - cont with triamterene-HCTZ with hold parameters  - monitor electrolytes  - cont with atorvastatin    Hypothyroidism  - cont with levothyroxine 125mcg every day except 250mcg on Sunday    Preventives measures  - cont lovenox for DVT ppx  - SCD only

## 2024-03-08 NOTE — PROGRESS NOTE ADULT - ASSESSMENT
Pt. with PATRICIA, noncompliant , with some hypoxia and intermittent AMS.  Desats walking--will need home O2  ??Viral infection  Meningioma; unclear ophthalmological issue-.    Will need fu sleep study as outpt.    Venous doppler legs negative; hi d dimer likely normal for age.  Dw dtr in detail.    FU with me in office.  Will arrange home sleep study.

## 2024-03-09 LAB
ALBUMIN SERPL ELPH-MCNC: 2.4 G/DL — LOW (ref 3.3–5)
ALP SERPL-CCNC: 65 U/L — SIGNIFICANT CHANGE UP (ref 30–120)
ALT FLD-CCNC: <10 U/L — LOW (ref 10–60)
ANION GAP SERPL CALC-SCNC: 12 MMOL/L — SIGNIFICANT CHANGE UP (ref 5–17)
AST SERPL-CCNC: 11 U/L — SIGNIFICANT CHANGE UP (ref 10–40)
B2 GLYCOPROT1 AB SER QL: NEGATIVE — SIGNIFICANT CHANGE UP
BILIRUB SERPL-MCNC: 0.2 MG/DL — SIGNIFICANT CHANGE UP (ref 0.2–1.2)
BUN SERPL-MCNC: 34 MG/DL — HIGH (ref 7–23)
CALCIUM SERPL-MCNC: 10.2 MG/DL — SIGNIFICANT CHANGE UP (ref 8.4–10.5)
CHLORIDE SERPL-SCNC: 100 MMOL/L — SIGNIFICANT CHANGE UP (ref 96–108)
CO2 SERPL-SCNC: 28 MMOL/L — SIGNIFICANT CHANGE UP (ref 22–31)
CREAT SERPL-MCNC: 1.09 MG/DL — SIGNIFICANT CHANGE UP (ref 0.5–1.3)
EGFR: 49 ML/MIN/1.73M2 — LOW
GLUCOSE BLDC GLUCOMTR-MCNC: 114 MG/DL — HIGH (ref 70–99)
GLUCOSE BLDC GLUCOMTR-MCNC: 118 MG/DL — HIGH (ref 70–99)
GLUCOSE BLDC GLUCOMTR-MCNC: 153 MG/DL — HIGH (ref 70–99)
GLUCOSE BLDC GLUCOMTR-MCNC: 155 MG/DL — HIGH (ref 70–99)
GLUCOSE SERPL-MCNC: 137 MG/DL — HIGH (ref 70–99)
HCT VFR BLD CALC: 32.1 % — LOW (ref 34.5–45)
HGB BLD-MCNC: 10 G/DL — LOW (ref 11.5–15.5)
MAGNESIUM SERPL-MCNC: 1.6 MG/DL — SIGNIFICANT CHANGE UP (ref 1.6–2.6)
MCHC RBC-ENTMCNC: 28.7 PG — SIGNIFICANT CHANGE UP (ref 27–34)
MCHC RBC-ENTMCNC: 31.2 GM/DL — LOW (ref 32–36)
MCV RBC AUTO: 92.2 FL — SIGNIFICANT CHANGE UP (ref 80–100)
NRBC # BLD: 0 /100 WBCS — SIGNIFICANT CHANGE UP (ref 0–0)
PHOSPHATE SERPL-MCNC: 3.7 MG/DL — SIGNIFICANT CHANGE UP (ref 2.5–4.5)
PLATELET # BLD AUTO: 400 K/UL — SIGNIFICANT CHANGE UP (ref 150–400)
POTASSIUM SERPL-MCNC: 3.6 MMOL/L — SIGNIFICANT CHANGE UP (ref 3.5–5.3)
POTASSIUM SERPL-SCNC: 3.6 MMOL/L — SIGNIFICANT CHANGE UP (ref 3.5–5.3)
PROT SERPL-MCNC: 6.7 G/DL — SIGNIFICANT CHANGE UP (ref 6–8.3)
RBC # BLD: 3.48 M/UL — LOW (ref 3.8–5.2)
RBC # FLD: 15.1 % — HIGH (ref 10.3–14.5)
SODIUM SERPL-SCNC: 140 MMOL/L — SIGNIFICANT CHANGE UP (ref 135–145)
T PALLIDUM AB TITR SER: NEGATIVE — SIGNIFICANT CHANGE UP
WBC # BLD: 11.6 K/UL — HIGH (ref 3.8–10.5)
WBC # FLD AUTO: 11.6 K/UL — HIGH (ref 3.8–10.5)

## 2024-03-09 PROCEDURE — 99232 SBSQ HOSP IP/OBS MODERATE 35: CPT

## 2024-03-09 RX ADMIN — PANTOPRAZOLE SODIUM 40 MILLIGRAM(S): 20 TABLET, DELAYED RELEASE ORAL at 05:22

## 2024-03-09 RX ADMIN — Medication 3 MILLIGRAM(S): at 22:45

## 2024-03-09 RX ADMIN — GABAPENTIN 200 MILLIGRAM(S): 400 CAPSULE ORAL at 21:05

## 2024-03-09 RX ADMIN — Medication 1: at 16:46

## 2024-03-09 RX ADMIN — GABAPENTIN 200 MILLIGRAM(S): 400 CAPSULE ORAL at 05:22

## 2024-03-09 RX ADMIN — Medication 2000 UNIT(S): at 13:51

## 2024-03-09 RX ADMIN — GABAPENTIN 200 MILLIGRAM(S): 400 CAPSULE ORAL at 13:51

## 2024-03-09 RX ADMIN — Medication 1 TABLET(S): at 05:23

## 2024-03-09 RX ADMIN — ENOXAPARIN SODIUM 40 MILLIGRAM(S): 100 INJECTION SUBCUTANEOUS at 16:45

## 2024-03-09 RX ADMIN — Medication 125 MICROGRAM(S): at 05:22

## 2024-03-09 RX ADMIN — LETROZOLE 2.5 MILLIGRAM(S): 2.5 TABLET, FILM COATED ORAL at 13:51

## 2024-03-09 RX ADMIN — ATORVASTATIN CALCIUM 10 MILLIGRAM(S): 80 TABLET, FILM COATED ORAL at 21:05

## 2024-03-09 NOTE — PROGRESS NOTE ADULT - REASON FOR ADMISSION
fever, SOB, weakness

## 2024-03-09 NOTE — PROGRESS NOTE ADULT - ASSESSMENT
AMS   etiology not clear    unclear etiology, RVP and COVID negative.    - f/u procalcitonin -> barely elevated   - f/u urinalysis and if needed, urine culture -> normal UA      Left eye vision changes   - MRI of orbits with no acute findings  - MRI brain showed "BRAIN:  Meningioma right middle cranial fossa.  Ischemic white matter disease and atrophy typical for age.  ANTERIOR CIRCULATION:   Intracranial atherosclerosis correlates segments of the internal carotid arteries, mild. 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  POSTERIOR CIRCULATION:  Intact."      Pre-DM  - cont with metformin  - diabetic diet  - check a1c  - cont with low dose insulin coverage scale with FS qAC and qHS    Primary immunodeficiency disorder  - cont with Hizentra on Saturday (patient to bring her own)    Breast cancer  - cont with letrozole    HTN/HLD  - cont with triamterene-HCTZ with hold parameters  - monitor electrolytes  - cont with atorvastatin      Hypothyroidism  - cont with levothyroxine 125mcg every day except 250mcg on Sunday    Preventives measures  - cont lovenox for DVT ppx  - SCD only

## 2024-03-09 NOTE — PROGRESS NOTE ADULT - ASSESSMENT
The patient is an 88 year old female with a history of HTN, HL, immunodeficiency disorder, hypothyroidism who presents with visual changes, fevers, weakness.    Plan:  - ECG with low voltage, unchanged from prior  - CTA head and neck with meningioma; no acute pathology or stenosis. Cannot exclude ICA aneurysm.  - BNP normal  - CXR with grossly clear lungs  - Hold triamterene-HCTZ - resume on discharge  - If concern for CVA, check echo  - Continue atorvastatin 10 mg daily  - Ophthalmology eval noted  - Neurology follow-up  - MRI head with meningioma; otherwise unremarkable  - Discharge planning

## 2024-03-10 VITALS
OXYGEN SATURATION: 94 % | DIASTOLIC BLOOD PRESSURE: 68 MMHG | RESPIRATION RATE: 18 BRPM | SYSTOLIC BLOOD PRESSURE: 138 MMHG | HEART RATE: 76 BPM | TEMPERATURE: 98 F

## 2024-03-10 LAB
ALBUMIN SERPL ELPH-MCNC: 2.6 G/DL — LOW (ref 3.3–5)
ALP SERPL-CCNC: 59 U/L — SIGNIFICANT CHANGE UP (ref 30–120)
ALT FLD-CCNC: 13 U/L — SIGNIFICANT CHANGE UP (ref 10–60)
ANA TITR SER: NEGATIVE — SIGNIFICANT CHANGE UP
ANION GAP SERPL CALC-SCNC: 9 MMOL/L — SIGNIFICANT CHANGE UP (ref 5–17)
AST SERPL-CCNC: 13 U/L — SIGNIFICANT CHANGE UP (ref 10–40)
BILIRUB SERPL-MCNC: 0.2 MG/DL — SIGNIFICANT CHANGE UP (ref 0.2–1.2)
BUN SERPL-MCNC: 34 MG/DL — HIGH (ref 7–23)
CALCIUM SERPL-MCNC: 9.6 MG/DL — SIGNIFICANT CHANGE UP (ref 8.4–10.5)
CHLORIDE SERPL-SCNC: 105 MMOL/L — SIGNIFICANT CHANGE UP (ref 96–108)
CO2 SERPL-SCNC: 31 MMOL/L — SIGNIFICANT CHANGE UP (ref 22–31)
CREAT SERPL-MCNC: 1.02 MG/DL — SIGNIFICANT CHANGE UP (ref 0.5–1.3)
EGFR: 53 ML/MIN/1.73M2 — LOW
GLUCOSE BLDC GLUCOMTR-MCNC: 126 MG/DL — HIGH (ref 70–99)
GLUCOSE SERPL-MCNC: 125 MG/DL — HIGH (ref 70–99)
HCT VFR BLD CALC: 33.2 % — LOW (ref 34.5–45)
HGB BLD-MCNC: 10.1 G/DL — LOW (ref 11.5–15.5)
MCHC RBC-ENTMCNC: 27.7 PG — SIGNIFICANT CHANGE UP (ref 27–34)
MCHC RBC-ENTMCNC: 30.4 GM/DL — LOW (ref 32–36)
MCV RBC AUTO: 91.2 FL — SIGNIFICANT CHANGE UP (ref 80–100)
NRBC # BLD: 0 /100 WBCS — SIGNIFICANT CHANGE UP (ref 0–0)
PLATELET # BLD AUTO: 378 K/UL — SIGNIFICANT CHANGE UP (ref 150–400)
POTASSIUM SERPL-MCNC: 3.7 MMOL/L — SIGNIFICANT CHANGE UP (ref 3.5–5.3)
POTASSIUM SERPL-SCNC: 3.7 MMOL/L — SIGNIFICANT CHANGE UP (ref 3.5–5.3)
PROT SERPL-MCNC: 5.9 G/DL — LOW (ref 6–8.3)
RBC # BLD: 3.64 M/UL — LOW (ref 3.8–5.2)
RBC # FLD: 14.7 % — HIGH (ref 10.3–14.5)
SODIUM SERPL-SCNC: 145 MMOL/L — SIGNIFICANT CHANGE UP (ref 135–145)
WBC # BLD: 11.76 K/UL — HIGH (ref 3.8–10.5)
WBC # FLD AUTO: 11.76 K/UL — HIGH (ref 3.8–10.5)

## 2024-03-10 PROCEDURE — 85610 PROTHROMBIN TIME: CPT

## 2024-03-10 PROCEDURE — 84443 ASSAY THYROID STIM HORMONE: CPT

## 2024-03-10 PROCEDURE — 99232 SBSQ HOSP IP/OBS MODERATE 35: CPT

## 2024-03-10 PROCEDURE — 82164 ANGIOTENSIN I ENZYME TEST: CPT

## 2024-03-10 PROCEDURE — 81003 URINALYSIS AUTO W/O SCOPE: CPT

## 2024-03-10 PROCEDURE — 86146 BETA-2 GLYCOPROTEIN ANTIBODY: CPT

## 2024-03-10 PROCEDURE — 70498 CT ANGIOGRAPHY NECK: CPT | Mod: MC

## 2024-03-10 PROCEDURE — 71275 CT ANGIOGRAPHY CHEST: CPT | Mod: MC

## 2024-03-10 PROCEDURE — 74177 CT ABD & PELVIS W/CONTRAST: CPT | Mod: MC

## 2024-03-10 PROCEDURE — 94664 DEMO&/EVAL PT USE INHALER: CPT

## 2024-03-10 PROCEDURE — 85027 COMPLETE CBC AUTOMATED: CPT

## 2024-03-10 PROCEDURE — 94640 AIRWAY INHALATION TREATMENT: CPT

## 2024-03-10 PROCEDURE — 80053 COMPREHEN METABOLIC PANEL: CPT

## 2024-03-10 PROCEDURE — 86053 AQAPRN-4 ANTB FLO CYTMTRY EA: CPT

## 2024-03-10 PROCEDURE — 71045 X-RAY EXAM CHEST 1 VIEW: CPT

## 2024-03-10 PROCEDURE — 83880 ASSAY OF NATRIURETIC PEPTIDE: CPT

## 2024-03-10 PROCEDURE — 82607 VITAMIN B-12: CPT

## 2024-03-10 PROCEDURE — 86036 ANCA SCREEN EACH ANTIBODY: CPT

## 2024-03-10 PROCEDURE — 84484 ASSAY OF TROPONIN QUANT: CPT

## 2024-03-10 PROCEDURE — 85379 FIBRIN DEGRADATION QUANT: CPT

## 2024-03-10 PROCEDURE — 97161 PT EVAL LOW COMPLEX 20 MIN: CPT

## 2024-03-10 PROCEDURE — 82746 ASSAY OF FOLIC ACID SERUM: CPT

## 2024-03-10 PROCEDURE — 36415 COLL VENOUS BLD VENIPUNCTURE: CPT

## 2024-03-10 PROCEDURE — 85025 COMPLETE CBC W/AUTO DIFF WBC: CPT

## 2024-03-10 PROCEDURE — 83605 ASSAY OF LACTIC ACID: CPT

## 2024-03-10 PROCEDURE — 97530 THERAPEUTIC ACTIVITIES: CPT

## 2024-03-10 PROCEDURE — 86147 CARDIOLIPIN ANTIBODY EA IG: CPT

## 2024-03-10 PROCEDURE — 83735 ASSAY OF MAGNESIUM: CPT

## 2024-03-10 PROCEDURE — 86780 TREPONEMA PALLIDUM: CPT

## 2024-03-10 PROCEDURE — 82803 BLOOD GASES ANY COMBINATION: CPT

## 2024-03-10 PROCEDURE — 97116 GAIT TRAINING THERAPY: CPT

## 2024-03-10 PROCEDURE — 85652 RBC SED RATE AUTOMATED: CPT

## 2024-03-10 PROCEDURE — 99285 EMERGENCY DEPT VISIT HI MDM: CPT

## 2024-03-10 PROCEDURE — 93005 ELECTROCARDIOGRAM TRACING: CPT

## 2024-03-10 PROCEDURE — 70496 CT ANGIOGRAPHY HEAD: CPT | Mod: MC

## 2024-03-10 PROCEDURE — 86140 C-REACTIVE PROTEIN: CPT

## 2024-03-10 PROCEDURE — 86618 LYME DISEASE ANTIBODY: CPT

## 2024-03-10 PROCEDURE — 86431 RHEUMATOID FACTOR QUANT: CPT

## 2024-03-10 PROCEDURE — 93970 EXTREMITY STUDY: CPT

## 2024-03-10 PROCEDURE — 96375 TX/PRO/DX INJ NEW DRUG ADDON: CPT

## 2024-03-10 PROCEDURE — 96374 THER/PROPH/DIAG INJ IV PUSH: CPT

## 2024-03-10 PROCEDURE — 82962 GLUCOSE BLOOD TEST: CPT

## 2024-03-10 PROCEDURE — 85730 THROMBOPLASTIN TIME PARTIAL: CPT

## 2024-03-10 PROCEDURE — 70450 CT HEAD/BRAIN W/O DYE: CPT | Mod: MC

## 2024-03-10 PROCEDURE — 84100 ASSAY OF PHOSPHORUS: CPT

## 2024-03-10 PROCEDURE — 86038 ANTINUCLEAR ANTIBODIES: CPT

## 2024-03-10 PROCEDURE — 83036 HEMOGLOBIN GLYCOSYLATED A1C: CPT

## 2024-03-10 PROCEDURE — 87040 BLOOD CULTURE FOR BACTERIA: CPT

## 2024-03-10 PROCEDURE — 0225U NFCT DS DNA&RNA 21 SARSCOV2: CPT

## 2024-03-10 PROCEDURE — 84145 PROCALCITONIN (PCT): CPT

## 2024-03-10 PROCEDURE — 86362 MOG-IGG1 ANTB CBA EACH: CPT

## 2024-03-10 RX ORDER — HUMAN IMMUNOGLOBULIN G 0.2 G/ML
11 LIQUID SUBCUTANEOUS
Qty: 0 | Refills: 0 | DISCHARGE

## 2024-03-10 RX ADMIN — GABAPENTIN 200 MILLIGRAM(S): 400 CAPSULE ORAL at 06:20

## 2024-03-10 RX ADMIN — Medication 1 TABLET(S): at 06:20

## 2024-03-10 RX ADMIN — LETROZOLE 2.5 MILLIGRAM(S): 2.5 TABLET, FILM COATED ORAL at 11:04

## 2024-03-10 RX ADMIN — Medication 250 MICROGRAM(S): at 06:20

## 2024-03-10 RX ADMIN — Medication 2000 UNIT(S): at 11:04

## 2024-03-10 NOTE — PROGRESS NOTE ADULT - ASSESSMENT
The patient is an 88 year old female with a history of HTN, HL, immunodeficiency disorder, hypothyroidism who presents with visual changes, fevers, weakness.    Plan:  - ECG with low voltage, unchanged from prior  - CTA head and neck with meningioma; no acute pathology or stenosis. Cannot exclude ICA aneurysm.  - BNP normal  - CXR with grossly clear lungs  - Hold triamterene-HCTZ - resume on discharge  - Continue atorvastatin 10 mg daily  - Ophthalmology eval noted  - Neurology follow-up  - MRI head with meningioma; otherwise unremarkable  - Discharge planning

## 2024-03-10 NOTE — CASE MANAGEMENT PROGRESS NOTE - NSCMPROGRESSNOTE_GEN_ALL_CORE
Pt was cleared for transition home today. CM met at the bedside with the pt and the pt's daughter Tania @ [phone], and both are in agreement with the discharge plan of home care for visiting nurse and PT services. The pt already had the portable oxygen delivered to the bedside, and the concentrator was delivered to the home as per the daughter. The post- hospitalization follow-up with her community MD DR Christopher Zhang [phone], will be set up by the daughter as per her request due to scheduling within 1 week of discharge. The pt was set up with [facility] with a start on care on Monday 3/11/24. The daughter will transport the pt home. The bedside nurse is aware of the plan of care for discharge above.

## 2024-03-10 NOTE — PROGRESS NOTE ADULT - SUBJECTIVE AND OBJECTIVE BOX
Neurology follow up note    MARGARITA POWERS88yFemale      Interval History:    Patient feels ok no new complaints vison problems resolved     Allergies    soft shell seafood (Unknown)  No Known Drug Allergies  Vinegar- hands break out in a rash (Rash)  Shrimp (Hives)    Intolerances        MEDICATIONS    acetaminophen     Tablet .. 650 milliGRAM(s) Oral every 6 hours PRN  aluminum hydroxide/magnesium hydroxide/simethicone Suspension 30 milliLiter(s) Oral every 4 hours PRN  atorvastatin 10 milliGRAM(s) Oral at bedtime  cholecalciferol 2000 Unit(s) Oral daily  dextrose 5%. 1000 milliLiter(s) IV Continuous <Continuous>  dextrose 5%. 1000 milliLiter(s) IV Continuous <Continuous>  dextrose 50% Injectable 25 Gram(s) IV Push once  dextrose 50% Injectable 12.5 Gram(s) IV Push once  dextrose 50% Injectable 25 Gram(s) IV Push once  dextrose Oral Gel 15 Gram(s) Oral once PRN  enoxaparin Injectable 40 milliGRAM(s) SubCutaneous every 24 hours  gabapentin 200 milliGRAM(s) Oral three times a day  glucagon  Injectable 1 milliGRAM(s) IntraMuscular once  insulin lispro (ADMELOG) corrective regimen sliding scale   SubCutaneous three times a day before meals  insulin lispro (ADMELOG) corrective regimen sliding scale   SubCutaneous at bedtime  letrozole 2.5 milliGRAM(s) Oral daily  levothyroxine 125 MICROGram(s) Oral <User Schedule>  levothyroxine 250 MICROGram(s) Oral <User Schedule>  melatonin 3 milliGRAM(s) Oral at bedtime PRN  ondansetron Injectable 4 milliGRAM(s) IV Push every 8 hours PRN  pantoprazole    Tablet 40 milliGRAM(s) Oral before breakfast  traMADol 50 milliGRAM(s) Oral once  triamterene 37.5 mG/hydrochlorothiazide 25 mG Tablet 1 Tablet(s) Oral daily          Height (cm): 175.3 ( @ 16:12)  Weight (kg): 95.6 ( @ 16:12)  BMI (kg/m2): 31.1 ( @ 16:12)    Vital Signs Last 24 Hrs  T(C): 36.4 (08 Mar 2024 13:05), Max: 37 (07 Mar 2024 16:12)  T(F): 97.5 (08 Mar 2024 13:05), Max: 98.6 (07 Mar 2024 16:12)  HR: 79 (08 Mar 2024 13:05) (61 - 79)  BP: 123/62 (08 Mar 2024 13:05) (123/62 - 164/63)  BP(mean): --  RR: 17 (08 Mar 2024 13:05) (16 - 18)  SpO2: 92% (08 Mar 2024 13:05) (90% - 100%)    Parameters below as of 08 Mar 2024 12:15  Patient On (Oxygen Delivery Method): room air          Review of System:      CONSTITUTIONALLY:  At present denies any fevers, but apparently this has been running low-grade fevers for the last 3 weeks.  HEAD:  No headaches.  EYES:  Positive visual changes on the left eye in the lower quadrant.  She describes as wavy lines.    EARS:  No ringing in ears.  NECK:  No neck pain.  CARDIOVASCULAR:  No chest pain.  RESPIRATORY:  No shortness of breath.  ABDOMEN:  No nausea, vomiting, or abdominal pain  EXTREMITIES/NEUROLOGIC:  No numbness or tingling  MUSCULOSKELETAL:  No joint pain.    PHYSICAL EXAMINATION:  HEENT:  Head is normocephalic, atraumatic.  Eyes, no scleral icterus.  Ears, hearing bilaterally intact.  NECK:  Supple.  CARDIOVASCULAR:  S1 and S2 heard.  RESPIRATORY:  Air entry bilaterally.  ABDOMEN:  Soft and nontender.  EXTREMITIES:  No clubbing or cyanosis were noted.    NEUROLOGIC:  The patient is awake, alert.  Extraocular movements were intact.  I did not notice any palsies.  The patient appears to have full visual fields.  Speech is full.  Smile symmetric.  Motor bilateral upper and lower 4+ over 5.  Sensory  intact to light touch.  No drift.      LABS:  CBC Full  -  ( 08 Mar 2024 07:57 )  WBC Count : 12.13 K/uL  RBC Count : 3.60 M/uL  Hemoglobin : 9.9 g/dL  Hematocrit : 33.1 %  Platelet Count - Automated : 433 K/uL  Mean Cell Volume : 91.9 fl  Mean Cell Hemoglobin : 27.5 pg  Mean Cell Hemoglobin Concentration : 29.9 gm/dL  Auto Neutrophil # : 8.92 K/uL  Auto Lymphocyte # : 1.99 K/uL  Auto Monocyte # : 0.87 K/uL  Auto Eosinophil # : 0.19 K/uL  Auto Basophil # : 0.05 K/uL  Auto Neutrophil % : 73.5 %  Auto Lymphocyte % : 16.4 %  Auto Monocyte % : 7.2 %  Auto Eosinophil % : 1.6 %  Auto Basophil % : 0.4 %    Urinalysis Basic - ( 08 Mar 2024 08:26 )    Color: Yellow / Appearance: Clear / S.023 / pH: x  Gluc: x / Ketone: Negative mg/dL  / Bili: Negative / Urobili: 0.2 mg/dL   Blood: x / Protein: Negative mg/dL / Nitrite: Negative   Leuk Esterase: Negative / RBC: x / WBC x   Sq Epi: x / Non Sq Epi: x / Bacteria: x      03-    144  |  104  |  25<H>  ----------------------------<  97  3.7   |  32<H>  |  1.02    Ca    9.9      08 Mar 2024 07:57  Phos  4.0     03-08  Mg     1.6     03-08    TPro  6.7  /  Alb  2.5<L>  /  TBili  0.2  /  DBili  x   /  AST  18  /  ALT  15  /  AlkPhos  55  03-08    Hemoglobin A1C:     LIVER FUNCTIONS - ( 08 Mar 2024 07:57 )  Alb: 2.5 g/dL / Pro: 6.7 g/dL / ALK PHOS: 55 U/L / ALT: 15 U/L / AST: 18 U/L / GGT: x           Vitamin B12         RADIOLOGY      ASSESSMENT AND PLAN:  An 88-year-old with a history of generalized lethargy, weakness and visual changes.    For episode of generalized lethargy and weakness, questionable at present, unclear etiology with a history of low grade temperature.  We will recommend to rule out any type of underlying infectious disease process, but questionable patient could have any type of autoimmune disorder.  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    We will start with a basic blood work and chemistries.  We will also consider a vasculitic type of workup for autoimmune disease.  Peripheral visual changes.  Ophthalmology follow up.  May need MRI of the orbits, possibly MRI of the brain based on their evaluation.  Suspect less likely this is a neuromuscular disease.  There is no clear history to suggest as the day goes on the patient becomes weaker as per my conversation with son.  For history of hyperlipidemia, continue patient on statin.  For history of hypertension.  Monitor systolic blood pressure.  Spoke to dtr about repeat CTA MRA head in 3 months.   neurologic wise stable     Seen with daughter today Yarsani  3/8 she will be primary contact as per her     48 minutes of time was spent with greater than 50% of the time with care coordination, counseling, and speaking to multi-disciplinary health care team
PULMONARY/CRITICAL CARE    DOS 3/7/24  Pt. Alert, conversant. Less sob.   Ophtho note apprec.    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  Per son, symptoms started about 3 weeks ago with nonstop non-bloody diarrhea.  Has had chronic diarrhea for the past 3 years but said she this episode was for 3-4 days which resulted in her being very weak and fatigued.  Despite having normal BMs now, patient still feels weak.  Son also reports patient having a low grade temp of 100.8F about 4-5 days ago.  Patient reports having light-headedness and occasional headaches as well.  Some nausea but no vomiting.  Also complained of intermittent memory loss and intermittent earaches.  Denied any chest pain or abdominal pain.  Patient unclear if she has SOB but son thinks she does have some but has been compensating.  Has also been coughing for the past couple of weeks with thick white/yellow sputum.  No recent travel.  No swelling of her BLE (chronically swollen but not worse).  Also of note, patient complaining of left eye vision changes that started about 2.5 weeks ago.  Said she has double vision when she looks far and also has a "patch" in the lower half of her left field of vision.  Went to see an optometrist for glasses last week and was told she had an inflamed optic nerve but recommended an ophthamology evaluation.  Patient went to Dr. Benjamin's office for evaluation of cough, low grade fever, SOB, and double vision, and was sent here for further evaluation.      In the ED, patient's triage vitals were /72  HR 83  T 99.4F RR 20, 92%  - labs significant for slight anemia, some thrombocytopenia  - CXR with both lung clear  - CT of brain and CTA of head neck showed "No hydrocephalus, acute intracranial hemorrhage, mass effect, or brain   edema.  No flow-limiting stenosis. No AVM.  Probable left P-comm infundibulum. Because the left P-comm is not well   visualized, differential diagnosis includes left P-comm level ICA  aneurysm. The dome measures 2 mm in size. The neck measures 3 mm in size. 1.2 x 2.4 cm right anterior temporal extra-axial homogeneously enhancing lesion, likely a meningioma.  No flow-limiting stenosis, evidence for arterial dissection, or vascular aneurysm."  - ophthamology consulted for her vision changes    Currently patient with no SOB but still feels weak overall.    Events last 24 hours: ***    PAST MEDICAL & SURGICAL HISTORY:  PATRICIA--noncompliant with CPAP  Skin cancer      Hypothyroidism      Bronchitis      Pneumonia      Chronic cough      Osteoarthritis      Environmental allergies      Vitamin D deficiency      Hyperlipidemia      Class 2 obesity with body mass index (BMI) of 39.0 to 39.9 in adult      Cancer of female breast  - left breast- tx with surgery, radiation and Letrozole      DDD (degenerative disc disease), lumbar      Primary immunodeficiency disorder  being treated with Hizentra      H/O actinic keratosis      H/O seborrheic keratosis      S/P cataract surgery  approx 2006- bilateral      S/P appendectomy  1950      S/P partial mastectomy, left  2012      S/P epidural steroid injection  x2- Dr. Martin- for Lumbar Disc Disease      S/P skin cancer resection  x11        Allergies    soft shell seafood (Unknown)  No Known Drug Allergies  Vinegar- hands break out in a rash (Rash)  Shrimp (Hives)    Intolerances      FAMILY HISTORY: no cigs, etoh  FH: dementia  father-     FHx: cerebral aneurysm  mother-  @ age 66    Family history of hypertension in mother      Family history of lung cancer  sister-     Family history of lymphoma  brother-    Family history of hypertension in son  son- living - age 57yo    Family history of ulcerative colitis  son- living- age 58yo        Medications:    triamterene 37.5 mG/hydrochlorothiazide 25 mG Tablet 1 Tablet(s) Oral daily      acetaminophen     Tablet .. 650 milliGRAM(s) Oral every 6 hours PRN  gabapentin 200 milliGRAM(s) Oral three times a day  melatonin 3 milliGRAM(s) Oral at bedtime PRN  ondansetron Injectable 4 milliGRAM(s) IV Push every 8 hours PRN    letrozole 2.5 milliGRAM(s) Oral daily      aluminum hydroxide/magnesium hydroxide/simethicone Suspension 30 milliLiter(s) Oral every 4 hours PRN  pantoprazole    Tablet 40 milliGRAM(s) Oral before breakfast      atorvastatin 10 milliGRAM(s) Oral at bedtime  dextrose 50% Injectable 25 Gram(s) IV Push once  dextrose 50% Injectable 25 Gram(s) IV Push once  dextrose 50% Injectable 12.5 Gram(s) IV Push once  dextrose Oral Gel 15 Gram(s) Oral once PRN  glucagon  Injectable 1 milliGRAM(s) IntraMuscular once  insulin lispro (ADMELOG) corrective regimen sliding scale   SubCutaneous three times a day before meals  insulin lispro (ADMELOG) corrective regimen sliding scale   SubCutaneous at bedtime  levothyroxine 250 MICROGram(s) Oral <User Schedule>  levothyroxine 125 MICROGram(s) Oral <User Schedule>    cholecalciferol 2000 Unit(s) Oral daily  dextrose 5%. 1000 milliLiter(s) IV Continuous <Continuous>  dextrose 5%. 1000 milliLiter(s) IV Continuous <Continuous>                ICU Vital Signs Last 24 Hrs  T(C): 37.4 (06 Mar 2024 10:29), Max: 37.4 (06 Mar 2024 10:29)  T(F): 99.4 (06 Mar 2024 10:29), Max: 99.4 (06 Mar 2024 10:29)  HR: 71 (06 Mar 2024 11:26) (71 - 83)  BP: 166/72 (06 Mar 2024 10:29) (166/72 - 166/72)  BP(mean): --  ABP: --  ABP(mean): --  RR: 20 (06 Mar 2024 10:29) (20 - 20)  SpO2: 96% (06 Mar 2024 11:26) (92% - 96%)    O2 Parameters below as of 06 Mar 2024 10:29  Patient On (Oxygen Delivery Method): room air          Vital Signs Last 24 Hrs  T(C): 37.4 (06 Mar 2024 10:29), Max: 37.4 (06 Mar 2024 10:29)  T(F): 99.4 (06 Mar 2024 10:29), Max: 99.4 (06 Mar 2024 10:29)  HR: 71 (06 Mar 2024 11:26) (71 - 83)  BP: 166/72 (06 Mar 2024 10:29) (166/72 - 166/72)  BP(mean): --  RR: 20 (06 Mar 2024 10:29) (20 - 20)  SpO2: 96% (06 Mar 2024 11:26) (92% - 96%)    Parameters below as of 06 Mar 2024 10:29  Patient On (Oxygen Delivery Method): room air            I&O's Detail        LABS:                        10.5   10.40 )-----------( 428      ( 06 Mar 2024 11:07 )             33.8     03-    140  |  100  |  26<H>  ----------------------------<  116<H>  3.8   |  30  |  0.97    Ca    9.9      06 Mar 2024 11:07    TPro  7.3  /  Alb  2.6<L>  /  TBili  0.4  /  DBili  x   /  AST  13  /  ALT  12  /  AlkPhos  63  03-06          CAPILLARY BLOOD GLUCOSE      POCT Blood Glucose.: 185 mg/dL (06 Mar 2024 16:01)    PT/INR - ( 06 Mar 2024 11:07 )   PT: 12.0 sec;   INR: 1.10 ratio         PTT - ( 06 Mar 2024 11:07 )  PTT:32.1 sec  Urinalysis Basic - ( 06 Mar 2024 11:07 )    Color: x / Appearance: x / SG: x / pH: x  Gluc: 116 mg/dL / Ketone: x  / Bili: x / Urobili: x   Blood: x / Protein: x / Nitrite: x   Leuk Esterase: x / RBC: x / WBC x   Sq Epi: x / Non Sq Epi: x / Bacteria: x      CULTURES:      Physical Examination:    General: No acute distress.  elderly female    HEENT: Pupils equal, reactive to light.  Symmetric.    PULM: Clear to auscultation bilaterally, no wheeze rhonchi rales no change percussion    CVS: Regular rate and rhythm, no murmurs, rubs, or gallops    ABD: Soft, nondistended, nontender, normoactive bowel sounds, no masses    EXT:nontender calves; brawny edema    SKIN: Warm and well perfused, no rashes noted.    NEURO: Alert, oriented, interactive, nonfocal moves all ext    RADIOLOGY: ***  rads< from: CT Angio Neck w/ IV Cont (24 @ 12:13) >  ACC: [id number] EXAM:  CT BRAIN   ORDERED BY: KELLEE JOHNSON     ACC: [id number] EXAM:  CT ANGIO NECK (W)AW IC   ORDERED BY: KELLEE JOHNSON     ACC: [id number] EXAM:  CT ANGIO BRAIN (W)AW IC   ORDERED BY: KELLEE JOHNSON     PROCEDURE DATE:  2024          INTERPRETATION:  CT angiography of the brain and neck    CLINICAL INDICATION: Left eye vision loss    TECHNIQUE: Direct axial CT scanning of the brain and neck was obtained   from the vertex to the level of the clavicular heads after the dynamic   intravenous injection of 90 cc of Omnipaque 350. 10 cc discarded.   Sagittal and coronal maximum intensity projection reformats were   provided.  Three-dimensional reconstructions were performed by the   radiologist using the [employer] workstation.    COMPARISON: None available    FINDINGS:    CT BRAIN:    No hydrocephalus, mass effect, midline shift, acute intracranial   hemorrhage, or brain edema.    1.1 x 2.4 cm (AP by TV) right anterior temporal extra-axial isointense to   gray matter lesion, likely a meningioma.    Mild white matter microvascular ischemic disease.    Status post bilateral ocular lens replacement. The orbital contents are   otherwise unremarkable.    Visualized paranasal sinuses and mastoid air cells are clear.    CTA BRAIN:    Redemonstration of 1.2 x 2.4 cm right anterior temporal extra-axial   homogeneously enhancing lesion, likely a meningioma.    Probable left P-comm infundibulum. Because the left P-comm is not well   visualized, differential diagnosis includes left P-comm level ICA   aneurysm. The dome measures 2 mm in size. The neck measures 3 mm in size.    Normal flow-related enhancement of the skull base/intracranial internal   carotid arteries.    Normal flow-related enhancement of the bilateral anterior, middle, and   posterior cerebral arteries.    Normal flow-related enhancement of the bilateral intradural vertebral   arteries and the basilar artery.    No flow-limiting stenosis. No AVM.    Venous system is well opacified, no evidence for venous sinus or cortical   vein thrombosis.    CTA NECK:    Normal flow-related enhancement of the bilateral common and internal   carotid arteries.    Normal flow-related enhancement of the bilateral vertebral arteries.    No flow-limiting stenosis, evidence for arterial dissection, or vascular   aneurysm.    IMPRESSION:    CT brain:  No hydrocephalus, acute intracranial hemorrhage, mass effect, or brain   edema.    CTA brain:  No flow-limiting stenosis. No AVM.    Probable left P-comm infundibulum. Because the left P-comm is not well   visualized, differential diagnosis includes left P-comm level ICA   aneurysm. The dome measures 2 mm in size. The neck measures 3 mm in size.    1.2 x 2.4 cm right anterior temporal extra-axialhomogeneously enhancing   lesion, likely a meningioma.    CTA neck:  No flow-limiting stenosis, evidence for arterial dissection, or vascular   aneurysm.    --- End of Report ---            NATHAN COOPER MD; Attending Radiologist  This document has beenelectronically signed. Mar  6 2024 12:43PM    < end of copied text >  < from: Xray Chest 1 View- PORTABLE-Urgent (24 @ 11:08) >  ACC: [id number] EXAM:  XR CHEST PORTABLE URGENT 1V   ORDERED BY: KELLEE JOHNSON     PROCEDURE DATE:  2024          INTERPRETATION:  EXAM: XR CHEST URGENT    INDICATION: Sepsis SXR    COMPARISON: 2020    IMPRESSION: Left Chest is clear with surgical clips related to the left   breast region. Right lung is clear. Heart is within normal limits in its   transthoracic diameter. Some sclerotic changes related to the left   humeral head. Follow-up suggested as indicated clinically.    --- End of Report ---            LISSA KISER MD; Attending Radiologist  This document has been electronically signed. Mar  6 2024 11:11AM    < end of copied text >      
Patient is a 88y old  Female who presents with a chief complaint of fever, SOB, weakness (07 Mar 2024 07:33)    INTERVAL HPI/OVERNIGHT EVENTS:  No acute events overnight.  This AM, patient reports feeling better, not as weak as before.  Still has some vision changes in left eye.  No nausea.  No headaches.  Daughter reported that the patient desat'ed to 70s% off O2 while ambulating earlier.   Awaiting possible MRI today.      MEDICATIONS  (STANDING):  atorvastatin 10 milliGRAM(s) Oral at bedtime  cholecalciferol 2000 Unit(s) Oral daily  dextrose 5%. 1000 milliLiter(s) (100 mL/Hr) IV Continuous <Continuous>  dextrose 5%. 1000 milliLiter(s) (50 mL/Hr) IV Continuous <Continuous>  dextrose 50% Injectable 25 Gram(s) IV Push once  dextrose 50% Injectable 12.5 Gram(s) IV Push once  dextrose 50% Injectable 25 Gram(s) IV Push once  gabapentin 200 milliGRAM(s) Oral three times a day  glucagon  Injectable 1 milliGRAM(s) IntraMuscular once  insulin lispro (ADMELOG) corrective regimen sliding scale   SubCutaneous three times a day before meals  insulin lispro (ADMELOG) corrective regimen sliding scale   SubCutaneous at bedtime  letrozole 2.5 milliGRAM(s) Oral daily  levothyroxine 125 MICROGram(s) Oral <User Schedule>  levothyroxine 250 MICROGram(s) Oral <User Schedule>  pantoprazole    Tablet 40 milliGRAM(s) Oral before breakfast  traMADol 50 milliGRAM(s) Oral once  triamterene 37.5 mG/hydrochlorothiazide 25 mG Tablet 1 Tablet(s) Oral daily    MEDICATIONS  (PRN):  acetaminophen     Tablet .. 650 milliGRAM(s) Oral every 6 hours PRN Temp greater or equal to 38C (100.4F), Mild Pain (1 - 3)  aluminum hydroxide/magnesium hydroxide/simethicone Suspension 30 milliLiter(s) Oral every 4 hours PRN Dyspepsia  dextrose Oral Gel 15 Gram(s) Oral once PRN Blood Glucose LESS THAN 70 milliGRAM(s)/deciliter  melatonin 3 milliGRAM(s) Oral at bedtime PRN Insomnia  ondansetron Injectable 4 milliGRAM(s) IV Push every 8 hours PRN Nausea and/or Vomiting      Allergies    soft shell seafood (Unknown)  No Known Drug Allergies  Vinegar- hands break out in a rash (Rash)  Shrimp (Hives)    ROS as above and reviewed and is otherwise negative    PHYSICAL EXAM:  Vital Signs Last 24 Hrs  T(C): 36.4 (07 Mar 2024 08:06), Max: 36.9 (06 Mar 2024 11:26)  T(F): 97.6 (07 Mar 2024 08:06), Max: 98.5 (06 Mar 2024 11:26)  HR: 70 (07 Mar 2024 08:06) (60 - 78)  BP: 140/64 (07 Mar 2024 08:06) (126/65 - 145/76)  BP(mean): --  RR: 18 (07 Mar 2024 08:06) (16 - 18)  SpO2: 97% (07 Mar 2024 08:06) (94% - 97%)    Parameters below as of 07 Mar 2024 08:06  Patient On (Oxygen Delivery Method): nasal cannula  O2 Flow (L/min): 3      GENERAL:    more alert today, in NAD  HEAD:     atraumatic, normocephalic  EYES:     EOMI, conjunctiva and sclera clear  RESPIRATORY:     no gross crackles or wheezing heard  CARDIOVASCULAR:     regular rate and rhythm, soft i/vi systolic murmur in LUSB, no murmurs or rubs or gallops  GASTROINTESTINAL:     soft, nontender, nondistended, bowel sounds present  EXTREMITIES:     no clubbing or cyanosis or edema  MUSCULOSKELETAL:     no joint pain or swelling or deformities  NERVOUS SYSTEM:     moves all 4s, no sensory deficits noted  PSYCH:     appropriate, alert and orientated x3, good concentration      LABS:                        10.0   14.11 )-----------( 448      ( 07 Mar 2024 05:54 )             31.8     07 Mar 2024 05:54    142    |  103    |  23     ----------------------------<  138    3.8     |  31     |  0.98     Ca    9.5        07 Mar 2024 05:54  Phos  3.0       07 Mar 2024 05:54  Mg     1.4       07 Mar 2024 05:54    TPro  6.6    /  Alb  2.3    /  TBili  0.2    /  DBili  x      /  AST  13     /  ALT  13     /  AlkPhos  56     07 Mar 2024 05:54    PT/INR - ( 06 Mar 2024 11:07 )   PT: 12.0 sec;   INR: 1.10 ratio         PTT - ( 06 Mar 2024 11:07 )  PTT:32.1 sec  Urinalysis Basic - ( 07 Mar 2024 05:54 )    Color: x / Appearance: x / SG: x / pH: x  Gluc: 138 mg/dL / Ketone: x  / Bili: x / Urobili: x   Blood: x / Protein: x / Nitrite: x   Leuk Esterase: x / RBC: x / WBC x   Sq Epi: x / Non Sq Epi: x / Bacteria: x      CAPILLARY BLOOD GLUCOSE      POCT Blood Glucose.: 120 mg/dL (07 Mar 2024 08:13)  POCT Blood Glucose.: 257 mg/dL (06 Mar 2024 22:26)  POCT Blood Glucose.: 185 mg/dL (06 Mar 2024 16:01)      
Chief Complaint: Visual changes, fevers, weakness    Interval Events: No events overnight.    Review of Systems:  General: No fevers, chills, weight gain  Skin: No rashes, color changes  Cardiovascular: No chest pain, orthopnea  Respiratory: No shortness of breath, cough  Gastrointestinal: No nausea, abdominal pain  Genitourinary: No incontinence, pain with urination  Musculoskeletal: No pain, swelling, decreased range of motion  Neurological: No headache, weakness  Psychiatric: No depression, anxiety  Endocrine: No weight gain, increased thirst  All other systems are comprehensively negative.    Physical Exam:  Vitals:        Vital Signs Last 24 Hrs  T(C): 36.3 (08 Mar 2024 09:08), Max: 37 (07 Mar 2024 16:12)  T(F): 97.4 (08 Mar 2024 09:08), Max: 98.6 (07 Mar 2024 16:12)  HR: 77 (08 Mar 2024 09:08) (61 - 77)  BP: 132/66 (08 Mar 2024 09:08) (132/66 - 164/63)  BP(mean): --  RR: 16 (08 Mar 2024 09:08) (16 - 18)  SpO2: 90% (08 Mar 2024 09:08) (90% - 100%)  Parameters below as of 08 Mar 2024 04:56  Patient On (Oxygen Delivery Method): nasal cannula  General: NAD  HEENT: MMM  Neck: No JVD, no carotid bruit  Lungs: CTAB  CV: RRR, nl S1/S2, no M/R/G  Abdomen: S/NT/ND, +BS  Extremities: No LE edema, no cyanosis  Neuro: AAOx3, non-focal  Skin: No rash    Labs:                        9.9    12.13 )-----------( 433      ( 08 Mar 2024 07:57 )             33.1     03-08    144  |  104  |  25<H>  ----------------------------<  97  3.7   |  32<H>  |  1.02    Ca    9.9      08 Mar 2024 07:57  Phos  4.0     03-08  Mg     1.6     03-08    TPro  6.7  /  Alb  2.5<L>  /  TBili  0.2  /  DBili  x   /  AST  18  /  ALT  15  /  AlkPhos  55  03-08        PT/INR - ( 06 Mar 2024 11:07 )   PT: 12.0 sec;   INR: 1.10 ratio         PTT - ( 06 Mar 2024 11:07 )  PTT:32.1 sec    ECG/Telemetry: Sinus rhythm
Chief Complaint: Visual changes, fevers, weakness    Interval Events: No events overnight. Sleeping.    Review of Systems:  General: No fevers, chills, weight gain  Skin: No rashes, color changes  Cardiovascular: No chest pain, orthopnea  Respiratory: No shortness of breath, cough  Gastrointestinal: No nausea, abdominal pain  Genitourinary: No incontinence, pain with urination  Musculoskeletal: No pain, swelling, decreased range of motion  Neurological: No headache, weakness  Psychiatric: No depression, anxiety  Endocrine: No weight gain, increased thirst  All other systems are comprehensively negative.    Physical Exam:  Vital Signs Last 24 Hrs  T(C): 36.4 (10 Mar 2024 09:05), Max: 37.1 (09 Mar 2024 13:05)  T(F): 97.6 (10 Mar 2024 09:05), Max: 98.8 (09 Mar 2024 13:05)  HR: 82 (10 Mar 2024 09:05) (66 - 82)  BP: 145/69 (10 Mar 2024 09:05) (138/65 - 155/79)  BP(mean): --  RR: 16 (10 Mar 2024 09:05) (16 - 18)  SpO2: 91% (10 Mar 2024 09:05) (88% - 99%)  Parameters below as of 10 Mar 2024 08:27  Patient On (Oxygen Delivery Method): nasal cannula  O2 Flow (L/min): 2  General: NAD  HEENT: MMM  Neck: No JVD, no carotid bruit  Lungs: CTAB  CV: RRR, nl S1/S2, no M/R/G  Abdomen: S/NT/ND, +BS  Extremities: No LE edema, no cyanosis  Neuro: AAOx3, non-focal  Skin: No rash    Labs:             03-10    145  |  105  |  34<H>  ----------------------------<  125<H>  3.7   |  31  |  1.02    Ca    9.6      10 Mar 2024 06:30  Phos  3.7     03-09  Mg     1.6     03-09    TPro  5.9<L>  /  Alb  2.6<L>  /  TBili  0.2  /  DBili  x   /  AST  13  /  ALT  13  /  AlkPhos  59  03-10                        10.1   11.76 )-----------( 378      ( 10 Mar 2024 06:30 )             33.2       ECG/Telemetry: Sinus rhythm
Chief Complaint: Visual changes, fevers, weakness    Interval Events: No events overnight. Sleeping.    Review of Systems:  General: No fevers, chills, weight gain  Skin: No rashes, color changes  Cardiovascular: No chest pain, orthopnea  Respiratory: No shortness of breath, cough  Gastrointestinal: No nausea, abdominal pain  Genitourinary: No incontinence, pain with urination  Musculoskeletal: No pain, swelling, decreased range of motion  Neurological: No headache, weakness  Psychiatric: No depression, anxiety  Endocrine: No weight gain, increased thirst  All other systems are comprehensively negative.    Physical Exam:  Vital Signs Last 24 Hrs  T(C): 36.8 (09 Mar 2024 09:05), Max: 36.8 (09 Mar 2024 09:05)  T(F): 98.3 (09 Mar 2024 09:05), Max: 98.3 (09 Mar 2024 09:05)  HR: 69 (09 Mar 2024 09:05) (66 - 79)  BP: 131/52 (09 Mar 2024 09:05) (123/62 - 148/56)  BP(mean): --  RR: 17 (09 Mar 2024 09:05) (17 - 18)  SpO2: 94% (09 Mar 2024 09:05) (92% - 99%)  Parameters below as of 09 Mar 2024 05:09  Patient On (Oxygen Delivery Method): room air  General: NAD  HEENT: MMM  Neck: No JVD, no carotid bruit  Lungs: CTAB  CV: RRR, nl S1/S2, no M/R/G  Abdomen: S/NT/ND, +BS  Extremities: No LE edema, no cyanosis  Neuro: AAOx3, non-focal  Skin: No rash    Labs:             03-09    140  |  100  |  34<H>  ----------------------------<  137<H>  3.6   |  28  |  1.09    Ca    10.2      09 Mar 2024 06:30  Phos  3.7     03-09  Mg     1.6     03-09    TPro  6.7  /  Alb  2.4<L>  /  TBili  0.2  /  DBili  x   /  AST  11  /  ALT  <10<L>  /  AlkPhos  65  03-09                        10.0   11.60 )-----------( 400      ( 09 Mar 2024 06:30 )             32.1       ECG/Telemetry: Sinus rhythm
Neurology follow up note    MARGARITA POWERS88yFemale      Interval History:    Patient feels ok no new complaints.    Allergies    soft shell seafood (Unknown)  No Known Drug Allergies  Vinegar- hands break out in a rash (Rash)  Shrimp (Hives)    Intolerances        MEDICATIONS    acetaminophen     Tablet .. 650 milliGRAM(s) Oral every 6 hours PRN  aluminum hydroxide/magnesium hydroxide/simethicone Suspension 30 milliLiter(s) Oral every 4 hours PRN  atorvastatin 10 milliGRAM(s) Oral at bedtime  cholecalciferol 2000 Unit(s) Oral daily  dextrose 5%. 1000 milliLiter(s) IV Continuous <Continuous>  dextrose 5%. 1000 milliLiter(s) IV Continuous <Continuous>  dextrose 50% Injectable 25 Gram(s) IV Push once  dextrose 50% Injectable 12.5 Gram(s) IV Push once  dextrose 50% Injectable 25 Gram(s) IV Push once  dextrose Oral Gel 15 Gram(s) Oral once PRN  enoxaparin Injectable 40 milliGRAM(s) SubCutaneous every 24 hours  gabapentin 200 milliGRAM(s) Oral three times a day  glucagon  Injectable 1 milliGRAM(s) IntraMuscular once  insulin lispro (ADMELOG) corrective regimen sliding scale   SubCutaneous three times a day before meals  insulin lispro (ADMELOG) corrective regimen sliding scale   SubCutaneous at bedtime  letrozole 2.5 milliGRAM(s) Oral daily  levothyroxine 125 MICROGram(s) Oral <User Schedule>  levothyroxine 250 MICROGram(s) Oral <User Schedule>  melatonin 3 milliGRAM(s) Oral at bedtime PRN  ondansetron Injectable 4 milliGRAM(s) IV Push every 8 hours PRN  pantoprazole    Tablet 40 milliGRAM(s) Oral before breakfast  traMADol 50 milliGRAM(s) Oral once  triamterene 37.5 mG/hydrochlorothiazide 25 mG Tablet 1 Tablet(s) Oral daily              Vital Signs Last 24 Hrs  T(C): 36.4 (07 Mar 2024 08:06), Max: 36.4 (06 Mar 2024 23:46)  T(F): 97.6 (07 Mar 2024 08:06), Max: 97.6 (07 Mar 2024 08:06)  HR: 70 (07 Mar 2024 08:06) (60 - 78)  BP: 140/64 (07 Mar 2024 08:06) (126/65 - 140/64)  BP(mean): --  RR: 18 (07 Mar 2024 08:06) (16 - 18)  SpO2: 97% (07 Mar 2024 08:06) (94% - 97%)    Parameters below as of 07 Mar 2024 08:06  Patient On (Oxygen Delivery Method): nasal cannula  O2 Flow (L/min): 3      Review of System:      CONSTITUTIONALLY:  At present denies any fevers, but apparently this has been running low-grade fevers for the last 3 weeks.  HEAD:  No headaches.  EYES:  Positive visual changes on the left eye in the lower quadrant.  She describes as wavy lines.    EARS:  No ringing in ears.  NECK:  No neck pain.  CARDIOVASCULAR:  No chest pain.  RESPIRATORY:  No shortness of breath.  ABDOMEN:  No nausea, vomiting, or abdominal pain  EXTREMITIES/NEUROLOGIC:  No numbness or tingling  MUSCULOSKELETAL:  No joint pain.    PHYSICAL EXAMINATION:  HEENT:  Head is normocephalic, atraumatic.  Eyes, no scleral icterus.  Ears, hearing bilaterally intact.  NECK:  Supple.  CARDIOVASCULAR:  S1 and S2 heard.  RESPIRATORY:  Air entry bilaterally.  ABDOMEN:  Soft and nontender.  EXTREMITIES:  No clubbing or cyanosis were noted.    NEUROLOGIC:  The patient is awake, alert.  Extraocular movements were intact.  I did not notice any palsies.  The patient appears to have full visual fields.  Speech is full.  Smile symmetric.  Motor bilateral upper and lower 4+ over 5.  Sensory  intact to light touch.  No drift.    LABS:  CBC Full  -  ( 07 Mar 2024 05:54 )  WBC Count : 14.11 K/uL  RBC Count : 3.53 M/uL  Hemoglobin : 10.0 g/dL  Hematocrit : 31.8 %  Platelet Count - Automated : 448 K/uL  Mean Cell Volume : 90.1 fl  Mean Cell Hemoglobin : 28.3 pg  Mean Cell Hemoglobin Concentration : 31.4 gm/dL  Auto Neutrophil # : 11.53 K/uL  Auto Lymphocyte # : 1.75 K/uL  Auto Monocyte # : 0.68 K/uL  Auto Eosinophil # : 0.00 K/uL  Auto Basophil # : 0.02 K/uL  Auto Neutrophil % : 81.8 %  Auto Lymphocyte % : 12.4 %  Auto Monocyte % : 4.8 %  Auto Eosinophil % : 0.0 %  Auto Basophil % : 0.1 %    Urinalysis Basic - ( 07 Mar 2024 05:54 )    Color: x / Appearance: x / SG: x / pH: x  Gluc: 138 mg/dL / Ketone: x  / Bili: x / Urobili: x   Blood: x / Protein: x / Nitrite: x   Leuk Esterase: x / RBC: x / WBC x   Sq Epi: x / Non Sq Epi: x / Bacteria: x      03-07    142  |  103  |  23  ----------------------------<  138<H>  3.8   |  31  |  0.98    Ca    9.5      07 Mar 2024 05:54  Phos  3.0     03-07  Mg     1.4     03-07    TPro  6.6  /  Alb  2.3<L>  /  TBili  0.2  /  DBili  x   /  AST  13  /  ALT  13  /  AlkPhos  56  03-07    Hemoglobin A1C:     LIVER FUNCTIONS - ( 07 Mar 2024 05:54 )  Alb: 2.3 g/dL / Pro: 6.6 g/dL / ALK PHOS: 56 U/L / ALT: 13 U/L / AST: 13 U/L / GGT: x           Vitamin B12 Vitamin B12, Serum: 294 pg/mL (03-07 @ 05:54)  Vitamin B12, Serum: 320 pg/mL (03-06 @ 16:44)    PT/INR - ( 06 Mar 2024 11:07 )   PT: 12.0 sec;   INR: 1.10 ratio         PTT - ( 06 Mar 2024 11:07 )  PTT:32.1 sec      RADIOLOGY        ASSESSMENT AND PLAN:  An 88-year-old with a history of generalized lethargy, weakness and visual changes.    For episode of generalized lethargy and weakness, questionable at present, unclear etiology with a history of low grade temperature.  We will recommend to rule out any type of underlying infectious disease process, but questionable patient could have any type of autoimmune disorder.  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    We will start with a basic blood work and chemistries.  We will also consider a vasculitic type of workup for autoimmune disease.  Peripheral visual changes.  Ophthalmology follow up.  May need MRI of the orbits, possibly MRI of the brain based on their evaluation.  Suspect less likely this is a neuromuscular disease.  There is no clear history to suggest as the day goes on the patient becomes weaker as per my conversation with son.  For history of hyperlipidemia, continue patient on statin.  For history of hypertension.  Monitor systolic blood pressure.  Spoke to son about repeat CTA MRA head in 3 months.     Seen with daughter today [place]  she will be primary contact as per her     I spoke with son at bedside yesterday     48 minutes of time was spent with greater than 50% of the time with care coordination, counseling, and speaking to multi-disciplinary health care team.
Neurology follow up note    MARGARITA POWERS89yFemale      Interval History:    Patient feels ok no new complaints.    Allergies    soft shell seafood (Unknown)  No Known Drug Allergies  Vinegar- hands break out in a rash (Rash)  Shrimp (Hives)    Intolerances        MEDICATIONS    acetaminophen     Tablet .. 650 milliGRAM(s) Oral every 6 hours PRN  aluminum hydroxide/magnesium hydroxide/simethicone Suspension 30 milliLiter(s) Oral every 4 hours PRN  atorvastatin 10 milliGRAM(s) Oral at bedtime  cholecalciferol 2000 Unit(s) Oral daily  dextrose 5%. 1000 milliLiter(s) IV Continuous <Continuous>  dextrose 5%. 1000 milliLiter(s) IV Continuous <Continuous>  dextrose 50% Injectable 25 Gram(s) IV Push once  dextrose 50% Injectable 12.5 Gram(s) IV Push once  dextrose 50% Injectable 25 Gram(s) IV Push once  dextrose Oral Gel 15 Gram(s) Oral once PRN  enoxaparin Injectable 40 milliGRAM(s) SubCutaneous every 24 hours  gabapentin 200 milliGRAM(s) Oral three times a day  glucagon  Injectable 1 milliGRAM(s) IntraMuscular once  insulin lispro (ADMELOG) corrective regimen sliding scale   SubCutaneous three times a day before meals  insulin lispro (ADMELOG) corrective regimen sliding scale   SubCutaneous at bedtime  letrozole 2.5 milliGRAM(s) Oral daily  levothyroxine 125 MICROGram(s) Oral <User Schedule>  levothyroxine 250 MICROGram(s) Oral <User Schedule>  melatonin 3 milliGRAM(s) Oral at bedtime PRN  ondansetron Injectable 4 milliGRAM(s) IV Push every 8 hours PRN  pantoprazole    Tablet 40 milliGRAM(s) Oral before breakfast  traMADol 50 milliGRAM(s) Oral once  triamterene 37.5 mG/hydrochlorothiazide 25 mG Tablet 1 Tablet(s) Oral daily              Vital Signs Last 24 Hrs  T(C): 36.4 (09 Mar 2024 05:09), Max: 36.7 (08 Mar 2024 16:50)  T(F): 97.5 (09 Mar 2024 05:09), Max: 98 (08 Mar 2024 16:50)  HR: 74 (09 Mar 2024 05:09) (66 - 79)  BP: 134/58 (09 Mar 2024 05:09) (123/62 - 148/56)  BP(mean): --  RR: 18 (09 Mar 2024 05:09) (16 - 18)  SpO2: 94% (09 Mar 2024 05:09) (90% - 99%)    Parameters below as of 09 Mar 2024 05:09  Patient On (Oxygen Delivery Method): room air      Review of System:      CONSTITUTIONALLY:  At present denies any fevers, but apparently this has been running low-grade fevers for the last 3 weeks.  HEAD:  No headaches.  EYES:  Positive visual changes on the left eye in the lower quadrant.  She describes as wavy lines.    EARS:  No ringing in ears.  NECK:  No neck pain.  CARDIOVASCULAR:  No chest pain.  RESPIRATORY:  No shortness of breath.  ABDOMEN:  No nausea, vomiting, or abdominal pain  EXTREMITIES/NEUROLOGIC:  No numbness or tingling  MUSCULOSKELETAL:  No joint pain.    PHYSICAL EXAMINATION:  HEENT:  Head is normocephalic, atraumatic.  Eyes, no scleral icterus.  Ears, hearing bilaterally intact.  NECK:  Supple.  CARDIOVASCULAR:  S1 and S2 heard.  RESPIRATORY:  Air entry bilaterally.  ABDOMEN:  Soft and nontender.  EXTREMITIES:  No clubbing or cyanosis were noted.    NEUROLOGIC:  The patient is awake, alert.  Extraocular movements were intact.  I did not notice any palsies.  The patient appears to have full visual fields.  Speech is full.  Smile symmetric.  Motor bilateral upper and lower 4+ over 5.  Sensory  intact to light touch.  No drift.    LABS:  CBC Full  -  ( 09 Mar 2024 06:30 )  WBC Count : 11.60 K/uL  RBC Count : 3.48 M/uL  Hemoglobin : 10.0 g/dL  Hematocrit : 32.1 %  Platelet Count - Automated : 400 K/uL  Mean Cell Volume : 92.2 fl  Mean Cell Hemoglobin : 28.7 pg  Mean Cell Hemoglobin Concentration : 31.2 gm/dL  Auto Neutrophil # : x  Auto Lymphocyte # : x  Auto Monocyte # : x  Auto Eosinophil # : x  Auto Basophil # : x  Auto Neutrophil % : x  Auto Lymphocyte % : x  Auto Monocyte % : x  Auto Eosinophil % : x  Auto Basophil % : x    Urinalysis Basic - ( 09 Mar 2024 06:30 )    Color: x / Appearance: x / SG: x / pH: x  Gluc: 137 mg/dL / Ketone: x  / Bili: x / Urobili: x   Blood: x / Protein: x / Nitrite: x   Leuk Esterase: x / RBC: x / WBC x   Sq Epi: x / Non Sq Epi: x / Bacteria: x      03-09    140  |  100  |  34<H>  ----------------------------<  137<H>  3.6   |  28  |  1.09    Ca    10.2      09 Mar 2024 06:30  Phos  3.7     03-09  Mg     1.6     03-09    TPro  6.7  /  Alb  2.4<L>  /  TBili  0.2  /  DBili  x   /  AST  11  /  ALT  <10<L>  /  AlkPhos  65  03-09    Hemoglobin A1C:     LIVER FUNCTIONS - ( 09 Mar 2024 06:30 )  Alb: 2.4 g/dL / Pro: 6.7 g/dL / ALK PHOS: 65 U/L / ALT: <10 U/L / AST: 11 U/L / GGT: x           Vitamin B12         RADIOLOGY    ASSESSMENT AND PLAN:  An 88-year-old with a history of generalized lethargy, weakness and visual changes.    For episode of generalized lethargy and weakness, questionable at present, unclear etiology with a history of low grade temperature.  We will recommend to rule out any type of underlying infectious disease process, but questionable patient could have any type of autoimmune disorder.  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  There is no clear history to suggest as the day goes on the patient becomes weaker as per my conversation with family   For history of hyperlipidemia, continue patient on statin.  For history of hypertension.  Monitor systolic blood pressure.  Spoke to dtr about repeat CTA MRA head in 3 months.   neurologic wise stable     Seen with daughter today [place]  3/8 she will be primary contact as per her     48 minutes of time was spent with greater than 50% of the time with care coordination, counseling, and speaking to multi-disciplinary health care team
PULMONARY/CRITICAL CARE    DOS 3/8/24  Desats walking.  Pt. Alert, conversant. Less sob.   .    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  Per son, symptoms started about 3 weeks ago with nonstop non-bloody diarrhea.  Has had chronic diarrhea for the past 3 years but said she this episode was for 3-4 days which resulted in her being very weak and fatigued.  Despite having normal BMs now, patient still feels weak.  Son also reports patient having a low grade temp of 100.8F about 4-5 days ago.  Patient reports having light-headedness and occasional headaches as well.  Some nausea but no vomiting.  Also complained of intermittent memory loss and intermittent earaches.  Denied any chest pain or abdominal pain.  Patient unclear if she has SOB but son thinks she does have some but has been compensating.  Has also been coughing for the past couple of weeks with thick white/yellow sputum.  No recent travel.  No swelling of her BLE (chronically swollen but not worse).  Also of note, patient complaining of left eye vision changes that started about 2.5 weeks ago.  Said she has double vision when she looks far and also has a "patch" in the lower half of her left field of vision.  Went to see an optometrist for glasses last week and was told she had an inflamed optic nerve but recommended an ophthamology evaluation.  Patient went to Dr. Benjamin's office for evaluation of cough, low grade fever, SOB, and double vision, and was sent here for further evaluation.      In the ED, patient's triage vitals were /72  HR 83  T 99.4F RR 20, 92%  - labs significant for slight anemia, some thrombocytopenia  - CXR with both lung clear  - CT of brain and CTA of head neck showed "No hydrocephalus, acute intracranial hemorrhage, mass effect, or brain   edema.  No flow-limiting stenosis. No AVM.  Probable left P-comm infundibulum. Because the left P-comm is not well   visualized, differential diagnosis includes left P-comm level ICA  aneurysm. The dome measures 2 mm in size. The neck measures 3 mm in size. 1.2 x 2.4 cm right anterior temporal extra-axial homogeneously enhancing lesion, likely a meningioma.  No flow-limiting stenosis, evidence for arterial dissection, or vascular aneurysm."  - ophthamology consulted for her vision changes    Currently patient with no SOB but still feels weak overall.    Events last 24 hours: ***    PAST MEDICAL & SURGICAL HISTORY:  PATRICIA--noncompliant with CPAP  Skin cancer      Hypothyroidism      Bronchitis      Pneumonia      Chronic cough      Osteoarthritis      Environmental allergies      Vitamin D deficiency      Hyperlipidemia      Class 2 obesity with body mass index (BMI) of 39.0 to 39.9 in adult      Cancer of female breast  - left breast- tx with surgery, radiation and Letrozole      DDD (degenerative disc disease), lumbar      Primary immunodeficiency disorder  being treated with Hizentra      H/O actinic keratosis      H/O seborrheic keratosis      S/P cataract surgery  approx 2006- bilateral      S/P appendectomy  1950      S/P partial mastectomy, left  2012      S/P epidural steroid injection  x2- Dr. Martin- for Lumbar Disc Disease      S/P skin cancer resection  x11        Allergies    soft shell seafood (Unknown)  No Known Drug Allergies  Vinegar- hands break out in a rash (Rash)  Shrimp (Hives)    Intolerances      FAMILY HISTORY: no cigs, etoh  FH: dementia  father-     FHx: cerebral aneurysm  mother-  @ age 66    Family history of hypertension in mother      Family history of lung cancer  sister-     Family history of lymphoma  brother-    Family history of hypertension in son  son- living - age 59yo    Family history of ulcerative colitis  son- living- age 56yo        Medications:    triamterene 37.5 mG/hydrochlorothiazide 25 mG Tablet 1 Tablet(s) Oral daily      acetaminophen     Tablet .. 650 milliGRAM(s) Oral every 6 hours PRN  gabapentin 200 milliGRAM(s) Oral three times a day  melatonin 3 milliGRAM(s) Oral at bedtime PRN  ondansetron Injectable 4 milliGRAM(s) IV Push every 8 hours PRN    letrozole 2.5 milliGRAM(s) Oral daily      aluminum hydroxide/magnesium hydroxide/simethicone Suspension 30 milliLiter(s) Oral every 4 hours PRN  pantoprazole    Tablet 40 milliGRAM(s) Oral before breakfast      atorvastatin 10 milliGRAM(s) Oral at bedtime  dextrose 50% Injectable 25 Gram(s) IV Push once  dextrose 50% Injectable 25 Gram(s) IV Push once  dextrose 50% Injectable 12.5 Gram(s) IV Push once  dextrose Oral Gel 15 Gram(s) Oral once PRN  glucagon  Injectable 1 milliGRAM(s) IntraMuscular once  insulin lispro (ADMELOG) corrective regimen sliding scale   SubCutaneous three times a day before meals  insulin lispro (ADMELOG) corrective regimen sliding scale   SubCutaneous at bedtime  levothyroxine 250 MICROGram(s) Oral <User Schedule>  levothyroxine 125 MICROGram(s) Oral <User Schedule>    cholecalciferol 2000 Unit(s) Oral daily  dextrose 5%. 1000 milliLiter(s) IV Continuous <Continuous>  dextrose 5%. 1000 milliLiter(s) IV Continuous <Continuous>                ICU Vital Signs Last 24 Hrs  T(C): 37.4 (06 Mar 2024 10:29), Max: 37.4 (06 Mar 2024 10:)  T(F): 99.4 (06 Mar 2024 10:), Max: 99.4 (06 Mar 2024 10:)  HR: 71 (06 Mar 2024 11:) (71 - 83)  BP: 166/72 (06 Mar 2024 10:) (166/72 - 166/72)  BP(mean): --  ABP: --  ABP(mean): --  RR: 20 (06 Mar 2024 10:) (20 - 20)  SpO2: 96% (06 Mar 2024 11:) (92% - 96%)    O2 Parameters below as of 06 Mar 2024 10:29  Patient On (Oxygen Delivery Method): room air          Vital Signs Last 24 Hrs  T(C): 37.4 (06 Mar 2024 10:), Max: 37.4 (06 Mar 2024 10:)  T(F): 99.4 (06 Mar 2024 10:), Max: 99.4 (06 Mar 2024 10:)  HR: 71 (06 Mar 2024 11:) (71 - 83)  BP: 166/72 (06 Mar 2024 10:) (166/72 - 166/72)  BP(mean): --  RR: 20 (06 Mar 2024 10:29) (20 - 20)  SpO2: 96% (06 Mar 2024 11:) (92% - 96%)    Parameters below as of 06 Mar 2024 10:29  Patient On (Oxygen Delivery Method): room air            I&O's Detail        LABS:                        10.5   10.40 )-----------( 428      ( 06 Mar 2024 11:07 )             33.8     03-    140  |  100  |  26<H>  ----------------------------<  116<H>  3.8   |  30  |  0.97    Ca    9.9      06 Mar 2024 11:07    TPro  7.3  /  Alb  2.6<L>  /  TBili  0.4  /  DBili  x   /  AST  13  /  ALT  12  /  AlkPhos  63  03-06          CAPILLARY BLOOD GLUCOSE      POCT Blood Glucose.: 185 mg/dL (06 Mar 2024 16:01)    PT/INR - ( 06 Mar 2024 11:07 )   PT: 12.0 sec;   INR: 1.10 ratio         PTT - ( 06 Mar 2024 11:07 )  PTT:32.1 sec  Urinalysis Basic - ( 06 Mar 2024 11:07 )    Color: x / Appearance: x / SG: x / pH: x  Gluc: 116 mg/dL / Ketone: x  / Bili: x / Urobili: x   Blood: x / Protein: x / Nitrite: x   Leuk Esterase: x / RBC: x / WBC x   Sq Epi: x / Non Sq Epi: x / Bacteria: x      CULTURES:      Physical Examination:    General: No acute distress.  elderly female    HEENT: Pupils equal, reactive to light.  Symmetric.    PULM: Clear to auscultation bilaterally, no wheeze rhonchi rales no change percussion    CVS: Regular rate and rhythm, no murmurs, rubs, or gallops    ABD: Soft, nondistended, nontender, normoactive bowel sounds, no masses    EXT:nontender calves; brawny edema    SKIN: Warm and well perfused, no rashes noted.    NEURO: Alert, oriented, interactive, nonfocal moves all ext    RADIOLOGY: ***  rads< from: CT Angio Neck w/ IV Cont (24 @ 12:13) >  ACC: [id number] EXAM:  CT BRAIN   ORDERED BY: KELLEE JOHNSON     ACC: [id number] EXAM:  CT ANGIO NECK (W)AW IC   ORDERED BY: KELLEE JOHNSON     ACC: [id number] EXAM:  CT ANGIO BRAIN (W)AW IC   ORDERED BY: KELLEE JOHNSON     PROCEDURE DATE:  2024          INTERPRETATION:  CT angiography of the brain and neck    CLINICAL INDICATION: Left eye vision loss    TECHNIQUE: Direct axial CT scanning of the brain and neck was obtained   from the vertex to the level of the clavicular heads after the dynamic   intravenous injection of 90 cc of Omnipaque 350. 10 cc discarded.   Sagittal and coronal maximum intensity projection reformats were   provided.  Three-dimensional reconstructions were performed by the   radiologist using the [employer] workstation.    COMPARISON: None available    FINDINGS:    CT BRAIN:    No hydrocephalus, mass effect, midline shift, acute intracranial   hemorrhage, or brain edema.    1.1 x 2.4 cm (AP by TV) right anterior temporal extra-axial isointense to   gray matter lesion, likely a meningioma.    Mild white matter microvascular ischemic disease.    Status post bilateral ocular lens replacement. The orbital contents are   otherwise unremarkable.    Visualized paranasal sinuses and mastoid air cells are clear.    CTA BRAIN:    Redemonstration of 1.2 x 2.4 cm right anterior temporal extra-axial   homogeneously enhancing lesion, likely a meningioma.    Probable left P-comm infundibulum. Because the left P-comm is not well   visualized, differential diagnosis includes left P-comm level ICA   aneurysm. The dome measures 2 mm in size. The neck measures 3 mm in size.    Normal flow-related enhancement of the skull base/intracranial internal   carotid arteries.    Normal flow-related enhancement of the bilateral anterior, middle, and   posterior cerebral arteries.    Normal flow-related enhancement of the bilateral intradural vertebral   arteries and the basilar artery.    No flow-limiting stenosis. No AVM.    Venous system is well opacified, no evidence for venous sinus or cortical   vein thrombosis.    CTA NECK:    Normal flow-related enhancement of the bilateral common and internal   carotid arteries.    Normal flow-related enhancement of the bilateral vertebral arteries.    No flow-limiting stenosis, evidence for arterial dissection, or vascular   aneurysm.    IMPRESSION:    CT brain:  No hydrocephalus, acute intracranial hemorrhage, mass effect, or brain   edema.    CTA brain:  No flow-limiting stenosis. No AVM.    Probable left P-comm infundibulum. Because the left P-comm is not well   visualized, differential diagnosis includes left P-comm level ICA   aneurysm. The dome measures 2 mm in size. The neck measures 3 mm in size.    1.2 x 2.4 cm right anterior temporal extra-axialhomogeneously enhancing   lesion, likely a meningioma.    CTA neck:  No flow-limiting stenosis, evidence for arterial dissection, or vascular   aneurysm.    --- End of Report ---            NATHAN COOPER MD; Attending Radiologist  This document has beenelectronically signed. Mar  6 2024 12:43PM    < end of copied text >  < from: Xray Chest 1 View- PORTABLE-Urgent (24 @ 11:08) >  ACC: [id number] EXAM:  XR CHEST PORTABLE URGENT 1V   ORDERED BY: KELLEE JOHNSON     PROCEDURE DATE:  2024          INTERPRETATION:  EXAM: XR CHEST URGENT    INDICATION: Sepsis SXR    COMPARISON: 2020    IMPRESSION: Left Chest is clear with surgical clips related to the left   breast region. Right lung is clear. Heart is within normal limits in its   transthoracic diameter. Some sclerotic changes related to the left   humeral head. Follow-up suggested as indicated clinically.    --- End of Report ---            LISSA KISER MD; Attending Radiologist  This document has been electronically signed. Mar  6 2024 11:11AM    < end of copied text >      
Patient is a 88y old  Female who presents with a chief complaint of fever, SOB, weakness (08 Mar 2024 08:44)    INTERVAL HPI/OVERNIGHT EVENTS:  No acute events overnight.  This AM, patient with no acute complaints.  Actually feels well and the best she has felt in the past 3 weeks.  Reports her vision changes are no longer present as well.  Denies any nausea/vomiting.  No diarrhea.  No pain elsewhere.      MEDICATIONS  (STANDING):  atorvastatin 10 milliGRAM(s) Oral at bedtime  cholecalciferol 2000 Unit(s) Oral daily  dextrose 5%. 1000 milliLiter(s) (100 mL/Hr) IV Continuous <Continuous>  dextrose 5%. 1000 milliLiter(s) (50 mL/Hr) IV Continuous <Continuous>  dextrose 50% Injectable 25 Gram(s) IV Push once  dextrose 50% Injectable 12.5 Gram(s) IV Push once  dextrose 50% Injectable 25 Gram(s) IV Push once  enoxaparin Injectable 40 milliGRAM(s) SubCutaneous every 24 hours  gabapentin 200 milliGRAM(s) Oral three times a day  glucagon  Injectable 1 milliGRAM(s) IntraMuscular once  insulin lispro (ADMELOG) corrective regimen sliding scale   SubCutaneous three times a day before meals  insulin lispro (ADMELOG) corrective regimen sliding scale   SubCutaneous at bedtime  letrozole 2.5 milliGRAM(s) Oral daily  levothyroxine 125 MICROGram(s) Oral <User Schedule>  levothyroxine 250 MICROGram(s) Oral <User Schedule>  pantoprazole    Tablet 40 milliGRAM(s) Oral before breakfast  traMADol 50 milliGRAM(s) Oral once  triamterene 37.5 mG/hydrochlorothiazide 25 mG Tablet 1 Tablet(s) Oral daily    MEDICATIONS  (PRN):  acetaminophen     Tablet .. 650 milliGRAM(s) Oral every 6 hours PRN Temp greater or equal to 38C (100.4F), Mild Pain (1 - 3)  aluminum hydroxide/magnesium hydroxide/simethicone Suspension 30 milliLiter(s) Oral every 4 hours PRN Dyspepsia  dextrose Oral Gel 15 Gram(s) Oral once PRN Blood Glucose LESS THAN 70 milliGRAM(s)/deciliter  melatonin 3 milliGRAM(s) Oral at bedtime PRN Insomnia  ondansetron Injectable 4 milliGRAM(s) IV Push every 8 hours PRN Nausea and/or Vomiting      Allergies    soft shell seafood (Unknown)  No Known Drug Allergies  Vinegar- hands break out in a rash (Rash)  Shrimp (Hives)    ROS as above and reviewed and is otherwise negative    PHYSICAL EXAM:  Vital Signs Last 24 Hrs  T(C): 36.3 (08 Mar 2024 09:08), Max: 37 (07 Mar 2024 16:12)  T(F): 97.4 (08 Mar 2024 09:08), Max: 98.6 (07 Mar 2024 16:12)  HR: 77 (08 Mar 2024 09:08) (61 - 77)  BP: 132/66 (08 Mar 2024 09:08) (132/66 - 164/63)  BP(mean): --  RR: 17 (08 Mar 2024 10:22) (16 - 18)  SpO2: 95% (08 Mar 2024 10:22) (90% - 100%)    Parameters below as of 08 Mar 2024 10:22  Patient On (Oxygen Delivery Method): room air    GENERAL:    alert today, in NAD  HEAD:     atraumatic, normocephalic  EYES:     EOMI, conjunctiva and sclera clear  RESPIRATORY:     no gross crackles or wheezing heard  CARDIOVASCULAR:     regular rate and rhythm, soft i/vi systolic murmur in LUSB, no murmurs or rubs or gallops  GASTROINTESTINAL:     soft, nontender, nondistended, bowel sounds present  EXTREMITIES:     no clubbing or cyanosis or edema  MUSCULOSKELETAL:     no joint pain or swelling or deformities  NERVOUS SYSTEM:     moves all 4s, no sensory deficits noted  PSYCH:     appropriate, alert and orientated x3, good concentration    LABS:                        9.9    12.13 )-----------( 433      ( 08 Mar 2024 07:57 )             33.1     08 Mar 2024 07:57    144    |  104    |  25     ----------------------------<  97     3.7     |  32     |  1.02     Ca    9.9        08 Mar 2024 07:57  Phos  4.0       08 Mar 2024 07:57  Mg     1.6       08 Mar 2024 07:57    TPro  6.7    /  Alb  2.5    /  TBili  0.2    /  DBili  x      /  AST  18     /  ALT  15     /  AlkPhos  55     08 Mar 2024 07:57    PT/INR - ( 06 Mar 2024 11:07 )   PT: 12.0 sec;   INR: 1.10 ratio         PTT - ( 06 Mar 2024 11:07 )  PTT:32.1 sec  Urinalysis Basic - ( 08 Mar 2024 08:26 )    Color: Yellow / Appearance: Clear / S.023 / pH: x  Gluc: x / Ketone: Negative mg/dL  / Bili: Negative / Urobili: 0.2 mg/dL   Blood: x / Protein: Negative mg/dL / Nitrite: Negative   Leuk Esterase: Negative / RBC: x / WBC x   Sq Epi: x / Non Sq Epi: x / Bacteria: x      CAPILLARY BLOOD GLUCOSE      POCT Blood Glucose.: 114 mg/dL (08 Mar 2024 07:38)  POCT Blood Glucose.: 131 mg/dL (07 Mar 2024 21:20)  POCT Blood Glucose.: 111 mg/dL (07 Mar 2024 17:58)  POCT Blood Glucose.: 113 mg/dL (07 Mar 2024 12:03)      
Patient is a 89y old  Female who presents with a chief complaint of fever, SOB, weakness (09 Mar 2024 08:32)        HPI:  88F with hx of primary immunodeficiency disorder on Hizentra, pre-DM on metformn, HLD, HTN, sciatica on gabapentin, hypothyroidism who presents with weakness, SOB, and fevers.  Per son, symptoms started about 3 weeks ago with nonstop non-bloody diarrhea.  Has had chronic diarrhea for the past 3 years but said she this episode was for 3-4 days which resulted in her being very weak and fatigued.  Despite having normal BMs now, patient still feels weak.  Son also reports patient having a low grade temp of 100.8F about 4-5 days ago.  Patient reports having light-headedness and occasional headaches as well.  Some nausea but no vomiting.  Also complained of intermittent memory loss and intermittent earaches.  Denied any chest pain or abdominal pain.  Patient unclear if she has SOB but son thinks she does have some but has been compensating.  Has also been coughing for the past couple of weeks with thick white/yellow sputum.  No recent travel.  No swelling of her BLE (chronically swollen but not worse).  Also of note, patient complaining of left eye vision changes that started about 2.5 weeks ago.  Said she has double vision when she looks far and also has a "patch" in the lower half of her left field of vision.  Went to see an optometrist for glasses last week and was told she had an inflamed optic nerve but recommended an ophthamology evaluation.  Patient went to Dr. Benjamin's office for evaluation of cough, low grade fever, SOB, and double vision, and was sent here for further evaluation.      In the ED, patient's triage vitals were /72  HR 83  T 99.4F RR 20, 92%  - labs significant for slight anemia, some thrombocytopenia  - CXR with both lung clear  - CT of brain and CTA of head neck showed "No hydrocephalus, acute intracranial hemorrhage, mass effect, or brain   edema.  No flow-limiting stenosis. No AVM.  Probable left P-comm infundibulum. Because the left P-comm is not well   visualized, differential diagnosis includes left P-comm level ICA  aneurysm. The dome measures 2 mm in size. The neck measures 3 mm in size. 1.2 x 2.4 cm right anterior temporal extra-axial homogeneously enhancing lesion, likely a meningioma.  No flow-limiting stenosis, evidence for arterial dissection, or vascular aneurysm."  - ophthamology consulted for her vision changes    Currently patient with no SOB but still feels weak overall.     (06 Mar 2024 13:16)      SUBJECTIVE & OBJECTIVE: Pt seen and examined at bedside. nad     PHYSICAL EXAM:  T(C): 36.8 (03-09-24 @ 09:05), Max: 36.8 (03-09-24 @ 09:05)  HR: 69 (03-09-24 @ 09:05) (66 - 79)  BP: 131/52 (03-09-24 @ 09:05) (123/62 - 148/56)  RR: 17 (03-09-24 @ 09:05) (17 - 18)  SpO2: 94% (03-09-24 @ 09:05) (92% - 99%)  Wt(kg): --   GENERAL: NAD, well-groomed, well-developed  NECK: Supple, No JVD  NERVOUS SYSTEM:  Alert & Oriented X3,   CHEST/LUNG: Clear to auscultation bilaterally; No rales, rhonchi, wheezing, or rubs  HEART: Regular rate and rhythm; No murmurs, rubs, or gallops  ABDOMEN: Soft, Nontender, Nondistended; Bowel sounds present  EXTREMITIES:  2+ Peripheral Pulses, No clubbing, cyanosis, or edema        MEDICATIONS  (STANDING):  atorvastatin 10 milliGRAM(s) Oral at bedtime  cholecalciferol 2000 Unit(s) Oral daily  dextrose 5%. 1000 milliLiter(s) (100 mL/Hr) IV Continuous <Continuous>  dextrose 5%. 1000 milliLiter(s) (50 mL/Hr) IV Continuous <Continuous>  dextrose 50% Injectable 25 Gram(s) IV Push once  dextrose 50% Injectable 12.5 Gram(s) IV Push once  dextrose 50% Injectable 25 Gram(s) IV Push once  enoxaparin Injectable 40 milliGRAM(s) SubCutaneous every 24 hours  gabapentin 200 milliGRAM(s) Oral three times a day  glucagon  Injectable 1 milliGRAM(s) IntraMuscular once  insulin lispro (ADMELOG) corrective regimen sliding scale   SubCutaneous three times a day before meals  insulin lispro (ADMELOG) corrective regimen sliding scale   SubCutaneous at bedtime  letrozole 2.5 milliGRAM(s) Oral daily  levothyroxine 125 MICROGram(s) Oral <User Schedule>  levothyroxine 250 MICROGram(s) Oral <User Schedule>  pantoprazole    Tablet 40 milliGRAM(s) Oral before breakfast  traMADol 50 milliGRAM(s) Oral once  triamterene 37.5 mG/hydrochlorothiazide 25 mG Tablet 1 Tablet(s) Oral daily    MEDICATIONS  (PRN):  acetaminophen     Tablet .. 650 milliGRAM(s) Oral every 6 hours PRN Temp greater or equal to 38C (100.4F), Mild Pain (1 - 3)  aluminum hydroxide/magnesium hydroxide/simethicone Suspension 30 milliLiter(s) Oral every 4 hours PRN Dyspepsia  dextrose Oral Gel 15 Gram(s) Oral once PRN Blood Glucose LESS THAN 70 milliGRAM(s)/deciliter  melatonin 3 milliGRAM(s) Oral at bedtime PRN Insomnia  ondansetron Injectable 4 milliGRAM(s) IV Push every 8 hours PRN Nausea and/or Vomiting      LABS:                        10.0   11.60 )-----------( 400      ( 09 Mar 2024 06:30 )             32.1     03-09    140  |  100  |  34<H>  ----------------------------<  137<H>  3.6   |  28  |  1.09    Ca    10.2      09 Mar 2024 06:30  Phos  3.7     03-09  Mg     1.6     03-09    TPro  6.7  /  Alb  2.4<L>  /  TBili  0.2  /  DBili  x   /  AST  11  /  ALT  <10<L>  /  AlkPhos  65  03-09      Urinalysis Basic - ( 09 Mar 2024 06:30 )    Color: x / Appearance: x / SG: x / pH: x  Gluc: 137 mg/dL / Ketone: x  / Bili: x / Urobili: x   Blood: x / Protein: x / Nitrite: x   Leuk Esterase: x / RBC: x / WBC x   Sq Epi: x / Non Sq Epi: x / Bacteria: x      Magnesium: 1.6 mg/dL (03-09 @ 06:30)    CAPILLARY BLOOD GLUCOSE      POCT Blood Glucose.: 118 mg/dL (09 Mar 2024 07:43)  POCT Blood Glucose.: 159 mg/dL (08 Mar 2024 20:50)  POCT Blood Glucose.: 141 mg/dL (08 Mar 2024 16:39)  POCT Blood Glucose.: 132 mg/dL (08 Mar 2024 12:20)      CAPILLARY BLOOD GLUCOSE      POCT Blood Glucose.: 118 mg/dL (09 Mar 2024 07:43)  POCT Blood Glucose.: 159 mg/dL (08 Mar 2024 20:50)  POCT Blood Glucose.: 141 mg/dL (08 Mar 2024 16:39)  POCT Blood Glucose.: 132 mg/dL (08 Mar 2024 12:20)    CAPILLARY BLOOD GLUCOSE      POCT Blood Glucose.: 118 mg/dL (09 Mar 2024 07:43)            RECENT CULTURES:      RADIOLOGY & ADDITIONAL TESTS:                        DVT/GI ppx  Discussed with pt @ bedside

## 2024-03-10 NOTE — PROGRESS NOTE ADULT - PROVIDER SPECIALTY LIST ADULT
Cardiology
Cardiology
Neurology
Hospitalist
Hospitalist
Neurology
Cardiology
Hospitalist
Neurology
Pulmonology
Pulmonology

## 2024-03-11 LAB
CULTURE RESULTS: SIGNIFICANT CHANGE UP
CULTURE RESULTS: SIGNIFICANT CHANGE UP
SPECIMEN SOURCE: SIGNIFICANT CHANGE UP
SPECIMEN SOURCE: SIGNIFICANT CHANGE UP

## 2024-03-13 ENCOUNTER — APPOINTMENT (OUTPATIENT)
Dept: INTERNAL MEDICINE | Facility: CLINIC | Age: 89
End: 2024-03-13
Payer: MEDICARE

## 2024-03-13 ENCOUNTER — NON-APPOINTMENT (OUTPATIENT)
Age: 89
End: 2024-03-13

## 2024-03-13 ENCOUNTER — INPATIENT (INPATIENT)
Facility: HOSPITAL | Age: 89
LOS: 5 days | Discharge: HOME HEALTH | DRG: 516 | End: 2024-03-19
Attending: HOSPITALIST | Admitting: STUDENT IN AN ORGANIZED HEALTH CARE EDUCATION/TRAINING PROGRAM
Payer: MEDICARE

## 2024-03-13 VITALS
RESPIRATION RATE: 17 BRPM | OXYGEN SATURATION: 100 % | SYSTOLIC BLOOD PRESSURE: 132 MMHG | HEIGHT: 69 IN | HEART RATE: 87 BPM | DIASTOLIC BLOOD PRESSURE: 68 MMHG

## 2024-03-13 VITALS
WEIGHT: 230 LBS | OXYGEN SATURATION: 96 % | TEMPERATURE: 97.8 F | BODY MASS INDEX: 34.07 KG/M2 | HEART RATE: 113 BPM | HEIGHT: 69 IN

## 2024-03-13 VITALS — DIASTOLIC BLOOD PRESSURE: 60 MMHG | SYSTOLIC BLOOD PRESSURE: 122 MMHG

## 2024-03-13 DIAGNOSIS — R73.03 PREDIABETES: ICD-10-CM

## 2024-03-13 DIAGNOSIS — Z92.241 PERSONAL HISTORY OF SYSTEMIC STEROID THERAPY: Chronic | ICD-10-CM

## 2024-03-13 DIAGNOSIS — Z90.49 ACQUIRED ABSENCE OF OTHER SPECIFIED PARTS OF DIGESTIVE TRACT: Chronic | ICD-10-CM

## 2024-03-13 DIAGNOSIS — Z98.890 OTHER SPECIFIED POSTPROCEDURAL STATES: Chronic | ICD-10-CM

## 2024-03-13 DIAGNOSIS — R00.0 TACHYCARDIA, UNSPECIFIED: ICD-10-CM

## 2024-03-13 DIAGNOSIS — Z91.89 OTHER SPECIFIED PERSONAL RISK FACTORS, NOT ELSEWHERE CLASSIFIED: ICD-10-CM

## 2024-03-13 DIAGNOSIS — I10 ESSENTIAL (PRIMARY) HYPERTENSION: ICD-10-CM

## 2024-03-13 DIAGNOSIS — Z01.818 ENCOUNTER FOR OTHER PREPROCEDURAL EXAMINATION: ICD-10-CM

## 2024-03-13 DIAGNOSIS — R76.0 RAISED ANTIBODY TITER: ICD-10-CM

## 2024-03-13 DIAGNOSIS — M31.6 OTHER GIANT CELL ARTERITIS: ICD-10-CM

## 2024-03-13 DIAGNOSIS — Z29.9 ENCOUNTER FOR PROPHYLACTIC MEASURES, UNSPECIFIED: ICD-10-CM

## 2024-03-13 DIAGNOSIS — D64.9 ANEMIA, UNSPECIFIED: ICD-10-CM

## 2024-03-13 DIAGNOSIS — D72.829 ELEVATED WHITE BLOOD CELL COUNT, UNSPECIFIED: ICD-10-CM

## 2024-03-13 DIAGNOSIS — E03.9 HYPOTHYROIDISM, UNSPECIFIED: ICD-10-CM

## 2024-03-13 DIAGNOSIS — H53.9 UNSPECIFIED VISUAL DISTURBANCE: ICD-10-CM

## 2024-03-13 DIAGNOSIS — Z90.12 ACQUIRED ABSENCE OF LEFT BREAST AND NIPPLE: Chronic | ICD-10-CM

## 2024-03-13 DIAGNOSIS — R80.9 PROTEINURIA, UNSPECIFIED: ICD-10-CM

## 2024-03-13 LAB
ALBUMIN SERPL ELPH-MCNC: 3.6 G/DL — SIGNIFICANT CHANGE UP (ref 3.3–5)
ALP SERPL-CCNC: 74 U/L — SIGNIFICANT CHANGE UP (ref 40–120)
ALT FLD-CCNC: 6 U/L — LOW (ref 10–45)
ANION GAP SERPL CALC-SCNC: 14 MMOL/L — SIGNIFICANT CHANGE UP (ref 5–17)
APTT BLD: 34.1 SEC — SIGNIFICANT CHANGE UP (ref 24.5–35.6)
AST SERPL-CCNC: 12 U/L — SIGNIFICANT CHANGE UP (ref 10–40)
BASOPHILS # BLD AUTO: 0.08 K/UL — SIGNIFICANT CHANGE UP (ref 0–0.2)
BASOPHILS NFR BLD AUTO: 0.7 % — SIGNIFICANT CHANGE UP (ref 0–2)
BILIRUB SERPL-MCNC: 0.4 MG/DL — SIGNIFICANT CHANGE UP (ref 0.2–1.2)
BUN SERPL-MCNC: 35 MG/DL — HIGH (ref 7–23)
CALCIUM SERPL-MCNC: 10.6 MG/DL — HIGH (ref 8.4–10.5)
CHLORIDE SERPL-SCNC: 98 MMOL/L — SIGNIFICANT CHANGE UP (ref 96–108)
CO2 SERPL-SCNC: 28 MMOL/L — SIGNIFICANT CHANGE UP (ref 22–31)
CREAT SERPL-MCNC: 0.96 MG/DL — SIGNIFICANT CHANGE UP (ref 0.5–1.3)
CRP SERPL-MCNC: 153 MG/L — HIGH (ref 0–4)
EGFR: 57 ML/MIN/1.73M2 — LOW
EOSINOPHIL # BLD AUTO: 0.26 K/UL — SIGNIFICANT CHANGE UP (ref 0–0.5)
EOSINOPHIL NFR BLD AUTO: 2.1 % — SIGNIFICANT CHANGE UP (ref 0–6)
GLUCOSE BLDC GLUCOMTR-MCNC: 196 MG/DL — HIGH (ref 70–99)
GLUCOSE SERPL-MCNC: 110 MG/DL — HIGH (ref 70–99)
HCT VFR BLD CALC: 34.1 % — LOW (ref 34.5–45)
HGB BLD-MCNC: 10.3 G/DL — LOW (ref 11.5–15.5)
IMM GRANULOCYTES NFR BLD AUTO: 1 % — HIGH (ref 0–0.9)
INR BLD: 1.14 RATIO — SIGNIFICANT CHANGE UP (ref 0.85–1.18)
LYMPHOCYTES # BLD AUTO: 1.65 K/UL — SIGNIFICANT CHANGE UP (ref 1–3.3)
LYMPHOCYTES # BLD AUTO: 13.5 % — SIGNIFICANT CHANGE UP (ref 13–44)
MCHC RBC-ENTMCNC: 27.8 PG — SIGNIFICANT CHANGE UP (ref 27–34)
MCHC RBC-ENTMCNC: 30.2 GM/DL — LOW (ref 32–36)
MCV RBC AUTO: 91.9 FL — SIGNIFICANT CHANGE UP (ref 80–100)
MONOCYTES # BLD AUTO: 0.99 K/UL — HIGH (ref 0–0.9)
MONOCYTES NFR BLD AUTO: 8.1 % — SIGNIFICANT CHANGE UP (ref 2–14)
NEUTROPHILS # BLD AUTO: 9.13 K/UL — HIGH (ref 1.8–7.4)
NEUTROPHILS NFR BLD AUTO: 74.6 % — SIGNIFICANT CHANGE UP (ref 43–77)
NRBC # BLD: 0 /100 WBCS — SIGNIFICANT CHANGE UP (ref 0–0)
PLATELET # BLD AUTO: 422 K/UL — HIGH (ref 150–400)
POTASSIUM SERPL-MCNC: 4.1 MMOL/L — SIGNIFICANT CHANGE UP (ref 3.5–5.3)
POTASSIUM SERPL-SCNC: 4.1 MMOL/L — SIGNIFICANT CHANGE UP (ref 3.5–5.3)
PROT SERPL-MCNC: 7.5 G/DL — SIGNIFICANT CHANGE UP (ref 6–8.3)
PROTHROM AB SERPL-ACNC: 12.5 SEC — SIGNIFICANT CHANGE UP (ref 9.5–13)
RBC # BLD: 3.71 M/UL — LOW (ref 3.8–5.2)
RBC # FLD: 15.1 % — HIGH (ref 10.3–14.5)
SODIUM SERPL-SCNC: 140 MMOL/L — SIGNIFICANT CHANGE UP (ref 135–145)
WBC # BLD: 12.23 K/UL — HIGH (ref 3.8–10.5)
WBC # FLD AUTO: 12.23 K/UL — HIGH (ref 3.8–10.5)

## 2024-03-13 PROCEDURE — 93000 ELECTROCARDIOGRAM COMPLETE: CPT | Mod: PD

## 2024-03-13 PROCEDURE — 99285 EMERGENCY DEPT VISIT HI MDM: CPT | Mod: FS

## 2024-03-13 PROCEDURE — 99223 1ST HOSP IP/OBS HIGH 75: CPT | Mod: GC

## 2024-03-13 PROCEDURE — 99215 OFFICE O/P EST HI 40 MIN: CPT | Mod: 25

## 2024-03-13 RX ORDER — LEVOTHYROXINE SODIUM 125 MCG
125 TABLET ORAL
Refills: 0 | Status: DISCONTINUED | OUTPATIENT
Start: 2024-03-13 | End: 2024-03-14

## 2024-03-13 RX ORDER — DEXTROSE 50 % IN WATER 50 %
25 SYRINGE (ML) INTRAVENOUS ONCE
Refills: 0 | Status: DISCONTINUED | OUTPATIENT
Start: 2024-03-13 | End: 2024-03-19

## 2024-03-13 RX ORDER — LETROZOLE 2.5 MG/1
1 TABLET, FILM COATED ORAL
Qty: 0 | Refills: 0 | DISCHARGE

## 2024-03-13 RX ORDER — LANOLIN ALCOHOL/MO/W.PET/CERES
3 CREAM (GRAM) TOPICAL AT BEDTIME
Refills: 0 | Status: DISCONTINUED | OUTPATIENT
Start: 2024-03-13 | End: 2024-03-19

## 2024-03-13 RX ORDER — FLUTICASONE PROPIONATE 50 MCG
1 SPRAY, SUSPENSION NASAL
Qty: 0 | Refills: 0 | DISCHARGE

## 2024-03-13 RX ORDER — TRIAMTERENE/HYDROCHLOROTHIAZID 75 MG-50MG
1 TABLET ORAL
Qty: 0 | Refills: 0 | DISCHARGE

## 2024-03-13 RX ORDER — SODIUM CHLORIDE 9 MG/ML
1000 INJECTION, SOLUTION INTRAVENOUS
Refills: 0 | Status: DISCONTINUED | OUTPATIENT
Start: 2024-03-13 | End: 2024-03-19

## 2024-03-13 RX ORDER — CHOLECALCIFEROL (VITAMIN D3) 125 MCG
2000 CAPSULE ORAL DAILY
Refills: 0 | Status: DISCONTINUED | OUTPATIENT
Start: 2024-03-13 | End: 2024-03-19

## 2024-03-13 RX ORDER — LEVOTHYROXINE SODIUM 125 MCG
2 TABLET ORAL
Refills: 0 | DISCHARGE

## 2024-03-13 RX ORDER — DEXTROSE 50 % IN WATER 50 %
15 SYRINGE (ML) INTRAVENOUS ONCE
Refills: 0 | Status: DISCONTINUED | OUTPATIENT
Start: 2024-03-13 | End: 2024-03-19

## 2024-03-13 RX ORDER — ACETAMINOPHEN WITH CODEINE 300MG-30MG
1 TABLET ORAL
Refills: 0 | Status: DISCONTINUED | OUTPATIENT
Start: 2024-03-13 | End: 2024-03-19

## 2024-03-13 RX ORDER — DEXTROSE 50 % IN WATER 50 %
12.5 SYRINGE (ML) INTRAVENOUS ONCE
Refills: 0 | Status: DISCONTINUED | OUTPATIENT
Start: 2024-03-13 | End: 2024-03-19

## 2024-03-13 RX ORDER — INSULIN LISPRO 100/ML
VIAL (ML) SUBCUTANEOUS EVERY 6 HOURS
Refills: 0 | Status: DISCONTINUED | OUTPATIENT
Start: 2024-03-13 | End: 2024-03-17

## 2024-03-13 RX ORDER — GABAPENTIN 400 MG/1
300 CAPSULE ORAL
Refills: 0 | DISCHARGE

## 2024-03-13 RX ORDER — TRIAMTERENE/HYDROCHLOROTHIAZID 75 MG-50MG
1 TABLET ORAL DAILY
Refills: 0 | Status: DISCONTINUED | OUTPATIENT
Start: 2024-03-13 | End: 2024-03-15

## 2024-03-13 RX ORDER — GLUCAGON INJECTION, SOLUTION 0.5 MG/.1ML
1 INJECTION, SOLUTION SUBCUTANEOUS ONCE
Refills: 0 | Status: DISCONTINUED | OUTPATIENT
Start: 2024-03-13 | End: 2024-03-19

## 2024-03-13 RX ORDER — CHOLECALCIFEROL (VITAMIN D3) 125 MCG
1 CAPSULE ORAL
Qty: 0 | Refills: 0 | DISCHARGE

## 2024-03-13 RX ORDER — LEVOTHYROXINE SODIUM 125 MCG
125 TABLET ORAL DAILY
Refills: 0 | Status: DISCONTINUED | OUTPATIENT
Start: 2024-03-13 | End: 2024-03-19

## 2024-03-13 RX ORDER — ATORVASTATIN CALCIUM 80 MG/1
10 TABLET, FILM COATED ORAL AT BEDTIME
Refills: 0 | Status: DISCONTINUED | OUTPATIENT
Start: 2024-03-13 | End: 2024-03-19

## 2024-03-13 RX ORDER — LEVOTHYROXINE SODIUM 125 MCG
1 TABLET ORAL
Qty: 0 | Refills: 0 | DISCHARGE

## 2024-03-13 RX ORDER — GABAPENTIN 400 MG/1
300 CAPSULE ORAL THREE TIMES A DAY
Refills: 0 | Status: DISCONTINUED | OUTPATIENT
Start: 2024-03-13 | End: 2024-03-19

## 2024-03-13 RX ORDER — PANTOPRAZOLE SODIUM 20 MG/1
40 TABLET, DELAYED RELEASE ORAL
Refills: 0 | Status: DISCONTINUED | OUTPATIENT
Start: 2024-03-13 | End: 2024-03-19

## 2024-03-13 RX ADMIN — Medication 3 MILLIGRAM(S): at 22:55

## 2024-03-13 RX ADMIN — Medication 100 MILLIGRAM(S): at 18:58

## 2024-03-13 RX ADMIN — ATORVASTATIN CALCIUM 10 MILLIGRAM(S): 80 TABLET, FILM COATED ORAL at 22:57

## 2024-03-13 RX ADMIN — GABAPENTIN 300 MILLIGRAM(S): 400 CAPSULE ORAL at 22:55

## 2024-03-13 NOTE — REVIEW OF SYSTEMS
[Fatigue] : fatigue [Vision Problems] : vision problems [Shortness Of Breath] : shortness of breath [Dyspnea on Exertion] : dyspnea on exertion [Constipation] : constipation [Negative] : Heme/Lymph

## 2024-03-13 NOTE — CONSULT NOTE ADULT - SUBJECTIVE AND OBJECTIVE BOX
Patient is a 89y old  Female who presents with a chief complaint of     HPI:       PAST MEDICAL & SURGICAL HISTORY:  Skin cancer      Hypothyroidism      Bronchitis      Pneumonia      Chronic cough      Osteoarthritis      Environmental allergies      Vitamin D deficiency      Hyperlipidemia      Class 2 obesity with body mass index (BMI) of 39.0 to 39.9 in adult      Cancer of female breast  - left breast- tx with surgery, radiation and Letrozole      DDD (degenerative disc disease), lumbar      Primary immunodeficiency disorder  being treated with Hizentra      H/O actinic keratosis      H/O seborrheic keratosis      S/P cataract surgery  approx 2006- bilateral      S/P appendectomy  1950      S/P partial mastectomy, left  2012      S/P epidural steroid injection  x2- Dr. Martin- for Lumbar Disc Disease      S/P skin cancer resection  x11  FHx: cerebral aneurysm  mother-  @ age 66    Family history of hypertension in mother      Family history of lung cancer  sister-     Family history of lymphoma  brother-    Family history of hypertension in son  son- living - age 57yo    Family history of ulcerative colitis  son- living- age 56yo        Vital Signs Last 24 Hrs  T(C): 36.9 (13 Mar 2024 16:20), Max: 36.9 (13 Mar 2024 16:20)  T(F): 98.5 (13 Mar 2024 16:20), Max: 98.5 (13 Mar 2024 16:20)  HR: 87 (13 Mar 2024 16:20) (87 - 87)  BP: 131/66 (13 Mar 2024 16:20) (131/66 - 132/68)  BP(mean): --  RR: 16 (13 Mar 2024 16:20) (16 - 17)  SpO2: 96% (13 Mar 2024 16:20) (96% - 100%)    Parameters below as of 13 Mar 2024 16:20  Patient On (Oxygen Delivery Method): room air        PHYSICAL EXAM:  Constitutional: well developed, well nourished, NAD  Eyes: anicteric  ENMT: normal facies, symmetric  Neck: supple  Respiratory: unlabored breathing  Cardiovascular: RRR  Extremities: FROM, warm  Neurological: intact, non-focal  Skin: no gross lesions  Lymph Nodes: no gross adenopathy  Musculoskeletal: equal strength bilateral upper and lower extremities  Psychiatric: oriented x 3; appropriate  Rectal:        LABS:                        10.3   12.23 )-----------( 422      ( 13 Mar 2024 16:36 )             34.1     03-13    140  |  98  |  35<H>  ----------------------------<  110<H>  4.1   |  28  |  0.96    Ca    10.6<H>      13 Mar 2024 16:36    TPro  7.5  /  Alb  3.6  /  TBili  0.4  /  DBili  x   /  AST  12  /  ALT  6<L>  /  AlkPhos  74  03-13    PT/INR - ( 13 Mar 2024 16:36 )   PT: 12.5 sec;   INR: 1.14 ratio         PTT - ( 13 Mar 2024 16:36 )  PTT:34.1 sec  Urinalysis Basic - ( 13 Mar 2024 16:36 )    Color: x / Appearance: x / SG: x / pH: x  Gluc: 110 mg/dL / Ketone: x  / Bili: x / Urobili: x   Blood: x / Protein: x / Nitrite: x   Leuk Esterase: x / RBC: x / WBC x   Sq Epi: x / Non Sq Epi: x / Bacteria: x        RADIOLOGY & ADDITIONAL STUDIES: HPI:   Patient is a 89y old  Female who presents with a chief complaint of painless R eye vision loss that started last night. Around a week and a half ago patient presented to [facility] c/o L eye vision changes that has since recovered spontaneously 3-4 days ago. At this time a MRI was ordered with no remarkable changes in regards to vision loss. patient has seen an ophthalmologist for f/u and was told she was optic nerve edema. She also complains of jaw claudication sx for the past 2-3 weeks with headaches, Denies any chest pain, abdominal pain, syncope.     PAST MEDICAL & SURGICAL HISTORY:  Skin cancer      Hypothyroidism      Bronchitis      Pneumonia      Chronic cough      Osteoarthritis      Environmental allergies      Vitamin D deficiency      Hyperlipidemia      Class 2 obesity with body mass index (BMI) of 39.0 to 39.9 in adult      Cancer of female breast  - left breast- tx with surgery, radiation and Letrozole      DDD (degenerative disc disease), lumbar      Primary immunodeficiency disorder  being treated with Hizentra      H/O actinic keratosis      H/O seborrheic keratosis      S/P cataract surgery  approx 2006- bilateral      S/P appendectomy  1950      S/P partial mastectomy, left  2012      S/P epidural steroid injection  x2- Dr. Martin- for Lumbar Disc Disease      S/P skin cancer resection  x11  FHx: cerebral aneurysm  mother-  @ age 66    Family history of hypertension in mother      Family history of lung cancer  sister-     Family history of lymphoma  brother-    Family history of hypertension in son  son- living - age 59yo    Family history of ulcerative colitis  son- living- age 56yo        Vital Signs Last 24 Hrs  T(C): 36.9 (13 Mar 2024 16:20), Max: 36.9 (13 Mar 2024 16:20)  T(F): 98.5 (13 Mar 2024 16:20), Max: 98.5 (13 Mar 2024 16:20)  HR: 87 (13 Mar 2024 16:20) (87 - 87)  BP: 131/66 (13 Mar 2024 16:20) (131/66 - 132/68)  BP(mean): --  RR: 16 (13 Mar 2024 16:20) (16 - 17)  SpO2: 96% (13 Mar 2024 16:20) (96% - 100%)    Parameters below as of 13 Mar 2024 16:20  Patient On (Oxygen Delivery Method): room air        PHYSICAL EXAM:  Constitutional: well developed, well nourished, NAD  Eyes: anicteric  ENMT: normal facies, symmetric  Neck: supple  Respiratory: unlabored breathing with equal chest wall expansion.   Cardiovascular: RRR  Extremities: FROM, warm   Neurological: intact, non-focal  Skin: no gross lesions  Psychiatric: oriented x 3; appropriate      LABS:                        10.3   12.23 )-----------( 422      ( 13 Mar 2024 16:36 )             34.1     03-13    140  |  98  |  35<H>  ----------------------------<  110<H>  4.1   |  28  |  0.96    Ca    10.6<H>      13 Mar 2024 16:36    TPro  7.5  /  Alb  3.6  /  TBili  0.4  /  DBili  x   /  AST  12  /  ALT  6<L>  /  AlkPhos  74  03-13    PT/INR - ( 13 Mar 2024 16:36 )   PT: 12.5 sec;   INR: 1.14 ratio         PTT - ( 13 Mar 2024 16:36 )  PTT:34.1 sec  Urinalysis Basic - ( 13 Mar 2024 16:36 )    Color: x / Appearance: x / SG: x / pH: x  Gluc: 110 mg/dL / Ketone: x  / Bili: x / Urobili: x   Blood: x / Protein: x / Nitrite: x   Leuk Esterase: x / RBC: x / WBC x   Sq Epi: x / Non Sq Epi: x / Bacteria: x        RADIOLOGY & ADDITIONAL STUDIES: HPI:   Patient is a 89y old  Female who presents with a chief complaint of painless R eye vision loss that started last night. Around a week and a half ago patient presented to [facility] c/o L eye vision changes that has since recovered spontaneously 3-4 days ago. At this time a MRI was ordered with no remarkable changes in regards to vision loss. Ophthalmology was consulted at this time and she states she was told she was optic nerve edema. She also complains of jaw claudication sx for the past 2-3 weeks with headaches. Denies any chest pain, abdominal pain, syncope, scalp tenderness.     PAST MEDICAL & SURGICAL HISTORY:  Skin cancer      Hypothyroidism      Bronchitis      Pneumonia      Chronic cough      Osteoarthritis      Environmental allergies      Vitamin D deficiency      Hyperlipidemia    + anti- cardiolipin antibody       Class 2 obesity with body mass index (BMI) of 39.0 to 39.9 in adult      Cancer of female breast  - left breast- tx with surgery, radiation and Letrozole      DDD (degenerative disc disease), lumbar      Primary immunodeficiency disorder  being treated with Hizentra      H/O actinic keratosis      H/O seborrheic keratosis      S/P cataract surgery  approx 2006- bilateral      S/P appendectomy  1950      S/P partial mastectomy, left  2012      S/P epidural steroid injection  x2- Dr. Martin- for Lumbar Disc Disease      S/P skin cancer resection  x11  FHx: cerebral aneurysm  mother-  @ age 66    Family history of hypertension in mother      Family history of lung cancer  sister-     Family history of lymphoma  brother-    Family history of hypertension in son  son- living - age 57yo    Family history of ulcerative colitis  son- living- age 58yo        Vital Signs Last 24 Hrs  T(C): 36.9 (13 Mar 2024 16:20), Max: 36.9 (13 Mar 2024 16:20)  T(F): 98.5 (13 Mar 2024 16:20), Max: 98.5 (13 Mar 2024 16:20)  HR: 87 (13 Mar 2024 16:20) (87 - 87)  BP: 131/66 (13 Mar 2024 16:20) (131/66 - 132/68)  BP(mean): --  RR: 16 (13 Mar 2024 16:20) (16 - 17)  SpO2: 96% (13 Mar 2024 16:20) (96% - 100%)    Parameters below as of 13 Mar 2024 16:20  Patient On (Oxygen Delivery Method): room air        PHYSICAL EXAM:  Constitutional: well developed, well nourished, NAD  Eyes: anicteric  Respiratory: unlabored breathing with equal chest wall expansion.   Extremities: warm with slight edema b/l  Skin: b/l lower extremity dry, non erythematous skin lesions.  Psychiatric: AAO x 3      LABS:                        10.3   12.23 )-----------( 422      ( 13 Mar 2024 16:36 )             34.1     03-13    140  |  98  |  35<H>  ----------------------------<  110<H>  4.1   |  28  |  0.96    Ca    10.6<H>      13 Mar 2024 16:36    TPro  7.5  /  Alb  3.6  /  TBili  0.4  /  DBili  x   /  AST  12  /  ALT  6<L>  /  AlkPhos  74  03-13    PT/INR - ( 13 Mar 2024 16:36 )   PT: 12.5 sec;   INR: 1.14 ratio         PTT - ( 13 Mar 2024 16:36 )  PTT:34.1 sec  Urinalysis Basic - ( 13 Mar 2024 16:36 )    Color: x / Appearance: x / SG: x / pH: x  Gluc: 110 mg/dL / Ketone: x  / Bili: x / Urobili: x   Blood: x / Protein: x / Nitrite: x   Leuk Esterase: x / RBC: x / WBC x   Sq Epi: x / Non Sq Epi: x / Bacteria: x        RADIOLOGY & ADDITIONAL STUDIES:

## 2024-03-13 NOTE — H&P ADULT - PROBLEM SELECTOR PLAN 3
Can be iso Hizentra, stable from prior hospitalization. Normocytic  - B12, folate wnl at previous hospitalization, obtain iron studies  - CTM

## 2024-03-13 NOTE — H&P ADULT - NSHPREVIEWOFSYSTEMS_GEN_ALL_CORE
CONSTITUTIONAL: No fevers, chills, fatigue, dizziness, weakness, unexpected weight change  EYES: No double vision, + blurry vision R eye  ENT: No ear pain, nasal congestion, runny nose, sore throat  CV: No chest pain, palpitations  PULM: No cough, shortness of breath  GI: No abdominal pain, nausea, vomiting, diarrhea, + constipation  : No dysuria, polyuria, hematuria  SKIN: No rashes, swelling  MSK: No muscle aches  NEURO: No headache, paresthesias  PSYCHIATRIC: Denies suicidal, homicidal ideations. No auditory, visual, tactile hallucinations

## 2024-03-13 NOTE — H&P ADULT - PROBLEM SELECTOR PLAN 9
DVT: SCDs iso planned procedure then lovenox subQ  Diet: DASH  Dispo: Per family, patient does not want PT as had injury during PT session in the past. They also do not want any additional aides at home, both sons live with her  GI: pantoprazole 40 PO while on high dose steroids    GOC: Full Code

## 2024-03-13 NOTE — H&P ADULT - PROBLEM SELECTOR PLAN 2
Downtrending from peak of 14 during previous hospitalization, unclear if iso Hizentra use vs. reactive iso GCA?  - CTM; patient with no signs of infection at this time

## 2024-03-13 NOTE — H&P ADULT - PROBLEM SELECTOR PLAN 7
Patient without symptoms of ACS, RCRI of 0, for this non-emergent surgery. Low risk for major adverse cardiac event, not requiring further testing.

## 2024-03-13 NOTE — CONSULT NOTE ADULT - ASSESSMENT
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  - 89F with recent hx of left eye blurry vision, now improving, and now right eye severely decreased vision iso jaw claudication and elevated ESR/CRP  - VA HM OD, 20/40 OS ; IOP wnl OU, tr APD OD   - MRI at [facility] during recent presentation for left eye symptoms was unrevealing for cause of vision loss  - Anterior exam wnl OU   - DFE with ***   - High suspicion for GCA at this time   - Recommend initiation of high dose steroids now (1g IV solumedrol per day for at least 3 days followed by additional steroid and steroid taper per rheum)  - Recommend rheumatology consult  - Pt will need temporal artery biopsy   - ***      Discussed with Dr. Oliveira, chief resident.     Outpatient Follow-up: Patient should follow-up with his/her ophthalmologist or with [facility] Department of Ophthalmology within 1 week of after discharge at:    [address]  [place] [address]  [phone] 89F with PMH HTN, HLD, hypothyroidism and POHx pseudophakia OU presents for acute right eye vision loss iso 2-3 weeks jaw claudication and now with optic disc edema OD>OS.    # Giant cell arteritis, bilateral, high suspicion  - 89F with recent hx of left eye blurry vision, now improving, and now right eye severely decreased vision iso jaw claudication and elevated ESR/CRP  - VA HM OD, 20/40 OS ; IOP wnl OU, tr APD OD   - MRI at [facility] during recent presentation for left eye symptoms was unrevealing for cause of vision loss  - Anterior exam wnl OU   - 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 (right eye), left eye with tr optic disc edema and disc hemorrhage  - High suspicion for GCA at this time   - Recommend initiation of high dose steroids now (1g IV solumedrol per day for at least 3 days followed by additional steroid and steroid taper per rheum)  - Recommend rheumatology consult  - Pt will need temporal artery biopsy, please consult vascular surgery  - Ophtho to follow     Discussed with Dr. Oliveira, chief resident.     Outpatient Follow-up: Patient should follow-up with his/her ophthalmologist or with [facility] Department of Ophthalmology within 1 week of after discharge at:    [address]  [place] [address]  [phone] 89F with PMH HTN, HLD, hypothyroidism and POHx pseudophakia OU presents for acute right eye vision loss iso 2-3 weeks jaw claudication and now with optic disc edema OD>OS.    # Giant cell arteritis, bilateral, high suspicion  - 89F with recent hx of left eye blurry vision, now improving, and now right eye severely decreased vision iso jaw claudication and elevated ESR/CRP  - VA HM OD, 20/40 OS ; IOP wnl OU, tr APD OD   - MRI at [facility] during recent presentation for left eye symptoms was unrevealing for cause of vision loss  - Anterior exam wnl OU   - 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 (right eye), left eye with tr optic disc edema and disc hemorrhage  - High suspicion for GCA at this time   - 3/6 labs remarkable for  and   - Current presentation labs with thrombocytosis, , ESR pending   - Recommend initiation of high dose steroids now (1g IV solumedrol per day for at least 3 days followed by additional steroid and steroid taper per rheum)  - Recommend rheumatology consult  - Pt will need temporal artery biopsy, please consult vascular surgery  - Ophtho to follow     Discussed with Dr. Oliveira, chief resident.     Outpatient Follow-up: Patient should follow-up with his/her ophthalmologist or with [facility] Department of Ophthalmology within 1 week of after discharge at:    [address]  [place] [address]  [phone]

## 2024-03-13 NOTE — H&P ADULT - ASSESSMENT
Ms. 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 Workup with elevated ESR/CRP and ophthalmologic exam concerning for GCA, started on steroids.

## 2024-03-13 NOTE — PATIENT PROFILE ADULT - SURGICAL SITE INCISION
Contact Information: If you have any questions, concerns, pended studies, tests and/or procedures, or emergencies regarding your inpatient behavioral health visit. Please contact [place] [facility] [phone] and ask to speak to a , nurse or physician. A contact is available 24 hours/ 7 days a week at this number.     Summary of Procedures Performed During your Stay:  Below is a list of major procedures performed during your hospital stay and a summary of results:  - No major procedures performed.    Pending Studies (From admission, onward)      None          Please follow up on the above pending studies with your PCP and/or referring provider.   no

## 2024-03-13 NOTE — ED ADULT NURSE REASSESSMENT NOTE - NS ED NURSE REASSESS COMMENT FT1
Patient ordered IVPB Solumedrol, not available in ED pyxis. Contacted ED pharmacy who will send medication to [facility].

## 2024-03-13 NOTE — H&P ADULT - NSHPPHYSICALEXAM_GEN_ALL_CORE
GENERAL: Thin woman, sitting in bed in no acute distress  EYES: Pupils dilated iso ophthalmology exam, pt notes blurriness present in R eye, L eye back to normal  HENT: NC/AT, moist mucous membranes  NECK: Supple, trachea midline  LUNG: Nonlabored respirations, b/l crackles at bases  CV: RRR, Pulses- Radial: 2+ b/l  ABDOMEN: Nondistended, nontender  MSK: Numerous "actinic keratosis" (per patient, diagnosed by dermatologist) on b/l lower extremities, varicose veins, b/l ankle swelling  SKIN: No rashes, bruises  NEURO: AAOx4 (to person, place, time, event), no tremor, steady gait with walker  PSYCH: Normal mood and affect

## 2024-03-13 NOTE — H&P ADULT - HISTORY OF PRESENT ILLNESS
Ms. 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 At [facility], MR orbits were negative, MR brain with meningioma. Ophthalmology sent workup for rheumatologic/autoimmune causes, and as her symptoms improved, she was discharged with recommended outpatient ophthalmologic and rheumatologic workup. Her left eye changes improved; however, now with right eye blurry vision without pain. Also with 2-3 weeks jaw claudication, worsens with chewing. Denies fevers, chest pain, shortness of breath, abdominal pain, changes in urination. Has chronic leg swelling, last BM 3 days ago.

## 2024-03-13 NOTE — ED PROVIDER NOTE - PROGRESS NOTE DETAILS
Ophtho aware, will come eval. Rica Arreola PA-C Surgery aware, will come eval. Rica Arreola PA-C Surgery paged. Awaiting call back. Rica Arreola PA-C

## 2024-03-13 NOTE — H&P ADULT - PROBLEM SELECTOR PLAN 1
B/l eye changes/jaw pain; R eye now improved, no steroids received, high ESR/CRP 3/6  -Ophthalmology on board, appreciate recs  - Per ophtho:  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 (right eye), left eye with tr optic disc edema and disc hemorrhage, therefore high suspicion for GCA  - Started on 1G IV solumedrol qd for 3 days  - Will obtain rheumatology consult  - Vascular surgery on board for temporal artery biopsy, want rheumatology to weigh in prior to procedure as well as medical/cardiac pre-op eval

## 2024-03-13 NOTE — ED ADULT NURSE NOTE - NSFALLHARMRISKINTERV_ED_ALL_ED

## 2024-03-13 NOTE — HISTORY OF PRESENT ILLNESS
[Family Member] : family member [FreeTextEntry1] : hospital follow up  [de-identified] : 89 Y F with pmh of primary immunodeficiency on Hizentra, HTN, HLD, hypothyroid, CKD3a, PATRICIA not on CPAP, chronic diarrhea. Pt here after being discharged from [facility] 3/10. Son Wilman accompanied her. Patient was admitted after coming to PMD's office 3/6 with low grade fevers, tachycardia, dyspnea, as well as double vision, with decreased vision in left eye. She was previously seen by optometrist for decreased vision and found to have left eye disc edema. Neurology, cardiology, ophthalmology eval'd patient throughout stay. Had extensive workup including CT, CTA, MR MRA, and autoimmune workup. 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 ESR and CRP elevated at 101/174. Bacterial PNA ruled out, suspected viral syndrome, was sent home on 2L NC. 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 Today, patient is saying she has decreased vision in RIGHT eye now. Thinks it happened over last few days, unsure because she hasn't closed her left eye to see just out of right. Thinks the left eye blurry vision has resolved. No headaches, does endorse chronic jaw claudication when eating something hard, but none at rest. No temporal pain. No fevers. Says she feels short of breath with exertion but no chest pain. No new rashes. Son endorses periods of confusion. She says she has not had BM in 4 days but no abd pain.

## 2024-03-13 NOTE — H&P ADULT - PROBLEM SELECTOR PLAN 4
Patient started on high-dose steroids and with diagnosis of pre-diabetes. A1c 6.1  - NATASHA TID AC and qHS

## 2024-03-13 NOTE — ED ADULT NURSE NOTE - OBJECTIVE STATEMENT
88yo F PMH HTN, Pre-DM, breast cancer, complains of vision loss in R eye. Patient sent from PCP. Patient was previously admitted to [facility] 1 week ago for flu-like symptoms and vision loss in L eye. After discharge, L eye vision returned to normal. Today patient developed R eye vision loss. Patient's vision is blurry and unable to see fingers 1 feet from face. L eye vision is normal. Patient denies N/V/D, falls, injuries, dizziness, chest pain, SOB, dysuria, fever, chills.

## 2024-03-13 NOTE — ASSESSMENT
[FreeTextEntry1] : #subacute monocular vision loss OD  -patient unsure of when vision loss occurred because "I've been using both eyes to see" but thinks it was over "last few days" -says left eye vision loss is resolved now -high suspicion for rheumatologic issue occurring; elevated ESR and CRP, sinus tachycardia, respiratory failure of uncertain etiology on 2LNC, elevated anti-cardiolipin IgG, proteinuria, b/l hand arthritis (chronic) -denies temporal headaches, says she always has jaw pain when "chewing hard food like bagels" but nothing when not eating these things- highly doubt temporal arteritis -EKG showing sinus tachycardia; no chest pain -long discussion with son Wilman and his sister who is RN in [place], decision made to go to ER  -patient cannot see my hand waving 1 foot in front of her face; says there's "darkness" -pupils sluggish  -ambulance called -decision made to go to [facility] because I believe patient needs rheum workup -spoke with [facility] ER Referrals Fredo and gave report   **high complexity due to complex decision-making

## 2024-03-13 NOTE — CONSULT NOTE ADULT - ASSESSMENT
PLAN:  - No acute surgical intervention  - Please consult Rheumatology   - If rheumatology thinks TAB is needed, will plan for surgery   - Please obtain / document medical and   cardiac risk stratification and optimization for surgery.       Discussed with Dr José on behalf of Dr Malcolm Davis PGY2  Vascular surgery  [id number]

## 2024-03-13 NOTE — ED ADULT NURSE NOTE - EXTENSIONS OF SELF_ADULT
SUBJECTIVE:   Jeanine Hernandez is a 6 LNADON NunezC  [facility]   None

## 2024-03-13 NOTE — H&P ADULT - PROBLEM SELECTOR PLAN 8
Per patient's son (Favian), patient has had episodes of aggression/agitation in the past few weeks, especially during hospitalization at [place].   - Advised patient's son that steroids may worsen these episodes of agitation  - Will start melatonin as son notes patient has difficulty sleeping; continue home gabapentin  - Patient also taking acetaminophen with codeine at home, per son, "probably more than she should be" (iSTOP in chart) for generalized pain iso degenerative disc disease, knee arthritis

## 2024-03-13 NOTE — PHYSICAL EXAM
[Normal Sclera/Conjunctiva] : normal sclera/conjunctiva [EOMI] : extraocular movements intact [Normal Outer Ear/Nose] : the outer ears and nose were normal in appearance [Normal Oropharynx] : the oropharynx was normal [No JVD] : no jugular venous distention [No Lymphadenopathy] : no lymphadenopathy [Supple] : supple [Thyroid Normal, No Nodules] : the thyroid was normal and there were no nodules present [No Respiratory Distress] : no respiratory distress  [No Accessory Muscle Use] : no accessory muscle use [Clear to Auscultation] : lungs were clear to auscultation bilaterally [Regular Rhythm] : with a regular rhythm [Normal S1, S2] : normal S1 and S2 [No Murmur] : no murmur heard [No Carotid Bruits] : no carotid bruits [No Abdominal Bruit] : a ~M bruit was not heard ~T in the abdomen [No Varicosities] : no varicosities [Pedal Pulses Present] : the pedal pulses are present [No Palpable Aorta] : no palpable aorta [No Extremity Clubbing/Cyanosis] : no extremity clubbing/cyanosis [Soft] : abdomen soft [Non Tender] : non-tender [Non-distended] : non-distended [No Masses] : no abdominal mass palpated [No HSM] : no HSM [Normal Bowel Sounds] : normal bowel sounds [Normal Posterior Cervical Nodes] : no posterior cervical lymphadenopathy [Normal Anterior Cervical Nodes] : no anterior cervical lymphadenopathy [No CVA Tenderness] : no CVA  tenderness [No Spinal Tenderness] : no spinal tenderness [No Joint Swelling] : no joint swelling [Grossly Normal Strength/Tone] : grossly normal strength/tone [Coordination Grossly Intact] : coordination grossly intact [No Focal Deficits] : no focal deficits [Normal Gait] : normal gait [Deep Tendon Reflexes (DTR)] : deep tendon reflexes were 2+ and symmetric [Normal Affect] : the affect was normal [Normal Insight/Judgement] : insight and judgment were intact [de-identified] : ill-appearing, on 2L NC [de-identified] : tachycardic  [de-identified] : pupils sluggish bilaterally  [de-identified] : b/l nonpitting LE edema  [de-identified] : multiple keratoses b/l extremities  [de-identified] : unable to see HM at 2 feet with right eye nor at 20 feet

## 2024-03-13 NOTE — PATIENT PROFILE ADULT - FALL HARM RISK - HARM RISK INTERVENTIONS

## 2024-03-13 NOTE — H&P ADULT - TIME BILLING
Need to interview and examine patient, discuss care with family, review ophthalmology recommendations, review vascular recommendations, provide counseling, coordinate care, place orders, document, personally review imaging and review prior medical records

## 2024-03-13 NOTE — H&P ADULT - PROBLEM/PLAN-2
ACUTE/SICK VISIT:    Nursing notes reviewed and accepted    ASSESSMENT/PLAN:  Katherine was seen today for illness.    Diagnoses and all orders for this visit:    Cough and Fever:  Clinical findings are consistent with Pneumonia.  No effusions or large exudates on CXR. Questionable obscured left heart border.  Will await radiology reading.  However based on clinic course would like to treat for pneumonia despite radiology read.  No wheezing heard in clinic today.  Mom can continue to use the albuterol 2 puffs PRN 4-6 hours if thinks she is wheezing or having shortness of breath.  Return to clinic if fevers persist in 2-3 days.     -     XR CHEST PA AND LATERAL; Future   -     Rapid Influenza PCR negative.   -     amoxicillin (AMOXIL) 400 MG/5ML suspension; Take 6.8 mLs by mouth 2 times daily for 10 days.    Fever in other diseases  -     acetaminophen (TYLENOL) 160 MG/5ML solution 179.2 mg; Take 5.6 mLs by mouth once.       SUBJECTIVE:  HPI:  Katherine Grady is an 3 year old female who presents with Mother with the following chief complaint   Chief Complaint   Patient presents with   • Illness     Wheezing     For 4 weeks, Katherine Grady has had intermittent barky cough that resolves with wheezing, runny nose and congestion.  This time she had a barky cough for 1 days and then loose wet cough following for the next 3 days.  She had a fever yesterday but otherwise seemed ok.  She was evaluated yesterday in clinic.  She was wheezing so Dr Martino gave a treatment and she improved but per mom \"not dramatic\".  She continues to cough and run fevers.  Mom has been trying to give the albuterol.  She has given it 3 times since yesterday, 3 puffs each time.  Katherine is biting on the mask so mom isn't sure if it is going in.  She was up all night coughing.  She has pain with coughing, stating \"owe\" after.  This morning her temperature was 100.5.  Since she saw Dr. Martino yesterday she is \"10 times worse\".  She denies a history of  any other symptoms.    She has a decreased appetite.  She had a small bowl of cereal.  She is taking sips and keeping this down.  She has the normal number of wets but less wet each time.  No cyanosis or distress.  She has ear tubes and mother denies any ear discharge.  She has not had any vomiting, diarrhea, or difficulty breathing.  Mother denies history of wheezing, although patient was prescribed albuterol 11 months ago for a URI (hasn't used it since)    Activity is significantly decreased.    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  Last event over a year ago.    Social Hx:  /School: Yes;   Exposure to someone at /school with similar symptoms: Yes, case RSV and pneumonia  Exposure to someone else at home with similar symptoms:  No    REVIEW OF SYSTEMS see HPI; otherwise denies HEENT, MSK, RESP, CARDIAC, GI, , NEURO or PSYCH Symptoms    Past medical, family, and social history reviewed and updated per family.    Current Outpatient Prescriptions   Medication Sig Dispense Refill   • albuterol 108 (90 Base) MCG/ACT inhaler Inhale 2 puffs into the lungs every 4 hours as needed for Shortness of Breath or Wheezing. 1 Inhaler 1   • Spacer/Aero-Holding Chambers (BREATHERITE SPACER ) Misc To use with inhaler 1 each 0   • amoxicillin (AMOXIL) 400 MG/5ML suspension Take 6.8 mLs by mouth 2 times daily for 10 days. 100 mL 0     No current facility-administered medications for this visit.      ALLERGIES:  No Known Allergies    OBJECTIVE:  PHYSICAL EXAM:  Visit Vitals  Temp (!) 102.6 °F (39.2 °C) (Tympanic)   Wt 12 kg   SpO2 98%     General appearance: alert, in no distress, flushed, appears tired  HEENT:   eyes:  normal without erythema and clear drainage   ears:  Right TM:  normal and tube in canal but not in the TM.          Left TM:  normal and tube present, looks in appropriate position but due to cerumen can't confirm it passes through the TM   nose: clear rhinorrhea and nasal  congestion bilaterally external appearance and nasal mucosa   oropharynx:  mild erythema and tonsillar hypertrophy, 3+   Neck: Normal and no adenopathy at cervical or supraclavicular sites  Lungs:  Expiratory rhonchi left anterior lower hemithorax, without wheezing.  Heart:  no murmurs, clicks, or gallops, tachycardic  Abdomen:  soft, non-tender, Bowel sounds normal, no masses or [id number] noted  Skin:  Skin color, texture, turgor are normal. There are no bruises, rashes or lesions.        Tanika Cortes MD   12/13/2017  1:28 PM                        DISPLAY PLAN FREE TEXT

## 2024-03-13 NOTE — ED ADULT NURSE NOTE - CHIEF COMPLAINT
Personalized Preventive Plan for Naty Morse - 1/9/2023  Medicare offers a range of preventive health benefits. Some of the tests and screenings are paid in full while other may be subject to a deductible, co-insurance, and/or copay. Some of these benefits include a comprehensive review of your medical history including lifestyle, illnesses that may run in your family, and various assessments and screenings as appropriate. After reviewing your medical record and screening and assessments performed today your provider may have ordered immunizations, labs, imaging, and/or referrals for you. A list of these orders (if applicable) as well as your Preventive Care list are included within your After Visit Summary for your review. Other Preventive Recommendations:    A preventive eye exam performed by an eye specialist is recommended every 1-2 years to screen for glaucoma; cataracts, macular degeneration, and other eye disorders. A preventive dental visit is recommended every 6 months. Try to get at least 150 minutes of exercise per week or 10,000 steps per day on a pedometer . Order or download the FREE \"Exercise & Physical Activity: Your Everyday Guide\" from [employer]. Call 1-588.353.7061 or search [employer] online. You need 0034-3913 mg of calcium and 3312-6234 IU of vitamin D per day. It is possible to meet your calcium requirement with diet alone, but a vitamin D supplement is usually necessary to meet this goal.  When exposed to the sun, use a sunscreen that protects against both UVA and UVB radiation with an SPF of 30 or greater. Reapply every 2 to 3 hours or after sweating, drying off with a towel, or swimming. Always wear a seat belt when traveling in a car. Always wear a helmet when riding a bicycle or motorcycle. The patient is a 89y Female complaining of

## 2024-03-13 NOTE — H&P ADULT - NSHPLABSRESULTS_GEN_ALL_CORE
LABS: When present labs, imaging, and ECG were personally reviewed                          10.3   12.23 )-----------( 422      ( 13 Mar 2024 16:36 )             34.1       03-13    140  |  98  |  35<H>  ----------------------------<  110<H>  4.1   |  28  |  0.96    Ca    10.6<H>      13 Mar 2024 16:36    TPro  7.5  /  Alb  3.6  /  TBili  0.4  /  DBili  x   /  AST  12  /  ALT  6<L>  /  AlkPhos  74  03-13       LIVER FUNCTIONS - ( 13 Mar 2024 16:36 )  Alb: 3.6 g/dL / Pro: 7.5 g/dL / ALK PHOS: 74 U/L / ALT: 6 U/L / AST: 12 U/L / GGT: x                    Urinalysis Basic - ( 13 Mar 2024 16:36 )    Color: x / Appearance: x / SG: x / pH: x  Gluc: 110 mg/dL / Ketone: x  / Bili: x / Urobili: x   Blood: x / Protein: x / Nitrite: x   Leuk Esterase: x / RBC: x / WBC x   Sq Epi: x / Non Sq Epi: x / Bacteria: x        PT/INR - ( 13 Mar 2024 16:36 )   PT: 12.5 sec;   INR: 1.14 ratio         PTT - ( 13 Mar 2024 16:36 )  PTT:34.1 sec    Lactate Trend            CAPILLARY BLOOD GLUCOSE            Culture Results:   No growth at 5 days (03-06 @ 11:07)  Culture Results:   No growth at 5 days (03-06 @ 11:07)      RADIOLOGY & ADDITIONAL TESTS:   < from: MR Orbits w/wo IV Cont (03.07.24 @ 14:10) >    IMPRESSION:    1.  ORBITS:   Unremarkable MR of the orbits.    2.  BRAIN:  Meningioma right middle cranial fossa.  Ischemic white matter   disease and atrophy typical for age.    3. ANTERIOR CIRCULATION:   Intracranial atherosclerosis correlates   segments of the internal carotid arteries, mild. Left internal carotid   clinoid segment undersurface tiny contour deformity 0.2 cm saccular   aneurysm versus infundibulum of the left posterior communicating artery   otherwise not identified.    4. POSTERIOR CIRCULATION:  Intact.    --- End of Report ---    < end of copied text >

## 2024-03-13 NOTE — ED PROVIDER NOTE - PHYSICAL EXAMINATION
CONSTITUTIONAL: In no apparent distress. Very pleasant.  ENT: Airway patent, moist mucous membranes.   EYES: Pupils equal, round and reactive to light. EOMI. Conjunctiva normal appearing. OD hand motion only. OS can count fingers.   CARDIAC: Normal rate, regular rhythm.  Heart sounds S1, S2.    RESPIRATORY: Breath sounds clear and equal bilaterally.   GASTROINTESTINAL: Abdomen soft, non-tender, not distended.  MUSCULOSKELETAL: Spine appears normal. Moving all extremities.   NEUROLOGICAL: Alert and oriented x3, 5/5 muscle strength throughout. Speech clear, face symmetric. Grossly normal.

## 2024-03-13 NOTE — H&P ADULT - ATTENDING COMMENTS
I have reviewed the labs and prior imaging.    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 Workup with elevated ESR/CRP and ophthalmologic exam was concerned for GCA/ temporal artery arteritis. No temporal tenderness noted, pt endorses jaw claudication when chewing bagel like items. Appreciate vascular recommendations. Treating w/ IV Steroids for now  -Cont. IV Solumedrol  -Rheumatology consult in AM  -If Rheum recommends, Vascular will perform TA biopsy  -PPI PPx  -Trend Leukocytosis  -Cont. Home meds  -F/u Vascular and Ophthalmology recommendations  -No further work up recommended before low risk procedure  -EKG  -Consider TA doppler  -DVT PPx, SCDs

## 2024-03-13 NOTE — CONSULT NOTE ADULT - SUBJECTIVE AND OBJECTIVE BOX
[facility] DEPARTMENT OF OPHTHALMOLOGY - INITIAL ADULT CONSULT  ----------------------------------------------------------------------------------------------------  Daryl Dumont PGY-2  Available on teams  ----------------------------------------------------------------------------------------------------    HPI: 89F with PMH HTN, HLD, hypothyroidism and POHx pseudophakia OU presents for acute right eye vision loss. Pt reports right eye significantly blurry vision starting last night without pain. Reports left eye was blurry about a week and a half ago for which she reported to [facility] at which point MRI was performed which was unremarkable for cause of vision loss, ESR/CRP were drawn at that time which were markedly elevated, pt not started on steroids at that time and discharged with rheum follow up. Pt reports bilateral jaw claudication for the past 2-3 weeks. Denies scalp tenderness, temporal headaches or transient visual loss prior to presenting to [facility]. Reports left eye vision normalized about 3-4 days ago without intervention. Denies trauma.     PAST MEDICAL & SURGICAL HISTORY:  Skin cancer      Hypothyroidism      Bronchitis      Pneumonia      Chronic cough      Osteoarthritis      Environmental allergies      Vitamin D deficiency      Hyperlipidemia      Class 2 obesity with body mass index (BMI) of 39.0 to 39.9 in adult      Cancer of female breast  - left breast- tx with surgery, radiation and Letrozole      DDD (degenerative disc disease), lumbar      Primary immunodeficiency disorder  being treated with Hizentra      H/O actinic keratosis      H/O seborrheic keratosis      S/P cataract surgery  approx 2006- bilateral      S/P appendectomy  1950      S/P partial mastectomy, left  2012      S/P epidural steroid injection  x2- Dr. Martin- for Lumbar Disc Disease      S/P skin cancer resection  x11        Past Ocular History: Pseudophakia OU   Ophthalmic Medications: None  FAMILY HISTORY:  FH: dementia  father-     FHx: cerebral aneurysm  mother-  @ age 66    Family history of hypertension in mother      Family history of lung cancer  sister-     Family history of lymphoma  brother-    Family history of hypertension in son  son- living - age 59yo    Family history of ulcerative colitis  son- living- age 56yo    MEDICATIONS  (STANDING):    MEDICATIONS  (PRN):    Allergies & Intolerances:   soft shell seafood (Unknown)  Vinegar- hands break out in a rash (Rash)  No Known Drug Allergies  Shrimp (Hives)    Review of Systems:  Constitutional: No fever, chills  Eyes: See HPI   Neuro: No tremors  Cardiovascular: No chest pain, palpitations  Respiratory: No SOB, no cough  GI: No nausea, vomiting, abdominal pain  : No dysuria  Skin: no rash  Psych: no depression  Endocrine: no polyuria, polydipsia  Heme/lymph: no swelling    VITALS: T(C): --  T(F): --  HR: 87 (24 @ 15:47) (87 - 87)  BP: 132/68 (24 @ 15:47) (132/68 - 132/68)  RR:  (17 - 17)  SpO2:  (100% - 100%)  Wt(kg): --  General: AAO x 3, appropriate mood and affect    Ophthalmology Exam:  Visual acuity (cc with +2.50 readers): HM OD ; 20/40 OS PHNI   Pupils: pupils sluggishly reactive OU, possible tr APD OD  Ttono: 9 OD 11 OS   Extraocular movements (EOMs): Full OU, no pain, no diplopia  Confrontational Visual Field (CVF): Grossly full with dynamic hand movements OD ; Full OS  Color Plates: MAYITO OD 2/2 VA ; 12/12 OS     Pen Light Exam (PLE)  External: Flat OU  Lids/Lashes/Lacrimal Ducts: Flat OU    Sclera/Conjunctiva: W+Q OU  Cornea: Cl OU  Anterior Chamber: D+F OU    Iris: Flat OU  Lens: Cl OU    Fundus Exam: dilated with 1% tropicamide and 2.5% phenylephrine  Approval obtained from primary team for dilation  Patient aware that pupils can remained dilated for at least 4-6 hours  Exam performed with 20D lens    Vitreous: wnl OU  Disc, cup/disc: sharp and pink, 0.4 OU ***   Macula: wnl OU  Vessels: wnl OU  Periphery: wnl OU    Labs/Imaging:     MR BRAIN WAW IC   ORDERED BY: RIO PLATT     ACC: [id number] EXAM:  MR ORBITS ONLY WAWIC   ORDERED BY: RIO PLATT     ACC: [id number] EXAM:  MR ANGIO BRAIN   ORDERED BY: RIO PLATT     PROCEDURE DATE:  2024      IMPRESSION:    1.  ORBITS:   Unremarkable MR of the orbits.    2.  BRAIN:  Meningioma right middle cranial fossa.  Ischemic white matter   disease and atrophy typical for age.    3. ANTERIOR CIRCULATION:   Intracranial atherosclerosis correlates   segments of the internal carotid arteries, mild. Left internal carotid   clinoid segment undersurface tiny contour deformity 0.2 cm saccular   aneurysm versus infundibulum of the left posterior communicating artery   otherwise not identified.   [facility] DEPARTMENT OF OPHTHALMOLOGY - INITIAL ADULT CONSULT  ----------------------------------------------------------------------------------------------------  Daryl Dumont PGY-2  Available on teams  ----------------------------------------------------------------------------------------------------    HPI: 89F with PMH HTN, HLD, hypothyroidism and POHx pseudophakia OU presents for acute right eye vision loss. Pt reports right eye significantly blurry vision starting last night without pain. Reports left eye was blurry about a week and a half ago for which she reported to [facility] at which point MRI was performed which was unremarkable for cause of vision loss, ESR/CRP were drawn at that time which were markedly elevated, pt not started on steroids at that time and discharged with rheum follow up. Pt reports bilateral jaw claudication for the past 2-3 weeks. Denies scalp tenderness, temporal headaches or transient visual loss prior to presenting to [facility]. Reports left eye vision normalized about 3-4 days ago without intervention. Denies trauma.     PAST MEDICAL & SURGICAL HISTORY:  Skin cancer      Hypothyroidism      Bronchitis      Pneumonia      Chronic cough      Osteoarthritis      Environmental allergies      Vitamin D deficiency      Hyperlipidemia      Class 2 obesity with body mass index (BMI) of 39.0 to 39.9 in adult      Cancer of female breast  - left breast- tx with surgery, radiation and Letrozole      DDD (degenerative disc disease), lumbar      Primary immunodeficiency disorder  being treated with Hizentra      H/O actinic keratosis      H/O seborrheic keratosis      S/P cataract surgery  approx 2006- bilateral      S/P appendectomy  1950      S/P partial mastectomy, left  2012      S/P epidural steroid injection  x2- Dr. Martin- for Lumbar Disc Disease      S/P skin cancer resection  x11        Past Ocular History: Pseudophakia OU   Ophthalmic Medications: None  FAMILY HISTORY:  FH: dementia  father-     FHx: cerebral aneurysm  mother-  @ age 66    Family history of hypertension in mother      Family history of lung cancer  sister-     Family history of lymphoma  brother-    Family history of hypertension in son  son- living - age 59yo    Family history of ulcerative colitis  son- living- age 58yo    MEDICATIONS  (STANDING):    MEDICATIONS  (PRN):    Allergies & Intolerances:   soft shell seafood (Unknown)  Vinegar- hands break out in a rash (Rash)  No Known Drug Allergies  Shrimp (Hives)    Review of Systems:  Constitutional: No fever, chills  Eyes: See HPI   Neuro: No tremors  Cardiovascular: No chest pain, palpitations  Respiratory: No SOB, no cough  GI: No nausea, vomiting, abdominal pain  : No dysuria  Skin: no rash  Psych: no depression  Endocrine: no polyuria, polydipsia  Heme/lymph: no swelling    VITALS: T(C): --  T(F): --  HR: 87 (24 @ 15:47) (87 - 87)  BP: 132/68 (24 @ 15:47) (132/68 - 132/68)  RR:  (17 - 17)  SpO2:  (100% - 100%)  Wt(kg): --  General: AAO x 3, appropriate mood and affect    Ophthalmology Exam:  Visual acuity (cc with +2.50 readers): HM OD ; 20/40 OS PHNI   Pupils: pupils sluggishly reactive OU, possible tr APD OD  Ttono: 9 OD 11 OS   Extraocular movements (EOMs): Full OU, no pain, no diplopia  Confrontational Visual Field (CVF): Grossly full with dynamic hand movements OD ; Full OS  Color Plates: MAYITO OD 2/2 VA ; 12/12 OS     Pen Light Exam (PLE)  External: Flat OU  Lids/Lashes/Lacrimal Ducts: Flat OU    Sclera/Conjunctiva: W+Q OU  Cornea: Cl OU  Anterior Chamber: D+F OU    Iris: Flat OU  Lens: Cl OU    Fundus Exam: dilated with 1% tropicamide and 2.5% phenylephrine  Approval obtained from primary team for dilation  Patient aware that pupils can remained dilated for at least 4-6 hours  Exam performed with 20D lens    Vitreous: wnl OU  Disc, cup/disc: OD: Grade I-II optic disc edema, superonasal disc hemorrhage ; OS: temporal disc hemorrhage, tr elevated disc edema  Macula: wnl OU  Vessels: wnl OU  Periphery: wnl OU    Labs/Imaging:     MR BRAIN WAW IC   ORDERED BY: RIO PLATT     ACC: [id number] EXAM:  MR ORBITS ONLY WAWIC   ORDERED BY: RIO PLATT     ACC: [id number] EXAM:  MR ANGIO BRAIN   ORDERED BY: RIO PLATT     PROCEDURE DATE:  2024      IMPRESSION:    1.  ORBITS:   Unremarkable MR of the orbits.    2.  BRAIN:  Meningioma right middle cranial fossa.  Ischemic white matter   disease and atrophy typical for age.    3. ANTERIOR CIRCULATION:   Intracranial atherosclerosis correlates   segments of the internal carotid arteries, mild. Left internal carotid   clinoid segment undersurface tiny contour deformity 0.2 cm saccular   aneurysm versus infundibulum of the left posterior communicating artery   otherwise not identified.

## 2024-03-13 NOTE — ED PROVIDER NOTE - ATTENDING APP SHARED VISIT CONTRIBUTION OF CARE
pt is a 88 y/o female recently dc from [facility] for l eye vision changes with full work up including MRI of the brain/orbits with Meningioma right middle cranial fossa.  Ischemic white matter disease and atrophy typical for age.  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

## 2024-03-14 LAB
ANION GAP SERPL CALC-SCNC: 15 MMOL/L — SIGNIFICANT CHANGE UP (ref 5–17)
APTT BLD: 29 SEC — SIGNIFICANT CHANGE UP (ref 24.5–35.6)
BUN SERPL-MCNC: 32 MG/DL — HIGH (ref 7–23)
CALCIUM SERPL-MCNC: 10.4 MG/DL — SIGNIFICANT CHANGE UP (ref 8.4–10.5)
CHLORIDE SERPL-SCNC: 97 MMOL/L — SIGNIFICANT CHANGE UP (ref 96–108)
CO2 SERPL-SCNC: 27 MMOL/L — SIGNIFICANT CHANGE UP (ref 22–31)
CREAT SERPL-MCNC: 0.87 MG/DL — SIGNIFICANT CHANGE UP (ref 0.5–1.3)
EGFR: 64 ML/MIN/1.73M2 — SIGNIFICANT CHANGE UP
ERYTHROCYTE [SEDIMENTATION RATE] IN BLOOD: 45 MM/HR — HIGH (ref 0–20)
FERRITIN SERPL-MCNC: 648 NG/ML — HIGH (ref 13–330)
GLUCOSE BLDC GLUCOMTR-MCNC: 126 MG/DL — HIGH (ref 70–99)
GLUCOSE BLDC GLUCOMTR-MCNC: 164 MG/DL — HIGH (ref 70–99)
GLUCOSE BLDC GLUCOMTR-MCNC: 192 MG/DL — HIGH (ref 70–99)
GLUCOSE BLDC GLUCOMTR-MCNC: 195 MG/DL — HIGH (ref 70–99)
GLUCOSE SERPL-MCNC: 205 MG/DL — HIGH (ref 70–99)
HCT VFR BLD CALC: 34.7 % — SIGNIFICANT CHANGE UP (ref 34.5–45)
HGB BLD-MCNC: 10.6 G/DL — LOW (ref 11.5–15.5)
INR BLD: 1.07 RATIO — SIGNIFICANT CHANGE UP (ref 0.85–1.18)
IRON SATN MFR SERPL: 13 % — LOW (ref 14–50)
IRON SATN MFR SERPL: 38 UG/DL — SIGNIFICANT CHANGE UP (ref 30–160)
MAGNESIUM SERPL-MCNC: 1.8 MG/DL — SIGNIFICANT CHANGE UP (ref 1.6–2.6)
MCHC RBC-ENTMCNC: 27.7 PG — SIGNIFICANT CHANGE UP (ref 27–34)
MCHC RBC-ENTMCNC: 30.5 GM/DL — LOW (ref 32–36)
MCV RBC AUTO: 90.8 FL — SIGNIFICANT CHANGE UP (ref 80–100)
NRBC # BLD: 0 /100 WBCS — SIGNIFICANT CHANGE UP (ref 0–0)
PHOSPHATE SERPL-MCNC: 3.2 MG/DL — SIGNIFICANT CHANGE UP (ref 2.5–4.5)
PLATELET # BLD AUTO: 367 K/UL — SIGNIFICANT CHANGE UP (ref 150–400)
POTASSIUM SERPL-MCNC: 4.3 MMOL/L — SIGNIFICANT CHANGE UP (ref 3.5–5.3)
POTASSIUM SERPL-SCNC: 4.3 MMOL/L — SIGNIFICANT CHANGE UP (ref 3.5–5.3)
PROTHROM AB SERPL-ACNC: 11.2 SEC — SIGNIFICANT CHANGE UP (ref 9.5–13)
RBC # BLD: 3.82 M/UL — SIGNIFICANT CHANGE UP (ref 3.8–5.2)
RBC # FLD: 14.9 % — HIGH (ref 10.3–14.5)
SODIUM SERPL-SCNC: 139 MMOL/L — SIGNIFICANT CHANGE UP (ref 135–145)
TIBC SERPL-MCNC: 291 UG/DL — SIGNIFICANT CHANGE UP (ref 220–430)
UIBC SERPL-MCNC: 253 UG/DL — SIGNIFICANT CHANGE UP (ref 110–370)
WBC # BLD: 10.68 K/UL — HIGH (ref 3.8–10.5)
WBC # FLD AUTO: 10.68 K/UL — HIGH (ref 3.8–10.5)

## 2024-03-14 PROCEDURE — 99222 1ST HOSP IP/OBS MODERATE 55: CPT | Mod: GC

## 2024-03-14 PROCEDURE — 99233 SBSQ HOSP IP/OBS HIGH 50: CPT

## 2024-03-14 RX ORDER — CHLORHEXIDINE GLUCONATE 213 G/1000ML
1 SOLUTION TOPICAL DAILY
Refills: 0 | Status: DISCONTINUED | OUTPATIENT
Start: 2024-03-14 | End: 2024-03-19

## 2024-03-14 RX ORDER — LEVOTHYROXINE SODIUM 125 MCG
125 TABLET ORAL
Refills: 0 | Status: DISCONTINUED | OUTPATIENT
Start: 2024-03-14 | End: 2024-03-19

## 2024-03-14 RX ORDER — ONDANSETRON 8 MG/1
4 TABLET, FILM COATED ORAL ONCE
Refills: 0 | Status: COMPLETED | OUTPATIENT
Start: 2024-03-14 | End: 2024-03-14

## 2024-03-14 RX ORDER — NYSTATIN CREAM 100000 [USP'U]/G
1 CREAM TOPICAL
Refills: 0 | Status: DISCONTINUED | OUTPATIENT
Start: 2024-03-14 | End: 2024-03-19

## 2024-03-14 RX ADMIN — Medication 1 TABLET(S): at 23:06

## 2024-03-14 RX ADMIN — Medication 1: at 06:21

## 2024-03-14 RX ADMIN — ATORVASTATIN CALCIUM 10 MILLIGRAM(S): 80 TABLET, FILM COATED ORAL at 23:06

## 2024-03-14 RX ADMIN — NYSTATIN CREAM 1 APPLICATION(S): 100000 CREAM TOPICAL at 17:56

## 2024-03-14 RX ADMIN — GABAPENTIN 300 MILLIGRAM(S): 400 CAPSULE ORAL at 14:22

## 2024-03-14 RX ADMIN — Medication 3 MILLIGRAM(S): at 23:06

## 2024-03-14 RX ADMIN — PANTOPRAZOLE SODIUM 40 MILLIGRAM(S): 20 TABLET, DELAYED RELEASE ORAL at 06:11

## 2024-03-14 RX ADMIN — Medication 1 TABLET(S): at 06:12

## 2024-03-14 RX ADMIN — Medication 1: at 12:58

## 2024-03-14 RX ADMIN — Medication 1 TABLET(S): at 23:54

## 2024-03-14 RX ADMIN — ONDANSETRON 4 MILLIGRAM(S): 8 TABLET, FILM COATED ORAL at 08:59

## 2024-03-14 RX ADMIN — Medication 125 MICROGRAM(S): at 06:11

## 2024-03-14 RX ADMIN — GABAPENTIN 300 MILLIGRAM(S): 400 CAPSULE ORAL at 23:06

## 2024-03-14 RX ADMIN — Medication 2000 UNIT(S): at 12:57

## 2024-03-14 RX ADMIN — GABAPENTIN 300 MILLIGRAM(S): 400 CAPSULE ORAL at 06:11

## 2024-03-14 RX ADMIN — Medication 1: at 00:24

## 2024-03-14 RX ADMIN — NYSTATIN CREAM 1 APPLICATION(S): 100000 CREAM TOPICAL at 06:15

## 2024-03-14 NOTE — CONSULT NOTE ADULT - ASSESSMENT
***consult has been received. 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 At [place], patient received extensive work up including CTA H/N, MRI/MRA Brain and Orbits which was unremarkable. Her L eye sx resolved prior to discharge. On this admission, ophtho evaluation revealed bilateral optic disc edema with hemorrhages (R>L) and was started on Solu-medrol 1g/day 3/13 for concerns for GCA. Rheumatology consulted for evaluation for GCA.    #GCA, currently receiving steroids (dose/dates)/not receiving steroids  -high suspicion  -ophthalmology exam shows bilateral optic disc edema with hemorrhages (R>L)  -imaging studies (CTA H/N, MRI/MRA Brain and Orbits) unremarkable  -ESR/CRP elevated 101/153 on 3/6  -more specific and concerning features such as jaw claudication, lack of b/l temporal pulses, visual deficits are present  -comorbid PMR symptoms are absent  -vascular already consulted for biopsy    PLAN  -with current presentation patient is high risk and will need steroids  -continue with pulse dose steroids 3/13-3/15  -to transition to prednisone 60 mg daily on 3/16  -please order VA duplex carotid with GCA protocol to evaluate for halo sign  -biopsy planning as per vascular  -please continue GI and start PCP prophylaxis while on high dose steroids  -pending biopsy      Plan discussed with attending, Dr. Mccoy. 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 At [place], patient received extensive work up including CTA H/N, MRI/MRA Brain and Orbits which was unremarkable. Her L eye sx resolved prior to discharge. On this admission, ophtho evaluation revealed bilateral optic disc edema with hemorrhages (R>L) and was started on Solu-medrol 1g/day 3/13 for concerns for GCA. Rheumatology consulted for evaluation for GCA.    #GCA, currently receiving steroids (dose/dates)/not receiving steroids  -high suspicion  -ophthalmology exam shows bilateral optic disc edema with hemorrhages (R>L)  -imaging studies (CTA H/N, MRI/MRA Brain and Orbits) unremarkable  -ESR/CRP elevated 101/153 on 3/6  -more specific and concerning features such as jaw claudication, lack of b/l temporal pulses, visual deficits are present  -comorbid PMR symptoms are absent  -vascular already consulted for biopsy    PLAN  -with current presentation patient is high risk and will need steroids  -continue with pulse dose steroids 3/13-3/15  -to transition to prednisone 60 mg daily on 3/16  -please order VA duplex carotid with GCA protocol to evaluate for halo sign  -biopsy planning as per vascular  -please continue GI and start PCP prophylaxis while on high dose steroids  -pending biopsy  -f/u quant-gold, acute hepatitis panel and hep B core ab total      Plan discussed with attending, Dr. Mccoy. 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 At [place], patient received extensive work up including CTA H/N, MRI/MRA Brain and Orbits which was unremarkable. Her L eye sx resolved prior to discharge. On this admission, ophtho evaluation revealed bilateral optic disc edema with hemorrhages (R>L) and was started on Solu-medrol 1g/day 3/13 for concerns for GCA. Rheumatology consulted for evaluation for GCA.    #GCA, currently receiving steroids (dose/dates)/not receiving steroids  -high suspicion  -ophthalmology exam shows bilateral optic disc edema with hemorrhages (R>L)  -imaging studies (CTA H/N, MRI/MRA Brain and Orbits) unremarkable  -ESR/CRP elevated 101/153 on 3/6  -more specific and concerning features such as jaw claudication, lack of b/l temporal pulses, visual deficits are present  -comorbid PMR symptoms are absent  -vascular already consulted for biopsy    PLAN  -with current presentation patient is high risk and will need steroids  -continue with pulse dose steroids 3/13-3/15  -to transition to prednisone 60 mg daily on 3/16  -please order VA duplex carotid with GCA protocol to evaluate for halo sign (should NOT prevent patient from getting right temporal artery biopsy as soon as possible)  -biopsy planning as per vascular (right temporal artery)  -please continue GI and start PCP prophylaxis while on high dose steroids  -f/u quant-gold, acute hepatitis panel and hep B core ab total  -discussed steroid-sparing therapy with Actemra with patient and family (to be started outpatient).    Plan discussed with attending, Dr. Mccoy.

## 2024-03-14 NOTE — PROGRESS NOTE ADULT - SUBJECTIVE AND OBJECTIVE BOX
[facility] DEPARTMENT OF OPHTHALMOLOGY  ------------------------------------------------------------------------------  Fabio Torres MD PGY-3  ------------------------------------------------------------------------------    Interval History: No acute events overnight. Currently on high dose steroids.     MEDICATIONS  (STANDING):  acetaminophen  300 mG/codeine 30 mG 1 Tablet(s) Oral <User Schedule>  atorvastatin 10 milliGRAM(s) Oral at bedtime  chlorhexidine 4% Liquid 1 Application(s) Topical daily  cholecalciferol 2000 Unit(s) Oral daily  dextrose 5%. 1000 milliLiter(s) (100 mL/Hr) IV Continuous <Continuous>  dextrose 5%. 1000 milliLiter(s) (50 mL/Hr) IV Continuous <Continuous>  dextrose 50% Injectable 25 Gram(s) IV Push once  dextrose 50% Injectable 12.5 Gram(s) IV Push once  dextrose 50% Injectable 25 Gram(s) IV Push once  gabapentin 300 milliGRAM(s) Oral three times a day  glucagon  Injectable 1 milliGRAM(s) IntraMuscular once  insulin lispro (ADMELOG) corrective regimen sliding scale   SubCutaneous every 6 hours  levothyroxine 125 MICROGram(s) Oral <User Schedule>  levothyroxine 125 MICROGram(s) Oral daily  melatonin 3 milliGRAM(s) Oral at bedtime  methylPREDNISolone sodium succinate IVPB 1000 milliGRAM(s) IV Intermittent daily  nystatin Powder 1 Application(s) Topical two times a day  pantoprazole    Tablet 40 milliGRAM(s) Oral before breakfast  triamterene 37.5 mG/hydrochlorothiazide 25 mG Tablet 1 Tablet(s) Oral daily    MEDICATIONS  (PRN):  dextrose Oral Gel 15 Gram(s) Oral once PRN Blood Glucose LESS THAN 70 milliGRAM(s)/deciliter      VITALS: T(C): 36.4 (03-14-24 @ 12:44)  T(F): 97.5 (03-14-24 @ 12:44), Max: 98.7 (03-13-24 @ 21:08)  HR: 87 (03-14-24 @ 12:44) (79 - 87)  BP: 143/66 (03-14-24 @ 12:44) (137/63 - 147/64)  RR:  (18 - 18)  SpO2:  (92% - 99%)  Wt(kg): --  General: AAO x 3, appropriate mood and affect    Ophthalmology Exam:  Visual acuity (cc with +2.50 readers): HM OD ; 20/30 OS    Pupils: pupils sluggishly reactive OU,  APD OD  Ttono: 9 OD 11 OS   Extraocular movements (EOMs): Full OU, no pain, no diplopia  Confrontational Visual Field (CVF): Grossly full with dynamic hand movements OD ; Full OS  Color Plates: MAYITO OD 2/2 VA ; 12/12 OS     Pen Light Exam (PLE)  External: Flat OU  Lids/Lashes/Lacrimal Ducts: Flat OU    Sclera/Conjunctiva: W+Q OU  Cornea: Cl OU  Anterior Chamber: D+F OU    Iris: Flat OU  Lens: Cl OU    Fundus Exam: dilated with 1% tropicamide and 2.5% phenylephrine  Approval obtained from primary team for dilation  Patient aware that pupils can remained dilated for at least 4-6 hours  Exam performed with 20D lens    Vitreous: wnl OU  Disc, cup/disc: OD: Grade I-II optic disc edema, superonasal disc hemorrhage ; OS: temporal disc hemorrhage, tr elevated disc edema  Macula: wnl OU  Vessels: wnl OU  Periphery: wnl OU

## 2024-03-14 NOTE — OCCUPATIONAL THERAPY INITIAL EVALUATION ADULT - DIAGNOSIS, OT EVAL
Pt presents with c/o of R eye visual changes impacting functional mobility and independence with ADLs

## 2024-03-14 NOTE — PROGRESS NOTE ADULT - PROBLEM SELECTOR PLAN 3
Can be iso Hizentra, stable from prior hospitalization. Normocytic  - B12, folate wnl at previous hospitalization, obtain iron studies  - CTM Can be iso Hizentra, stable from prior hospitalization. Normocytic, baseline appears to be 9-10 per chart review.   - B12, folate wnl at previous hospitalization, iron studies wnl   - CTM

## 2024-03-14 NOTE — PROGRESS NOTE ADULT - SUBJECTIVE AND OBJECTIVE BOX
***************************************************************  Radhakitty Zepeda, PGY 1   Internal Medicine   ***************************************************************    MARGARITA POWERS  89y  MRN: [id number]    Patient is a 89y old  Female who presents with a chief complaint of Vision changes (13 Mar 2024 20:46)      Subjective: Seen at bedside, nauseous, left eye impairment has resolved, new right eye vision loss with preserved depth perception. Endorsing diffuse myalgias, jaw claudication b/l. Denies fever/chills, emesis, abd pain, dysuria.   Accompanied by daughter at bedside.     MEDICATIONS  (STANDING):  acetaminophen  300 mG/codeine 30 mG 1 Tablet(s) Oral <User Schedule>  atorvastatin 10 milliGRAM(s) Oral at bedtime  cholecalciferol 2000 Unit(s) Oral daily  dextrose 5%. 1000 milliLiter(s) (50 mL/Hr) IV Continuous <Continuous>  dextrose 5%. 1000 milliLiter(s) (100 mL/Hr) IV Continuous <Continuous>  dextrose 50% Injectable 12.5 Gram(s) IV Push once  dextrose 50% Injectable 25 Gram(s) IV Push once  dextrose 50% Injectable 25 Gram(s) IV Push once  gabapentin 300 milliGRAM(s) Oral three times a day  glucagon  Injectable 1 milliGRAM(s) IntraMuscular once  insulin lispro (ADMELOG) corrective regimen sliding scale   SubCutaneous every 6 hours  levothyroxine 125 MICROGram(s) Oral <User Schedule>  levothyroxine 125 MICROGram(s) Oral daily  melatonin 3 milliGRAM(s) Oral at bedtime  methylPREDNISolone sodium succinate IVPB 1000 milliGRAM(s) IV Intermittent daily  nystatin Powder 1 Application(s) Topical two times a day  pantoprazole    Tablet 40 milliGRAM(s) Oral before breakfast  triamterene 37.5 mG/hydrochlorothiazide 25 mG Tablet 1 Tablet(s) Oral daily    MEDICATIONS  (PRN):  dextrose Oral Gel 15 Gram(s) Oral once PRN Blood Glucose LESS THAN 70 milliGRAM(s)/deciliter      Objective:    Vitals: Vital Signs Last 24 Hrs  T(C): 36.5 (03-14-24 @ 05:03), Max: 37.1 (03-13-24 @ 21:08)  T(F): 97.7 (03-14-24 @ 05:03), Max: 98.7 (03-13-24 @ 21:08)  HR: 80 (03-14-24 @ 05:03) (79 - 87)  BP: 147/64 (03-14-24 @ 05:03) (131/66 - 147/64)  BP(mean): --  RR: 18 (03-14-24 @ 05:03) (16 - 18)  SpO2: 95% (03-14-24 @ 05:03) (92% - 100%)            I&O's Summary      PHYSICAL EXAM:  GENERAL: NAD  HEAD:  Atraumatic, Normocephalic.   EYES: EOMI, impaired R visual acuity   CHEST/LUNG: Clear to auscultation bilaterally; No rales, rhonchi, wheezing, or rubs  HEART: Regular rate and rhythm; Systolic  ejection murmur LSB   ABDOMEN: Soft, Nontender, Nondistended;   SKIN: Actinic keratosis of b/l LE   NERVOUS SYSTEM:  Alert & Oriented X3, no focal deficits    LABS:  03-14    139  |  97  |  32<H>  ----------------------------<  205<H>  4.3   |  27  |  0.87  03-13    140  |  98  |  35<H>  ----------------------------<  110<H>  4.1   |  28  |  0.96    Ca    10.4      14 Mar 2024 06:46  Ca    10.6<H>      13 Mar 2024 16:36  Phos  3.2     03-14  Mg     1.8     03-14    TPro  7.5  /  Alb  3.6  /  TBili  0.4  /  DBili  x   /  AST  12  /  ALT  6<L>  /  AlkPhos  74  03-13      PT/INR - ( 14 Mar 2024 06:47 )   PT: 11.2 sec;   INR: 1.07 ratio         PTT - ( 14 Mar 2024 06:47 )  PTT:29.0 sec              Urinalysis Basic - ( 14 Mar 2024 06:46 )    Color: x / Appearance: x / SG: x / pH: x  Gluc: 205 mg/dL / Ketone: x  / Bili: x / Urobili: x   Blood: x / Protein: x / Nitrite: x   Leuk Esterase: x / RBC: x / WBC x   Sq Epi: x / Non Sq Epi: x / Bacteria: x                              10.6   10.68 )-----------( 367      ( 14 Mar 2024 06:46 )             34.7                         10.3   12.23 )-----------( 422      ( 13 Mar 2024 16:36 )             34.1     CAPILLARY BLOOD GLUCOSE      POCT Blood Glucose.: 195 mg/dL (14 Mar 2024 06:19)  POCT Blood Glucose.: 192 mg/dL (14 Mar 2024 05:12)  POCT Blood Glucose.: 196 mg/dL (13 Mar 2024 23:56)      RADIOLOGY & ADDITIONAL TESTS:    Imaging Personally Reviewed:  [x ] YES  [ ] NO    Consultants involved in case:   Consultant(s) Notes Reviewed:  [ x] YES  [ ] NO:   Care Discussed with Consultants/Other Providers [x ] YES  [ ] NO

## 2024-03-14 NOTE — PHYSICAL THERAPY INITIAL EVALUATION ADULT - GENERAL OBSERVATIONS, REHAB EVAL
Rec'd semi-supine in bed in NAD, VSS, +PIVL, daughter at bedside, A&O x3 (self, year, hospital), agreeable to PT.

## 2024-03-14 NOTE — PROGRESS NOTE ADULT - PROBLEM SELECTOR PLAN 4
Patient started on high-dose steroids and with diagnosis of pre-diabetes. Home medication Metformin 500qd. A1c 6.1  - NATASHA TID AC and qHS

## 2024-03-14 NOTE — PROGRESS NOTE ADULT - PROBLEM SELECTOR PLAN 1
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

## 2024-03-14 NOTE — PROGRESS NOTE ADULT - ASSESSMENT
Ms. Pulliam is an 89 YO woman with hx of primary immunodeficiency disorder on Hizentra (weekly, last dose Saturday), breast cancer s/p surgery/radiation on letrozole, pre-DM, HLD, HTN, hypothyroidism, s/p lens replacement in both eyes, recent admission at [facility] for fevers/weakness/SOB, infectious workup negative, also with L eye visual changes during that hospitalization (had for weeks), presenting for visual changes now in her R eye. Workup with elevated ESR/CRP and ophthalmologic exam concerning for GCA, started on steroids.

## 2024-03-14 NOTE — PHYSICAL THERAPY INITIAL EVALUATION ADULT - ADDITIONAL COMMENTS
Pt lives with her 2 sons in a private house with 5 steps to enter, 5 steps within +handrail. Prior to admission, pt was independent with ADLs and ambulation with walker or cane.

## 2024-03-14 NOTE — OCCUPATIONAL THERAPY INITIAL EVALUATION ADULT - STRENGTHENING, PT EVAL
GOAL: Pt will increase UE/LE strength 1 grade for increased safety and independence with ADL task in 3 weeks.

## 2024-03-14 NOTE — ADVANCED PRACTICE NURSE CONSULT - REASON FOR CONSULT
Wound care consult initiated by RN to assess patient's skin for a possible sacral deep tissue injury present on admission     Reason for Admission: Vision changes  History of Present Illness:   Ms. Pulliam is an 89 YO woman with hx of primary immunodeficiency disorder on Hizentra (weekly, last dose Saturday), breast cancer s/p surgery/radiation on letrozole, pre-DM, HLD, HTN, hypothyroidism, s/p lens replacement in both eyes, recent admission at [facility] for fevers/weakness/SOB, infectious workup negative, also with L eye visual changes during that hospitalization (had for weeks), presenting for visual changes now in her R eye. At [facility], MR orbits were negative, MR brain with meningioma. Ophthalmology sent workup for rheumatologic/autoimmune causes, and as her symptoms improved, she was discharged with recommended outpatient ophthalmologic and rheumatologic workup. Her left eye changes improved; however, now with right eye blurry vision without pain. Also with 2-3 weeks jaw claudication, worsens with chewing. Denies fevers, chest pain, shortness of breath, abdominal pain, changes in urination. Has chronic leg swelling, last BM 3 days ago.

## 2024-03-14 NOTE — PROGRESS NOTE ADULT - SUBJECTIVE AND OBJECTIVE BOX
***************************************************************  Radha Zepeda, PGY 1   Internal Medicine   ***************************************************************    MARGARITA POWERS  89y  MRN: [id number]    Patient is a 89y old  Female who presents with a chief complaint of Vision changes (14 Mar 2024 11:39)      Subjective: no events ON. Denies fever, CP, SOB, abn pain, N/V, dysuria. Tolerating diet.      MEDICATIONS  (STANDING):  acetaminophen  300 mG/codeine 30 mG 1 Tablet(s) Oral <User Schedule>  atorvastatin 10 milliGRAM(s) Oral at bedtime  cholecalciferol 2000 Unit(s) Oral daily  dextrose 5%. 1000 milliLiter(s) (50 mL/Hr) IV Continuous <Continuous>  dextrose 5%. 1000 milliLiter(s) (100 mL/Hr) IV Continuous <Continuous>  dextrose 50% Injectable 25 Gram(s) IV Push once  dextrose 50% Injectable 25 Gram(s) IV Push once  dextrose 50% Injectable 12.5 Gram(s) IV Push once  gabapentin 300 milliGRAM(s) Oral three times a day  glucagon  Injectable 1 milliGRAM(s) IntraMuscular once  insulin lispro (ADMELOG) corrective regimen sliding scale   SubCutaneous every 6 hours  levothyroxine 125 MICROGram(s) Oral <User Schedule>  levothyroxine 125 MICROGram(s) Oral daily  melatonin 3 milliGRAM(s) Oral at bedtime  methylPREDNISolone sodium succinate IVPB 1000 milliGRAM(s) IV Intermittent daily  nystatin Powder 1 Application(s) Topical two times a day  pantoprazole    Tablet 40 milliGRAM(s) Oral before breakfast  triamterene 37.5 mG/hydrochlorothiazide 25 mG Tablet 1 Tablet(s) Oral daily    MEDICATIONS  (PRN):  dextrose Oral Gel 15 Gram(s) Oral once PRN Blood Glucose LESS THAN 70 milliGRAM(s)/deciliter      Objective:    Vitals: Vital Signs Last 24 Hrs  T(C): 36.4 (03-14-24 @ 12:44), Max: 37.1 (03-13-24 @ 21:08)  T(F): 97.5 (03-14-24 @ 12:44), Max: 98.7 (03-13-24 @ 21:08)  HR: 87 (03-14-24 @ 12:44) (79 - 87)  BP: 143/66 (03-14-24 @ 12:44) (131/66 - 147/64)  BP(mean): --  RR: 18 (03-14-24 @ 12:44) (16 - 18)  SpO2: 99% (03-14-24 @ 12:44) (92% - 100%)            I&O's Summary      PHYSICAL EXAM:  GENERAL: NAD  HEAD:  Atraumatic, Normocephalic  EYES: EOMI, conjunctiva and sclera clear  CHEST/LUNG: Clear to auscultation bilaterally; No rales, rhonchi, wheezing, or rubs  HEART: Regular rate and rhythm; No murmurs, rubs, or gallops  ABDOMEN: Soft, Nontender, Nondistended;   SKIN: No rashes or lesions  NERVOUS SYSTEM:  Alert & Oriented X3, no focal deficits    LABS:  03-14    139  |  97  |  32<H>  ----------------------------<  205<H>  4.3   |  27  |  0.87  03-13    140  |  98  |  35<H>  ----------------------------<  110<H>  4.1   |  28  |  0.96    Ca    10.4      14 Mar 2024 06:46  Ca    10.6<H>      13 Mar 2024 16:36  Phos  3.2     03-14  Mg     1.8     03-14    TPro  7.5  /  Alb  3.6  /  TBili  0.4  /  DBili  x   /  AST  12  /  ALT  6<L>  /  AlkPhos  74  03-13      PT/INR - ( 14 Mar 2024 06:47 )   PT: 11.2 sec;   INR: 1.07 ratio         PTT - ( 14 Mar 2024 06:47 )  PTT:29.0 sec              Urinalysis Basic - ( 14 Mar 2024 06:46 )    Color: x / Appearance: x / SG: x / pH: x  Gluc: 205 mg/dL / Ketone: x  / Bili: x / Urobili: x   Blood: x / Protein: x / Nitrite: x   Leuk Esterase: x / RBC: x / WBC x   Sq Epi: x / Non Sq Epi: x / Bacteria: x                              10.6   10.68 )-----------( 367      ( 14 Mar 2024 06:46 )             34.7                         10.3   12.23 )-----------( 422      ( 13 Mar 2024 16:36 )             34.1     CAPILLARY BLOOD GLUCOSE      POCT Blood Glucose.: 164 mg/dL (14 Mar 2024 12:04)  POCT Blood Glucose.: 195 mg/dL (14 Mar 2024 06:19)  POCT Blood Glucose.: 192 mg/dL (14 Mar 2024 05:12)  POCT Blood Glucose.: 196 mg/dL (13 Mar 2024 23:56)      RADIOLOGY & ADDITIONAL TESTS:    Imaging Personally Reviewed:  [x ] YES  [ ] NO    Consultants involved in case:   Consultant(s) Notes Reviewed:  [ x] YES  [ ] NO:   Care Discussed with Consultants/Other Providers [x ] YES  [ ] NO         ***************************************************************  Radha Zepeda, PGY 1   Internal Medicine   ***************************************************************    MARGARITA POWERS  89y  MRN: [id number]    Patient is a 89y old  Female who presents with a chief complaint of Vision changes (14 Mar 2024 11:39)      Subjective: Seen at bedside, accompanied by daughter. Nauseous, limited history due to patient discomfort. Notes that vision in left eye had improved, unable to see out of right eye. Denies fever, CP, SOB, abn pain,dysuria. Tolerating diet.      MEDICATIONS  (STANDING):  acetaminophen  300 mG/codeine 30 mG 1 Tablet(s) Oral <User Schedule>  atorvastatin 10 milliGRAM(s) Oral at bedtime  cholecalciferol 2000 Unit(s) Oral daily  dextrose 5%. 1000 milliLiter(s) (50 mL/Hr) IV Continuous <Continuous>  dextrose 5%. 1000 milliLiter(s) (100 mL/Hr) IV Continuous <Continuous>  dextrose 50% Injectable 25 Gram(s) IV Push once  dextrose 50% Injectable 25 Gram(s) IV Push once  dextrose 50% Injectable 12.5 Gram(s) IV Push once  gabapentin 300 milliGRAM(s) Oral three times a day  glucagon  Injectable 1 milliGRAM(s) IntraMuscular once  insulin lispro (ADMELOG) corrective regimen sliding scale   SubCutaneous every 6 hours  levothyroxine 125 MICROGram(s) Oral <User Schedule>  levothyroxine 125 MICROGram(s) Oral daily  melatonin 3 milliGRAM(s) Oral at bedtime  methylPREDNISolone sodium succinate IVPB 1000 milliGRAM(s) IV Intermittent daily  nystatin Powder 1 Application(s) Topical two times a day  pantoprazole    Tablet 40 milliGRAM(s) Oral before breakfast  triamterene 37.5 mG/hydrochlorothiazide 25 mG Tablet 1 Tablet(s) Oral daily    MEDICATIONS  (PRN):  dextrose Oral Gel 15 Gram(s) Oral once PRN Blood Glucose LESS THAN 70 milliGRAM(s)/deciliter      Objective:    Vitals: Vital Signs Last 24 Hrs  T(C): 36.4 (03-14-24 @ 12:44), Max: 37.1 (03-13-24 @ 21:08)  T(F): 97.5 (03-14-24 @ 12:44), Max: 98.7 (03-13-24 @ 21:08)  HR: 87 (03-14-24 @ 12:44) (79 - 87)  BP: 143/66 (03-14-24 @ 12:44) (131/66 - 147/64)  BP(mean): --  RR: 18 (03-14-24 @ 12:44) (16 - 18)  SpO2: 99% (03-14-24 @ 12:44) (92% - 100%)            I&O's Summary      PHYSICAL EXAM:  GENERAL: NAD  HEAD:  Atraumatic, Normocephalic  EYES: EOMI, conjunctiva and sclera clear  CHEST/LUNG: Clear to auscultation bilaterally; No rales, rhonchi, wheezing, or rubs  HEART: Regular rate and rhythm; No murmurs, rubs, or gallops  ABDOMEN: Soft, Nontender, Nondistended;   SKIN: No rashes or lesions  NERVOUS SYSTEM:  Alert & Oriented X3, no focal deficits    LABS:  03-14    139  |  97  |  32<H>  ----------------------------<  205<H>  4.3   |  27  |  0.87  03-13    140  |  98  |  35<H>  ----------------------------<  110<H>  4.1   |  28  |  0.96    Ca    10.4      14 Mar 2024 06:46  Ca    10.6<H>      13 Mar 2024 16:36  Phos  3.2     03-14  Mg     1.8     03-14    TPro  7.5  /  Alb  3.6  /  TBili  0.4  /  DBili  x   /  AST  12  /  ALT  6<L>  /  AlkPhos  74  03-13      PT/INR - ( 14 Mar 2024 06:47 )   PT: 11.2 sec;   INR: 1.07 ratio         PTT - ( 14 Mar 2024 06:47 )  PTT:29.0 sec              Urinalysis Basic - ( 14 Mar 2024 06:46 )    Color: x / Appearance: x / SG: x / pH: x  Gluc: 205 mg/dL / Ketone: x  / Bili: x / Urobili: x   Blood: x / Protein: x / Nitrite: x   Leuk Esterase: x / RBC: x / WBC x   Sq Epi: x / Non Sq Epi: x / Bacteria: x                              10.6   10.68 )-----------( 367      ( 14 Mar 2024 06:46 )             34.7                         10.3   12.23 )-----------( 422      ( 13 Mar 2024 16:36 )             34.1     CAPILLARY BLOOD GLUCOSE      POCT Blood Glucose.: 164 mg/dL (14 Mar 2024 12:04)  POCT Blood Glucose.: 195 mg/dL (14 Mar 2024 06:19)  POCT Blood Glucose.: 192 mg/dL (14 Mar 2024 05:12)  POCT Blood Glucose.: 196 mg/dL (13 Mar 2024 23:56)      RADIOLOGY & ADDITIONAL TESTS:    Imaging Personally Reviewed:  [x ] YES  [ ] NO    Consultants involved in case:   Consultant(s) Notes Reviewed:  [ x] YES  [ ] NO:   Care Discussed with Consultants/Other Providers [x ] YES  [ ] NO         ***************************************************************  Radha Zepeda, PGY 1   Internal Medicine   ***************************************************************    MARGARITA POWERS  89y  MRN: [id number]    Patient is a 89y old  Female who presents with a chief complaint of Vision changes (14 Mar 2024 11:39)      Subjective: Seen at bedside, accompanied by daughter. Nauseous, limited history due to patient discomfort. Notes that vision in left eye had improved, unable to see out of right eye. Denies fever, CP, SOB, abn pain,dysuria. Tolerating diet.      MEDICATIONS  (STANDING):  acetaminophen  300 mG/codeine 30 mG 1 Tablet(s) Oral <User Schedule>  atorvastatin 10 milliGRAM(s) Oral at bedtime  cholecalciferol 2000 Unit(s) Oral daily  dextrose 5%. 1000 milliLiter(s) (50 mL/Hr) IV Continuous <Continuous>  dextrose 5%. 1000 milliLiter(s) (100 mL/Hr) IV Continuous <Continuous>  dextrose 50% Injectable 25 Gram(s) IV Push once  dextrose 50% Injectable 25 Gram(s) IV Push once  dextrose 50% Injectable 12.5 Gram(s) IV Push once  gabapentin 300 milliGRAM(s) Oral three times a day  glucagon  Injectable 1 milliGRAM(s) IntraMuscular once  insulin lispro (ADMELOG) corrective regimen sliding scale   SubCutaneous every 6 hours  levothyroxine 125 MICROGram(s) Oral <User Schedule>  levothyroxine 125 MICROGram(s) Oral daily  melatonin 3 milliGRAM(s) Oral at bedtime  methylPREDNISolone sodium succinate IVPB 1000 milliGRAM(s) IV Intermittent daily  nystatin Powder 1 Application(s) Topical two times a day  pantoprazole    Tablet 40 milliGRAM(s) Oral before breakfast  triamterene 37.5 mG/hydrochlorothiazide 25 mG Tablet 1 Tablet(s) Oral daily    MEDICATIONS  (PRN):  dextrose Oral Gel 15 Gram(s) Oral once PRN Blood Glucose LESS THAN 70 milliGRAM(s)/deciliter      Objective:    Vitals: Vital Signs Last 24 Hrs  T(C): 36.4 (03-14-24 @ 12:44), Max: 37.1 (03-13-24 @ 21:08)  T(F): 97.5 (03-14-24 @ 12:44), Max: 98.7 (03-13-24 @ 21:08)  HR: 87 (03-14-24 @ 12:44) (79 - 87)  BP: 143/66 (03-14-24 @ 12:44) (131/66 - 147/64)  BP(mean): --  RR: 18 (03-14-24 @ 12:44) (16 - 18)  SpO2: 99% (03-14-24 @ 12:44) (92% - 100%)            I&O's Summary      PHYSICAL EXAM:  GENERAL: NAD  HEAD:  Atraumatic, Normocephalic.   EYES: EOMI, impaired R visual acuity   CHEST/LUNG: Clear to auscultation bilaterally; No rales, rhonchi, wheezing, or rubs  HEART: Regular rate and rhythm; Systolic  ejection murmur LSB   ABDOMEN: Soft, Nontender, Nondistended;   SKIN: Actinic keratosis of b/l LE   NERVOUS SYSTEM:  Alert & Oriented X3, no focal deficits      LABS:  03-14    139  |  97  |  32<H>  ----------------------------<  205<H>  4.3   |  27  |  0.87  03-13    140  |  98  |  35<H>  ----------------------------<  110<H>  4.1   |  28  |  0.96    Ca    10.4      14 Mar 2024 06:46  Ca    10.6<H>      13 Mar 2024 16:36  Phos  3.2     03-14  Mg     1.8     03-14    TPro  7.5  /  Alb  3.6  /  TBili  0.4  /  DBili  x   /  AST  12  /  ALT  6<L>  /  AlkPhos  74  03-13      PT/INR - ( 14 Mar 2024 06:47 )   PT: 11.2 sec;   INR: 1.07 ratio         PTT - ( 14 Mar 2024 06:47 )  PTT:29.0 sec              Urinalysis Basic - ( 14 Mar 2024 06:46 )    Color: x / Appearance: x / SG: x / pH: x  Gluc: 205 mg/dL / Ketone: x  / Bili: x / Urobili: x   Blood: x / Protein: x / Nitrite: x   Leuk Esterase: x / RBC: x / WBC x   Sq Epi: x / Non Sq Epi: x / Bacteria: x                              10.6   10.68 )-----------( 367      ( 14 Mar 2024 06:46 )             34.7                         10.3   12.23 )-----------( 422      ( 13 Mar 2024 16:36 )             34.1     CAPILLARY BLOOD GLUCOSE      POCT Blood Glucose.: 164 mg/dL (14 Mar 2024 12:04)  POCT Blood Glucose.: 195 mg/dL (14 Mar 2024 06:19)  POCT Blood Glucose.: 192 mg/dL (14 Mar 2024 05:12)  POCT Blood Glucose.: 196 mg/dL (13 Mar 2024 23:56)      RADIOLOGY & ADDITIONAL TESTS:    Imaging Personally Reviewed:  [x ] YES  [ ] NO    Consultants involved in case:   Consultant(s) Notes Reviewed:  [ x] YES  [ ] NO:   Care Discussed with Consultants/Other Providers [x ] YES  [ ] NO

## 2024-03-14 NOTE — OCCUPATIONAL THERAPY INITIAL EVALUATION ADULT - REHAB POTENTIAL, OT EVAL
H&P completed and reviewed, the patient has been examined and:  I concur with the findings and no changes have occurred since H&P was written.          To the OR as planned  Postop orders to follow       Christian Chacko MD  NSGY          Dear Dr. Sage, Sagar VELAZQUEZ MD,     Thank you for referring this patient to my clinic. The full details of my evaluation will also be forthcoming to you by letter.     CHIEF COMPLAINT:  Neck pain, numbness     HPI:  Josef Sage is a 46 y.o.  male with a PMHx of HLD, who is referred to me by Dr. Sage for evaluation of neck pain. Patient reports his pain is associated with numbness and tingling that radiates down his right elbow into his fingers. He describes his pain as a constant dull pain that builds up throughout the day.  He states he has weakness that has prevented him from working up. He reports good balance, no b/b dysfunction, and denies dropping anything from his hands. His symptoms began worsening since December, and he denies receiving injections since his symptoms started to worsen.            Review of patient's allergies indicates:   Allergen Reactions    Sulfa (sulfonamide antibiotics) Rash              Past Medical History:   Diagnosis Date    Asthma      Bronchitis      Mixed hyperlipidemia 09/17/2019    Recurrent upper respiratory infection (URI)      Wheezes              Past Surgical History:   Procedure Laterality Date    MOUTH SURGERY        None                Family History   Problem Relation Age of Onset    Hypertension Mother      Hypertension Father      Eczema Sister        Social History            Tobacco Use    Smoking status: Never    Smokeless tobacco: Never   Substance Use Topics    Alcohol use: Never       Alcohol/week: 1.0 standard drink       Types: 1 Glasses of wine per week    Drug use: No         Review of Systems   Constitutional:  Negative for fatigue, fever and unexpected weight change.   HENT:  Negative for ear  pain, hearing loss, rhinorrhea, tinnitus, trouble swallowing and voice change.    Eyes:  Negative for photophobia and pain.   Respiratory:  Negative for cough, chest tightness and shortness of breath.    Cardiovascular:  Negative for chest pain.   Gastrointestinal:  Negative for constipation, diarrhea, nausea and vomiting.   Genitourinary:  Negative for decreased urine volume, difficulty urinating, frequency, testicular pain and urgency.   Musculoskeletal:  Positive for neck pain. Negative for arthralgias, back pain, joint swelling and neck stiffness.   Neurological:  Positive for weakness and numbness. Negative for dizziness, tremors, seizures, syncope, facial asymmetry, speech difficulty, light-headedness and headaches.   Psychiatric/Behavioral:  Negative for agitation, confusion and decreased concentration. The patient is not nervous/anxious.       OBJECTIVE:      Vital Signs:  Pulse: 89 (04/03/23 1405)  BP: (!) 146/91 (04/03/23 1405)     Physical Exam:     Vital signs: All nursing notes and vital signs reviewed -- afebrile, vital signs stable.  Constitutional: Patient sitting comfortably in chair. Appears well developed and well nourished.  Skin: Exposed areas are intact without abnormal markings, rashes or other lesions.  HEENT: Normocephalic. Normal conjunctivae.  Cardiovascular: Normal rate and regular rhythm.  Respiratory: Chest wall rises and falls symmetrically, without signs of respiratory distress.  Abdomen: Soft and non-tender.  Extremities: Warm and without edema. Calves supple, non-tender.  Psych/Behavior: Normal affect.     Neurological:     Mental status: Alert and oriented. Conversational and appropriate.       Cranial Nerves: VFF to confrontation. PERRL. EOMI without nystagmus. Facial STLT normal and symmetric. Strong, symmetric muscles of mastication. Facial strength full and symmetric. Hearing equal bilaterally to finger rub. Palate and uvula rise and fall normally in midline. Shoulder shrug 5/5  strength. Tongue midline.      Motor:     Upper:   Deltoids Triceps Biceps WE WF      R 5/5 5/5 5/5 5/5 5/5 5/5     L 5/5 5/5 5/5 5/5 5/5 5/5      Lower:   HF KE KF DF PF EHL     R 5/5 5/5 5/5 5/5 5/5 5/5     L 5/5 5/5 5/5 5/5 5/5 5/5      Sensory: Intact sensation to light touch in all extremities. Romberg negative.     Reflexes:          DTR: 2+ symmetrically throughout.     Abdominal reflexes: Normal, symmetric.     Alfred's: Negative.     Babinski's: Negative.     Clonus: Negative.     Cerebellar: Finger-to-nose and rapid alternating movements normal. Gait stable, fluid.     Spine:     Posture: Head well aligned over pelvis in front and side views.  No focal or global spinal deformity visible on inspection. Shoulders and hips even. No obvious leg length discrepancy. No scapula winging.     Bending: Full ROM with forward, back and lateral bending. No rib prominence with forward bend.     Cervical:      ROM: Full with flexion, extension, lateral rotation and ear-to-shoulder bend.      Midline TTP: Negative.     Spurling's test: Negative.     Lhermitte's: Negative.     Thoracic:     Midline TTP: Negative     Lumbar:     Midline TTP: Negative     Straight Leg Test: Negative     Crossed Straight Leg Test: Negative     Sciatic notch tenderness: Negative.     Other:     SI joint TTP: Negative.     Greater trochanter TTP: Negative.     Tenderness with external/internal hip rotation: Negative.     Diagnostic Results:  All imaging was independently reviewed by me.     MRI cervical spine, dated 4/16/15:  1. Disc herniation right C5-6 with moderate right foraminal stenosis     Scoliosis standing AP and Lateral X-ray, dated 3/29/23:  No significant deformities      ASSESSMENT/PLAN:      Josef aSge has a right C5-6 disc bulge with moderate C6 stenosis. His right C6 radiculopathy is chronic but is recently getting worse. I recommend a C5-6 ELKE. Should surgery be needed I'd recommend a C5-6 ACD.     The patient  understands and agrees with the plan of care. All questions were answered.      1. C5-6 ELKE  2. RTC PRN             Ivan Dalton M.D.  Department of Neurosurgery  [facility]      good, to achieve stated therapy goals

## 2024-03-14 NOTE — OCCUPATIONAL THERAPY INITIAL EVALUATION ADULT - PERTINENT HX OF CURRENT PROBLEM, REHAB EVAL
Ms. 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 At [facility], MR orbits were negative, MR brain with meningioma. Ophthalmology sent workup for rheumatologic/autoimmune causes, and as her symptoms improved, she was discharged with recommended outpatient ophthalmologic and rheumatologic workup. Her left eye changes improved; however, now with right eye blurry vision without pain. Also with 2-3 weeks jaw claudication, worsens with chewing. Denies fevers, chest pain, shortness of breath, abdominal pain, changes in urination. Has chronic leg swelling, last BM 3 days ago.   CT CHEST.ABDOMEN: No acute pulmonary embolism.No acute intra-abdominal pathology.Other findings as discussed above.CT brain:No hydrocephalus, acute intracranial hemorrhage, mass effect, or brain edema.CTA brain:No flow-limiting stenosis. No AVM.Probable left P-comm infundibulum. Because the left P-comm is not well visualized, differential diagnosis includes left P-comm level ICA aneurysm. The dome measures 2 mm in size. The neck measures 3 mm in size.1.2 x 2.4 cm right anterior temporal extra-axial homogeneously enhancing lesion, likely a meningioma.CTA neck:No flow-limiting stenosis, evidence for arterial dissection, or vascular aneurysm.MRI: 1.  ORBITS:Unremarkable MR of the orbits.2.  BRAIN:  Meningioma right middle cranial fossa.  Ischemic white matter disease and atrophy typical for age.3. ANTERIOR CIRCULATION:   Intracranial atherosclerosis correlates   segments of the internal carotid arteries, mild. Left internal carotid clinoid segment undersurface tiny contour deformity 0.2 cm saccular aneurysm versus infundibulum of the left posterior communicating artery otherwise not identified.4. POSTERIOR CIRCULATION:  Intact. XRAY CHEST: Left Chest is clear with surgical clips related to the left   breast region. Right lung is clear. Heart is within normal limits in its transthoracic diameter. Some sclerotic changes related to the left humeral head. Follow-up suggested as indicated clinically.

## 2024-03-14 NOTE — PROGRESS NOTE ADULT - PROBLEM SELECTOR PLAN 2
Downtrending from peak of 14 during previous hospitalization, unclear if iso Hizentra use vs. reactive iso GCA?  - CTM; patient with no signs of infection at this time Downtrending from peak of 14 during previous hospitalization, unclear if iso Hizentra use vs. reactive iso GCA  - CTM; patient with no signs of infection at this time

## 2024-03-14 NOTE — PHYSICAL THERAPY INITIAL EVALUATION ADULT - PERTINENT HX OF CURRENT PROBLEM, REHAB EVAL
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 At [facility], MR orbits were negative, MR brain with meningioma. Ophthalmology sent workup for rheumatologic/autoimmune causes, and as her symptoms improved, she was discharged with recommended outpatient ophthalmologic and rheumatologic workup. Her left eye changes improved; however, now with right eye blurry vision without pain. Also with 2-3 weeks jaw claudication, worsens with chewing. Denies fevers, chest pain, shortness of breath, abdominal pain, changes in urination. Has chronic leg swelling, last BM 3 days ago.

## 2024-03-14 NOTE — PROGRESS NOTE ADULT - PROBLEM SELECTOR PLAN 8
Per patient's son (Favian), patient has had episodes of aggression/agitation in the past few weeks, especially during hospitalization at [facility].   - Advised patient's son that steroids may worsen these episodes of agitation  - Will start melatonin as son notes patient has difficulty sleeping; continue home gabapentin  - Patient also taking acetaminophen with codeine at home, per son, "probably more than she should be" (iSTOP in chart) for generalized pain iso degenerative disc disease, knee arthritis Per patient's son (Favian), patient has had episodes of aggression/agitation in the past few weeks, especially during hospitalization at [place]. May be exacerbated with steroid use  - Will start melatonin; continue home gabapentin  - Patient also taking acetaminophen with codeine at home, per son, "probably more than she should be" (iSTOP in chart) for generalized pain iso degenerative disc disease, knee arthritis

## 2024-03-14 NOTE — OCCUPATIONAL THERAPY INITIAL EVALUATION ADULT - NEUROMUSCULAR RE-EDUCATION, PT EVAL
GOAL: Pt will be able to track moving objects  with R eye without using L eye  to compensate  in 3 weeks

## 2024-03-14 NOTE — CONSULT NOTE ADULT - SUBJECTIVE AND OBJECTIVE BOX
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 At [facility], patient received extensive work up including CTA H/N, MRI/MRA Brain and Orbits which was unremarkable. Her L eye sx resolved prior to discharge. On this admission, ophtho evaluation revealed bilateral optic disc edema with hemorrhages (R>L) and was started on Solu-medrol 1g/day 3/13 for concerns for GCA.     Rheumatology consulted for evaluation of GCA.     History of GCA: none  Time when symptoms started: L eye 3 wks prior, now resolved. R eye 1d  Visual field changes: denies  Jaw Claudication: pt endorses chronic b/l jaw claudication (>1 year, only occurs when eating apples and bagels, resolves after 3 hrs)  Scalp Tenderness: denies  Headache location: denies  Temporal Artery Pulses: unable to palpate  PMR symptoms: denies  ESR/CRP: Sedimentation Rate, Erythrocyte: 45 mm/hr *H* [0 - 20] (24 @ 16:36)  C-Reactive Protein, Serum: 153 mg/L *H* [0 - 4] (24 @ 16:36)  Sedimentation Rate, Erythrocyte: 101 mm/Hr *H* [0 - 20] (24 @ 16:44)  C-Reactive Protein, Serum: 174 mg/L *H* (24 @ 16:44)  Opthalmology exam: VA HM OD, 20/40 OS ; IOP wnl OU, tr APD OD;; 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 (right eye), left eye with tr optic disc edema and disc hemorrhage  Imaging: CTA H/N, MRI/MRA Brain and Orbits during prior admission  Steroids: Solumedrol 1g/day 3/13-  Migraine/Diabetes/Vasculopathy history: No hx migraines, prediabetes, noted to have intracranial atherosclerosis on MR      ROS  Constitutional: weight loss over years, no fever  ENT/mouth: + chronic rhinorrhea  Eyes: decreased R eye acuity, now with b/l eye pain s/p dilation  CV: no chest pain  Resp: no shortness of breath, chronic productive cough  GI: no abdominal pain, no nausea, no vomiting, no diarrhea, +constipation  : no urinary sx  Skin/MSK: no pain, no rashes, no lower extremity edema  Neuro: No headachace      MEDICATIONS  (STANDING):  acetaminophen  300 mG/codeine 30 mG 1 Tablet(s) Oral <User Schedule>  atorvastatin 10 milliGRAM(s) Oral at bedtime  cholecalciferol 2000 Unit(s) Oral daily  dextrose 5%. 1000 milliLiter(s) (50 mL/Hr) IV Continuous <Continuous>  dextrose 5%. 1000 milliLiter(s) (100 mL/Hr) IV Continuous <Continuous>  dextrose 50% Injectable 12.5 Gram(s) IV Push once  dextrose 50% Injectable 25 Gram(s) IV Push once  dextrose 50% Injectable 25 Gram(s) IV Push once  gabapentin 300 milliGRAM(s) Oral three times a day  glucagon  Injectable 1 milliGRAM(s) IntraMuscular once  insulin lispro (ADMELOG) corrective regimen sliding scale   SubCutaneous every 6 hours  levothyroxine 125 MICROGram(s) Oral <User Schedule>  levothyroxine 125 MICROGram(s) Oral daily  melatonin 3 milliGRAM(s) Oral at bedtime  methylPREDNISolone sodium succinate IVPB 1000 milliGRAM(s) IV Intermittent daily  nystatin Powder 1 Application(s) Topical two times a day  pantoprazole    Tablet 40 milliGRAM(s) Oral before breakfast  triamterene 37.5 mG/hydrochlorothiazide 25 mG Tablet 1 Tablet(s) Oral daily    MEDICATIONS  (PRN):  dextrose Oral Gel 15 Gram(s) Oral once PRN Blood Glucose LESS THAN 70 milliGRAM(s)/deciliter      Allergies    iodine (Unknown)  soft shell seafood (Unknown)  Vinegar- hands break out in a rash (Rash)  Shrimp (Hives)    Intolerances        PERTINENT MEDICATION HISTORY:    SOCIAL HISTORY: Former smoker, quit 40 years ago    FAMILY HISTORY:  FH: dementia  father-     FHx: cerebral aneurysm  mother-  @ age 66    Family history of hypertension in mother      Family history of lung cancer  sister-     Family history of lymphoma  brother-    Family history of hypertension in son  son- living - age 57yo    Family history of ulcerative colitis  son- living- age 58yo    Family hx of rheumatoid arthritis  mother-  @ age 66      Vital Signs Last 24 Hrs  T(C): 36.5 (14 Mar 2024 05:03), Max: 37.1 (13 Mar 2024 21:08)  T(F): 97.7 (14 Mar 2024 05:03), Max: 98.7 (13 Mar 2024 21:08)  HR: 83 (14 Mar 2024 11:00) (79 - 87)  BP: 137/63 (14 Mar 2024 11:00) (131/66 - 147/64)  BP(mean): --  RR: 18 (14 Mar 2024 05:03) (16 - 18)  SpO2: 97% (14 Mar 2024 11:00) (92% - 100%)    Parameters below as of 14 Mar 2024 11:00  Patient On (Oxygen Delivery Method): nasal cannula        Physical Exam:  General: No apparent distress  HEENT: EOMI, pupils s/p dilation, MMM  CVS: +S1/S2, RRR, no murmurs/rubs/gallops  Resp: Bibasilar crackles  GI: Soft, NT/ND +BS  MSK: no swelling/warmth/erythema of the joints of the UE/LE  Neuro: AAOx3  Skin: no visible rashes    LABS:                        10.6   10.68 )-----------( 367      ( 14 Mar 2024 06:46 )             34.7     03-14    139  |  97  |  32<H>  ----------------------------<  205<H>  4.3   |  27  |  0.87    Ca    10.4      14 Mar 2024 06:46  Phos  3.2     03-14  Mg     1.8     03-14    TPro  7.5  /  Alb  3.6  /  TBili  0.4  /  DBili  x   /  AST  12  /  ALT  6<L>  /  AlkPhos  74  03-13    PT/INR - ( 14 Mar 2024 06:47 )   PT: 11.2 sec;   INR: 1.07 ratio         PTT - ( 14 Mar 2024 06:47 )  PTT:29.0 sec  Urinalysis Basic - ( 14 Mar 2024 06:46 )    Color: x / Appearance: x / SG: x / pH: x  Gluc: 205 mg/dL / Ketone: x  / Bili: x / Urobili: x   Blood: x / Protein: x / Nitrite: x   Leuk Esterase: x / RBC: x / WBC x   Sq Epi: x / Non Sq Epi: x / Bacteria: x            Rheumatology Work Up    Sedimentation Rate, Erythrocyte: 45 mm/hr *H* [0 - 20] (24 @ 16:36)  C-Reactive Protein, Serum: 153 mg/L *H* [0 - 4] (24 @ 16:36)  Sedimentation Rate, Erythrocyte: 101 mm/Hr *H* [0 - 20] (24 @ 16:44)  C-Reactive Protein, Serum: 174 mg/L *H* (24 @ 16:44)    RADIOLOGY & ADDITIONAL STUDIES:    CTH, CTA H/N 3/6  IMPRESSION:    CT brain:  No hydrocephalus, acute intracranial hemorrhage, mass effect, or brain   edema.    CTA brain:  No flow-limiting stenosis. No AVM.    Probable left P-comm infundibulum. Because the left P-comm is not well   visualized, differential diagnosis includes left P-comm level ICA   aneurysm. The dome measures 2 mm in size. The neck measures 3 mm in size.    1.2 x 2.4 cm right anterior temporal extra-axial homogeneously enhancing   lesion, likely a meningioma.    CTA neck:  No flow-limiting stenosis, evidence for arterial dissection, or vascular   aneurysm.    MRI/MRA Brain and Orbits 3/7  IMPRESSION:    1.  ORBITS:   Unremarkable MR of the orbits.    2.  BRAIN:  Meningioma right middle cranial fossa.  Ischemic white matter   disease and atrophy typical for age.    3. ANTERIOR CIRCULATION:   Intracranial atherosclerosis correlates   segments of the internal carotid arteries, mild. Left internal carotid   clinoid segment undersurface tiny contour deformity 0.2 cm saccular   aneurysm versus infundibulum of the left posterior communicating artery   otherwise not identified.    4. POSTERIOR CIRCULATION:  Intact.    CTA PE: 3/7  VESSELS:  There is no CT evidence for acute pulmonary embolism.  Atherosclerotic changes thoracic aorta and coronary artery calcification.   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 At [facility], patient received extensive work up including CTA H/N, MRI/MRA Brain and Orbits which was unremarkable. Her L eye sx resolved prior to discharge. On this admission, ophtho evaluation revealed bilateral optic disc edema with hemorrhages (R>L) and was started on Solu-medrol 1g/day 3/13 for concerns for GCA.     Rheumatology consulted for evaluation of GCA.     History of GCA: none  Time when symptoms started: L eye 3 wks prior, now resolved. R eye 1d  Visual field changes: denies  Jaw Claudication: pt endorses chronic b/l jaw claudication (xmonths, only occurs when eating apples and bagels)  Scalp Tenderness: denies  Headache location: denies  Temporal Artery Pulses: unable to palpate  PMR symptoms: denies  ESR/CRP: Sedimentation Rate, Erythrocyte: 45 mm/hr *H* [0 - 20] (24 @ 16:36)  C-Reactive Protein, Serum: 153 mg/L *H* [0 - 4] (24 @ 16:36)  Sedimentation Rate, Erythrocyte: 101 mm/Hr *H* [0 - 20] (24 @ 16:44)  C-Reactive Protein, Serum: 174 mg/L *H* (24 @ 16:44)  Opthalmology exam: VA HM OD, 20/40 OS ; IOP wnl OU, tr APD OD;; 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 (right eye), left eye with tr optic disc edema and disc hemorrhage  Imaging: CTA H/N, MRI/MRA Brain and Orbits during prior admission  Steroids: Solumedrol 1g/day 3/13-  Migraine/Diabetes/Vasculopathy history: No hx migraines, prediabetes, noted to have intracranial atherosclerosis on MR      ROS  Constitutional: weight loss over years, no fever, +fatigue xmonths  ENT/mouth: + chronic rhinorrhea  Eyes: decreased R eye acuity, now with b/l eye pain s/p dilation  CV: no chest pain  Resp: no shortness of breath, chronic productive cough  GI: no abdominal pain, no nausea, no vomiting, no diarrhea, +constipation  : no urinary sx  Skin/MSK: no pain, no rashes, no lower extremity edema  Neuro: No headachace      MEDICATIONS  (STANDING):  acetaminophen  300 mG/codeine 30 mG 1 Tablet(s) Oral <User Schedule>  atorvastatin 10 milliGRAM(s) Oral at bedtime  cholecalciferol 2000 Unit(s) Oral daily  dextrose 5%. 1000 milliLiter(s) (50 mL/Hr) IV Continuous <Continuous>  dextrose 5%. 1000 milliLiter(s) (100 mL/Hr) IV Continuous <Continuous>  dextrose 50% Injectable 12.5 Gram(s) IV Push once  dextrose 50% Injectable 25 Gram(s) IV Push once  dextrose 50% Injectable 25 Gram(s) IV Push once  gabapentin 300 milliGRAM(s) Oral three times a day  glucagon  Injectable 1 milliGRAM(s) IntraMuscular once  insulin lispro (ADMELOG) corrective regimen sliding scale   SubCutaneous every 6 hours  levothyroxine 125 MICROGram(s) Oral <User Schedule>  levothyroxine 125 MICROGram(s) Oral daily  melatonin 3 milliGRAM(s) Oral at bedtime  methylPREDNISolone sodium succinate IVPB 1000 milliGRAM(s) IV Intermittent daily  nystatin Powder 1 Application(s) Topical two times a day  pantoprazole    Tablet 40 milliGRAM(s) Oral before breakfast  triamterene 37.5 mG/hydrochlorothiazide 25 mG Tablet 1 Tablet(s) Oral daily    MEDICATIONS  (PRN):  dextrose Oral Gel 15 Gram(s) Oral once PRN Blood Glucose LESS THAN 70 milliGRAM(s)/deciliter      Allergies    iodine (Unknown)  soft shell seafood (Unknown)  Vinegar- hands break out in a rash (Rash)  Shrimp (Hives)    Intolerances        PERTINENT MEDICATION HISTORY:    SOCIAL HISTORY: Former smoker, quit 40 years ago    FAMILY HISTORY:  FH: dementia  father-     FHx: cerebral aneurysm  mother-  @ age 66    Family history of hypertension in mother      Family history of lung cancer  sister-     Family history of lymphoma  brother-    Family history of hypertension in son  son- living - age 57yo    Family history of ulcerative colitis  son- living- age 58yo    Family hx of rheumatoid arthritis  mother-  @ age 66      Vital Signs Last 24 Hrs  T(C): 36.5 (14 Mar 2024 05:03), Max: 37.1 (13 Mar 2024 21:08)  T(F): 97.7 (14 Mar 2024 05:03), Max: 98.7 (13 Mar 2024 21:08)  HR: 83 (14 Mar 2024 11:00) (79 - 87)  BP: 137/63 (14 Mar 2024 11:00) (131/66 - 147/64)  BP(mean): --  RR: 18 (14 Mar 2024 05:03) (16 - 18)  SpO2: 97% (14 Mar 2024 11:00) (92% - 100%)    Parameters below as of 14 Mar 2024 11:00  Patient On (Oxygen Delivery Method): nasal cannula        Physical Exam:  General: No apparent distress  HEENT: EOMI, pupils s/p dilation, MMM  CVS: +S1/S2, RRR, +murmur  Resp: Bibasilar crackles  GI: Soft, NT/ND +BS  MSK: no swelling/warmth/erythema of the joints of the UE/LE  Neuro: AAOx3  Skin: no visible rashes    LABS:                        10.6   10.68 )-----------( 367      ( 14 Mar 2024 06:46 )             34.7     03-14    139  |  97  |  32<H>  ----------------------------<  205<H>  4.3   |  27  |  0.87    Ca    10.4      14 Mar 2024 06:46  Phos  3.2     03-14  Mg     1.8     03-14    TPro  7.5  /  Alb  3.6  /  TBili  0.4  /  DBili  x   /  AST  12  /  ALT  6<L>  /  AlkPhos  74  03-13    PT/INR - ( 14 Mar 2024 06:47 )   PT: 11.2 sec;   INR: 1.07 ratio         PTT - ( 14 Mar 2024 06:47 )  PTT:29.0 sec  Urinalysis Basic - ( 14 Mar 2024 06:46 )    Color: x / Appearance: x / SG: x / pH: x  Gluc: 205 mg/dL / Ketone: x  / Bili: x / Urobili: x   Blood: x / Protein: x / Nitrite: x   Leuk Esterase: x / RBC: x / WBC x   Sq Epi: x / Non Sq Epi: x / Bacteria: x            Rheumatology Work Up    Sedimentation Rate, Erythrocyte: 45 mm/hr *H* [0 - 20] (24 @ 16:36)  C-Reactive Protein, Serum: 153 mg/L *H* [0 - 4] (24 @ 16:36)  Sedimentation Rate, Erythrocyte: 101 mm/Hr *H* [0 - 20] (24 @ 16:44)  C-Reactive Protein, Serum: 174 mg/L *H* (24 @ 16:44)    RADIOLOGY & ADDITIONAL STUDIES:    CTH, CTA H/N 3/6  IMPRESSION:    CT brain:  No hydrocephalus, acute intracranial hemorrhage, mass effect, or brain   edema.    CTA brain:  No flow-limiting stenosis. No AVM.    Probable left P-comm infundibulum. Because the left P-comm is not well   visualized, differential diagnosis includes left P-comm level ICA   aneurysm. The dome measures 2 mm in size. The neck measures 3 mm in size.    1.2 x 2.4 cm right anterior temporal extra-axial homogeneously enhancing   lesion, likely a meningioma.    CTA neck:  No flow-limiting stenosis, evidence for arterial dissection, or vascular   aneurysm.    MRI/MRA Brain and Orbits 3/7  IMPRESSION:    1.  ORBITS:   Unremarkable MR of the orbits.    2.  BRAIN:  Meningioma right middle cranial fossa.  Ischemic white matter   disease and atrophy typical for age.    3. ANTERIOR CIRCULATION:   Intracranial atherosclerosis correlates   segments of the internal carotid arteries, mild. Left internal carotid   clinoid segment undersurface tiny contour deformity 0.2 cm saccular   aneurysm versus infundibulum of the left posterior communicating artery   otherwise not identified.    4. POSTERIOR CIRCULATION:  Intact.    CTA PE: 3/7  VESSELS:  There is no CT evidence for acute pulmonary embolism.  Atherosclerotic changes thoracic aorta and coronary artery calcification.

## 2024-03-14 NOTE — PROGRESS NOTE ADULT - ASSESSMENT
Ms. Pulliam is an 87 YO woman with hx of primary immunodeficiency disorder on Hizentra (weekly, last dose Saturday), breast cancer s/p surgery/radiation on letrozole, pre-DM, HLD, HTN, hypothyroidism, s/p lens replacement in both eyes, recent admission at [facility] for fevers/weakness/SOB, infectious workup negative, also with L eye visual changes during that hospitalization (had for weeks), presenting for visual changes now in her R eye. Workup with elevated ESR/CRP and ophthalmologic exam concerning for GCA, started on steroids.  Ms. Pulliam is an 87 YO woman with hx of primary immunodeficiency disorder on Hizentra (weekly, last dose Saturday), breast cancer s/p surgery/radiation on letrozole, pre-DM, HLD, HTN, hypothyroidism, s/p lens replacement in both eyes, recent admission at [facility] for fevers/weakness/SOB, infectious workup negative, also with L eye visual changes during that hospitalization (had for weeks), presenting for visual changes now in her R eye. Workup with elevated ESR/CRP and ophthalmologic exam concerning for GCA, started on high dose steroids pending temporal artery biopsy.

## 2024-03-14 NOTE — PROGRESS NOTE ADULT - PROBLEM SELECTOR PLAN 9
DVT: SCDs iso planned procedure then lovenox subQ  Diet: DASH  Dispo: Per family, patient does not want PT as had injury during PT session in the past. They also do not want any additional aides at home, both sons live with her  GI: pantoprazole 40 PO while on high dose steroids    GOC: Full Code DVT: SCDs iso planned procedure then lovenox subQ  Diet: DASH  Dispo: Per family, patient does not want PT as had injury during PT session in the past. They also do not want any additional aides at home, both sons live with her  GI: pantoprazole 40 PO while on high dose steroids, Bactrim DS MWF   Lipitor 10 qhs for HLD  GOC: Full Code

## 2024-03-14 NOTE — ADVANCED PRACTICE NURSE CONSULT - RECOMMEDATIONS
Impression:    urinary incontinence  B/L buttocks/sacral deep tissue injury present on admission  right lateral foot deep tissue injury present on admission  right heel deep tissue injury present on admission  left heel hyperpigmentation, cannot rule out a deep tissue injury present on admission  thickened, elongated toenails    Recommendations:    1) continue turning and positioning q2 and PRN utilizing offloading assistive devices  2) continue with routine pericare daily and PRN soiling  3) encourage optimal nutrition  4) waffle cushion when oob to chair  5) B/L LE complete cair air fluidized boots OR jon lock pillow to offload heels/feet  6) triad protective barrier cream to B/L buttocks/sacrum daily and PRN soiling  7) incontinence management - consider external urinary catheter to divert urine from skin if incontinent  8) limit diaper usage to while patient is ambulating only then remove  9) consider podiatry for treatment recommendations regarding feet    Plan discussed with RN silviano Sullivan on unit    For questions or comments regarding this consult please call Mami at [phone]. Thank you.

## 2024-03-14 NOTE — PROGRESS NOTE ADULT - PROBLEM SELECTOR PLAN 4
Patient started on high-dose steroids and with diagnosis of pre-diabetes. A1c 6.1  - NATASHA TID AC and qHS Patient started on high-dose steroids and with diagnosis of pre-diabetes. A1c 6.1  - NATASHA TID AC and qHS can consider addition of basal insulin if FS persistently elevated

## 2024-03-14 NOTE — PROGRESS NOTE ADULT - PROBLEM SELECTOR PLAN 1
B/l eye changes/jaw pain; R eye now improved, no steroids received, high ESR/CRP 3/6  -Ophthalmology on board, appreciate recs  - Per ophtho:  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 (right eye), left eye with tr optic disc edema and disc hemorrhage, therefore high suspicion for GCA  - Started on 1G IV solumedrol qd for 3 days  - Will obtain rheumatology consult  - Vascular surgery on board for temporal artery biopsy, want rheumatology to weigh in prior to procedure as well as medical/cardiac pre-op eval B/l eye changes/jaw pain; R eye now improved, no steroids received, high ESR/CRP 3/6  -Ophthalmology on board, appreciate recs  - Per ophtho:  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 (right eye), left eye with tr optic disc edema and disc hemorrhage, therefore high suspicion for GCA  - Started on 1G IV solumedrol qd for 3 days  - s/p rheum eval, high suspicion for GCA, recommend temporal artery biopsy   - Vascular surgery on board for temporal artery biopsy, f/u with scheduling B/l eye changes/jaw pain; L eye improved now with acute R eye vision loss, high ESR/CRP 3/6  -Ophthalmology on board, appreciate recs  -Per ophtho:  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 (right eye), left eye with tr optic disc edema and disc hemorrhage, therefore high suspicion for GCA  - Started on 1G IV solumedrol qd for 3 days (3/13-3/15) with plan to transition to prednisone 60mg daily 3/16-  - s/p rheum eval, high suspicion for GCA, recommend temporal artery biopsy  - Vascular surgery on board for temporal artery biopsy, f/u with scheduling

## 2024-03-14 NOTE — PROGRESS NOTE ADULT - ASSESSMENT
89F with PMH HTN, HLD, hypothyroidism and POHx pseudophakia OU presents for acute right eye vision loss iso 2-3 weeks jaw claudication and now with optic disc edema OD>OS.  Patient previously seen for left sided optic nerve swelling without vision loss and had ROS negative for GCA despite elevated ESR/CRP. Now given jaw pain, bilateral involvement and elevated inflammatory markers, high concern for GCA.    # Giant cell arteritis, bilateral, high suspicion  - 89F with recent hx of left eye blurry vision, now improving, and now right eye severely decreased vision iso jaw claudication and elevated ESR/CRP  - VA LP OD, 20/30 OS ; IOP wnl OU, tr APD OD   - MRI at [place] during recent presentation for left eye symptoms was unrevealing for cause of vision loss  - Anterior exam wnl OU   - 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 (right eye), left eye with tr optic disc edema and disc hemorrhage  - High suspicion for GCA at this time   - 3/6 labs remarkable for  and   - Current presentation labs with thrombocytosis, , ESR 45  - cw high dose steroids now (1g IV solumedrol per day for at least 3 days followed by additional steroid and steroid taper per rheum)  - apprecaite rheumatology Reccs  - Pt will need temporal artery biopsy, please consult vascular surgery  - Ophtho to follow     Seen and discussed with Dr. Montemayor    Outpatient Follow-up: Patient should follow-up with his/her ophthalmologist or with [facility] Department of Ophthalmology within 1 week of after discharge at:    [address]  [place] [address]  [phone]

## 2024-03-14 NOTE — PROGRESS NOTE ADULT - ATTENDING COMMENTS
89F with PMH HTN, HLD, hypothyroidism and POHx pseudophakia OU presents for acute right eye vision loss and now claudication and now with optic disc edema OD>OS.      Pt with vision loss OD with disc edema OD. Pt denies headaches, scalp tenderness, temporal pain, denies weight loss, denies all symptoms. Noticed a few days ago that when she eats a bagel, she gets some pain in her jaws. pulses faint but equal in both temples. Daughter states she's been more fatigued recently. Vision OS has been blurry for a week and then now it switched over to OD. With High ESR/CRP and jaw claudication, and vision loss OD with disc edema, HIGH suspicion for GCA. Obtain rheumatology consult ASAP and vascular surgery for TAB. Pt on IV steroids since coming to the ED last night.   Ophtho will follow. DIscussed that if pt has vision changes OS please call ophtho on call. 89F with PMH HTN, HLD, hypothyroidism and POHx pseudophakia OU presents for acute right eye vision loss and now claudication and now with optic disc edema OD>OS.      Pt with vision loss OD with disc edema OD. Pt denies headaches, scalp tenderness, temporal pain, denies weight loss, denies all symptoms. Noticed a few days ago that when she eats a bagel, she gets some pain in her jaws. pulses very faint but equal in both temples. Daughter states she's been more fatigued recently. Vision OS has been blurry for a week and then now it switched over to OD. With High ESR/CRP and jaw claudication, and vision loss OD with disc edema, HIGH suspicion for GCA. Pt also has mild edema OS. Obtain rheumatology consult ASAP and vascular surgery for TAB. Pt on IV steroids since coming to the ED last night.   Ophtho will follow. DIscussed that if pt has vision changes OS please call ophtho on call. d/w Dr Cuevas (neuroop) 89F with PMH HTN, HLD, hypothyroidism and POHx pseudophakia OU presents for acute right eye vision loss and now claudication and now with optic disc edema OD>OS.      Pt with vision loss OD with disc edema OD. Pt denies headaches, scalp tenderness, temporal pain, denies weight loss, denies all symptoms. Noticed a few days ago that when she eats a bagel, she gets some pain in her jaws. pulses very faint but equal in both temples. Daughter states she's been more fatigued recently. Pt states initially Vision OS has been blurry for a week and then improved, and now OD became blurry. With High ESR/CRP and jaw claudication, and vision loss OD with disc edema, HIGH suspicion for GCA. Pt also has mild edema OS. Discussed that this is a systemic condition.   Obtain rheumatology consult ASAP and vascular surgery for TAB. Pt on IV steroids since coming to the ED last night.   Ophtho will follow. DIscussed that if pt has vision changes OS please call ophtho on call. d/w Dr Cuevas (neuroop)

## 2024-03-14 NOTE — PROGRESS NOTE ADULT - ATTENDING COMMENTS
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 Workup with elevated ESR/CRP and ophthalmologic exam was concerned for GCA/ temporal artery arteritis.     - c/w pulse dose steroids methylprednisolone IV 1000mg daily 3/13-3/15; Then transition to prednisone 60 mg daily on 3/16  - while on high dose steroids, will start PCP prophylaxis and GI prophylaxis   - f/u VA duplex carotid with GCA protocol to evaluate for halo sign  - s/p rheumatology eval, rec appreciated   - s/p ophthalmology eval  - s/p vascular surgery eval, plan for temporal artery biopsy     D/w rheumatology fellow Dr. Reyes   D/w patient's family at bedside, all questions answered

## 2024-03-14 NOTE — OCCUPATIONAL THERAPY INITIAL EVALUATION ADULT - BALANCE TRAINING, PT EVAL
GOAL: Pt will demonstrate improved balance 1 grade  while completing grooming task at sink independently in 3 weeks.

## 2024-03-14 NOTE — PHYSICAL THERAPY INITIAL EVALUATION ADULT - PATIENT/FAMILY AGREES WITH PLAN
Spiritual Care Services     Summary of Visit:   visited patient in her room in ICU. Patient was feeling mentally disoriented by her time in the hospital.  provided active listening to help patient vocalize her feelings of concern related to her illness and assurance of the quality of care patient is receiving.  also provided prayer for patient's healing. Spiritual Assessment/Intervention/Outcomes:    Encounter Summary  Services provided to[de-identified] Patient  Referral/Consult From[de-identified] Rounding  Support System: Children, Family members  Continue Visiting: No  Complexity of Encounter: Moderate  Length of Encounter: 15 minutes  Spiritual Assessment Completed: Yes  Advance Care Planning:  (No documents)  Routine  Type: Initial     Spiritual/Tenriism  Type: Spiritual support  Assessment: Approachable, Anxious  Intervention: Nurtured hope, Prayer  Outcome: Comfort, Engaged in conversation, Expressed feelings/needs/concerns, Less anxious, less agitated, Encouraged                         Care Plan:        Spiritual Care Services   Electronically signed by Harper Rehman on 8/19/21 at 10:49 AM EDT     To reach a  for emotional and spiritual support, place an [facility] consult request.   If a  is needed immediately, dial 0 and ask to page the on-call . yes

## 2024-03-14 NOTE — PROGRESS NOTE ADULT - PROBLEM SELECTOR PROBLEM 9
No indication for vasopressor or aggressive IVF  Monitor closely on IV lasix   Avoid nephrotoxins      Need for prophylactic measure

## 2024-03-14 NOTE — PROGRESS NOTE ADULT - PROBLEM SELECTOR PLAN 7
Patient without symptoms of ACS, RCRI of 0, for this non-emergent surgery. Low risk for major adverse cardiac event, not requiring further testing. Patient without symptoms of ACS, RCRI of 0.4%, for this non-emergent surgery. Low risk for major adverse cardiac event, not requiring further testing.    Cardiovascular Risk Stratification - RCRI = 0.4%    History of ischemic heart disease: No  History of congestive heart failure: No  History of cerebrovascular disease (stroke or transient ischemic attack): No  History of diabetes requiring preoperative insulin use: No  Chronic kidney disease (creatinine > 2 mg/dL): No  Undergoing major suprainguinal vascular, intraperitoneal, or intrathoracic surgery: No  Functional Status: Unable to Assess.     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 Patient currently does not show any clinical evidence of decompensated heart failure, cardiac arrythmias, or active ischemia.  According to ACC/AHA 2014 Guideline on Perioperative Cardiovascular Evaluation and Management (Circulation. 2014;130:e278–e333), no further cardiac testing is recommended. May proceed after discussion and shared-decision making with regarding the risk, benefits, and alternatives to the procedure by procedure-performing physician with patient or surrogate decision maker.

## 2024-03-14 NOTE — CONSULT NOTE ADULT - ATTENDING COMMENTS
high suspicion GCA - visual loss, optic disc edema, jaw claudication, high inflammatory markers.  pulse steroids  right temporal artery biopsy        Dr. Dawna Mccoy  Rheumatology Attending  [phone]  [email or web address]

## 2024-03-14 NOTE — ADVANCED PRACTICE NURSE CONSULT - ASSESSMENT
Patient encountered on 5 Monti. When wound care RN arrived on unit, patient was found lying in a low air loss pressure redistribution support surface style bed with her daughter, Tania, at bedside. Patient was alert and oriented and gave consent to skin consult. Ms Pulliam is unable to turn independently and staff assistance x 2 was provided. Once turned, wound care RN was able to visualize an area of persistent nonblanchable maroon discoloration over B/L buttocks/sacral skin, area measures approximately 6cm x 8cm x 0cm- presentation is consistent with a deep tissue injury with incontinence involvement present on admission. On right lateral foot, there is maroon discoloration with purple hues present noted to measure approximately 4cm x 2cm x 0cm- consistent with a deep tissue injury present on admission. Right heel with dark erythema, bogginess present, area measures approximately 4cm x 4cm x 0cm - consistent with a deep tissue injury present on admission. Left heel with hyperpigmentation measuring approximately 4cm x 4cm x 0cm, cannot rule out a deep tissue injury present on admission. Thickened, elongated toenails noted- podiatry recommended. Once consult was complete, patient and family were educated regarding the need for routine turning and positioning to prevent pressure injuries and patient was placed in a left side-lying position utilizing pillow positioner assistive devices.

## 2024-03-15 DIAGNOSIS — D84.89 OTHER IMMUNODEFICIENCIES: ICD-10-CM

## 2024-03-15 DIAGNOSIS — N17.9 ACUTE KIDNEY FAILURE, UNSPECIFIED: ICD-10-CM

## 2024-03-15 LAB
ANION GAP SERPL CALC-SCNC: 14 MMOL/L — SIGNIFICANT CHANGE UP (ref 5–17)
ANION GAP SERPL CALC-SCNC: 9 MMOL/L — SIGNIFICANT CHANGE UP (ref 5–17)
APPEARANCE UR: CLEAR — SIGNIFICANT CHANGE UP
BILIRUB UR-MCNC: NEGATIVE — SIGNIFICANT CHANGE UP
BLD GP AB SCN SERPL QL: NEGATIVE — SIGNIFICANT CHANGE UP
BUN SERPL-MCNC: 42 MG/DL — HIGH (ref 7–23)
BUN SERPL-MCNC: 43 MG/DL — HIGH (ref 7–23)
CALCIUM SERPL-MCNC: 10.1 MG/DL — SIGNIFICANT CHANGE UP (ref 8.4–10.5)
CALCIUM SERPL-MCNC: 9.7 MG/DL — SIGNIFICANT CHANGE UP (ref 8.4–10.5)
CHLORIDE SERPL-SCNC: 97 MMOL/L — SIGNIFICANT CHANGE UP (ref 96–108)
CHLORIDE SERPL-SCNC: 98 MMOL/L — SIGNIFICANT CHANGE UP (ref 96–108)
CO2 SERPL-SCNC: 29 MMOL/L — SIGNIFICANT CHANGE UP (ref 22–31)
CO2 SERPL-SCNC: 29 MMOL/L — SIGNIFICANT CHANGE UP (ref 22–31)
COLOR SPEC: YELLOW — SIGNIFICANT CHANGE UP
CREAT ?TM UR-MCNC: 61 MG/DL — SIGNIFICANT CHANGE UP
CREAT SERPL-MCNC: 1.3 MG/DL — SIGNIFICANT CHANGE UP (ref 0.5–1.3)
CREAT SERPL-MCNC: 1.39 MG/DL — HIGH (ref 0.5–1.3)
DIFF PNL FLD: NEGATIVE — SIGNIFICANT CHANGE UP
EGFR: 36 ML/MIN/1.73M2 — LOW
EGFR: 40 ML/MIN/1.73M2 — LOW
GLUCOSE BLDC GLUCOMTR-MCNC: 129 MG/DL — HIGH (ref 70–99)
GLUCOSE BLDC GLUCOMTR-MCNC: 134 MG/DL — HIGH (ref 70–99)
GLUCOSE BLDC GLUCOMTR-MCNC: 233 MG/DL — HIGH (ref 70–99)
GLUCOSE BLDC GLUCOMTR-MCNC: 238 MG/DL — HIGH (ref 70–99)
GLUCOSE BLDC GLUCOMTR-MCNC: 239 MG/DL — HIGH (ref 70–99)
GLUCOSE BLDC GLUCOMTR-MCNC: 245 MG/DL — HIGH (ref 70–99)
GLUCOSE SERPL-MCNC: 115 MG/DL — HIGH (ref 70–99)
GLUCOSE SERPL-MCNC: 237 MG/DL — HIGH (ref 70–99)
GLUCOSE UR QL: NEGATIVE MG/DL — SIGNIFICANT CHANGE UP
HAV IGM SER-ACNC: SIGNIFICANT CHANGE UP
HBV CORE AB SER-ACNC: SIGNIFICANT CHANGE UP
HBV CORE IGM SER-ACNC: SIGNIFICANT CHANGE UP
HBV SURFACE AG SER-ACNC: SIGNIFICANT CHANGE UP
HCT VFR BLD CALC: 30.9 % — LOW (ref 34.5–45)
HCV AB S/CO SERPL IA: 0.07 S/CO — SIGNIFICANT CHANGE UP (ref 0–0.99)
HCV AB SERPL-IMP: SIGNIFICANT CHANGE UP
HGB BLD-MCNC: 9.4 G/DL — LOW (ref 11.5–15.5)
INR BLD: 1.02 RATIO — SIGNIFICANT CHANGE UP (ref 0.85–1.18)
KETONES UR-MCNC: NEGATIVE MG/DL — SIGNIFICANT CHANGE UP
LEUKOCYTE ESTERASE UR-ACNC: NEGATIVE — SIGNIFICANT CHANGE UP
MCHC RBC-ENTMCNC: 27.7 PG — SIGNIFICANT CHANGE UP (ref 27–34)
MCHC RBC-ENTMCNC: 30.4 GM/DL — LOW (ref 32–36)
MCV RBC AUTO: 91.2 FL — SIGNIFICANT CHANGE UP (ref 80–100)
NITRITE UR-MCNC: NEGATIVE — SIGNIFICANT CHANGE UP
NRBC # BLD: 0 /100 WBCS — SIGNIFICANT CHANGE UP (ref 0–0)
OSMOLALITY UR: 415 MOS/KG — SIGNIFICANT CHANGE UP (ref 300–900)
PH UR: 6 — SIGNIFICANT CHANGE UP (ref 5–8)
PLATELET # BLD AUTO: 401 K/UL — HIGH (ref 150–400)
POTASSIUM SERPL-MCNC: 4.1 MMOL/L — SIGNIFICANT CHANGE UP (ref 3.5–5.3)
POTASSIUM SERPL-MCNC: 5.1 MMOL/L — SIGNIFICANT CHANGE UP (ref 3.5–5.3)
POTASSIUM SERPL-SCNC: 4.1 MMOL/L — SIGNIFICANT CHANGE UP (ref 3.5–5.3)
POTASSIUM SERPL-SCNC: 5.1 MMOL/L — SIGNIFICANT CHANGE UP (ref 3.5–5.3)
POTASSIUM UR-SCNC: 32 MMOL/L — SIGNIFICANT CHANGE UP
PROT ?TM UR-MCNC: 7 MG/DL — SIGNIFICANT CHANGE UP (ref 0–12)
PROT UR-MCNC: NEGATIVE MG/DL — SIGNIFICANT CHANGE UP
PROT/CREAT UR-RTO: 0.1 RATIO — SIGNIFICANT CHANGE UP (ref 0–0.2)
PROTHROM AB SERPL-ACNC: 11.2 SEC — SIGNIFICANT CHANGE UP (ref 9.5–13)
RBC # BLD: 3.39 M/UL — LOW (ref 3.8–5.2)
RBC # FLD: 14.8 % — HIGH (ref 10.3–14.5)
RH IG SCN BLD-IMP: POSITIVE — SIGNIFICANT CHANGE UP
SODIUM SERPL-SCNC: 136 MMOL/L — SIGNIFICANT CHANGE UP (ref 135–145)
SODIUM SERPL-SCNC: 140 MMOL/L — SIGNIFICANT CHANGE UP (ref 135–145)
SODIUM UR-SCNC: 55 MMOL/L — SIGNIFICANT CHANGE UP
SP GR SPEC: 1.01 — SIGNIFICANT CHANGE UP (ref 1–1.03)
UROBILINOGEN FLD QL: 0.2 MG/DL — SIGNIFICANT CHANGE UP (ref 0.2–1)
UUN UR-MCNC: 642 MG/DL — SIGNIFICANT CHANGE UP
WBC # BLD: 16.71 K/UL — HIGH (ref 3.8–10.5)
WBC # FLD AUTO: 16.71 K/UL — HIGH (ref 3.8–10.5)

## 2024-03-15 PROCEDURE — 99232 SBSQ HOSP IP/OBS MODERATE 35: CPT | Mod: GC

## 2024-03-15 PROCEDURE — 99233 SBSQ HOSP IP/OBS HIGH 50: CPT

## 2024-03-15 RX ORDER — ONDANSETRON 8 MG/1
4 TABLET, FILM COATED ORAL ONCE
Refills: 0 | Status: COMPLETED | OUTPATIENT
Start: 2024-03-15 | End: 2024-03-15

## 2024-03-15 RX ORDER — HEPARIN SODIUM 5000 [USP'U]/ML
5000 INJECTION INTRAVENOUS; SUBCUTANEOUS EVERY 8 HOURS
Refills: 0 | Status: DISCONTINUED | OUTPATIENT
Start: 2024-03-15 | End: 2024-03-18

## 2024-03-15 RX ORDER — SODIUM CHLORIDE 9 MG/ML
1000 INJECTION, SOLUTION INTRAVENOUS
Refills: 0 | Status: DISCONTINUED | OUTPATIENT
Start: 2024-03-15 | End: 2024-03-18

## 2024-03-15 RX ORDER — ATOVAQUONE 750 MG/5ML
1500 SUSPENSION ORAL DAILY
Refills: 0 | Status: DISCONTINUED | OUTPATIENT
Start: 2024-03-15 | End: 2024-03-18

## 2024-03-15 RX ADMIN — ONDANSETRON 4 MILLIGRAM(S): 8 TABLET, FILM COATED ORAL at 05:33

## 2024-03-15 RX ADMIN — SODIUM CHLORIDE 75 MILLILITER(S): 9 INJECTION, SOLUTION INTRAVENOUS at 11:17

## 2024-03-15 RX ADMIN — Medication 2: at 12:54

## 2024-03-15 RX ADMIN — HEPARIN SODIUM 5000 UNIT(S): 5000 INJECTION INTRAVENOUS; SUBCUTANEOUS at 15:12

## 2024-03-15 RX ADMIN — HEPARIN SODIUM 5000 UNIT(S): 5000 INJECTION INTRAVENOUS; SUBCUTANEOUS at 22:08

## 2024-03-15 RX ADMIN — NYSTATIN CREAM 1 APPLICATION(S): 100000 CREAM TOPICAL at 17:36

## 2024-03-15 RX ADMIN — ATORVASTATIN CALCIUM 10 MILLIGRAM(S): 80 TABLET, FILM COATED ORAL at 22:08

## 2024-03-15 RX ADMIN — Medication 125 MICROGRAM(S): at 05:34

## 2024-03-15 RX ADMIN — GABAPENTIN 300 MILLIGRAM(S): 400 CAPSULE ORAL at 15:12

## 2024-03-15 RX ADMIN — ATOVAQUONE 1500 MILLIGRAM(S): 750 SUSPENSION ORAL at 11:19

## 2024-03-15 RX ADMIN — Medication 2000 UNIT(S): at 11:19

## 2024-03-15 RX ADMIN — NYSTATIN CREAM 1 APPLICATION(S): 100000 CREAM TOPICAL at 05:34

## 2024-03-15 RX ADMIN — PANTOPRAZOLE SODIUM 40 MILLIGRAM(S): 20 TABLET, DELAYED RELEASE ORAL at 06:20

## 2024-03-15 RX ADMIN — GABAPENTIN 300 MILLIGRAM(S): 400 CAPSULE ORAL at 22:08

## 2024-03-15 RX ADMIN — Medication 2: at 17:35

## 2024-03-15 RX ADMIN — Medication 3 MILLIGRAM(S): at 22:08

## 2024-03-15 RX ADMIN — Medication 1 TABLET(S): at 05:34

## 2024-03-15 RX ADMIN — GABAPENTIN 300 MILLIGRAM(S): 400 CAPSULE ORAL at 05:33

## 2024-03-15 RX ADMIN — Medication 50 MILLIGRAM(S): at 05:33

## 2024-03-15 NOTE — DIETITIAN INITIAL EVALUATION ADULT - REASON INDICATOR FOR ASSESSMENT
RD Consult Indicated for: Pressure Injury Stage 2 or >  Source: Pt, Family (at bedside), Team, Electronic Medical Record   Chart reviewed, events noted.

## 2024-03-15 NOTE — PROGRESS NOTE ADULT - ASSESSMENT
89F w/ primary immunodeficiency on Hizentra, HTN, hypothyroidism, pre-diabetes and hx breast CA s/p surgery/radiation and recent admission to [facility] 3/6-3/10 for fevers, SOB and 2 weeks of L eye visual changes, now presenting for R eye visual changes x1d. At [facility], patient received extensive work up including CTA H/N, MRI/MRA Brain and Orbits which was unremarkable. Her L eye sx resolved prior to discharge. On this admission, ophtho evaluation revealed bilateral optic disc edema with hemorrhages (R>L) and was started on Solu-medrol 1g/day 3/13 for concerns for GCA. Rheumatology consulted for evaluation for GCA.    #GCA, currently receiving steroids (dose/dates)/not receiving steroids  -high suspicion  -ophthalmology exam shows bilateral optic disc edema with hemorrhages (R>L)  -imaging studies (CTA H/N, MRI/MRA Brain and Orbits) unremarkable  -ESR/CRP elevated 101/153 on 3/6  -more specific and concerning features such as jaw claudication, lack of b/l temporal pulses, visual deficits are present  -comorbid PMR symptoms are absent  -vascular already consulted for biopsy    PLAN  -with current presentation patient is high risk and will need steroids  -continue with pulse dose steroids 3/13-3/16 (missed 3/14 dose)  -to transition to prednisone 60 mg daily on 3/17  -obtain VA duplex carotid with GCA protocol to evaluate for halo sign (should NOT prevent patient from getting right temporal artery biopsy as soon as possible)  -biopsy planning as per vascular (right temporal artery)  -please continue GI and PCP prophylaxis while on high dose steroids  -f/u quant-gold, acute hepatitis panel and hep B core ab total  -discussed steroid-sparing therapy with Actemra with patient and family (to be started outpatient).    Plan discussed with attending, Dr. Mccoy.

## 2024-03-15 NOTE — PROGRESS NOTE ADULT - SUBJECTIVE AND OBJECTIVE BOX
***************************************************************  Radha Zepeda, PGY 1   Internal Medicine   ***************************************************************    MARGARITA POWERS  89y  MRN: [id number]    Patient is a 89y old  Female who presents with a chief complaint of Vision changes (14 Mar 2024 18:08)      Subjective: no events ON. Denies fever, CP, SOB, abn pain, N/V, dysuria. Tolerating diet.      MEDICATIONS  (STANDING):  acetaminophen  300 mG/codeine 30 mG 1 Tablet(s) Oral <User Schedule>  atorvastatin 10 milliGRAM(s) Oral at bedtime  chlorhexidine 4% Liquid 1 Application(s) Topical daily  cholecalciferol 2000 Unit(s) Oral daily  dextrose 5%. 1000 milliLiter(s) (100 mL/Hr) IV Continuous <Continuous>  dextrose 5%. 1000 milliLiter(s) (50 mL/Hr) IV Continuous <Continuous>  dextrose 50% Injectable 25 Gram(s) IV Push once  dextrose 50% Injectable 25 Gram(s) IV Push once  dextrose 50% Injectable 12.5 Gram(s) IV Push once  gabapentin 300 milliGRAM(s) Oral three times a day  glucagon  Injectable 1 milliGRAM(s) IntraMuscular once  insulin lispro (ADMELOG) corrective regimen sliding scale   SubCutaneous every 6 hours  levothyroxine 125 MICROGram(s) Oral <User Schedule>  levothyroxine 125 MICROGram(s) Oral daily  melatonin 3 milliGRAM(s) Oral at bedtime  methylPREDNISolone sodium succinate IVPB 1000 milliGRAM(s) IV Intermittent daily  nystatin Powder 1 Application(s) Topical two times a day  pantoprazole    Tablet 40 milliGRAM(s) Oral before breakfast  triamterene 37.5 mG/hydrochlorothiazide 25 mG Tablet 1 Tablet(s) Oral daily  trimethoprim  160 mG/sulfamethoxazole 800 mG 1 Tablet(s) Oral <User Schedule>    MEDICATIONS  (PRN):  dextrose Oral Gel 15 Gram(s) Oral once PRN Blood Glucose LESS THAN 70 milliGRAM(s)/deciliter      Objective:    Vitals: Vital Signs Last 24 Hrs  T(C): 36.4 (03-15-24 @ 04:50), Max: 36.4 (03-14-24 @ 12:44)  T(F): 97.5 (03-15-24 @ 04:50), Max: 97.5 (03-14-24 @ 12:44)  HR: 62 (03-15-24 @ 04:50) (62 - 88)  BP: 116/43 (03-15-24 @ 04:50) (116/43 - 156/62)  BP(mean): --  RR: 18 (03-15-24 @ 04:50) (18 - 18)  SpO2: 99% (03-15-24 @ 04:50) (97% - 99%)            I&O's Summary    14 Mar 2024 07:01  -  15 Mar 2024 06:05  --------------------------------------------------------  IN: 0 mL / OUT: 200 mL / NET: -200 mL        PHYSICAL EXAM:  GENERAL: NAD  HEAD:  Atraumatic, Normocephalic  EYES: EOMI, conjunctiva and sclera clear  CHEST/LUNG: Clear to auscultation bilaterally; No rales, rhonchi, wheezing, or rubs  HEART: Regular rate and rhythm; No murmurs, rubs, or gallops  ABDOMEN: Soft, Nontender, Nondistended;   SKIN: No rashes or lesions  NERVOUS SYSTEM:  Alert & Oriented X3, no focal deficits    LABS:  03-14    139  |  97  |  32<H>  ----------------------------<  205<H>  4.3   |  27  |  0.87  03-13    140  |  98  |  35<H>  ----------------------------<  110<H>  4.1   |  28  |  0.96    Ca    10.4      14 Mar 2024 06:46  Ca    10.6<H>      13 Mar 2024 16:36  Phos  3.2     03-14  Mg     1.8     03-14    TPro  7.5  /  Alb  3.6  /  TBili  0.4  /  DBili  x   /  AST  12  /  ALT  6<L>  /  AlkPhos  74  03-13      PT/INR - ( 14 Mar 2024 06:47 )   PT: 11.2 sec;   INR: 1.07 ratio         PTT - ( 14 Mar 2024 06:47 )  PTT:29.0 sec              Urinalysis Basic - ( 14 Mar 2024 06:46 )    Color: x / Appearance: x / SG: x / pH: x  Gluc: 205 mg/dL / Ketone: x  / Bili: x / Urobili: x   Blood: x / Protein: x / Nitrite: x   Leuk Esterase: x / RBC: x / WBC x   Sq Epi: x / Non Sq Epi: x / Bacteria: x                              10.6   10.68 )-----------( 367      ( 14 Mar 2024 06:46 )             34.7                         10.3   12.23 )-----------( 422      ( 13 Mar 2024 16:36 )             34.1     CAPILLARY BLOOD GLUCOSE      POCT Blood Glucose.: 129 mg/dL (15 Mar 2024 00:25)  POCT Blood Glucose.: 126 mg/dL (14 Mar 2024 17:41)  POCT Blood Glucose.: 164 mg/dL (14 Mar 2024 12:04)  POCT Blood Glucose.: 195 mg/dL (14 Mar 2024 06:19)      RADIOLOGY & ADDITIONAL TESTS:    Imaging Personally Reviewed:  [x ] YES  [ ] NO    Consultants involved in case:   Consultant(s) Notes Reviewed:  [ x] YES  [ ] NO:   Care Discussed with Consultants/Other Providers [x ] YES  [ ] NO

## 2024-03-15 NOTE — PROGRESS NOTE ADULT - PROBLEM SELECTOR PLAN 1
B/l eye changes/jaw pain; L eye improved now with acute R eye vision loss, high ESR/CRP 3/6  -Ophthalmology on board, appreciate recs  -Per ophtho:  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 (right eye), left eye with tr optic disc edema and disc hemorrhage, therefore high suspicion for GCA  - Started on 1G IV solumedrol qd for 3 days (3/13-3/15) with plan to transition to prednisone 60mg daily 3/16-  - s/p rheum eval, high suspicion for GCA, recommend temporal artery biopsy  - Vascular surgery on board for temporal artery biopsy, f/u with scheduling B/l eye changes/jaw pain; L eye improved now with acute R eye vision loss, high ESR/CRP 3/6  -Ophthalmology on board, appreciate recs  -Per ophtho:  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 (right eye), left eye with optic disc edema and disc hemorrhage, therefore high suspicion for GCA  - S/p 2 doses Solumedrol 1g on 3/13, 3/15 with plan to transition to prednisone 60mg daily 3/16-  - s/p rheum eval, high suspicion for GCA, recommend temporal artery biopsy  - Vascular surgery on board for temporal artery biopsy, f/u with scheduling

## 2024-03-15 NOTE — PROGRESS NOTE ADULT - ASSESSMENT
89F with PMH HTN, HLD, hypothyroidism and POHx pseudophakia OU presents for acute right eye vision loss iso 2-3 weeks jaw claudication and now with optic disc edema OD>OS.  Patient previously seen for left sided optic nerve swelling without vision loss and had ROS negative for GCA despite elevated ESR/CRP. Now given jaw pain, bilateral involvement and elevated inflammatory markers, high concern for GCA.    # Giant cell arteritis, bilateral, high suspicion  - 89F with recent hx of left eye blurry vision, now improving, and now right eye severely decreased vision iso jaw claudication and elevated ESR/CRP  - VA LP OD, 20/30 OS ; IOP wnl OU, tr APD OD   - MRI at [place] during recent presentation for left eye symptoms was unrevealing for cause of vision loss  - Anterior exam wnl OU   - 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 (right eye), left eye with tr optic disc edema and disc hemorrhage  - High suspicion for GCA at this time   - 3/6 labs remarkable for  and   - Current presentation labs with thrombocytosis, , ESR 45  - cw high dose steroids now (1g IV solumedrol per day for at least 3 days followed by additional steroid and steroid taper per rheum)  - apprecaite rheumatology Reccs  - Pending TAB with vascular surgery  - Ophtho to follow     Seen and discussed with Dr. Montemayor    Outpatient Follow-up: Patient should follow-up with his/her ophthalmologist or with [facility] Department of Ophthalmology within 1 week of after discharge at:    [address]  [place] [address]  [phone]   89F with PMH HTN, HLD, hypothyroidism and POHx pseudophakia OU presents for acute right eye vision loss iso 2-3 weeks jaw claudication and now with optic disc edema OD>OS.  Patient previously seen for left sided optic nerve swelling without vision loss and had ROS negative for GCA despite elevated ESR/CRP. Now given jaw pain, bilateral involvement and elevated inflammatory markers, high concern for GCA.    # Giant cell arteritis, bilateral, high suspicion  - 89F with recent hx of left eye blurry vision, now improving, and now right eye severely decreased vision iso jaw claudication and elevated ESR/CRP  - VA LP OD, 20/30 OS ; IOP wnl OU, tr APD OD   - MRI at [place] during recent presentation for left eye symptoms was unrevealing for cause of vision loss  - Anterior exam wnl OU   - 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 (right eye), left eye with tr optic disc edema and disc hemorrhage  - High suspicion for GCA at this time   - 3/6 labs remarkable for  and   - Current presentation labs with thrombocytosis, , ESR 45  - cw high dose steroids now (1g IV solumedrol per day for at least 3 days followed by additional steroid and steroid taper per rheum)  - apprecaite rheumatology Recs  - Pending TAB with vascular surgery  - Ophtho to follow         Outpatient Follow-up: Patient should follow-up with his/her ophthalmologist or with [facility] Department of Ophthalmology within 1 week of after discharge at:    [address]  [place] [address]  [phone]

## 2024-03-15 NOTE — PROGRESS NOTE ADULT - PROBLEM SELECTOR PLAN 7
Patient without symptoms of ACS, RCRI of 0.4%, for this non-emergent surgery. Low risk for major adverse cardiac event, not requiring further testing.    Cardiovascular Risk Stratification - RCRI = 0.4%    History of ischemic heart disease: No  History of congestive heart failure: No  History of cerebrovascular disease (stroke or transient ischemic attack): No  History of diabetes requiring preoperative insulin use: No  Chronic kidney disease (creatinine > 2 mg/dL): No  Undergoing major suprainguinal vascular, intraperitoneal, or intrathoracic surgery: No  Functional Status: Unable to Assess.     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 Patient currently does not show any clinical evidence of decompensated heart failure, cardiac arrythmias, or active ischemia.  According to ACC/AHA 2014 Guideline on Perioperative Cardiovascular Evaluation and Management (Circulation. 2014;130:e278–e333), no further cardiac testing is recommended. May proceed after discussion and shared-decision making with regarding the risk, benefits, and alternatives to the procedure by procedure-performing physician with patient or surrogate decision maker. Continue triamterene/HCTZ - patient started on steroids Continue synthroid at home dose

## 2024-03-15 NOTE — DIETITIAN INITIAL EVALUATION ADULT - PERTINENT LABORATORY DATA
03-15    140  |  97  |  43<H>  ----------------------------<  115<H>  4.1   |  29  |  1.39<H>    Ca    10.1      15 Mar 2024 07:05  Phos  3.2     03-14  Mg     1.8     03-14    TPro  7.5  /  Alb  3.6  /  TBili  0.4  /  DBili  x   /  AST  12  /  ALT  6<L>  /  AlkPhos  74  03-13  POCT Blood Glucose.: 238 mg/dL (03-15-24 @ 11:58)  A1C with Estimated Average Glucose Result: 6.1 % (03-07-24 @ 05:54)

## 2024-03-15 NOTE — DIETITIAN INITIAL EVALUATION ADULT - NSFNSGIASSESSMENTFT_GEN_A_CORE
No N/V nor diarrhea/constipation reported; last BM on 3/14. Not ordered for bowel regimen. Ordered for PPI.

## 2024-03-15 NOTE — PROGRESS NOTE ADULT - PROBLEM SELECTOR PLAN 2
Downtrending from peak of 14 during previous hospitalization, unclear if iso Hizentra use vs. reactive iso GCA  - CTM; patient with no signs of infection at this time Pt with RESHMA in the setting of poor PO intake. Will send UA, urine electrolytes, spot urine TP/CR. Trial of IVF NS for now.  Will need to consider HD if renal failure continues to worsen. Monitor labs and urine output.     Repeat BMP in afternoon  IVF  Will send urine lytes, f/u   Bladder scan   Dose medications as per eGFR, avoid NSAIDs ACEI/ARBS, RCA and nephrotoxins Pt with RESHMA in the setting of poor PO intake. Will send UA, urine electrolytes, spot urine TP/CR. Trial of IVF NS for now.  Will need to consider HD if renal failure continues to worsen. Monitor labs and urine output.       IVF  Will send urine lytes, f/u   Bladder scan   Dose medications as per eGFR, avoid NSAIDs ACEI/ARBS, RCA and nephrotoxins

## 2024-03-15 NOTE — PROGRESS NOTE ADULT - PROBLEM SELECTOR PLAN 10
DVT: SCDs iso planned procedure then lovenox subQ  Diet: DASH  Dispo: Per family, patient does not want PT as had injury during PT session in the past. They also do not want any additional aides at home, both sons live with her  GI: pantoprazole 40 PO while on high dose steroids, Bactrim DS MWF   Lipitor 10 qhs for HLD  GOC: Full Code DVT: SCDs iso planned procedure then lovenox subQ  Diet: DASH  Dispo: Per family, patient does not want PT as had injury during PT session in the past. They also do not want any additional aides at home, both sons live with her  GI: pantoprazole 40 PO while on high dose steroids, mepron for PCP prophylaxis   Lipitor 10 qhs for HLD  GOC: Full Code Per patient's son (Favian), patient has had episodes of aggression/agitation in the past few weeks, especially during hospitalization at [place]. May be exacerbated with steroid use  - C/w melatonin; continue home gabapentin  - Patient also taking acetaminophen with codeine at home, per son, "probably more than she should be" (iSTOP in chart) for generalized pain iso degenerative disc disease, knee arthritis

## 2024-03-15 NOTE — PROGRESS NOTE ADULT - PROBLEM SELECTOR PLAN 3
Can be iso Hizentra, stable from prior hospitalization. Normocytic, baseline appears to be 9-10 per chart review.   - B12, folate wnl at previous hospitalization, iron studies wnl   - CTM Downtrending from peak of 14 during previous hospitalization, unclear if iso Hizentra use vs. reactive iso GCA  - CTM; patient with no signs of infection at this time

## 2024-03-15 NOTE — PROGRESS NOTE ADULT - PROBLEM SELECTOR PLAN 9
DVT: SCDs iso planned procedure then lovenox subQ  Diet: DASH  Dispo: Per family, patient does not want PT as had injury during PT session in the past. They also do not want any additional aides at home, both sons live with her  GI: pantoprazole 40 PO while on high dose steroids, Bactrim DS MWF   Lipitor 10 qhs for HLD  GOC: Full Code Per patient's son (Favian), patient has had episodes of aggression/agitation in the past few weeks, especially during hospitalization at [place]. May be exacerbated with steroid use  - C/w melatonin; continue home gabapentin  - Patient also taking acetaminophen with codeine at home, per son, "probably more than she should be" (iSTOP in chart) for generalized pain iso degenerative disc disease, knee arthritis Patient without symptoms of ACS, RCRI of 0.4%, for this non-emergent surgery. Low risk for major adverse cardiac event, not requiring further testing.    Cardiovascular Risk Stratification - RCRI = 0.4%    History of ischemic heart disease: No  History of congestive heart failure: No  History of cerebrovascular disease (stroke or transient ischemic attack): No  History of diabetes requiring preoperative insulin use: No  Chronic kidney disease (creatinine > 2 mg/dL): No  Undergoing major suprainguinal vascular, intraperitoneal, or intrathoracic surgery: No  Functional Status: Unable to Assess.     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 Patient currently does not show any clinical evidence of decompensated heart failure, cardiac arrythmias, or active ischemia.  According to ACC/AHA 2014 Guideline on Perioperative Cardiovascular Evaluation and Management (Circulation. 2014;130:e278–e333), no further cardiac testing is recommended. May proceed after discussion and shared-decision making with regarding the risk, benefits, and alternatives to the procedure by procedure-performing physician with patient or surrogate decision maker.

## 2024-03-15 NOTE — DIETITIAN INITIAL EVALUATION ADULT - REASON
Nutrition Focused Physical Exam deferred at this time as pt with good appetite and no recent significant wt changes; no overt signs of muscle/fat wasting upon visual observation. RD will continue to monitor and reassess as indicated/able.  Nutrition Focused Physical Exam deferred at this time secondary to pt preference; RD will continue to monitor and reassess as indicated/able.

## 2024-03-15 NOTE — DIETITIAN INITIAL EVALUATION ADULT - NSFNSPHYEXAMSKINFT_GEN_A_CORE
Per wound care (3/14), pt with "B/L buttocks/sacral deep tissue injury; right lateral foot deep tissue injury; right heel deep tissue injury; left heel hyperpigmentation, cannot rule out a deep tissue injury"

## 2024-03-15 NOTE — PROGRESS NOTE ADULT - PROBLEM SELECTOR PLAN 4
Patient started on high-dose steroids and with diagnosis of pre-diabetes. A1c 6.1  - NATASHA TID AC and qHS can consider addition of basal insulin if FS persistently elevated Can be iso Hizentra, stable from prior hospitalization. Normocytic, baseline appears to be 9-10 per chart review.   - B12, folate wnl at previous hospitalization, iron studies wnl   - CTM

## 2024-03-15 NOTE — PROGRESS NOTE ADULT - ASSESSMENT
Assessment/plan:    Early signs of bilateral heel DTI: Stable, noninfected, present on admission  Xerosis plantarly bilateral.    Recommend ammonium lactate lotion to heels daily.  Recommend continue decubitus precautions and use of Z flow boots while in house.  Reconsult podiatry as needed

## 2024-03-15 NOTE — DIETITIAN INITIAL EVALUATION ADULT - ENERGY INTAKE
Fair (50-75%) - Pt previously NPO x 2 days; diet was upgraded last night (3/14). Pt endorsed good appetite for meals today; amenable to trialing Ensure Plus High Protein (Vanille Flavored, Provides 20g PRO, 350 Andrea per serving) 1x/day; RD to add cottage cheese 1x/day and yogurt 1x/day to meal trays to optimize protein intake. Obtained pt's food preferences; RD to honor as able.

## 2024-03-15 NOTE — PROGRESS NOTE ADULT - SUBJECTIVE AND OBJECTIVE BOX
Podiatry pager #: [phone]/ [phone]    Patient is a 89y old  Female who presents with a chief complaint of Visual disturbance. Podiatry consultation for painful tender heels of both lower extremities.     (15 Mar 2024 12:19)      HPI:  Ms. 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 At [facility], MR orbits were negative, MR brain with meningioma. Ophthalmology sent workup for rheumatologic/autoimmune causes, and as her symptoms improved, she was discharged with recommended outpatient ophthalmologic and rheumatologic workup. Her left eye changes improved; however, now with right eye blurry vision without pain. Also with 2-3 weeks jaw claudication, worsens with chewing. Denies fevers, chest pain, shortness of breath, abdominal pain, changes in urination. Has chronic leg swelling, last BM 3 days ago.  (13 Mar 2024 20:46)      PAST MEDICAL & SURGICAL HISTORY:  Skin cancer      Hypothyroidism      Bronchitis      Pneumonia      Chronic cough      Osteoarthritis      Environmental allergies      Vitamin D deficiency      Hyperlipidemia      Class 2 obesity with body mass index (BMI) of 39.0 to 39.9 in adult      Cancer of female breast  2012- left breast- tx with surgery, radiation and Letrozole      DDD (degenerative disc disease), lumbar      Primary immunodeficiency disorder  being treated with Hizentra      H/O actinic keratosis      H/O seborrheic keratosis      S/P cataract surgery  approx 2006- bilateral      S/P appendectomy  1950      S/P partial mastectomy, left  2012      S/P epidural steroid injection  x2- Dr. Martin- for Lumbar Disc Disease      S/P skin cancer resection  x11          MEDICATIONS  (STANDING):  acetaminophen  300 mG/codeine 30 mG 1 Tablet(s) Oral <User Schedule>  atorvastatin 10 milliGRAM(s) Oral at bedtime  atovaquone  Suspension 1500 milliGRAM(s) Oral daily  chlorhexidine 4% Liquid 1 Application(s) Topical daily  cholecalciferol 2000 Unit(s) Oral daily  dextrose 5%. 1000 milliLiter(s) (50 mL/Hr) IV Continuous <Continuous>  dextrose 5%. 1000 milliLiter(s) (100 mL/Hr) IV Continuous <Continuous>  dextrose 50% Injectable 25 Gram(s) IV Push once  dextrose 50% Injectable 12.5 Gram(s) IV Push once  dextrose 50% Injectable 25 Gram(s) IV Push once  gabapentin 300 milliGRAM(s) Oral three times a day  glucagon  Injectable 1 milliGRAM(s) IntraMuscular once  heparin   Injectable 5000 Unit(s) SubCutaneous every 8 hours  insulin lispro (ADMELOG) corrective regimen sliding scale   SubCutaneous every 6 hours  lactated ringers. 1000 milliLiter(s) (75 mL/Hr) IV Continuous <Continuous>  levothyroxine 125 MICROGram(s) Oral <User Schedule>  levothyroxine 125 MICROGram(s) Oral daily  melatonin 3 milliGRAM(s) Oral at bedtime  methylPREDNISolone sodium succinate IVPB 1000 milliGRAM(s) IV Intermittent daily  nystatin Powder 1 Application(s) Topical two times a day  pantoprazole    Tablet 40 milliGRAM(s) Oral before breakfast    MEDICATIONS  (PRN):  dextrose Oral Gel 15 Gram(s) Oral once PRN Blood Glucose LESS THAN 70 milliGRAM(s)/deciliter      Allergies    iodine (Unknown)  soft shell seafood (Unknown)  Vinegar- hands break out in a rash (Rash)  Shrimp (Hives)    Intolerances        VITALS:    Vital Signs Last 24 Hrs  T(C): 36.4 (15 Mar 2024 04:50), Max: 36.4 (15 Mar 2024 04:50)  T(F): 97.5 (15 Mar 2024 04:50), Max: 97.5 (15 Mar 2024 04:50)  HR: 62 (15 Mar 2024 04:50) (62 - 88)  BP: 116/43 (15 Mar 2024 04:50) (116/43 - 156/62)  BP(mean): --  RR: 18 (15 Mar 2024 04:50) (18 - 18)  SpO2: 99% (15 Mar 2024 04:50) (98% - 99%)    Parameters below as of 15 Mar 2024 04:50  Patient On (Oxygen Delivery Method): nasal cannula        LABS:                          9.4    16.71 )-----------( 401      ( 15 Mar 2024 07:05 )             30.9       03-15    140  |  97  |  43<H>  ----------------------------<  115<H>  4.1   |  29  |  1.39<H>    Ca    10.1      15 Mar 2024 07:05  Phos  3.2     03-14  Mg     1.8     03-14    TPro  7.5  /  Alb  3.6  /  TBili  0.4  /  DBili  x   /  AST  12  /  ALT  6<L>  /  AlkPhos  74  03-13      CAPILLARY BLOOD GLUCOSE      POCT Blood Glucose.: 238 mg/dL (15 Mar 2024 11:58)  POCT Blood Glucose.: 134 mg/dL (15 Mar 2024 06:16)  POCT Blood Glucose.: 129 mg/dL (15 Mar 2024 00:25)  POCT Blood Glucose.: 126 mg/dL (14 Mar 2024 17:41)      PT/INR - ( 15 Mar 2024 07:05 )   PT: 11.2 sec;   INR: 1.02 ratio         PTT - ( 14 Mar 2024 06:47 )  PTT:29.0 sec    LOWER EXTREMITY PHYSICAL EXAM:    Vasular: DP/PT 1_/4, B/L, CFT <_2 seconds B/L, Temperature gradient wnl_, B/L.   Neuro: Epicritic sensation _intact  to the level of _toes, B/L.  Skin: Bilateral heels early signs of DTI: Blanchable erythema negative bulla negative fluctuance purulence malodor or clinical signs of infection.  Positive secondary xerotic skin on the plantar aspect of both feet.      RADIOLOGY & ADDITIONAL STUDIES:

## 2024-03-15 NOTE — PROGRESS NOTE ADULT - SUBJECTIVE AND OBJECTIVE BOX
MARGARITA POWERS  [id number]    INTERVAL HPI/OVERNIGHT EVENTS:  Pt denies any interval changes in vision. Still pending biopsy. Missed steroid dose yesterday, unclear why.    MEDICATIONS  (STANDING):  acetaminophen  300 mG/codeine 30 mG 1 Tablet(s) Oral <User Schedule>  atorvastatin 10 milliGRAM(s) Oral at bedtime  atovaquone  Suspension 1500 milliGRAM(s) Oral daily  chlorhexidine 4% Liquid 1 Application(s) Topical daily  cholecalciferol 2000 Unit(s) Oral daily  dextrose 5%. 1000 milliLiter(s) (50 mL/Hr) IV Continuous <Continuous>  dextrose 5%. 1000 milliLiter(s) (100 mL/Hr) IV Continuous <Continuous>  dextrose 50% Injectable 12.5 Gram(s) IV Push once  dextrose 50% Injectable 25 Gram(s) IV Push once  dextrose 50% Injectable 25 Gram(s) IV Push once  gabapentin 300 milliGRAM(s) Oral three times a day  glucagon  Injectable 1 milliGRAM(s) IntraMuscular once  heparin   Injectable 5000 Unit(s) SubCutaneous every 8 hours  insulin lispro (ADMELOG) corrective regimen sliding scale   SubCutaneous every 6 hours  lactated ringers. 1000 milliLiter(s) (75 mL/Hr) IV Continuous <Continuous>  levothyroxine 125 MICROGram(s) Oral <User Schedule>  levothyroxine 125 MICROGram(s) Oral daily  melatonin 3 milliGRAM(s) Oral at bedtime  methylPREDNISolone sodium succinate IVPB 1000 milliGRAM(s) IV Intermittent daily  nystatin Powder 1 Application(s) Topical two times a day  pantoprazole    Tablet 40 milliGRAM(s) Oral before breakfast    MEDICATIONS  (PRN):  dextrose Oral Gel 15 Gram(s) Oral once PRN Blood Glucose LESS THAN 70 milliGRAM(s)/deciliter      Allergies    iodine (Unknown)  soft shell seafood (Unknown)  Vinegar- hands break out in a rash (Rash)  Shrimp (Hives)    Intolerances        Review of Systems:   General: No fevers/chills, no fatigue  HEENT: +blurry vision, jaw claudication, no dysphagia, or odynophagia  CVS: No CP/palpitations  Resp: No SOB/wheezing  GI: No N/V/C/D/abdominal pain  MSK: No arthralgia  Skin: No new rashes  Neuro: No headaches    Vital Signs Last 24 Hrs  T(C): 36.4 (15 Mar 2024 14:17), Max: 36.4 (15 Mar 2024 04:50)  T(F): 97.6 (15 Mar 2024 14:17), Max: 97.6 (15 Mar 2024 14:17)  HR: 60 (15 Mar 2024 14:17) (60 - 88)  BP: 125/62 (15 Mar 2024 14:17) (116/43 - 156/62)  BP(mean): --  RR: 18 (15 Mar 2024 14:17) (18 - 18)  SpO2: 99% (15 Mar 2024 14:17) (98% - 99%)    Parameters below as of 15 Mar 2024 14:17  Patient On (Oxygen Delivery Method): nasal cannula      Physical Exam:  General: NAD  HEENT: EOMI, MMM  Cardio: regular rate  Resp: no accessory muscle use  GI: +BS, soft, NT/ND  MSK: no joint swelling  Neuro: AAOx3  Psych: wnl    LABS:                        9.4    16.71 )-----------( 401      ( 15 Mar 2024 07:05 )             30.9     03-15    140  |  97  |  43<H>  ----------------------------<  115<H>  4.1   |  29  |  1.39<H>    Ca    10.1      15 Mar 2024 07:05  Phos  3.2     03-14  Mg     1.8     03-14    TPro  7.5  /  Alb  3.6  /  TBili  0.4  /  DBili  x   /  AST  12  /  ALT  6<L>  /  AlkPhos  74  03-13    PT/INR - ( 15 Mar 2024 07:05 )   PT: 11.2 sec;   INR: 1.02 ratio         PTT - ( 14 Mar 2024 06:47 )  PTT:29.0 sec  Urinalysis Basic - ( 15 Mar 2024 13:06 )    Color: Yellow / Appearance: Clear / S.013 / pH: x  Gluc: x / Ketone: Negative mg/dL  / Bili: Negative / Urobili: 0.2 mg/dL   Blood: x / Protein: Negative mg/dL / Nitrite: Negative   Leuk Esterase: Negative / RBC: x / WBC x   Sq Epi: x / Non Sq Epi: x / Bacteria: x          RADIOLOGY & ADDITIONAL TESTS:

## 2024-03-15 NOTE — DIETITIAN INITIAL EVALUATION ADULT - OTHER INFO
- P/w visual changes; per chart, suspected GCA. Ordered for Solu-cortef, Prednisone.   - Hx of immunodeficiency disorder.   - Leukocytosis (downtrending).   - IVF: LR @ 75 mL/hr.     Endo:  - Hx of pre-DM. HgbA1C of 6.1% (3/7) reflects fair glycemic control in consideration of advanced age. POCTs x 24 hrs: 126 - 164 mg/dL WNL. Noted pt is on steroid regimen which may elevate blood glucose levels and skew HgbA1C. Ordered for ADMELOG SSI. RD will continue to monitor blood glucose levels prn.   - Hx of hypothyroidism; ordered for PO synthroid.   - Ordered for vitamin D3.     Wt Hx:  - Pt reports UBW of 90.9 kg x 2 weeks ago; endorses has been weight stable. Per chart, dosing wt of 89 Kg (3/13).   - Wt hx in kg ([facility]) as follows: 104.3 (3/8 - ? accuracy), 90.7 (12/28/23), 104.3 (5/3/23), 104.3 (1/9/23), 97.7 (8/5/22). Noted hx of weight fluctuations likely in setting of fluid shifts versus method of weight obtained (stated, weighed). Possible wt loss of 12.8% x ~10 months (not clinically significant, based on wts from 5/3/23 and 3/13/24). RD will continue to monitor weight trends as available/able.   - IBW: 90.5 kg (based on ht of  69 in)

## 2024-03-15 NOTE — DIETITIAN INITIAL EVALUATION ADULT - NSFNSGIIOFT_GEN_A_CORE
[facility]    2/16/2023 7:49 AM        Name: Lu Warren            Admitted: 2/8/2023  Primary Care Provider: No primary care provider on file. (Tel: None)      Subjective:    Lu Warren is a 69 y.o. male with a past medical history of hypertension, hyperlipidemia, DM2, DKD3, and CAD s/p remote CABG who presented from [facility] with chest pressure and SOB with accompanying edema. He has had poor cardiac follow up in the past.  He was started on lasix by PCP without improvement. Troponin 0.45 in ER an he was transferred to [facility] for NSTEMI and acute CHF. Interval History: Today, he continues to feel well today. No SOB, CP, or orthopnea. Renal fx improved from 1.9-->1.7. Nephrology and cardiology have discussed LHC and plan to wait until cr 1.6 or less which is his baseline, likely tomorrow. Diuretics remain on hold today. Independently reviewed interval ancillary notes from cardiology. Problem List  Principal Problem:    Acute systolic congestive heart failure (HCC)  Active Problems:    Coronary artery disease due to calcified coronary lesion    Stage 3 chronic kidney disease (HCC)    Type 2 diabetes mellitus with stage 3 chronic kidney disease, without long-term current use of insulin (HCC)    Edema    Hyperlipidemia    Iron deficiency anemia    Chronic systolic (congestive) heart failure (HCC)    Electrolyte imbalance    Primary hypertension    Cardiomyopathy ([address]    Acute kidney injury superimposed on CKD ([address]  Resolved Problems:    * No resolved hospital problems.  *     Assessment and Plan:    Acute Combined CHF   - Appears euvolemic   - Echo with EF 25-30% with GIIDD   - IV lasix held per nephrology, likely can transition to oral medications post LHC   - Strict I&O, daily weight, and fluid restiction  Cardiomyopathy   - New problem, EF 25-30% with global hypokinesis more pronounced in the inferior and septal walls   - Plans for ischemic evaluation per lex betancourt tomorrow   CKD 3   - cr stable today 1.7 (baseline 1.6)   - nephrology following Dr. Shruthi Osborn, continue IV diuresis  NSTEMI/CAD   - Remote hx of CABG   - [facility] when renal fx stable, NPO today for possible [facility]  DM2    - A1C 6.8   - On Levemir, actos, and nateglinide at home    - Reviewed BG and recurrent hypoglycemia with am to 52; Reduce lantus to 15 units bid    - No Actos at discharge as can contribute to LE swelling  Hypertension   - Controlled, keep off Norvasc for now  Hyperlipidemia   - LDL 45, continue with statin   Normocytic Anemia    - Hgb 11.6 with low iron, receiving IV Venofer x5   - Needs OP colonoscopy screening   - CBC stable    - NPO at midnight for [facility]  - Diuretics on hold for [facility]    Discussed care with patient and nursing  Discussed case, assessment and plan of care with Dr. Dionna Walden: ADULT DIET; Regular;  No Added Salt (3-4 gm); 2000 ml  Code:Full Code  DVT PPX: Lovenox PPx    Disposition: Home without needs when medically able, likely needs 2 additional days for diuresis and further testing    Current Medications  insulin glargine (LANTUS) injection vial 15 Units, BID  [Held by provider] furosemide (LASIX) injection 80 mg, TID  sodium chloride flush 0.9 % injection 10 mL, 2 times per day  sodium chloride flush 0.9 % injection 10 mL, PRN  0.9 % sodium chloride infusion, PRN  ondansetron (ZOFRAN-ODT) disintegrating tablet 4 mg, Q8H PRN   Or  ondansetron (ZOFRAN) injection 4 mg, Q6H PRN  senna (SENOKOT) tablet 8.6 mg, Daily PRN  acetaminophen (TYLENOL) tablet 650 mg, Q6H PRN   Or  acetaminophen (TYLENOL) suppository 650 mg, Q6H PRN  enoxaparin Sodium (LOVENOX) injection 30 mg, BID  potassium chloride (KLOR-CON M) extended release tablet 40 mEq, PRN   Or  potassium bicarb-citric acid (EFFER-K) effervescent tablet 40 mEq, PRN   Or  potassium chloride 10 mEq/100 mL IVPB (Peripheral Line), PRN  magnesium sulfate 2000 mg in 50 mL IVPB premix, PRN  aspirin tablet 325 mg, Daily  atorvastatin (LIPITOR) tablet 40 mg, Daily  tamsulosin (FLOMAX) capsule 0.8 mg, Nightly  insulin lispro (HUMALOG) injection vial 0-4 Units, TID WC  insulin lispro (HUMALOG) injection vial 0-4 Units, Nightly  dextrose bolus 10% 125 mL, PRN   Or  dextrose bolus 10% 250 mL, PRN  glucagon (rDNA) injection 1 mg, PRN  dextrose 10 % infusion, Continuous PRN  perflutren lipid microspheres (DEFINITY) injection 1.5 mL, ONCE PRN  carvedilol (COREG) tablet 3.125 mg, BID WC      Objective:  /71   Pulse 84   Temp 98.4 °F (36.9 °C) (Oral)   Resp 18   Ht 6' 1\" (1.854 m)   Wt 290 lb 4.8 oz (131.7 kg)   SpO2 98%   BMI 38.30 kg/m²   Vitals:    02/16/23 0445   BP: 121/71   Pulse: 84   Resp: 18   Temp: 98.4 °F (36.9 °C)   SpO2: 98%       Intake/Output Summary (Last 24 hours) at 2/16/2023 0749  Last data filed at 2/16/2023 0730  Gross per 24 hour   Intake 720 ml   Output 2100 ml   Net -1380 ml        Wt Readings from Last 3 Encounters:   02/16/23 290 lb 4.8 oz (131.7 kg)       Review of Systems:  Constitutional: No fever, Yes sudden weight changes, No weakness  Skin: No excessive bruising, No bleeding, No changes in skin pigment, normal turgor  Respiratory: Yes SOB, No cough, No wheezing, No recent URI  Cardiovascular: Negative for CP, palpitations, dizziness, or syncope  Gastrointestinal: No abdominal pain, No nausea, No vomiting, No diarrhea, No constipation, No black/tarry stools  Genito-Urinary: No hematuria, No dysuria, No polyuria,   Musculoskeletal: No pain, No swelling, No new mobility concerns  Endocrine: No excessive sweating   Neurological/Psych: No for confusion, No TIA-like symptoms. Denies any acute depression, anxiety, or insomnia.       Physical Examination:  Telemetry: Personally Reviewed Normal sinus rhythm, IVCD, PVCs occasional  Constitutional: Cooperative and in no apparent distress, appears well nourished, Yes obesity  Skin: Warm and pink; no cyanosis, bruising, or clubbing, No lesions/incisions, BLE enzo   HEENT: Symmetric and normocephalic. Conjunctiva pink with clear sclera. Mucus membranes pink and moist.   Cardiovascular: regular rate and rhythm. S1 & S2, negative for murmurs. Peripheral pulses 2+, Yes trace BLE edema  Respiratory: Respirations symmetric and unlabored. Lungs clear to auscultation bilaterally, no wheezing, crackles, or rhonchi + diminished BLL  Gastrointestinal: Abdomen soft and round. normal bowel sounds. No tenderness  Musculoskeletal: No focal weakness, muscle strength 5/5 bilaterally  Neurologic/Psych: Awake and orientated to person, place and time. Calm affect, appropriate mood. Pertinent labs, diagnostic, and imaging results reviewed as a part of this visit    Labs and Tests:  CBC:   Recent Labs     02/15/23  0608   WBC 6.6   HGB 11.6*          BMP:    Recent Labs     02/14/23  0512 02/15/23  0608 02/16/23  0544    139 138   K 3.9 3.8 4.1    101 103   CO2 26 27 28   BUN 59* 58* 55*   CREATININE 2.0* 1.9* 1.7*   GLUCOSE 52* 85 177*       Hepatic: No results for input(s): AST, ALT, ALB, BILITOT, ALKPHOS in the last 72 hours. Relevant results:  CXR 2/8/2023:  Mild cardiomegaly with findings of pulmonary edema. Echo 2/9/2023:  Summary   Left ventricular size is moderately increased. Left ventricular systolic function is severely depressed with ejection   fraction estimated at 25-30%. Global hypokinesis with hypokinesis more pronounced in the inferior and septal walls. There is mild concentric left ventricular hypertrophy. Grade II diastolic dysfunction with elevated LV filling pressures. Mild to moderate mitral regurgitation. The left atrium is mildly dilated. Moderate tricuspid regurgitation. Systolic pulmonary artery pressure (SPAP) is normal and estimated at 30 mmHg (right atrial pressure 8 mmHg).     ZION Tobias - CNP   2/16/2023 7:49 AM I&O's Detail    14 Mar 2024 07:01  -  15 Mar 2024 07:00  --------------------------------------------------------  IN:  Total IN: 0 mL    OUT:    Voided (mL): 200 mL  Total OUT: 200 mL    Total NET: -200 mL

## 2024-03-15 NOTE — DIETITIAN INITIAL EVALUATION ADULT - ORAL NUTRITION SUPPLEMENTS
Trial Ensure Plus High Protein (Vanilla Flavored, Provides 20g PRO, 350 Andrea per serving) 1x/day and Liquid Protein Supplementation (per serving provides 15 g PRO, 100 kcal) 1x/day; f/u with pt's oral nutrition supplementation preference. RD to add Cottage Cheese 1x/day and Yogurt 1x/day to meal trays to optimize protein intake.

## 2024-03-15 NOTE — DIETITIAN INITIAL EVALUATION ADULT - ORAL INTAKE PTA/DIET HISTORY
Per pt PTA: Pt lives with two sons, who assist in meal preparation. Reports good appetite/PO intake; consuming small-medium meals 3x/day. Endorses pt does not follow any therapeutic diets. Reports micronutrient supplementation: vitamin D3; no protein supplementation. Confirms food allergies: soft shell seafood and shrimp (rxn: Neck Rash); reports pt does not have a vinegar allergy and requests for this restriction to be removed from the chart; Noted food intolerances to chocolate (Rxn: diarrhea). No chewing/swallowing difficulties reported at this time.

## 2024-03-15 NOTE — PROGRESS NOTE ADULT - PROBLEM SELECTOR PLAN 6
Continue triamterene/HCTZ - patient started on steroids Continue synthroid at home dose Takes Hizentra weekly Saturdays, son will bring in medication.

## 2024-03-15 NOTE — PROGRESS NOTE ADULT - PROBLEM SELECTOR PLAN 8
Per patient's son (Favian), patient has had episodes of aggression/agitation in the past few weeks, especially during hospitalization at [facility]. May be exacerbated with steroid use  - Will start melatonin; continue home gabapentin  - Patient also taking acetaminophen with codeine at home, per son, "probably more than she should be" (iSTOP in chart) for generalized pain iso degenerative disc disease, knee arthritis Per patient's son (Favian), patient has had episodes of aggression/agitation in the past few weeks, especially during hospitalization at [place]. May be exacerbated with steroid use  - C/w melatonin; continue home gabapentin  - Patient also taking acetaminophen with codeine at home, per son, "probably more than she should be" (iSTOP in chart) for generalized pain iso degenerative disc disease, knee arthritis Patient without symptoms of ACS, RCRI of 0.4%, for this non-emergent surgery. Low risk for major adverse cardiac event, not requiring further testing.    Cardiovascular Risk Stratification - RCRI = 0.4%    History of ischemic heart disease: No  History of congestive heart failure: No  History of cerebrovascular disease (stroke or transient ischemic attack): No  History of diabetes requiring preoperative insulin use: No  Chronic kidney disease (creatinine > 2 mg/dL): No  Undergoing major suprainguinal vascular, intraperitoneal, or intrathoracic surgery: No  Functional Status: Unable to Assess.     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 Patient currently does not show any clinical evidence of decompensated heart failure, cardiac arrythmias, or active ischemia.  According to ACC/AHA 2014 Guideline on Perioperative Cardiovascular Evaluation and Management (Circulation. 2014;130:e278–e333), no further cardiac testing is recommended. May proceed after discussion and shared-decision making with regarding the risk, benefits, and alternatives to the procedure by procedure-performing physician with patient or surrogate decision maker. Continue triamterene/HCTZ - patient started on steroids

## 2024-03-15 NOTE — PROGRESS NOTE ADULT - ATTENDING COMMENTS
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 Workup with elevated ESR/CRP and ophthalmologic exam was concerned for GCA/ temporal artery arteritis.     #GCA  # RESHMA   - c/w pulse dose steroids methylprednisolone IV 1000mg daily 3/13-3/15; Then transition to prednisone 60 mg daily on 3/16  - Cr uptrended from 0.87 to 1.39, suspect prerenal in setting of NPO for prolonged period. Hold triamterene/HCTZ, give gentle fluid hydration, f/u UA, urine lytes, bladder scan;   - for PCP prophylaxis, switch from bactrim to mepron for now in setting of RESHMA ---- however, considering that bactrim is first line for PCP prophylaxis, depending on patient's renal function, planned duration of steroids, will consider switch back to bactrim later    - f/u [facility] duplex carotid with GCA protocol   - s/p rheumatology eval, rec appreciated   - s/p ophthalmology eval  - s/p vascular surgery eval, plan for temporal artery biopsy, f/u w/ scheduling     D/w patient's son at bedside, all questions answered.  D/w resident team

## 2024-03-15 NOTE — DIETITIAN INITIAL EVALUATION ADULT - PERTINENT MEDS FT
MEDICATIONS  (STANDING):  acetaminophen  300 mG/codeine 30 mG 1 Tablet(s) Oral <User Schedule>  atorvastatin 10 milliGRAM(s) Oral at bedtime  atovaquone  Suspension 1500 milliGRAM(s) Oral daily  chlorhexidine 4% Liquid 1 Application(s) Topical daily  cholecalciferol 2000 Unit(s) Oral daily  dextrose 5%. 1000 milliLiter(s) (100 mL/Hr) IV Continuous <Continuous>  dextrose 5%. 1000 milliLiter(s) (50 mL/Hr) IV Continuous <Continuous>  dextrose 50% Injectable 25 Gram(s) IV Push once  dextrose 50% Injectable 25 Gram(s) IV Push once  dextrose 50% Injectable 12.5 Gram(s) IV Push once  gabapentin 300 milliGRAM(s) Oral three times a day  glucagon  Injectable 1 milliGRAM(s) IntraMuscular once  heparin   Injectable 5000 Unit(s) SubCutaneous every 8 hours  insulin lispro (ADMELOG) corrective regimen sliding scale   SubCutaneous every 6 hours  lactated ringers. 1000 milliLiter(s) (75 mL/Hr) IV Continuous <Continuous>  levothyroxine 125 MICROGram(s) Oral <User Schedule>  levothyroxine 125 MICROGram(s) Oral daily  melatonin 3 milliGRAM(s) Oral at bedtime  methylPREDNISolone sodium succinate IVPB 1000 milliGRAM(s) IV Intermittent daily  nystatin Powder 1 Application(s) Topical two times a day  pantoprazole    Tablet 40 milliGRAM(s) Oral before breakfast    MEDICATIONS  (PRN):  dextrose Oral Gel 15 Gram(s) Oral once PRN Blood Glucose LESS THAN 70 milliGRAM(s)/deciliter

## 2024-03-15 NOTE — PROGRESS NOTE ADULT - ASSESSMENT
Ms. 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 Workup with elevated ESR/CRP and ophthalmologic exam concerning for GCA, started on high dose steroids pending temporal artery biopsy.

## 2024-03-15 NOTE — PROGRESS NOTE ADULT - SUBJECTIVE AND OBJECTIVE BOX
[facility] DEPARTMENT OF OPHTHALMOLOGY  ------------------------------------------------------------------------------  Fabio Torres MD PGY-3  ------------------------------------------------------------------------------    Interval History: No acute events overnight. Currently on high dose steroids.     MEDICATIONS  (STANDING):  acetaminophen  300 mG/codeine 30 mG 1 Tablet(s) Oral <User Schedule>  atorvastatin 10 milliGRAM(s) Oral at bedtime  chlorhexidine 4% Liquid 1 Application(s) Topical daily  cholecalciferol 2000 Unit(s) Oral daily  dextrose 5%. 1000 milliLiter(s) (100 mL/Hr) IV Continuous <Continuous>  dextrose 5%. 1000 milliLiter(s) (50 mL/Hr) IV Continuous <Continuous>  dextrose 50% Injectable 25 Gram(s) IV Push once  dextrose 50% Injectable 12.5 Gram(s) IV Push once  dextrose 50% Injectable 25 Gram(s) IV Push once  gabapentin 300 milliGRAM(s) Oral three times a day  glucagon  Injectable 1 milliGRAM(s) IntraMuscular once  insulin lispro (ADMELOG) corrective regimen sliding scale   SubCutaneous every 6 hours  levothyroxine 125 MICROGram(s) Oral <User Schedule>  levothyroxine 125 MICROGram(s) Oral daily  melatonin 3 milliGRAM(s) Oral at bedtime  methylPREDNISolone sodium succinate IVPB 1000 milliGRAM(s) IV Intermittent daily  nystatin Powder 1 Application(s) Topical two times a day  pantoprazole    Tablet 40 milliGRAM(s) Oral before breakfast  triamterene 37.5 mG/hydrochlorothiazide 25 mG Tablet 1 Tablet(s) Oral daily    MEDICATIONS  (PRN):  dextrose Oral Gel 15 Gram(s) Oral once PRN Blood Glucose LESS THAN 70 milliGRAM(s)/deciliter      VITALS: T(C): 36.4 (03-14-24 @ 12:44)  T(F): 97.5 (03-14-24 @ 12:44), Max: 98.7 (03-13-24 @ 21:08)  HR: 87 (03-14-24 @ 12:44) (79 - 87)  BP: 143/66 (03-14-24 @ 12:44) (137/63 - 147/64)  RR:  (18 - 18)  SpO2:  (92% - 99%)  Wt(kg): --  General: AAO x 3, appropriate mood and affect    Ophthalmology Exam:  Visual acuity (cc with +2.50 readers): LP OD ; 20/20 OS    Pupils: pupils sluggishly reactive OU,  APD OD  Ttono: 9 OD 11 OS   Extraocular movements (EOMs): Full OU, no pain, no diplopia  Confrontational Visual Field (CVF): ; Full OS  Color Plates: MAYITO OD 2/2 VA ; 12/12 OS     Pen Light Exam (PLE)  External: Flat OU  Lids/Lashes/Lacrimal Ducts: Flat OU    Sclera/Conjunctiva: W+Q OU  Cornea: Cl OU  Anterior Chamber: D+F OU    Iris: Flat OU  Lens: Cl OU

## 2024-03-15 NOTE — DIETITIAN INITIAL EVALUATION ADULT - ADD RECOMMEND
[X] Consider liberalizing diet to low sodium to optimize PO intake; Defer texture/consistency to Speech Language Pathologist prn.   [X] Consider adding Multivitamin and Vitamin C once daily for micronutrient coverage/promote wound healing, pending no medical contraindications.   [X] RD will continue to monitor PO intake, GI tolerance, weight trends, skin integrity, BMs, labs/electrolytes prn.   RD remains available upon request.

## 2024-03-15 NOTE — PROGRESS NOTE ADULT - PROBLEM SELECTOR PLAN 11
DVT: SCDs iso planned procedure then lovenox subQ  Diet: DASH  Dispo: Per family, patient does not want PT as had injury during PT session in the past. They also do not want any additional aides at home, both sons live with her  GI: pantoprazole 40 PO while on high dose steroids, mepron for PCP prophylaxis   Lipitor 10 qhs for HLD  GOC: Full Code

## 2024-03-15 NOTE — PROGRESS NOTE ADULT - ATTENDING COMMENTS
as above      Dr. Dawna Mccoy  Rheumatology Attending  [phone]  [email or web address]

## 2024-03-16 LAB
ALBUMIN SERPL ELPH-MCNC: 3.3 G/DL — SIGNIFICANT CHANGE UP (ref 3.3–5)
ALP SERPL-CCNC: 66 U/L — SIGNIFICANT CHANGE UP (ref 40–120)
ALT FLD-CCNC: 7 U/L — LOW (ref 10–45)
ANION GAP SERPL CALC-SCNC: 12 MMOL/L — SIGNIFICANT CHANGE UP (ref 5–17)
AQP4 H2O CHANNEL AB SERPL IA-ACNC: NEGATIVE — SIGNIFICANT CHANGE UP
AST SERPL-CCNC: 13 U/L — SIGNIFICANT CHANGE UP (ref 10–40)
BILIRUB SERPL-MCNC: 0.2 MG/DL — SIGNIFICANT CHANGE UP (ref 0.2–1.2)
BUN SERPL-MCNC: 43 MG/DL — HIGH (ref 7–23)
CALCIUM SERPL-MCNC: 10.2 MG/DL — SIGNIFICANT CHANGE UP (ref 8.4–10.5)
CHLORIDE SERPL-SCNC: 98 MMOL/L — SIGNIFICANT CHANGE UP (ref 96–108)
CO2 SERPL-SCNC: 29 MMOL/L — SIGNIFICANT CHANGE UP (ref 22–31)
CREAT SERPL-MCNC: 1.35 MG/DL — HIGH (ref 0.5–1.3)
EGFR: 38 ML/MIN/1.73M2 — LOW
GLUCOSE BLDC GLUCOMTR-MCNC: 181 MG/DL — HIGH (ref 70–99)
GLUCOSE BLDC GLUCOMTR-MCNC: 226 MG/DL — HIGH (ref 70–99)
GLUCOSE BLDC GLUCOMTR-MCNC: 227 MG/DL — HIGH (ref 70–99)
GLUCOSE BLDC GLUCOMTR-MCNC: 289 MG/DL — HIGH (ref 70–99)
GLUCOSE SERPL-MCNC: 145 MG/DL — HIGH (ref 70–99)
HCT VFR BLD CALC: 33.8 % — LOW (ref 34.5–45)
HGB BLD-MCNC: 10.4 G/DL — LOW (ref 11.5–15.5)
MAGNESIUM SERPL-MCNC: 2 MG/DL — SIGNIFICANT CHANGE UP (ref 1.6–2.6)
MCHC RBC-ENTMCNC: 28 PG — SIGNIFICANT CHANGE UP (ref 27–34)
MCHC RBC-ENTMCNC: 30.8 GM/DL — LOW (ref 32–36)
MCV RBC AUTO: 90.9 FL — SIGNIFICANT CHANGE UP (ref 80–100)
MOG AB SER QL CBA IFA: NEGATIVE — SIGNIFICANT CHANGE UP
NRBC # BLD: 0 /100 WBCS — SIGNIFICANT CHANGE UP (ref 0–0)
PHOSPHATE SERPL-MCNC: 2.9 MG/DL — SIGNIFICANT CHANGE UP (ref 2.5–4.5)
PLATELET # BLD AUTO: 421 K/UL — HIGH (ref 150–400)
POTASSIUM SERPL-MCNC: 4.6 MMOL/L — SIGNIFICANT CHANGE UP (ref 3.5–5.3)
POTASSIUM SERPL-SCNC: 4.6 MMOL/L — SIGNIFICANT CHANGE UP (ref 3.5–5.3)
PROT SERPL-MCNC: 7.2 G/DL — SIGNIFICANT CHANGE UP (ref 6–8.3)
RBC # BLD: 3.72 M/UL — LOW (ref 3.8–5.2)
RBC # FLD: 14.8 % — HIGH (ref 10.3–14.5)
SARS-COV-2 RNA SPEC QL NAA+PROBE: SIGNIFICANT CHANGE UP
SODIUM SERPL-SCNC: 139 MMOL/L — SIGNIFICANT CHANGE UP (ref 135–145)
WBC # BLD: 21.85 K/UL — HIGH (ref 3.8–10.5)
WBC # FLD AUTO: 21.85 K/UL — HIGH (ref 3.8–10.5)

## 2024-03-16 PROCEDURE — 99233 SBSQ HOSP IP/OBS HIGH 50: CPT | Mod: GC

## 2024-03-16 RX ADMIN — ATOVAQUONE 1500 MILLIGRAM(S): 750 SUSPENSION ORAL at 13:11

## 2024-03-16 RX ADMIN — Medication 1: at 05:54

## 2024-03-16 RX ADMIN — Medication 3 MILLIGRAM(S): at 21:34

## 2024-03-16 RX ADMIN — GABAPENTIN 300 MILLIGRAM(S): 400 CAPSULE ORAL at 05:59

## 2024-03-16 RX ADMIN — Medication 50 MILLIGRAM(S): at 06:00

## 2024-03-16 RX ADMIN — HEPARIN SODIUM 5000 UNIT(S): 5000 INJECTION INTRAVENOUS; SUBCUTANEOUS at 13:10

## 2024-03-16 RX ADMIN — Medication 125 MICROGRAM(S): at 05:59

## 2024-03-16 RX ADMIN — Medication 2000 UNIT(S): at 13:11

## 2024-03-16 RX ADMIN — Medication 1 TABLET(S): at 00:32

## 2024-03-16 RX ADMIN — Medication 3: at 23:45

## 2024-03-16 RX ADMIN — NYSTATIN CREAM 1 APPLICATION(S): 100000 CREAM TOPICAL at 18:02

## 2024-03-16 RX ADMIN — Medication 2: at 13:12

## 2024-03-16 RX ADMIN — ATORVASTATIN CALCIUM 10 MILLIGRAM(S): 80 TABLET, FILM COATED ORAL at 21:37

## 2024-03-16 RX ADMIN — HEPARIN SODIUM 5000 UNIT(S): 5000 INJECTION INTRAVENOUS; SUBCUTANEOUS at 05:59

## 2024-03-16 RX ADMIN — GABAPENTIN 300 MILLIGRAM(S): 400 CAPSULE ORAL at 21:35

## 2024-03-16 RX ADMIN — Medication 2: at 00:31

## 2024-03-16 RX ADMIN — GABAPENTIN 300 MILLIGRAM(S): 400 CAPSULE ORAL at 13:10

## 2024-03-16 RX ADMIN — Medication 1 TABLET(S): at 01:33

## 2024-03-16 RX ADMIN — CHLORHEXIDINE GLUCONATE 1 APPLICATION(S): 213 SOLUTION TOPICAL at 13:11

## 2024-03-16 RX ADMIN — HEPARIN SODIUM 5000 UNIT(S): 5000 INJECTION INTRAVENOUS; SUBCUTANEOUS at 21:35

## 2024-03-16 RX ADMIN — NYSTATIN CREAM 1 APPLICATION(S): 100000 CREAM TOPICAL at 05:59

## 2024-03-16 RX ADMIN — PANTOPRAZOLE SODIUM 40 MILLIGRAM(S): 20 TABLET, DELAYED RELEASE ORAL at 09:42

## 2024-03-16 RX ADMIN — Medication 2: at 18:02

## 2024-03-16 NOTE — PROGRESS NOTE ADULT - PROBLEM SELECTOR PLAN 5
Patient started on high-dose steroids and with diagnosis of pre-diabetes. A1c 6.1  - NATASHA TID AC and qHS can consider addition of basal insulin if FS persistently elevated

## 2024-03-16 NOTE — PROGRESS NOTE ADULT - PROBLEM SELECTOR PLAN 2
Pt with RESHMA in the setting of poor PO intake. UA wnl, FeNa 0.9% c/w prerenal RESHMA iso overdiuresis, hypovolemic on exam. Trial of IVF NS with improvement in SCr. Continue to trend BMP, nonoliguric at this time.   Plan:   CTM   Dose medications as per eGFR, avoid NSAIDs ACEI/ARBS, RCA and nephrotoxins Pt with RESHMA in the setting of poor PO intake. UA wnl, FeNa 0.9% c/w prerenal RESHMA iso poor PO intake, hypovolemic on exam. Trial of IVF NS with improvement in SCr. Continue to trend BMP, nonoliguric at this time.   Plan:   CTM   Dose medications as per eGFR, avoid NSAIDs ACEI/ARBS, RCA and nephrotoxins

## 2024-03-16 NOTE — PROGRESS NOTE ADULT - PROBLEM SELECTOR PLAN 10
Per patient's son (Favian), patient has had episodes of aggression/agitation in the past few weeks, especially during hospitalization at [facility]. May be exacerbated with steroid use  - C/w melatonin; continue home gabapentin  - Patient also taking acetaminophen with codeine at home, per son, "probably more than she should be" (iSTOP in chart) for generalized pain iso degenerative disc disease, knee arthritis

## 2024-03-16 NOTE — PROGRESS NOTE ADULT - PROBLEM SELECTOR PLAN 9
Patient without symptoms of ACS, RCRI of 0.4%, for this non-emergent surgery. Low risk for major adverse cardiac event, not requiring further testing.    Cardiovascular Risk Stratification - RCRI = 0.4%    History of ischemic heart disease: No  History of congestive heart failure: No  History of cerebrovascular disease (stroke or transient ischemic attack): No  History of diabetes requiring preoperative insulin use: No  Chronic kidney disease (creatinine > 2 mg/dL): No  Undergoing major suprainguinal vascular, intraperitoneal, or intrathoracic surgery: No  Functional Status: Unable to Assess.     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 Patient currently does not show any clinical evidence of decompensated heart failure, cardiac arrythmias, or active ischemia.  According to ACC/AHA 2014 Guideline on Perioperative Cardiovascular Evaluation and Management (Circulation. 2014;130:e278–e333), no further cardiac testing is recommended. May proceed after discussion and shared-decision making with regarding the risk, benefits, and alternatives to the procedure by procedure-performing physician with patient or surrogate decision maker.

## 2024-03-16 NOTE — PROGRESS NOTE ADULT - PROBLEM SELECTOR PLAN 3
Potentially iso Hizentra use vs. reactive iso GCA  - CTM; patient with no signs of infection at this time

## 2024-03-16 NOTE — PROGRESS NOTE ADULT - PROBLEM SELECTOR PLAN 4
Can be iso Hizentra, stable from prior hospitalization. Normocytic, baseline appears to be 9-10 per chart review.   - B12, folate wnl at previous hospitalization, iron studies wnl   - CTM

## 2024-03-16 NOTE — PROGRESS NOTE ADULT - PROBLEM SELECTOR PLAN 11
DVT: SCDs iso planned procedure then lovenox subQ  Diet: DASH  Dispo: Per family, patient does not want PT as had injury during PT session in the past. They also do not want any additional aides at home, both sons live with her  GI: pantoprazole 40 PO while on high dose steroids, mepron for PCP prophylaxis (can consider switching to Bactrim with resolution of RESHMA given optimal choice for PJP coverage)   Lipitor 10 qhs for HLD  GOC: Full Code DVT: SCDs iso planned procedure then lovenox subQ  Diet: DASH  Dispo: PT recc Home PT with rolling walker (patient has at home). Per family, patient does not want PT as had injury during PT session in the past. They also do not want any additional aides at home, both sons live with her  GI: pantoprazole 40 PO while on high dose steroids, mepron for PCP prophylaxis (can consider switching to Bactrim with resolution of RESHMA given optimal choice for PJP coverage)   Lipitor 10 qhs for HLD  GOC: Full Code

## 2024-03-16 NOTE — PROGRESS NOTE ADULT - SUBJECTIVE AND OBJECTIVE BOX
***************************************************************  Radha Zepeda, PGY 1   Internal Medicine   ***************************************************************    MARGARITA POWERS  89y  MRN: [id number]    Patient is a 89y old  Female who presents with a chief complaint of Vision changes (15 Mar 2024 16:55)      Subjective: no events ON. Denies fever, CP, SOB, abn pain, N/V, dysuria. Tolerating diet.      MEDICATIONS  (STANDING):  acetaminophen  300 mG/codeine 30 mG 1 Tablet(s) Oral <User Schedule>  atorvastatin 10 milliGRAM(s) Oral at bedtime  atovaquone  Suspension 1500 milliGRAM(s) Oral daily  chlorhexidine 4% Liquid 1 Application(s) Topical daily  cholecalciferol 2000 Unit(s) Oral daily  dextrose 5%. 1000 milliLiter(s) (50 mL/Hr) IV Continuous <Continuous>  dextrose 5%. 1000 milliLiter(s) (100 mL/Hr) IV Continuous <Continuous>  dextrose 50% Injectable 12.5 Gram(s) IV Push once  dextrose 50% Injectable 25 Gram(s) IV Push once  dextrose 50% Injectable 25 Gram(s) IV Push once  gabapentin 300 milliGRAM(s) Oral three times a day  glucagon  Injectable 1 milliGRAM(s) IntraMuscular once  heparin   Injectable 5000 Unit(s) SubCutaneous every 8 hours  insulin lispro (ADMELOG) corrective regimen sliding scale   SubCutaneous every 6 hours  lactated ringers. 1000 milliLiter(s) (75 mL/Hr) IV Continuous <Continuous>  levothyroxine 125 MICROGram(s) Oral <User Schedule>  levothyroxine 125 MICROGram(s) Oral daily  melatonin 3 milliGRAM(s) Oral at bedtime  nystatin Powder 1 Application(s) Topical two times a day  pantoprazole    Tablet 40 milliGRAM(s) Oral before breakfast    MEDICATIONS  (PRN):  dextrose Oral Gel 15 Gram(s) Oral once PRN Blood Glucose LESS THAN 70 milliGRAM(s)/deciliter      Objective:    Vitals: Vital Signs Last 24 Hrs  T(C): 36.3 (03-16-24 @ 05:36), Max: 36.4 (03-15-24 @ 14:17)  T(F): 97.3 (03-16-24 @ 05:36), Max: 97.6 (03-15-24 @ 14:17)  HR: 64 (03-16-24 @ 05:36) (60 - 64)  BP: 149/57 (03-16-24 @ 05:36) (109/45 - 149/57)  BP(mean): --  RR: 18 (03-16-24 @ 05:36) (18 - 18)  SpO2: 96% (03-16-24 @ 05:36) (96% - 99%)            I&O's Summary    15 Mar 2024 07:01  -  16 Mar 2024 07:00  --------------------------------------------------------  IN: 360 mL / OUT: 430 mL / NET: -70 mL        PHYSICAL EXAM:  GENERAL: NAD  HEAD:  Atraumatic, Normocephalic  EYES: EOMI, conjunctiva and sclera clear  CHEST/LUNG: Clear to auscultation bilaterally; No rales, rhonchi, wheezing, or rubs  HEART: Regular rate and rhythm; No murmurs, rubs, or gallops  ABDOMEN: Soft, Nontender, Nondistended;   SKIN: No rashes or lesions  NERVOUS SYSTEM:  Alert & Oriented X3, no focal deficits    LABS:  03-15    136  |  98  |  42<H>  ----------------------------<  237<H>  5.1   |  29  |  1.30  03-15    140  |  97  |  43<H>  ----------------------------<  115<H>  4.1   |  29  |  1.39<H>  03-14    139  |  97  |  32<H>  ----------------------------<  205<H>  4.3   |  27  |  0.87    Ca    9.7      15 Mar 2024 20:19  Ca    10.1      15 Mar 2024 07:05  Ca    10.4      14 Mar 2024 06:46    TPro  7.5  /  Alb  3.6  /  TBili  0.4  /  DBili  x   /  AST  12  /  ALT  6<L>  /  AlkPhos  74  03-13      PT/INR - ( 15 Mar 2024 07:05 )   PT: 11.2 sec;   INR: 1.02 ratio                       Urinalysis Basic - ( 15 Mar 2024 20:19 )    Color: x / Appearance: x / SG: x / pH: x  Gluc: 237 mg/dL / Ketone: x  / Bili: x / Urobili: x   Blood: x / Protein: x / Nitrite: x   Leuk Esterase: x / RBC: x / WBC x   Sq Epi: x / Non Sq Epi: x / Bacteria: x                              9.4    16.71 )-----------( 401      ( 15 Mar 2024 07:05 )             30.9                         10.6   10.68 )-----------( 367      ( 14 Mar 2024 06:46 )             34.7                         10.3   12.23 )-----------( 422      ( 13 Mar 2024 16:36 )             34.1     CAPILLARY BLOOD GLUCOSE      POCT Blood Glucose.: 181 mg/dL (16 Mar 2024 05:19)  POCT Blood Glucose.: 245 mg/dL (15 Mar 2024 23:46)  POCT Blood Glucose.: 239 mg/dL (15 Mar 2024 21:50)  POCT Blood Glucose.: 233 mg/dL (15 Mar 2024 17:33)  POCT Blood Glucose.: 238 mg/dL (15 Mar 2024 11:58)      RADIOLOGY & ADDITIONAL TESTS:    Imaging Personally Reviewed:  [x ] YES  [ ] NO    Consultants involved in case:   Consultant(s) Notes Reviewed:  [ x] YES  [ ] NO:   Care Discussed with Consultants/Other Providers [x ] YES  [ ] NO         ***************************************************************  Radha Zepeda, PGY 1   Internal Medicine   ***************************************************************    MARGARITA POWERS  89y  MRN: [id number]    Patient is a 89y old  Female who presents with a chief complaint of Vision changes (15 Mar 2024 16:55)      Subjective: no events OVN. Seen at bedside with daughter, feels well notes that vision in right eye is slightly improved and she is able to distinguish between objects. Denies fever, CP, SOB, abn pain, N/V, dysuria. Tolerating diet.      MEDICATIONS  (STANDING):  acetaminophen  300 mG/codeine 30 mG 1 Tablet(s) Oral <User Schedule>  atorvastatin 10 milliGRAM(s) Oral at bedtime  atovaquone  Suspension 1500 milliGRAM(s) Oral daily  chlorhexidine 4% Liquid 1 Application(s) Topical daily  cholecalciferol 2000 Unit(s) Oral daily  dextrose 5%. 1000 milliLiter(s) (50 mL/Hr) IV Continuous <Continuous>  dextrose 5%. 1000 milliLiter(s) (100 mL/Hr) IV Continuous <Continuous>  dextrose 50% Injectable 12.5 Gram(s) IV Push once  dextrose 50% Injectable 25 Gram(s) IV Push once  dextrose 50% Injectable 25 Gram(s) IV Push once  gabapentin 300 milliGRAM(s) Oral three times a day  glucagon  Injectable 1 milliGRAM(s) IntraMuscular once  heparin   Injectable 5000 Unit(s) SubCutaneous every 8 hours  insulin lispro (ADMELOG) corrective regimen sliding scale   SubCutaneous every 6 hours  lactated ringers. 1000 milliLiter(s) (75 mL/Hr) IV Continuous <Continuous>  levothyroxine 125 MICROGram(s) Oral <User Schedule>  levothyroxine 125 MICROGram(s) Oral daily  melatonin 3 milliGRAM(s) Oral at bedtime  nystatin Powder 1 Application(s) Topical two times a day  pantoprazole    Tablet 40 milliGRAM(s) Oral before breakfast    MEDICATIONS  (PRN):  dextrose Oral Gel 15 Gram(s) Oral once PRN Blood Glucose LESS THAN 70 milliGRAM(s)/deciliter      Objective:    Vitals: Vital Signs Last 24 Hrs  T(C): 36.3 (03-16-24 @ 05:36), Max: 36.4 (03-15-24 @ 14:17)  T(F): 97.3 (03-16-24 @ 05:36), Max: 97.6 (03-15-24 @ 14:17)  HR: 64 (03-16-24 @ 05:36) (60 - 64)  BP: 149/57 (03-16-24 @ 05:36) (109/45 - 149/57)  BP(mean): --  RR: 18 (03-16-24 @ 05:36) (18 - 18)  SpO2: 96% (03-16-24 @ 05:36) (96% - 99%)            I&O's Summary    15 Mar 2024 07:01  -  16 Mar 2024 07:00  --------------------------------------------------------  IN: 360 mL / OUT: 430 mL / NET: -70 mL        PHYSICAL EXAM:  GENERAL: NAD  HEAD:  Atraumatic, Normocephalic  EYES: EOMI, conjunctiva and sclera clear. Diminished visual acuity in R eye.   CHEST/LUNG: Clear to auscultation bilaterally; No rales, rhonchi, wheezing, or rubs  HEART: Regular rate and rhythm; No murmurs, rubs, or gallops  ABDOMEN: Soft, Nontender, Nondistended;   SKIN: No rashes or lesions  NERVOUS SYSTEM:  Alert & Oriented X3, no focal deficits    LABS:  03-15    136  |  98  |  42<H>  ----------------------------<  237<H>  5.1   |  29  |  1.30  03-15    140  |  97  |  43<H>  ----------------------------<  115<H>  4.1   |  29  |  1.39<H>  03-14    139  |  97  |  32<H>  ----------------------------<  205<H>  4.3   |  27  |  0.87    Ca    9.7      15 Mar 2024 20:19  Ca    10.1      15 Mar 2024 07:05  Ca    10.4      14 Mar 2024 06:46    TPro  7.5  /  Alb  3.6  /  TBili  0.4  /  DBili  x   /  AST  12  /  ALT  6<L>  /  AlkPhos  74  03-13      PT/INR - ( 15 Mar 2024 07:05 )   PT: 11.2 sec;   INR: 1.02 ratio                       Urinalysis Basic - ( 15 Mar 2024 20:19 )    Color: x / Appearance: x / SG: x / pH: x  Gluc: 237 mg/dL / Ketone: x  / Bili: x / Urobili: x   Blood: x / Protein: x / Nitrite: x   Leuk Esterase: x / RBC: x / WBC x   Sq Epi: x / Non Sq Epi: x / Bacteria: x                              9.4    16.71 )-----------( 401      ( 15 Mar 2024 07:05 )             30.9                         10.6   10.68 )-----------( 367      ( 14 Mar 2024 06:46 )             34.7                         10.3   12.23 )-----------( 422      ( 13 Mar 2024 16:36 )             34.1     CAPILLARY BLOOD GLUCOSE      POCT Blood Glucose.: 181 mg/dL (16 Mar 2024 05:19)  POCT Blood Glucose.: 245 mg/dL (15 Mar 2024 23:46)  POCT Blood Glucose.: 239 mg/dL (15 Mar 2024 21:50)  POCT Blood Glucose.: 233 mg/dL (15 Mar 2024 17:33)  POCT Blood Glucose.: 238 mg/dL (15 Mar 2024 11:58)      RADIOLOGY & ADDITIONAL TESTS:    Imaging Personally Reviewed:  [x ] YES  [ ] NO    Consultants involved in case:   Consultant(s) Notes Reviewed:  [ x] YES  [ ] NO:   Care Discussed with Consultants/Other Providers [x ] YES  [ ] NO

## 2024-03-16 NOTE — PROGRESS NOTE ADULT - ASSESSMENT
Ms. 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 Workup with elevated ESR/CRP and ophthalmologic exam concerning for GCA, started on high dose steroids pending temporal artery biopsy.

## 2024-03-16 NOTE — PROGRESS NOTE ADULT - ATTENDING COMMENTS
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 Workup with elevated ESR/CRP and ophthalmologic exam was concerned for GCA/ temporal artery arteritis.     1. GCA  2. RESHMA     - s/p pulse dose IV steroids methylprednisolone IV 1000mg daily 3/13-3/15; Then transition to prednisone 60 mg daily on 3/16  - Cr uptrended from 0.87 to 1.39, suspect prerenal in setting of NPO for prolonged period. 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 Workup with elevated ESR/CRP and ophthalmologic exam was concerned for GCA/ temporal artery arteritis.     1. GCA  2. RESHMA     - s/p pulse dose IV steroids methylprednisolone IV 1000mg daily 3/13-3/15; Then transition to prednisone 60 mg daily on 3/16  - Cr uptrended from 0.87 to 1.39, suspect prerenal in setting of NPO for prolonged period. 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 Workup with elevated ESR/CRP and ophthalmologic exam was concerned for GCA/ temporal artery arteritis.     1. GCA  2. RESHMA   3. Common Immune deficiency    - s/p pulse dose IV steroids methylprednisolone IV 1000mg daily 3/13-3/15; Then transition to prednisone 60 mg daily on 3/16  - Cr uptrended from 0.87 to 1.39, suspect prerenal in setting of NPO for prolonged period. Hold triamterene/HCTZ, give gentle fluid hydration, f/u UA, urine lytes, bladder scan;   - Rheumatology plan noted- Vascular Sx for TA biopsy in 2 days  - for PCP prophylaxis, switch from bactrim to mepron for now in setting of RESHMA   - ophthalmology eval appreciated  - IG 1gm/5ml sc q weekly- tomorrow, no objection per Rheum attending- Dr Mccoy. Spoke to Pharmacy for freetext  ** spoke to family at bedside

## 2024-03-16 NOTE — PROGRESS NOTE ADULT - PROBLEM SELECTOR PLAN 1
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 High risk for bone demineralization with concomitant use of aromatase inhibitor and prolonged use of high dose steroids B/l eye changes/jaw pain; L eye improved now with acute R eye vision loss, high ESR/CRP 3/6 now downtrending.   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 High risk for bone demineralization with concomitant use of aromatase inhibitor and prolonged use of high dose steroids

## 2024-03-16 NOTE — PROGRESS NOTE ADULT - PROBLEM SELECTOR PLAN 6
Takes Hizentra weekly Saturdays, son will bring in medication. Takes Hizentra weekly Saturdays, medication verified with pharmacy. Last dose Chirag 3/10

## 2024-03-17 ENCOUNTER — TRANSCRIPTION ENCOUNTER (OUTPATIENT)
Age: 89
End: 2024-03-17

## 2024-03-17 LAB
ALBUMIN SERPL ELPH-MCNC: 3.8 G/DL — SIGNIFICANT CHANGE UP (ref 3.3–5)
ALP SERPL-CCNC: 82 U/L — SIGNIFICANT CHANGE UP (ref 40–120)
ALT FLD-CCNC: 8 U/L — LOW (ref 10–45)
ANION GAP SERPL CALC-SCNC: 15 MMOL/L — SIGNIFICANT CHANGE UP (ref 5–17)
AST SERPL-CCNC: 12 U/L — SIGNIFICANT CHANGE UP (ref 10–40)
BILIRUB SERPL-MCNC: 0.2 MG/DL — SIGNIFICANT CHANGE UP (ref 0.2–1.2)
BUN SERPL-MCNC: 42 MG/DL — HIGH (ref 7–23)
CALCIUM SERPL-MCNC: 10.9 MG/DL — HIGH (ref 8.4–10.5)
CHLORIDE SERPL-SCNC: 98 MMOL/L — SIGNIFICANT CHANGE UP (ref 96–108)
CO2 SERPL-SCNC: 25 MMOL/L — SIGNIFICANT CHANGE UP (ref 22–31)
CREAT SERPL-MCNC: 1.14 MG/DL — SIGNIFICANT CHANGE UP (ref 0.5–1.3)
EGFR: 46 ML/MIN/1.73M2 — LOW
GLUCOSE BLDC GLUCOMTR-MCNC: 164 MG/DL — HIGH (ref 70–99)
GLUCOSE BLDC GLUCOMTR-MCNC: 170 MG/DL — HIGH (ref 70–99)
GLUCOSE BLDC GLUCOMTR-MCNC: 173 MG/DL — HIGH (ref 70–99)
GLUCOSE BLDC GLUCOMTR-MCNC: 180 MG/DL — HIGH (ref 70–99)
GLUCOSE BLDC GLUCOMTR-MCNC: 213 MG/DL — HIGH (ref 70–99)
GLUCOSE SERPL-MCNC: 142 MG/DL — HIGH (ref 70–99)
HCT VFR BLD CALC: 36.5 % — SIGNIFICANT CHANGE UP (ref 34.5–45)
HGB BLD-MCNC: 11.1 G/DL — LOW (ref 11.5–15.5)
MAGNESIUM SERPL-MCNC: 1.9 MG/DL — SIGNIFICANT CHANGE UP (ref 1.6–2.6)
MCHC RBC-ENTMCNC: 27.7 PG — SIGNIFICANT CHANGE UP (ref 27–34)
MCHC RBC-ENTMCNC: 30.4 GM/DL — LOW (ref 32–36)
MCV RBC AUTO: 91 FL — SIGNIFICANT CHANGE UP (ref 80–100)
NRBC # BLD: 0 /100 WBCS — SIGNIFICANT CHANGE UP (ref 0–0)
PHOSPHATE SERPL-MCNC: 2.6 MG/DL — SIGNIFICANT CHANGE UP (ref 2.5–4.5)
PLATELET # BLD AUTO: 406 K/UL — HIGH (ref 150–400)
POTASSIUM SERPL-MCNC: 4.4 MMOL/L — SIGNIFICANT CHANGE UP (ref 3.5–5.3)
POTASSIUM SERPL-SCNC: 4.4 MMOL/L — SIGNIFICANT CHANGE UP (ref 3.5–5.3)
PROT SERPL-MCNC: 7.5 G/DL — SIGNIFICANT CHANGE UP (ref 6–8.3)
RBC # BLD: 4.01 M/UL — SIGNIFICANT CHANGE UP (ref 3.8–5.2)
RBC # FLD: 14.7 % — HIGH (ref 10.3–14.5)
SODIUM SERPL-SCNC: 138 MMOL/L — SIGNIFICANT CHANGE UP (ref 135–145)
WBC # BLD: 19.77 K/UL — HIGH (ref 3.8–10.5)
WBC # FLD AUTO: 19.77 K/UL — HIGH (ref 3.8–10.5)

## 2024-03-17 PROCEDURE — 99233 SBSQ HOSP IP/OBS HIGH 50: CPT

## 2024-03-17 PROCEDURE — 93998 UNLISTD NONINVAS VASC DX STD: CPT | Mod: 26

## 2024-03-17 RX ORDER — SODIUM CHLORIDE 9 MG/ML
1000 INJECTION, SOLUTION INTRAVENOUS
Refills: 0 | Status: DISCONTINUED | OUTPATIENT
Start: 2024-03-17 | End: 2024-03-19

## 2024-03-17 RX ORDER — PANTOPRAZOLE SODIUM 20 MG/1
1 TABLET, DELAYED RELEASE ORAL
Qty: 30 | Refills: 0
Start: 2024-03-17 | End: 2024-04-15

## 2024-03-17 RX ORDER — INSULIN LISPRO 100/ML
VIAL (ML) SUBCUTANEOUS AT BEDTIME
Refills: 0 | Status: DISCONTINUED | OUTPATIENT
Start: 2024-03-17 | End: 2024-03-19

## 2024-03-17 RX ORDER — INSULIN LISPRO 100/ML
VIAL (ML) SUBCUTANEOUS
Refills: 0 | Status: DISCONTINUED | OUTPATIENT
Start: 2024-03-17 | End: 2024-03-19

## 2024-03-17 RX ADMIN — Medication 125 MICROGRAM(S): at 05:49

## 2024-03-17 RX ADMIN — HEPARIN SODIUM 5000 UNIT(S): 5000 INJECTION INTRAVENOUS; SUBCUTANEOUS at 05:49

## 2024-03-17 RX ADMIN — GABAPENTIN 300 MILLIGRAM(S): 400 CAPSULE ORAL at 21:48

## 2024-03-17 RX ADMIN — NYSTATIN CREAM 1 APPLICATION(S): 100000 CREAM TOPICAL at 05:48

## 2024-03-17 RX ADMIN — Medication 60 MILLIGRAM(S): at 05:49

## 2024-03-17 RX ADMIN — HEPARIN SODIUM 5000 UNIT(S): 5000 INJECTION INTRAVENOUS; SUBCUTANEOUS at 13:05

## 2024-03-17 RX ADMIN — Medication 1: at 17:30

## 2024-03-17 RX ADMIN — HEPARIN SODIUM 5000 UNIT(S): 5000 INJECTION INTRAVENOUS; SUBCUTANEOUS at 21:48

## 2024-03-17 RX ADMIN — ATORVASTATIN CALCIUM 10 MILLIGRAM(S): 80 TABLET, FILM COATED ORAL at 21:48

## 2024-03-17 RX ADMIN — CHLORHEXIDINE GLUCONATE 1 APPLICATION(S): 213 SOLUTION TOPICAL at 13:09

## 2024-03-17 RX ADMIN — Medication 1: at 05:50

## 2024-03-17 RX ADMIN — NYSTATIN CREAM 1 APPLICATION(S): 100000 CREAM TOPICAL at 17:30

## 2024-03-17 RX ADMIN — Medication 1 TABLET(S): at 21:47

## 2024-03-17 RX ADMIN — GABAPENTIN 300 MILLIGRAM(S): 400 CAPSULE ORAL at 13:06

## 2024-03-17 RX ADMIN — Medication 2000 UNIT(S): at 13:04

## 2024-03-17 RX ADMIN — ATOVAQUONE 1500 MILLIGRAM(S): 750 SUSPENSION ORAL at 13:08

## 2024-03-17 RX ADMIN — Medication 2: at 13:07

## 2024-03-17 RX ADMIN — PANTOPRAZOLE SODIUM 40 MILLIGRAM(S): 20 TABLET, DELAYED RELEASE ORAL at 05:46

## 2024-03-17 RX ADMIN — Medication 1 TABLET(S): at 22:47

## 2024-03-17 RX ADMIN — Medication 3 MILLIGRAM(S): at 21:48

## 2024-03-17 RX ADMIN — GABAPENTIN 300 MILLIGRAM(S): 400 CAPSULE ORAL at 05:49

## 2024-03-17 NOTE — PROGRESS NOTE ADULT - PROBLEM SELECTOR PLAN 10
Per patient's son (Favian), patient has had episodes of aggression/agitation in the past few weeks, especially during hospitalization at [facility]. May be exacerbated with steroid use  - C/w melatonin; continue home gabapentin  - Patient also taking acetaminophen with codeine at home, per son, "probably more than she should be" (iSTOP in chart) for generalized pain iso degenerative disc disease, knee arthritis

## 2024-03-17 NOTE — DISCHARGE NOTE PROVIDER - NSDCCPCAREPLAN_GEN_ALL_CORE_FT
PRINCIPAL DISCHARGE DIAGNOSIS  Diagnosis: Visual disturbance  Assessment and Plan of Treatment: While you were admitted, severeal diagnostic tests were highly suggestive of a diganosis of Giant cell arteritis (GCA) as the cause of your vision changes. GCA, also known as temporal arteritis, is a type of vasculitis, which involves inflammation of the blood vessels. GCA primarily affects medium-to-large arteries, especially those in the head, particularly the temporal arteries (located on the sides of the head).   Common symptoms associated with this condition, many of which you had been experiencing, include severe headaches, usually in the temples, jaw pain, particularly when chewing, vision changes, such as double vision, blurry vision, or sudden vision loss, fatigue, fever, unintended weight loss, and/or muscle aches and stiffness, particularly in the shoulders and hips.   Diagnosis of GCA is based on a combination of tests including a biopsy of the affected artery. Your blood tests showed elevated markers of inflammation, including erythrocyte sedimentation rate (ESR) and C-reactive protein (CRP).   You were treated with three doses of high dose IV steroids before being transitioned to an oral course of steroids which is what you will continue to take at home. Please take these medications as prescribed, as well as the additional medication we have provided you to prevent side effects associated with taking steroids such as gastrointestinal distress, and a predisposition to infections.     PRINCIPAL DISCHARGE DIAGNOSIS  Diagnosis: Visual disturbance  Assessment and Plan of Treatment: While you were admitted, severeal diagnostic tests were highly suggestive of a diganosis of Giant cell arteritis (GCA) as the cause of your vision changes. GCA, also known as temporal arteritis, is a type of vasculitis, which involves inflammation of the blood vessels. GCA primarily affects medium-to-large arteries, especially those in the head, particularly the temporal arteries (located on the sides of the head).   Common symptoms associated with this condition, many of which you had been experiencing, include severe headaches, usually in the temples, jaw pain, particularly when chewing, vision changes, such as double vision, blurry vision, or sudden vision loss, fatigue, fever, unintended weight loss, and/or muscle aches and stiffness, particularly in the shoulders and hips.   Diagnosis of GCA is based on a combination of tests including a biopsy of the affected artery. Your blood tests showed elevated markers of inflammation, including erythrocyte sedimentation rate (ESR) and C-reactive protein (CRP).  You had a biopsy performed on 3/19.   You were treated with three doses of high dose IV steroids before being transitioned to an oral course of steroids which is what you will continue to take at home. Please take these medications as prescribed, as well as the additional medication we have provided you to prevent side effects associated with taking steroids such as gastrointestinal distress, and a predisposition to infections.     PRINCIPAL DISCHARGE DIAGNOSIS  Diagnosis: Visual disturbance  Assessment and Plan of Treatment: While you were admitted, severeal diagnostic tests were highly suggestive of a diganosis of Giant cell arteritis (GCA) as the cause of your vision changes. GCA, also known as temporal arteritis, is a type of vasculitis, which involves inflammation of the blood vessels. GCA primarily affects medium-to-large arteries, especially those in the head, particularly the temporal arteries (located on the sides of the head).   Common symptoms associated with this condition, many of which you had been experiencing, include severe headaches, usually in the temples, jaw pain, particularly when chewing, vision changes, such as double vision, blurry vision, or sudden vision loss, fatigue, fever, unintended weight loss, and/or muscle aches and stiffness, particularly in the shoulders and hips.   Diagnosis of GCA is based on a combination of tests including a biopsy of the affected artery. Your blood tests showed elevated markers of inflammation, including erythrocyte sedimentation rate (ESR) and C-reactive protein (CRP).  You had a biopsy performed on 3/19.   You were treated with three doses of high dose IV steroids before being transitioned to an oral course of steroids which is what you will continue to take at home. Please take these medications as prescribed, as well as the additional medication we have provided you to prevent side effects associated with taking steroids such as gastrointestinal distress, and a predisposition to infections.  Please make sure you follow up with rheumatology for further adjustments to your steroids and to discuss switching to a non-steroidal medication.     PRINCIPAL DISCHARGE DIAGNOSIS  Diagnosis: Visual disturbance  Assessment and Plan of Treatment: While you were admitted, severeal diagnostic tests were highly suggestive of a diganosis of Giant cell arteritis (GCA) as the cause of your vision changes. GCA, also known as temporal arteritis, is a type of vasculitis, which involves inflammation of the blood vessels. GCA primarily affects medium-to-large arteries, especially those in the head, particularly the temporal arteries (located on the sides of the head).   Common symptoms associated with this condition, many of which you had been experiencing, include severe headaches, usually in the temples, jaw pain, particularly when chewing, vision changes, such as double vision, blurry vision, or sudden vision loss, fatigue, fever, unintended weight loss, and/or muscle aches and stiffness, particularly in the shoulders and hips.   Diagnosis of GCA is based on a combination of tests including a biopsy of the affected artery. Your blood tests showed elevated markers of inflammation, including erythrocyte sedimentation rate (ESR) and C-reactive protein (CRP).  You had a biopsy performed on 3/19.   You were treated with three doses of high dose IV steroids before being transitioned to an oral course of steroids which is what you will continue to take at home. Please take these medications as prescribed, as well as the additional medication we have provided you to prevent side effects associated with taking steroids such as gastrointestinal distress, and a predisposition to infections.  Please make sure you follow up with rheumatology for further adjustments to your steroids and to discuss switching to a non-steroidal medication.  - for now, continue taking prednisone 20mg 3 tablets (for total of 60mg) once a day  until your rheumatology appointment

## 2024-03-17 NOTE — PROGRESS NOTE ADULT - SUBJECTIVE AND OBJECTIVE BOX
SURGERY DAILY PROGRESS NOTE:     SUBJECTIVE/ROS: Patient seen and evaluated. Reports that she has been having trouble seeing.     OBJECTIVE:  Vital Signs Last 24 Hrs  T(C): 36.4 (17 Mar 2024 12:08), Max: 36.8 (17 Mar 2024 05:06)  T(F): 97.6 (17 Mar 2024 12:08), Max: 98.2 (17 Mar 2024 05:06)  HR: 56 (17 Mar 2024 12:08) (56 - 64)  BP: 144/66 (17 Mar 2024 12:08) (134/61 - 187/77)  BP(mean): --  RR: 18 (17 Mar 2024 12:08) (18 - 18)  SpO2: 97% (17 Mar 2024 12:08) (96% - 98%)    Parameters below as of 17 Mar 2024 12:08  Patient On (Oxygen Delivery Method): room air      I&O's Detail    16 Mar 2024 07:01  -  17 Mar 2024 07:00  --------------------------------------------------------  IN:    Oral Fluid: 720 mL  Total IN: 720 mL    OUT:  Total OUT: 0 mL    Total NET: 720 mL      17 Mar 2024 07:01  -  17 Mar 2024 18:00  --------------------------------------------------------  IN:    Oral Fluid: 480 mL  Total IN: 480 mL    OUT:  Total OUT: 0 mL    Total NET: 480 mL        Daily     Daily   MEDICATIONS  (STANDING):  acetaminophen  300 mG/codeine 30 mG 1 Tablet(s) Oral <User Schedule>  atorvastatin 10 milliGRAM(s) Oral at bedtime  atovaquone  Suspension 1500 milliGRAM(s) Oral daily  chlorhexidine 4% Liquid 1 Application(s) Topical daily  cholecalciferol 2000 Unit(s) Oral daily  dextrose 5%. 1000 milliLiter(s) (50 mL/Hr) IV Continuous <Continuous>  dextrose 5%. 1000 milliLiter(s) (100 mL/Hr) IV Continuous <Continuous>  dextrose 50% Injectable 12.5 Gram(s) IV Push once  dextrose 50% Injectable 25 Gram(s) IV Push once  dextrose 50% Injectable 25 Gram(s) IV Push once  gabapentin 300 milliGRAM(s) Oral three times a day  glucagon  Injectable 1 milliGRAM(s) IntraMuscular once  heparin   Injectable 5000 Unit(s) SubCutaneous every 8 hours  insulin lispro (ADMELOG) corrective regimen sliding scale   SubCutaneous three times a day before meals  insulin lispro (ADMELOG) corrective regimen sliding scale   SubCutaneous at bedtime  lactated ringers. 1000 milliLiter(s) (100 mL/Hr) IV Continuous <Continuous>  lactated ringers. 1000 milliLiter(s) (75 mL/Hr) IV Continuous <Continuous>  levothyroxine 125 MICROGram(s) Oral <User Schedule>  levothyroxine 125 MICROGram(s) Oral daily  melatonin 3 milliGRAM(s) Oral at bedtime  nystatin Powder 1 Application(s) Topical two times a day  pantoprazole    Tablet 40 milliGRAM(s) Oral before breakfast  predniSONE   Tablet 60 milliGRAM(s) Oral daily    MEDICATIONS  (PRN):  dextrose Oral Gel 15 Gram(s) Oral once PRN Blood Glucose LESS THAN 70 milliGRAM(s)/deciliter      LABS:                        11.1   19.77 )-----------( 406      ( 17 Mar 2024 07:39 )             36.5     03-17    138  |  98  |  42<H>  ----------------------------<  142<H>  4.4   |  25  |  1.14    Ca    10.9<H>      17 Mar 2024 07:39  Phos  2.6     03-17  Mg     1.9     03-17    TPro  7.5  /  Alb  3.8  /  TBili  0.2  /  DBili  x   /  AST  12  /  ALT  8<L>  /  AlkPhos  82  03-17        PHYSICAL EXAM:  Constitutional: well developed, well nourished, NAD  Eyes: anicteric  Respiratory: unlabored breathing with equal chest wall expansion.   Extremities: warm with slight edema b/l  Skin: b/l lower extremity dry, non erythematous skin lesions.  Psychiatric: AAO x 3

## 2024-03-17 NOTE — DISCHARGE NOTE PROVIDER - NSDCMRMEDTOKEN_GEN_ALL_CORE_FT
atorvastatin 10 mg oral tablet: 1 tab(s) orally once a day (at bedtime)  Bactrim  mg-160 mg oral tablet: 1 tab(s) orally 3 times a week please take 1 tablet each on Monday, Wednesday, and Friday (3 times a week total) while you are still taking Prednisone  gabapentin: 300 milligram(s) orally 3 times a day  levothyroxine 125 mcg (0.125 mg) oral tablet: 1 tab(s) orally once a day  levothyroxine 125 mcg (0.125 mg) oral tablet: 2 tab(s) orally every 7 days on Sunday  pantoprazole 40 mg oral delayed release tablet: 1 tab(s) orally once a day (before a meal)  triamterene-hydrochlorothiazide 37.5 mg-25 mg oral tablet: 1 tab(s) orally once a day  Vitamin D3 2000 intl units (50 mcg) oral tablet: 1 tab(s) orally once a day   atorvastatin 10 mg oral tablet: 1 tab(s) orally once a day (at bedtime)  Bactrim  mg-160 mg oral tablet: 1 tab(s) orally 3 times a week please take 1 tablet each on Monday, Wednesday, and Friday (3 times a week total) while you are still taking Prednisone  gabapentin: 300 milligram(s) orally 3 times a day  levothyroxine 125 mcg (0.125 mg) oral tablet: 1 tab(s) orally once a day  levothyroxine 125 mcg (0.125 mg) oral tablet: 2 tab(s) orally every 7 days on Sunday  pantoprazole 40 mg oral delayed release tablet: 1 tab(s) orally once a day (before a meal)  predniSONE 20 mg oral tablet: 3 tab(s) orally once a day  triamterene-hydrochlorothiazide 37.5 mg-25 mg oral tablet: 1 tab(s) orally once a day  Vitamin D3 2000 intl units (50 mcg) oral tablet: 1 tab(s) orally once a day

## 2024-03-17 NOTE — PROGRESS NOTE ADULT - ASSESSMENT
9F with PMH HTN, HLD, hypothyroidism and POHx pseudophakia OU presents for acute right eye vision loss iso 2-3 weeks jaw claudication and now with optic disc edema OD>OS.  Patient previously seen for left sided optic nerve swelling without vision loss and had ROS negative for GCA despite elevated ESR/CRP. Now given jaw pain, bilateral involvement and elevated inflammatory markers, high concern for GCA.    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 (right eye), left eye with tr optic disc edema and disc hemorrhage  - High suspicion for GCA at this time   - 3/6 labs remarkable for  and   - Current presentation labs with thrombocytosis, , ESR 45  - cw steroids per rheumatology  - apprecaite rheumatology Recs  - Planning for TAB with vascular surgery 3/18  - Pending read for duplex of temporal artery   - Ophtho to follow       Outpatient Follow-up: Patient should follow-up with his/her ophthalmologist or with [facility] Department of Ophthalmology within 1 week of after discharge at:    [address]  [place] [address]  [phone]

## 2024-03-17 NOTE — DISCHARGE NOTE PROVIDER - CARE PROVIDER_API CALL
Christopher Benjamin  Internal Medicine  [address], [address]  [place] [address]  Phone: [phone]  Fax: [phone]  Established Patient  Follow Up Time: 1 week

## 2024-03-17 NOTE — PROGRESS NOTE ADULT - ASSESSMENT
Ms. 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 Workup with elevated ESR/CRP and ophthalmologic exam concerning for GCA, started on high dose steroids pending temporal artery biopsy.

## 2024-03-17 NOTE — PROGRESS NOTE ADULT - PROBLEM SELECTOR PLAN 1
B/l eye changes/jaw pain; L eye improved now with acute R eye vision loss, high ESR/CRP 3/6 now downtrending.   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 High risk for bone demineralization with concomitant use of aromatase inhibitor and prolonged use of high dose steroids

## 2024-03-17 NOTE — PROGRESS NOTE ADULT - PROBLEM SELECTOR PLAN 7
Continue synthroid at home dose, confirmed w/ pt takes 2 tablets of 125mg on Sundays, 1 tablet all other days

## 2024-03-17 NOTE — DISCHARGE NOTE PROVIDER - NSDCFUADDAPPT_GEN_ALL_CORE_FT
APPTS ARE READY TO BE MADE: [X] YES  Best Family or Patient Contact (if needed):  Additional Information if needed:  1. ophthalmology f/u in 1 week  2. rheum f/u in 1 week       Other comments or requests:  APPTS ARE READY TO BE MADE: [X] YES  Best Family or Patient Contact (if needed):  Additional Information if needed:  1. ophthalmology f/u in 1 week  2. rheum f/u in 1 week   3. PCP f/u in 1 week      Other comments or requests:  APPTS ARE READY TO BE MADE: [X] YES  Best Family or Patient Contact (if needed):  Additional Information if needed:  1. ophthalmology f/u in 1 week  2. rheum f/u in 1 week   3. PCP f/u in 1 week      Other comments or requests:     Prior to outreaching the patient, it was visible that the patient has secured a follow up appointment which was not scheduled by our team.     Appointment scheduled for 3/27 at 1:45pm at [address] [facility] with Dr. Mo Thompson     Appointment scheduled for 4/2 at 10am at [address] [place] [address] with Dr. Dawna Mccoy.    Appointment scheduled for 3/29 at 9:30am at [address] [facility] with Dr. Andrez Benjamin

## 2024-03-17 NOTE — DISCHARGE NOTE PROVIDER - NSFOLLOWUPCLINICS_GEN_ALL_ED_FT
[facility] - Ophthalmology  Ophthalmology  [address]  [place] [address]  Phone: [phone]  Fax:   Follow Up Time: 1 week     [facility] - Ophthalmology  Ophthalmology  [address]  [place] [address]  Phone: [phone]  Fax:   Follow Up Time: 1 week    [facility] Rheumatology  Rheumatology  [address]  [place] [address]  Phone: [phone]  Fax:

## 2024-03-17 NOTE — PROGRESS NOTE ADULT - ASSESSMENT
Ms. 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 Workup with elevated ESR/CRP and ophthalmologic exam concerning for GCA, started on high dose steroids pending temporal artery biopsy.     Plan:  -Added on to OR schedule for Monday, 3/18/24  -Please obtain pre-op labs (CBC, BMP, Mg/Phos, PTT, PT/INR, Type and Screen x2) at 4 am  -NPO/IVF at midnight  -Consent in chart    Discussed with vascular surgery fellow on call on behalf of Dr. Gaines    Vascular Surgery  [phone]

## 2024-03-17 NOTE — DISCHARGE NOTE PROVIDER - NSDCFUSCHEDAPPT_GEN_ALL_CORE_FT
Mo Thompson  [facility]  OPHTHALM [address]  Scheduled Appointment: 03/27/2024    [facility]  INTMED [address]  Scheduled Appointment: 03/29/2024    Dawna Mccoy  [facility]  RHEUM [address]  Scheduled Appointment: 04/02/2024     Dawna Mccoy  [facility]  RHEUM [address]  Scheduled Appointment: 04/02/2024    Mo Thompson  [facility]  [address]THALM [address]  Scheduled Appointment: 04/17/2024    [facility]  INTMED [address]  Scheduled Appointment: 06/24/2024

## 2024-03-17 NOTE — PROGRESS NOTE ADULT - SUBJECTIVE AND OBJECTIVE BOX
[facility] DEPARTMENT OF OPHTHALMOLOGY  ------------------------------------------------------------------------------  Sixto Barnes MD PGY 2  Available on Teams  ------------------------------------------------------------------------------    Interval History: No acute events overnight. Pt states vision is the same since Friday.    MEDICATIONS  (STANDING):  acetaminophen  300 mG/codeine 30 mG 1 Tablet(s) Oral <User Schedule>  atorvastatin 10 milliGRAM(s) Oral at bedtime  atovaquone  Suspension 1500 milliGRAM(s) Oral daily  chlorhexidine 4% Liquid 1 Application(s) Topical daily  cholecalciferol 2000 Unit(s) Oral daily  dextrose 5%. 1000 milliLiter(s) (50 mL/Hr) IV Continuous <Continuous>  dextrose 5%. 1000 milliLiter(s) (100 mL/Hr) IV Continuous <Continuous>  dextrose 50% Injectable 12.5 Gram(s) IV Push once  dextrose 50% Injectable 25 Gram(s) IV Push once  dextrose 50% Injectable 25 Gram(s) IV Push once  gabapentin 300 milliGRAM(s) Oral three times a day  glucagon  Injectable 1 milliGRAM(s) IntraMuscular once  heparin   Injectable 5000 Unit(s) SubCutaneous every 8 hours  insulin lispro (ADMELOG) corrective regimen sliding scale   SubCutaneous three times a day before meals  insulin lispro (ADMELOG) corrective regimen sliding scale   SubCutaneous at bedtime  lactated ringers. 1000 milliLiter(s) (75 mL/Hr) IV Continuous <Continuous>  lactated ringers. 1000 milliLiter(s) (100 mL/Hr) IV Continuous <Continuous>  levothyroxine 125 MICROGram(s) Oral <User Schedule>  levothyroxine 125 MICROGram(s) Oral daily  melatonin 3 milliGRAM(s) Oral at bedtime  nystatin Powder 1 Application(s) Topical two times a day  pantoprazole    Tablet 40 milliGRAM(s) Oral before breakfast  predniSONE   Tablet 60 milliGRAM(s) Oral daily    MEDICATIONS  (PRN):  dextrose Oral Gel 15 Gram(s) Oral once PRN Blood Glucose LESS THAN 70 milliGRAM(s)/deciliter      VITALS: T(C): 36.3 (03-17-24 @ 19:37)  T(F): 97.4 (03-17-24 @ 19:37), Max: 98.2 (03-17-24 @ 05:06)  HR: 67 (03-17-24 @ 19:37) (56 - 67)  BP: 158/55 (03-17-24 @ 19:37) (134/61 - 187/77)  RR:  (18 - 18)  SpO2:  (95% - 98%)  Wt(kg): --  General: AAO x 3, appropriate mood and affect    Ophthalmology Exam:  Visual acuity (cc): LP OD, 20/30 OS  Pupils: poorly reactive OU, tr APD OD  Ttono: 11 OD, 10 OS  Extraocular movements (EOMs): Full OU, no pain, no diplopia  Confrontational Visual Field (CVF): Full OS  Color plates: MAYITO OD, 11/12 OS    Pen Light Exam (PLE)  External: Flat OU  Lids/Lashes/Lacrimal Ducts: Flat OU    Sclera/Conjunctiva: W+Q OU  Cornea: Cl OU  Anterior Chamber: D+F OU    Iris: Flat OU  Lens: Cl OU

## 2024-03-17 NOTE — PROGRESS NOTE ADULT - ATTENDING COMMENTS
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 Workup with elevated ESR/CRP and ophthalmologic exam was concerned for GCA/ temporal artery arteritis.     1. GCA  2. RESHMA   3. Common variable Immune deficiency    - still c/o L eye blurriness, no HA, but sed rate 110,   - s/p pulse dose IV steroids methylprednisolone IV 1000mg daily 3/13-3/15; Then transition to prednisone 60 mg daily on 3/16, WBC 19 from steroid  - Cr downtrended to 1.1, suspect prerenal in setting of NPO for prolonged period. 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 Pharmacy has her own meds  ** spoke to family at bedside. 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 Workup with elevated ESR/CRP and ophthalmologic exam was concerned for GCA/ temporal artery arteritis.     1. GCA  2. RESHMA   3. Common variable Immune deficiency    - still c/o L eye blurriness, no HA, but sed rate 101, ,   - s/p pulse dose IV steroids methylprednisolone IV 1000mg daily 3/13-3/15; Then transition to prednisone 60 mg daily on 3/16, WBC 19 from steroid  - Cr downtrended to 1.1, suspect prerenal in setting of NPO for prolonged period. 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 Pharmacy has her own meds  ** spoke to family at bedside. 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 Workup with elevated ESR/CRP and ophthalmologic exam was concerned for GCA/ temporal artery arteritis.     1. GCA  2. RESHMA   3. Common variable Immune deficiency    - still c/o L eye blurriness, no HA, but sed rate 101, ,   - s/p pulse dose IV steroids methylprednisolone IV 1000mg daily 3/13-3/15; Then transition to prednisone 60 mg daily on 3/16, WBC 19 from steroid  - Cr downtrended to 1.1, suspect prerenal in setting of NPO for prolonged period. 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 Pharmacy has her own meds  ** spoke to family at bedside.

## 2024-03-17 NOTE — DISCHARGE NOTE PROVIDER - HOSPITAL COURSE
HPI:  Ms. 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 At [facility], MR orbits were negative, MR brain with meningioma. Ophthalmology sent workup for rheumatologic/autoimmune causes, and as her symptoms improved, she was discharged with recommended outpatient ophthalmologic and rheumatologic workup. Her left eye changes improved; however, now with right eye blurry vision without pain. Also with 2-3 weeks jaw claudication, worsens with chewing. Denies fevers, chest pain, shortness of breath, abdominal pain, changes in urination. Has chronic leg swelling, last BM 3 days ago.  (13 Mar 2024 20:46)    Hospital Course:  Admitted to medicine, optho consuled, exam notable for R optic neuritis. Maintained on high dose steroids ( Solumedrol 3d -> Prednisone taper). Hospital course complicated by acute RESHMA, likely prerenal iso poor PO intake, improving with trial of IVF. Cleared for temporal artery biopsy on 3/18, with findings notable for ****. On 3/** deemed stable for discharge home with close followup as noted below.     The patient is afebrile, hemodynamically stable and medically optimized for discharge to home with follow up with PCP, Ophthalmology. On day of discharge, patient is clinically stable with no new exam findings or acute symptoms compared to prior. The patient was seen by the attending physician on the date of discharge and deemed stable and acceptable for discharge. The patient's chronic medical conditions were treated accordingly per the patient's home medication regimen. The patient's medication reconciliation (with changes made to chronic medications), follow up appointments, discharge orders, instructions, and significant lab and diagnostic studies are as noted.    Important Medication Changes and Reason: Prednisone , Protonix, Bactrim     Active or Pending Issues Requiring Follow-up: Please followup with your outpatient doctors within a week of your discharge.       Advanced Directives:   [X] Full code  [ ] DNR  [ ] Hospice    Discharge Diagnoses: Giant Cell Arteritis          HPI:  Ms. 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 At [facility], MR orbits were negative, MR brain with meningioma. Ophthalmology sent workup for rheumatologic/autoimmune causes, and as her symptoms improved, she was discharged with recommended outpatient ophthalmologic and rheumatologic workup. Her left eye changes improved; however, now with right eye blurry vision without pain. Also with 2-3 weeks jaw claudication, worsens with chewing. Denies fevers, chest pain, shortness of breath, abdominal pain, changes in urination. Has chronic leg swelling, last BM 3 days ago.  (13 Mar 2024 20:46)    Hospital Course:  Admitted to medicine, optho consuled, exam notable for R optic neuritis. Maintained on high dose steroids ( Solumedrol 3d -> Prednisone taper). Hospital course complicated by acute RESHMA, likely prerenal iso poor PO intake, improving with trial of IVF. Temporal artery biopsy was performed on 3/18. On 3/19, deemed stable for discharge home with close followup as noted below.     The patient is afebrile, hemodynamically stable and medically optimized for discharge to home with follow up with PCP, Ophthalmology. On day of discharge, patient is clinically stable with no new exam findings or acute symptoms compared to prior. The patient was seen by the attending physician on the date of discharge and deemed stable and acceptable for discharge. The patient's chronic medical conditions were treated accordingly per the patient's home medication regimen. The patient's medication reconciliation (with changes made to chronic medications), follow up appointments, discharge orders, instructions, and significant lab and diagnostic studies are as noted.    Important Medication Changes and Reason: Prednisone , Protonix, Bactrim     Active or Pending Issues Requiring Follow-up: Please followup with your outpatient doctors within a week of your discharge.       Advanced Directives:   [X] Full code  [ ] DNR  [ ] Hospice    Discharge Diagnoses: Giant Cell Arteritis          HPI:  Ms. 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 At [facility], MR orbits were negative, MR brain with meningioma. Ophthalmology sent workup for rheumatologic/autoimmune causes, and as her symptoms improved, she was discharged with recommended outpatient ophthalmologic and rheumatologic workup. Her left eye changes improved; however, now with right eye blurry vision without pain. Also with 2-3 weeks jaw claudication, worsens with chewing. Denies fevers, chest pain, shortness of breath, abdominal pain, changes in urination. Has chronic leg swelling, last BM 3 days ago.  (13 Mar 2024 20:46)    Hospital Course:  Admitted to medicine. Patient was evaluated by ophthalmology and rheumatology with findings, including elevated ESR/CRP, concerning for giant cell arteritis and was started on pulse IV steroids, then transitioned to oral prednisone with long for long taper vs. transition to steroid-sparing agent- to be determined outpatient. Temporal artery duplex was performed and did not show evidence of GCA, however not sensitive. Hospital course complicated by acute RESHMA, likely prerenal iso poor PO intake, improved with trial of IVF. Temporal artery biopsy was performed by vascular on 3/19. Now optimized for discharge from medical/ophtho/rheum/vascular standpoint with outpatient follow up.     Important Medication Changes and Reason:   - Prednisone   - Protonix, Bactrim for prophylaxis     Active or Pending Issues Requiring Follow-up:   - PCP, rheum, ophtho f/u within 1 week       Advanced Directives:   [X] Full code  [ ] DNR  [ ] Hospice    Discharge Diagnoses: Giant Cell Arteritis

## 2024-03-17 NOTE — PROGRESS NOTE ADULT - PROBLEM SELECTOR PLAN 2
Pt with RESHMA in the setting of poor PO intake. UA wnl, FeNa 0.9% c/w prerenal RESHMA iso poor PO intake, hypovolemic on exam. Trial of IVF NS with improvement in SCr. Continue to trend BMP, nonoliguric at this time.   Plan:   CTM   Dose medications as per eGFR, avoid NSAIDs ACEI/ARBS, RCA and nephrotoxins

## 2024-03-17 NOTE — PROGRESS NOTE ADULT - PROBLEM SELECTOR PLAN 11
DVT: SCDs iso planned procedure then lovenox subQ  Diet: DASH  Dispo: PT recc Home PT with rolling walker (patient has at home). Per family, patient does not want PT as had injury during PT session in the past. They also do not want any additional aides at home, both sons live with her  GI: pantoprazole 40 PO while on high dose steroids, mepron for PCP prophylaxis (can consider switching to Bactrim with resolution of RESHMA given optimal choice for PJP coverage)   Lipitor 10 qhs for HLD  GOC: Full Code

## 2024-03-17 NOTE — PROGRESS NOTE ADULT - SUBJECTIVE AND OBJECTIVE BOX
******INCOMPLETE NOTE******    No acute events overnight  Srini Benitez PGY3  Internal Medicine  Available on Teams     Progress Note  03-13-24 (4d)  Patient is a 89y old  Female who presents with a chief complaint of Vision changes (17 Mar 2024 07:41)    Subjective / Interval 24 Events :  - No acute events overnight.  - Pt seen and examined at bedside this AM. Eat breakfasting. feels well, without any acute complaints. No further changes in vision, no headaches     Additional ROS (if any): see above, otherwise negative     MEDICATIONS  (STANDING):  acetaminophen  300 mG/codeine 30 mG 1 Tablet(s) Oral <User Schedule>  atorvastatin 10 milliGRAM(s) Oral at bedtime  atovaquone  Suspension 1500 milliGRAM(s) Oral daily  chlorhexidine 4% Liquid 1 Application(s) Topical daily  cholecalciferol 2000 Unit(s) Oral daily  dextrose 5%. 1000 milliLiter(s) (50 mL/Hr) IV Continuous <Continuous>  dextrose 5%. 1000 milliLiter(s) (100 mL/Hr) IV Continuous <Continuous>  dextrose 50% Injectable 12.5 Gram(s) IV Push once  dextrose 50% Injectable 25 Gram(s) IV Push once  dextrose 50% Injectable 25 Gram(s) IV Push once  gabapentin 300 milliGRAM(s) Oral three times a day  glucagon  Injectable 1 milliGRAM(s) IntraMuscular once  heparin   Injectable 5000 Unit(s) SubCutaneous every 8 hours  insulin lispro (ADMELOG) corrective regimen sliding scale   SubCutaneous three times a day before meals  insulin lispro (ADMELOG) corrective regimen sliding scale   SubCutaneous at bedtime  lactated ringers. 1000 milliLiter(s) (75 mL/Hr) IV Continuous <Continuous>  levothyroxine 125 MICROGram(s) Oral <User Schedule>  levothyroxine 125 MICROGram(s) Oral daily  melatonin 3 milliGRAM(s) Oral at bedtime  nystatin Powder 1 Application(s) Topical two times a day  pantoprazole    Tablet 40 milliGRAM(s) Oral before breakfast  predniSONE   Tablet 60 milliGRAM(s) Oral daily    MEDICATIONS  (PRN):  dextrose Oral Gel 15 Gram(s) Oral once PRN Blood Glucose LESS THAN 70 milliGRAM(s)/deciliter      CAPILLARY BLOOD GLUCOSE      POCT Blood Glucose.: 170 mg/dL (17 Mar 2024 08:32)  POCT Blood Glucose.: 164 mg/dL (17 Mar 2024 05:48)  POCT Blood Glucose.: 289 mg/dL (16 Mar 2024 23:32)  POCT Blood Glucose.: 227 mg/dL (16 Mar 2024 17:44)  POCT Blood Glucose.: 226 mg/dL (16 Mar 2024 12:37)    I&O's Summary    16 Mar 2024 07:01  -  17 Mar 2024 07:00  --------------------------------------------------------  IN: 720 mL / OUT: 0 mL / NET: 720 mL      PHYSICAL EXAM:  Vital Signs Last 24 Hrs  T(C): 36.8 (17 Mar 2024 05:06), Max: 36.8 (17 Mar 2024 05:06)  T(F): 98.2 (17 Mar 2024 05:06), Max: 98.2 (17 Mar 2024 05:06)  HR: 64 (17 Mar 2024 05:06) (58 - 64)  BP: 134/61 (17 Mar 2024 06:51) (116/56 - 187/77)  BP(mean): --  RR: 18 (17 Mar 2024 05:06) (18 - 18)  SpO2: 98% (17 Mar 2024 05:06) (94% - 98%)    Parameters below as of 17 Mar 2024 05:06  Patient On (Oxygen Delivery Method): room air      General: NAD, sitting up comfortably in chair   HEENT: no scleral icterus  CV: RRR, normal S1 and S2  Lungs: normal respiratory effort on RA, CTAB  Abd: soft, nontender, nondistended  Ext: no edema, warm, well perfused  Pysch: AAOx3, appropriate affect   Neuro: grossly non-focal, moving all extremities spontaneously         LABS:                          11.1   19.77 )-----------( 406      ( 17 Mar 2024 07:39 )             36.5       WBC Trend: 19.77<--, 21.85<--, 16.71<--  Hb Trend: 11.1<--, 10.4<--, 9.4<--, 10.6<--, 10.3<--    03-17    138  |  98  |  42<H>  ----------------------------<  142<H>  4.4   |  25  |  1.14    Ca    10.9<H>      17 Mar 2024 07:39  Phos  2.6     03-17  Mg     1.9     03-17    TPro  7.5  /  Alb  3.8  /  TBili  0.2  /  DBili  x   /  AST  12  /  ALT  8<L>  /  AlkPhos  82  03-17      Urinalysis Basic - ( 17 Mar 2024 07:39 )    Color: x / Appearance: x / SG: x / pH: x  Gluc: 142 mg/dL / Ketone: x  / Bili: x / Urobili: x   Blood: x / Protein: x / Nitrite: x   Leuk Esterase: x / RBC: x / WBC x   Sq Epi: x / Non Sq Epi: x / Bacteria: x      RADIOLOGY & ADDITIONAL TESTS: Reviewed  - No new images

## 2024-03-18 ENCOUNTER — TRANSCRIPTION ENCOUNTER (OUTPATIENT)
Age: 89
End: 2024-03-18

## 2024-03-18 LAB
ADD ON TEST-SPECIMEN IN LAB: SIGNIFICANT CHANGE UP
ANION GAP SERPL CALC-SCNC: 14 MMOL/L — SIGNIFICANT CHANGE UP (ref 5–17)
APTT BLD: 33.9 SEC — SIGNIFICANT CHANGE UP (ref 24.5–35.6)
BLD GP AB SCN SERPL QL: NEGATIVE — SIGNIFICANT CHANGE UP
BUN SERPL-MCNC: 28 MG/DL — HIGH (ref 7–23)
CALCIUM SERPL-MCNC: 8.4 MG/DL — SIGNIFICANT CHANGE UP (ref 8.4–10.5)
CHLORIDE SERPL-SCNC: 102 MMOL/L — SIGNIFICANT CHANGE UP (ref 96–108)
CO2 SERPL-SCNC: 23 MMOL/L — SIGNIFICANT CHANGE UP (ref 22–31)
CREAT SERPL-MCNC: 0.88 MG/DL — SIGNIFICANT CHANGE UP (ref 0.5–1.3)
EGFR: 63 ML/MIN/1.73M2 — SIGNIFICANT CHANGE UP
GLUCOSE BLDC GLUCOMTR-MCNC: 142 MG/DL — HIGH (ref 70–99)
GLUCOSE BLDC GLUCOMTR-MCNC: 147 MG/DL — HIGH (ref 70–99)
GLUCOSE BLDC GLUCOMTR-MCNC: 152 MG/DL — HIGH (ref 70–99)
GLUCOSE BLDC GLUCOMTR-MCNC: 156 MG/DL — HIGH (ref 70–99)
GLUCOSE SERPL-MCNC: 99 MG/DL — SIGNIFICANT CHANGE UP (ref 70–99)
HCT VFR BLD CALC: 27.8 % — LOW (ref 34.5–45)
HGB BLD-MCNC: 8.7 G/DL — LOW (ref 11.5–15.5)
INR BLD: 1.08 RATIO — SIGNIFICANT CHANGE UP (ref 0.85–1.18)
MAGNESIUM SERPL-MCNC: 1.3 MG/DL — LOW (ref 1.6–2.6)
MCHC RBC-ENTMCNC: 28.2 PG — SIGNIFICANT CHANGE UP (ref 27–34)
MCHC RBC-ENTMCNC: 31.3 GM/DL — LOW (ref 32–36)
MCV RBC AUTO: 90 FL — SIGNIFICANT CHANGE UP (ref 80–100)
NRBC # BLD: 0 /100 WBCS — SIGNIFICANT CHANGE UP (ref 0–0)
PHOSPHATE SERPL-MCNC: 1.7 MG/DL — LOW (ref 2.5–4.5)
PLATELET # BLD AUTO: 260 K/UL — SIGNIFICANT CHANGE UP (ref 150–400)
POTASSIUM SERPL-MCNC: 4.2 MMOL/L — SIGNIFICANT CHANGE UP (ref 3.5–5.3)
POTASSIUM SERPL-SCNC: 4.2 MMOL/L — SIGNIFICANT CHANGE UP (ref 3.5–5.3)
PROTHROM AB SERPL-ACNC: 11.9 SEC — SIGNIFICANT CHANGE UP (ref 9.5–13)
RBC # BLD: 3.09 M/UL — LOW (ref 3.8–5.2)
RBC # FLD: 14.8 % — HIGH (ref 10.3–14.5)
RH IG SCN BLD-IMP: POSITIVE — SIGNIFICANT CHANGE UP
SODIUM SERPL-SCNC: 139 MMOL/L — SIGNIFICANT CHANGE UP (ref 135–145)
WBC # BLD: 13.17 K/UL — HIGH (ref 3.8–10.5)
WBC # FLD AUTO: 13.17 K/UL — HIGH (ref 3.8–10.5)

## 2024-03-18 PROCEDURE — 99232 SBSQ HOSP IP/OBS MODERATE 35: CPT | Mod: GC

## 2024-03-18 RX ORDER — MAGNESIUM SULFATE 500 MG/ML
2 VIAL (ML) INJECTION ONCE
Refills: 0 | Status: COMPLETED | OUTPATIENT
Start: 2024-03-18 | End: 2024-03-18

## 2024-03-18 RX ORDER — POTASSIUM PHOSPHATE, MONOBASIC POTASSIUM PHOSPHATE, DIBASIC 236; 224 MG/ML; MG/ML
30 INJECTION, SOLUTION INTRAVENOUS ONCE
Refills: 0 | Status: COMPLETED | OUTPATIENT
Start: 2024-03-18 | End: 2024-03-18

## 2024-03-18 RX ADMIN — Medication 1 TABLET(S): at 11:31

## 2024-03-18 RX ADMIN — PANTOPRAZOLE SODIUM 40 MILLIGRAM(S): 20 TABLET, DELAYED RELEASE ORAL at 05:42

## 2024-03-18 RX ADMIN — ATORVASTATIN CALCIUM 10 MILLIGRAM(S): 80 TABLET, FILM COATED ORAL at 21:43

## 2024-03-18 RX ADMIN — NYSTATIN CREAM 1 APPLICATION(S): 100000 CREAM TOPICAL at 05:42

## 2024-03-18 RX ADMIN — SODIUM CHLORIDE 100 MILLILITER(S): 9 INJECTION, SOLUTION INTRAVENOUS at 00:08

## 2024-03-18 RX ADMIN — POTASSIUM PHOSPHATE, MONOBASIC POTASSIUM PHOSPHATE, DIBASIC 83.33 MILLIMOLE(S): 236; 224 INJECTION, SOLUTION INTRAVENOUS at 07:39

## 2024-03-18 RX ADMIN — Medication 1: at 12:50

## 2024-03-18 RX ADMIN — HEPARIN SODIUM 5000 UNIT(S): 5000 INJECTION INTRAVENOUS; SUBCUTANEOUS at 05:41

## 2024-03-18 RX ADMIN — Medication 1: at 17:44

## 2024-03-18 RX ADMIN — NYSTATIN CREAM 1 APPLICATION(S): 100000 CREAM TOPICAL at 17:17

## 2024-03-18 RX ADMIN — Medication 125 MICROGRAM(S): at 05:42

## 2024-03-18 RX ADMIN — Medication 2000 UNIT(S): at 11:32

## 2024-03-18 RX ADMIN — GABAPENTIN 300 MILLIGRAM(S): 400 CAPSULE ORAL at 14:54

## 2024-03-18 RX ADMIN — GABAPENTIN 300 MILLIGRAM(S): 400 CAPSULE ORAL at 05:41

## 2024-03-18 RX ADMIN — GABAPENTIN 300 MILLIGRAM(S): 400 CAPSULE ORAL at 21:43

## 2024-03-18 RX ADMIN — Medication 60 MILLIGRAM(S): at 05:42

## 2024-03-18 RX ADMIN — Medication 25 GRAM(S): at 05:52

## 2024-03-18 NOTE — PROGRESS NOTE ADULT - PROBLEM SELECTOR PLAN 3
Potentially iso Hizentra use vs. reactive iso GCA  - CTM; patient with no signs of infection at this time Potentially iso Hizentra use vs. reactive iso GCA  - CTM; patient with no signs of infection at this time  - improving

## 2024-03-18 NOTE — CHART NOTE - NSCHARTNOTEFT_GEN_A_CORE
OR for temporal artery biopsy with vascular surgery postponed until 3/19/24 due to OR unavailability.    Plan:   - please allow diet until NPO at midnight with IVF  - preop for OR tomorrow: 4am CBC, BMP, mag, phos, PT/INR, PTT, T&S     Vascular surgery  [id number] OR for temporal artery biopsy with vascular surgery postponed until 3/19/24 due to OR unavailability. Discussed with patient at bedside.     Plan:   - please allow diet until NPO at midnight with IVF  - preop for OR tomorrow (3/19): 4am CBC, BMP, mag, phos, PT/INR, PTT, T&S     Vascular surgery  [id number]

## 2024-03-18 NOTE — PROGRESS NOTE ADULT - PROBLEM SELECTOR PLAN 10
Per patient's son (Favian), patient has had episodes of aggression/agitation in the past few weeks, especially during hospitalization at [facility]. May be exacerbated with steroid use  - C/w melatonin; continue home gabapentin  - Patient also taking acetaminophen with codeine at home, per son, "probably more than she should be" (iSTOP in chart) for generalized pain iso degenerative disc disease, knee arthritis

## 2024-03-18 NOTE — PROGRESS NOTE ADULT - PROBLEM SELECTOR PLAN 2
Pt with RESHMA in the setting of poor PO intake. UA wnl, FeNa 0.9% c/w prerenal RESHMA iso poor PO intake, hypovolemic on exam. Trial of IVF NS with improvement in SCr. Continue to trend BMP, nonoliguric at this time.   Plan:   CTM   Dose medications as per eGFR, avoid NSAIDs ACEI/ARBS, RCA and nephrotoxins Pt with RESHMA in the setting of poor PO intake. UA wnl, FeNa 0.9% c/w prerenal RESHMA iso poor PO intake, hypovolemic on exam. Trial of IVF NS with improvement in SCr. Continue to trend BMP, nonoliguric at this time.   Plan:   - CTM   - Dose medications as per eGFR, avoid NSAIDs ACEI/ARBS, RCA and nephrotoxins  - Improving

## 2024-03-18 NOTE — PROGRESS NOTE ADULT - SUBJECTIVE AND OBJECTIVE BOX
SURGERY DAILY PROGRESS NOTE:     SUBJECTIVE/ROS: Patient seen and evaluated on AM rounds.     OBJECTIVE:  Vital Signs Last 24 Hrs  T(C): 36.6 (18 Mar 2024 12:24), Max: 36.6 (18 Mar 2024 12:24)  T(F): 97.9 (18 Mar 2024 12:24), Max: 97.9 (18 Mar 2024 12:24)  HR: 51 (18 Mar 2024 12:24) (51 - 67)  BP: 139/62 (18 Mar 2024 12:24) (139/62 - 167/67)  BP(mean): --  RR: 18 (18 Mar 2024 12:24) (18 - 18)  SpO2: 97% (18 Mar 2024 12:24) (94% - 97%)    Parameters below as of 18 Mar 2024 12:24  Patient On (Oxygen Delivery Method): room air      I&O's Detail    17 Mar 2024 07:01  -  18 Mar 2024 07:00  --------------------------------------------------------  IN:    Lactated Ringers: 800 mL    Oral Fluid: 960 mL  Total IN: 1760 mL    OUT:  Total OUT: 0 mL    Total NET: 1760 mL        Daily     Daily   MEDICATIONS  (STANDING):  acetaminophen  300 mG/codeine 30 mG 1 Tablet(s) Oral <User Schedule>  atorvastatin 10 milliGRAM(s) Oral at bedtime  chlorhexidine 4% Liquid 1 Application(s) Topical daily  cholecalciferol 2000 Unit(s) Oral daily  dextrose 5%. 1000 milliLiter(s) (100 mL/Hr) IV Continuous <Continuous>  dextrose 5%. 1000 milliLiter(s) (50 mL/Hr) IV Continuous <Continuous>  dextrose 50% Injectable 25 Gram(s) IV Push once  dextrose 50% Injectable 25 Gram(s) IV Push once  dextrose 50% Injectable 12.5 Gram(s) IV Push once  gabapentin 300 milliGRAM(s) Oral three times a day  glucagon  Injectable 1 milliGRAM(s) IntraMuscular once  insulin lispro (ADMELOG) corrective regimen sliding scale   SubCutaneous three times a day before meals  insulin lispro (ADMELOG) corrective regimen sliding scale   SubCutaneous at bedtime  lactated ringers. 1000 milliLiter(s) (100 mL/Hr) IV Continuous <Continuous>  levothyroxine 125 MICROGram(s) Oral <User Schedule>  levothyroxine 125 MICROGram(s) Oral daily  melatonin 3 milliGRAM(s) Oral at bedtime  nystatin Powder 1 Application(s) Topical two times a day  pantoprazole    Tablet 40 milliGRAM(s) Oral before breakfast  predniSONE   Tablet 60 milliGRAM(s) Oral daily  trimethoprim  160 mG/sulfamethoxazole 800 mG 1 Tablet(s) Oral <User Schedule>    MEDICATIONS  (PRN):  dextrose Oral Gel 15 Gram(s) Oral once PRN Blood Glucose LESS THAN 70 milliGRAM(s)/deciliter      LABS:                        8.7    13.17 )-----------( 260      ( 18 Mar 2024 04:45 )             27.8     03-18    139  |  102  |  28<H>  ----------------------------<  99  4.2   |  23  |  0.88    Ca    8.4      18 Mar 2024 04:45  Phos  1.7     03-18  Mg     1.3     03-18    TPro  7.5  /  Alb  3.8  /  TBili  0.2  /  DBili  x   /  AST  12  /  ALT  8<L>  /  AlkPhos  82  03-17    PT/INR - ( 18 Mar 2024 04:45 )   PT: 11.9 sec;   INR: 1.08 ratio      PTT - ( 18 Mar 2024 04:45 )  PTT:33.9 sec    PHYSICAL EXAM:  Constitutional: well developed, well nourished, NAD  Eyes: anicteric  Respiratory: unlabored breathing with equal chest wall expansion.   Extremities: warm with slight edema b/l  Skin: b/l lower extremity dry, non erythematous skin lesions.  Psychiatric: AAO x 3

## 2024-03-18 NOTE — PROGRESS NOTE ADULT - SUBJECTIVE AND OBJECTIVE BOX
[facility] DEPARTMENT OF OPHTHALMOLOGY  ------------------------------------------------------------------------------  Manpreet Guzman MD PGY 2  Contact via [employer] Teams  ------------------------------------------------------------------------------    Interval History: No acute events overnight.     MEDICATIONS  (STANDING):  acetaminophen  300 mG/codeine 30 mG 1 Tablet(s) Oral <User Schedule>  atorvastatin 10 milliGRAM(s) Oral at bedtime  chlorhexidine 4% Liquid 1 Application(s) Topical daily  cholecalciferol 2000 Unit(s) Oral daily  dextrose 5%. 1000 milliLiter(s) (50 mL/Hr) IV Continuous <Continuous>  dextrose 5%. 1000 milliLiter(s) (100 mL/Hr) IV Continuous <Continuous>  dextrose 50% Injectable 12.5 Gram(s) IV Push once  dextrose 50% Injectable 25 Gram(s) IV Push once  dextrose 50% Injectable 25 Gram(s) IV Push once  gabapentin 300 milliGRAM(s) Oral three times a day  glucagon  Injectable 1 milliGRAM(s) IntraMuscular once  insulin lispro (ADMELOG) corrective regimen sliding scale   SubCutaneous three times a day before meals  insulin lispro (ADMELOG) corrective regimen sliding scale   SubCutaneous at bedtime  lactated ringers. 1000 milliLiter(s) (100 mL/Hr) IV Continuous <Continuous>  levothyroxine 125 MICROGram(s) Oral daily  levothyroxine 125 MICROGram(s) Oral <User Schedule>  melatonin 3 milliGRAM(s) Oral at bedtime  nystatin Powder 1 Application(s) Topical two times a day  pantoprazole    Tablet 40 milliGRAM(s) Oral before breakfast  predniSONE   Tablet 60 milliGRAM(s) Oral daily  trimethoprim  160 mG/sulfamethoxazole 800 mG 1 Tablet(s) Oral <User Schedule>    MEDICATIONS  (PRN):  dextrose Oral Gel 15 Gram(s) Oral once PRN Blood Glucose LESS THAN 70 milliGRAM(s)/deciliter      VITALS: T(C): 36.6 (03-18-24 @ 12:24)  T(F): 97.9 (03-18-24 @ 12:24), Max: 97.9 (03-18-24 @ 12:24)  HR: 51 (03-18-24 @ 12:24) (51 - 67)  BP: 139/62 (03-18-24 @ 12:24) (139/62 - 167/67)  RR:  (18 - 18)  SpO2:  (94% - 97%)  Wt(kg): --  General: AAO x 3, appropriate mood and affect      Ophthalmology Exam:  Visual acuity (cc): LP OD, 20/30 OS  Pupils: poorly reactive OU, tr APD OD  Ttono: 11 OD, 10 OS  Extraocular movements (EOMs): Full OU, no pain, no diplopia  Confrontational Visual Field (CVF): Full OS  Color plates: MAYITO OD, 11/12 OS    Pen Light Exam (PLE)  External: Flat OU  Lids/Lashes/Lacrimal Ducts: Flat OU    Sclera/Conjunctiva: W+Q OU  Cornea: Cl OU  Anterior Chamber: D+F OU    Iris: Flat OU  Lens: Cl OU

## 2024-03-18 NOTE — PROGRESS NOTE ADULT - SUBJECTIVE AND OBJECTIVE BOX
Yaa Vu MD   Internal Medicine, PGY 1  Contact via TEAMS.     SUBJECTIVE / OVERNIGHT EVENTS:  - Pt seen and examined at bedside  - BUCK    MEDICATIONS  (STANDING):  acetaminophen  300 mG/codeine 30 mG 1 Tablet(s) Oral <User Schedule>  atorvastatin 10 milliGRAM(s) Oral at bedtime  atovaquone  Suspension 1500 milliGRAM(s) Oral daily  chlorhexidine 4% Liquid 1 Application(s) Topical daily  cholecalciferol 2000 Unit(s) Oral daily  dextrose 5%. 1000 milliLiter(s) (100 mL/Hr) IV Continuous <Continuous>  dextrose 5%. 1000 milliLiter(s) (50 mL/Hr) IV Continuous <Continuous>  dextrose 50% Injectable 25 Gram(s) IV Push once  dextrose 50% Injectable 12.5 Gram(s) IV Push once  dextrose 50% Injectable 25 Gram(s) IV Push once  gabapentin 300 milliGRAM(s) Oral three times a day  glucagon  Injectable 1 milliGRAM(s) IntraMuscular once  heparin   Injectable 5000 Unit(s) SubCutaneous every 8 hours  insulin lispro (ADMELOG) corrective regimen sliding scale   SubCutaneous three times a day before meals  insulin lispro (ADMELOG) corrective regimen sliding scale   SubCutaneous at bedtime  lactated ringers. 1000 milliLiter(s) (100 mL/Hr) IV Continuous <Continuous>  lactated ringers. 1000 milliLiter(s) (75 mL/Hr) IV Continuous <Continuous>  levothyroxine 125 MICROGram(s) Oral <User Schedule>  levothyroxine 125 MICROGram(s) Oral daily  melatonin 3 milliGRAM(s) Oral at bedtime  nystatin Powder 1 Application(s) Topical two times a day  pantoprazole    Tablet 40 milliGRAM(s) Oral before breakfast  predniSONE   Tablet 60 milliGRAM(s) Oral daily    MEDICATIONS  (PRN):  dextrose Oral Gel 15 Gram(s) Oral once PRN Blood Glucose LESS THAN 70 milliGRAM(s)/deciliter        03-17-24 @ 07:01  -  03-18-24 @ 07:00  --------------------------------------------------------  IN: 1760 mL / OUT: 0 mL / NET: 1760 mL        PHYSICAL EXAM:  Vital Signs Last 24 Hrs  T(C): 36.4 (18 Mar 2024 05:16), Max: 36.4 (17 Mar 2024 12:08)  T(F): 97.5 (18 Mar 2024 05:16), Max: 97.6 (17 Mar 2024 12:08)  HR: 54 (18 Mar 2024 05:16) (54 - 67)  BP: 167/67 (18 Mar 2024 05:16) (144/66 - 167/67)  BP(mean): --  RR: 18 (18 Mar 2024 05:16) (18 - 18)  SpO2: 94% (18 Mar 2024 05:16) (94% - 97%)    Parameters below as of 18 Mar 2024 05:16  Patient On (Oxygen Delivery Method): room air        CAPILLARY BLOOD GLUCOSE      POCT Blood Glucose.: 180 mg/dL (17 Mar 2024 21:08)  POCT Blood Glucose.: 173 mg/dL (17 Mar 2024 17:26)  POCT Blood Glucose.: 213 mg/dL (17 Mar 2024 12:28)  POCT Blood Glucose.: 170 mg/dL (17 Mar 2024 08:32)    I&O's Summary    17 Mar 2024 07:01  -  18 Mar 2024 07:00  --------------------------------------------------------  IN: 1760 mL / OUT: 0 mL / NET: 1760 mL        CONSTITUTIONAL: NAD  HEENT: NC/AT  RESPIRATORY: Normal respiratory effort; lungs are clear to auscultation bilaterally  CARDIOVASCULAR: Regular rate and rhythm, normal S1 and S2, no murmur/rub/gallop; No lower extremity edema; Peripheral pulses are 2+ bilaterally  ABDOMEN: Nontender to palpation, normoactive bowel sounds, no rebound/guarding; No hepatosplenomegaly  MUSCLOSKELETAL: no clubbing or cyanosis of digits; no joint swelling or tenderness to palpation  EXTREMITIES: no peripheral edema, distal pulses intact   NEURO: no focal deficits   PSYCH: A+O to person, place, and time; affect appropriate    LABS:                        8.7    13.17 )-----------( 260      ( 18 Mar 2024 04:45 )             27.8     03-18    139  |  102  |  28<H>  ----------------------------<  99  4.2   |  23  |  0.88    Ca    8.4      18 Mar 2024 04:45  Phos  1.7     03-18  Mg     1.3     03-18    TPro  7.5  /  Alb  3.8  /  TBili  0.2  /  DBili  x   /  AST  12  /  ALT  8<L>  /  AlkPhos  82  03-17    PT/INR - ( 18 Mar 2024 04:45 )   PT: 11.9 sec;   INR: 1.08 ratio         PTT - ( 18 Mar 2024 04:45 )  PTT:33.9 sec      Urinalysis Basic - ( 18 Mar 2024 04:45 )    Color: x / Appearance: x / SG: x / pH: x  Gluc: 99 mg/dL / Ketone: x  / Bili: x / Urobili: x   Blood: x / Protein: x / Nitrite: x   Leuk Esterase: x / RBC: x / WBC x   Sq Epi: x / Non Sq Epi: x / Bacteria: x          IMAGING:    [X] All pertinent imaging reviewed by me Yaa Vu MD   Internal Medicine, PGY 1  Contact via TEAMS.     SUBJECTIVE / OVERNIGHT EVENTS:  - Pt seen and examined at bedside  - BUCK. Pt feels well. No acute complaints.     MEDICATIONS  (STANDING):  acetaminophen  300 mG/codeine 30 mG 1 Tablet(s) Oral <User Schedule>  atorvastatin 10 milliGRAM(s) Oral at bedtime  atovaquone  Suspension 1500 milliGRAM(s) Oral daily  chlorhexidine 4% Liquid 1 Application(s) Topical daily  cholecalciferol 2000 Unit(s) Oral daily  dextrose 5%. 1000 milliLiter(s) (100 mL/Hr) IV Continuous <Continuous>  dextrose 5%. 1000 milliLiter(s) (50 mL/Hr) IV Continuous <Continuous>  dextrose 50% Injectable 25 Gram(s) IV Push once  dextrose 50% Injectable 12.5 Gram(s) IV Push once  dextrose 50% Injectable 25 Gram(s) IV Push once  gabapentin 300 milliGRAM(s) Oral three times a day  glucagon  Injectable 1 milliGRAM(s) IntraMuscular once  heparin   Injectable 5000 Unit(s) SubCutaneous every 8 hours  insulin lispro (ADMELOG) corrective regimen sliding scale   SubCutaneous three times a day before meals  insulin lispro (ADMELOG) corrective regimen sliding scale   SubCutaneous at bedtime  lactated ringers. 1000 milliLiter(s) (100 mL/Hr) IV Continuous <Continuous>  lactated ringers. 1000 milliLiter(s) (75 mL/Hr) IV Continuous <Continuous>  levothyroxine 125 MICROGram(s) Oral <User Schedule>  levothyroxine 125 MICROGram(s) Oral daily  melatonin 3 milliGRAM(s) Oral at bedtime  nystatin Powder 1 Application(s) Topical two times a day  pantoprazole    Tablet 40 milliGRAM(s) Oral before breakfast  predniSONE   Tablet 60 milliGRAM(s) Oral daily    MEDICATIONS  (PRN):  dextrose Oral Gel 15 Gram(s) Oral once PRN Blood Glucose LESS THAN 70 milliGRAM(s)/deciliter        03-17-24 @ 07:01  -  03-18-24 @ 07:00  --------------------------------------------------------  IN: 1760 mL / OUT: 0 mL / NET: 1760 mL        PHYSICAL EXAM:  Vital Signs Last 24 Hrs  T(C): 36.4 (18 Mar 2024 05:16), Max: 36.4 (17 Mar 2024 12:08)  T(F): 97.5 (18 Mar 2024 05:16), Max: 97.6 (17 Mar 2024 12:08)  HR: 54 (18 Mar 2024 05:16) (54 - 67)  BP: 167/67 (18 Mar 2024 05:16) (144/66 - 167/67)  BP(mean): --  RR: 18 (18 Mar 2024 05:16) (18 - 18)  SpO2: 94% (18 Mar 2024 05:16) (94% - 97%)    Parameters below as of 18 Mar 2024 05:16  Patient On (Oxygen Delivery Method): room air        CAPILLARY BLOOD GLUCOSE      POCT Blood Glucose.: 180 mg/dL (17 Mar 2024 21:08)  POCT Blood Glucose.: 173 mg/dL (17 Mar 2024 17:26)  POCT Blood Glucose.: 213 mg/dL (17 Mar 2024 12:28)  POCT Blood Glucose.: 170 mg/dL (17 Mar 2024 08:32)    I&O's Summary    17 Mar 2024 07:01  -  18 Mar 2024 07:00  --------------------------------------------------------  IN: 1760 mL / OUT: 0 mL / NET: 1760 mL        General: NAD, sitting up comfortably in chair   HEENT: no scleral icterus  CV: RRR, normal S1 and S2  Lungs: normal respiratory effort on RA, CTAB  Abd: soft, nontender, nondistended  Ext: no edema, warm, well perfused  Pysch: AAOx3, appropriate affect   Neuro: grossly non-focal, moving all extremities spontaneously     LABS:                        8.7    13.17 )-----------( 260      ( 18 Mar 2024 04:45 )             27.8     03-18    139  |  102  |  28<H>  ----------------------------<  99  4.2   |  23  |  0.88    Ca    8.4      18 Mar 2024 04:45  Phos  1.7     03-18  Mg     1.3     03-18    TPro  7.5  /  Alb  3.8  /  TBili  0.2  /  DBili  x   /  AST  12  /  ALT  8<L>  /  AlkPhos  82  03-17    PT/INR - ( 18 Mar 2024 04:45 )   PT: 11.9 sec;   INR: 1.08 ratio         PTT - ( 18 Mar 2024 04:45 )  PTT:33.9 sec      Urinalysis Basic - ( 18 Mar 2024 04:45 )    Color: x / Appearance: x / SG: x / pH: x  Gluc: 99 mg/dL / Ketone: x  / Bili: x / Urobili: x   Blood: x / Protein: x / Nitrite: x   Leuk Esterase: x / RBC: x / WBC x   Sq Epi: x / Non Sq Epi: x / Bacteria: x          IMAGING:    [X] All pertinent imaging reviewed by me

## 2024-03-18 NOTE — PROGRESS NOTE ADULT - PROBLEM SELECTOR PLAN 11
DVT: SCDs iso planned procedure then lovenox subQ  Diet: DASH  Dispo: PT recc Home PT with rolling walker (patient has at home). Per family, patient does not want PT as had injury during PT session in the past. They also do not want any additional aides at home, both sons live with her  GI: pantoprazole 40 PO while on high dose steroids, mepron for PCP prophylaxis (can consider switching to Bactrim with resolution of RESHMA given optimal choice for PJP coverage)   Lipitor 10 qhs for HLD  GOC: Full Code DVT: SCDs iso planned procedure then lovenox subQ  Diet: DASH  Dispo: PT recc Home PT with rolling walker (patient has at home). Per family, patient does not want PT as had injury during PT session in the past. They also do not want any additional aides at home, both sons live with her  GI: pantoprazole 40 PO while on high dose steroids, switching back Bactrim for PCP prophylaxis given resolution of RESHMA   Lipitor 10 qhs for HLD  GOC: Full Code

## 2024-03-18 NOTE — PROGRESS NOTE ADULT - ATTENDING COMMENTS
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 Workup with elevated ESR/CRP and ophthalmologic exam was concerned for GCA/ temporal artery arteritis.     1. GCA  2. RESHMA- prerenal  3. Common variable Immune deficiency    - still c/o R eye blurriness, no HA, but sed rate 101, ,   - s/p pulse dose IV steroids methylprednisolone IV 1000mg daily 3/13-3/15; Then transition to prednisone 60 mg daily on 3/16, WBC 19 from steroid  - Cr downtrended to 0.8, suspect prerenal in setting of NPO for prolonged period. 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

## 2024-03-18 NOTE — PROGRESS NOTE ADULT - ASSESSMENT
Ms. 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 Workup with elevated ESR/CRP and ophthalmologic exam concerning for GCA, started on high dose steroids pending temporal artery biopsy.

## 2024-03-18 NOTE — PROGRESS NOTE ADULT - ASSESSMENT
9F with PMH HTN, HLD, hypothyroidism and POHx pseudophakia OU presents for acute right eye vision loss iso 2-3 weeks jaw claudication and now with optic disc edema OD>OS.  Patient previously seen for left sided optic nerve swelling without vision loss and had ROS negative for GCA despite elevated ESR/CRP. Now given jaw pain, bilateral involvement and elevated inflammatory markers, high concern for GCA.    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 (right eye), left eye with tr optic disc edema and disc hemorrhage  - High suspicion for GCA at this time   - 3/6 labs remarkable for  and   - Current presentation labs with thrombocytosis, , ESR 45  - cw steroids per rheumatology  - apprecaite rheumatology Recs  - Temporal Artery Ultrasound negative for findings of GCA - however this is not a sensitive test   - Planning for TAB with vascular surgery 3/18 as an addon   - Pending read for duplex of temporal artery   - Ophtho to follow       Outpatient Follow-up: Patient should follow-up with his/her ophthalmologist or with [facility] Department of Ophthalmology within 1 week of after discharge at:    [address]  [place] [address]  [phone]   9F with PMH HTN, HLD, hypothyroidism and POHx pseudophakia OU presents for acute right eye vision loss iso 2-3 weeks jaw claudication and now with optic disc edema OD>OS.  Patient previously seen for left sided optic nerve swelling without vision loss and had ROS negative for GCA despite elevated ESR/CRP. Now given jaw pain, bilateral involvement and elevated inflammatory markers, high concern for GCA.    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 (right eye), left eye with tr optic disc edema and disc hemorrhage  - High suspicion for GCA at this time   - 3/6 labs remarkable for  and   - Current presentation labs with thrombocytosis, , ESR 45  - cw steroids per rheumatology  - apprecaite rheumatology Recs  - Temporal Artery Ultrasound negative for findings of GCA - however this is not a sensitive test   - Planning for TAB with vascular surgery 3/18 as an addon   - Pending read for duplex of temporal artery   - Please page ophthalmology when discharging patient  - Ophtho to follow       Outpatient Follow-up: Patient should follow-up with his/her ophthalmologist or with [facility] Department of Ophthalmology within 1 week of after discharge at:    [address]  [place] [address]  [phone]   9F with PMH HTN, HLD, hypothyroidism and POHx pseudophakia OU presents for acute right eye vision loss iso 2-3 weeks jaw claudication and now with optic disc edema OD>OS.  Patient previously seen for left sided optic nerve swelling without vision loss and had ROS negative for GCA despite elevated ESR/CRP. Now given jaw pain, bilateral involvement and elevated inflammatory markers, high concern for GCA.    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 (right eye), left eye with tr optic disc edema and disc hemorrhage  - High suspicion for GCA at this time   - 3/6 labs remarkable for  and   - Current presentation labs with thrombocytosis, , ESR 45  - cw steroids per rheumatology  - apprecaite rheumatology Recs  - Temporal Artery Ultrasound negative for findings of GCA - however this is not a sensitive test   - Planning for TAB with vascular surgery 3/18 as an addon - Needed within 5-7 days of starting steroids  - Please page ophthalmology when discharging patient  - Ophtho to follow       Outpatient Follow-up: Patient should follow-up with his/her ophthalmologist or with [facility] Department of Ophthalmology within 1 week of after discharge at:    [address]  [place] [address]  [phone]

## 2024-03-18 NOTE — PROGRESS NOTE ADULT - ASSESSMENT
Ms. 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 Workup with elevated ESR/CRP and ophthalmologic exam concerning for GCA, started on high dose steroids pending temporal artery biopsy.     Plan:  -Added on to OR schedule for Monday, 3/18/24  -Pre-op labs reviewed, Mg/Phos given   -NPO/IVF  -Consent in chart    Discussed with vascular surgery fellow on call on behalf of Dr. Gaines    Vascular Surgery  [phone]

## 2024-03-18 NOTE — PROGRESS NOTE ADULT - PROBLEM SELECTOR PLAN 1
B/l eye changes/jaw pain; L eye improved now with acute R eye vision loss, high ESR/CRP 3/6 now downtrending.   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 High risk for bone demineralization with concomitant use of aromatase inhibitor and prolonged use of high dose steroids B/l eye changes/jaw pain; L eye improved now with acute R eye vision loss, high ESR/CRP 3/6 now downtrending.   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 High risk for bone demineralization with concomitant use of aromatase inhibitor and prolonged use of high dose steroids

## 2024-03-19 ENCOUNTER — TRANSCRIPTION ENCOUNTER (OUTPATIENT)
Age: 89
End: 2024-03-19

## 2024-03-19 ENCOUNTER — RESULT REVIEW (OUTPATIENT)
Age: 89
End: 2024-03-19

## 2024-03-19 ENCOUNTER — NON-APPOINTMENT (OUTPATIENT)
Age: 89
End: 2024-03-19

## 2024-03-19 VITALS
HEART RATE: 71 BPM | DIASTOLIC BLOOD PRESSURE: 54 MMHG | TEMPERATURE: 98 F | RESPIRATION RATE: 18 BRPM | OXYGEN SATURATION: 91 % | SYSTOLIC BLOOD PRESSURE: 134 MMHG

## 2024-03-19 LAB
ALBUMIN SERPL ELPH-MCNC: 3.2 G/DL — LOW (ref 3.3–5)
ALP SERPL-CCNC: 69 U/L — SIGNIFICANT CHANGE UP (ref 40–120)
ALT FLD-CCNC: 15 U/L — SIGNIFICANT CHANGE UP (ref 10–45)
ANION GAP SERPL CALC-SCNC: 10 MMOL/L — SIGNIFICANT CHANGE UP (ref 5–17)
APTT BLD: 26.5 SEC — SIGNIFICANT CHANGE UP (ref 24.5–35.6)
AST SERPL-CCNC: 17 U/L — SIGNIFICANT CHANGE UP (ref 10–40)
BASOPHILS # BLD AUTO: 0.04 K/UL — SIGNIFICANT CHANGE UP (ref 0–0.2)
BASOPHILS NFR BLD AUTO: 0.3 % — SIGNIFICANT CHANGE UP (ref 0–2)
BILIRUB SERPL-MCNC: 0.2 MG/DL — SIGNIFICANT CHANGE UP (ref 0.2–1.2)
BUN SERPL-MCNC: 34 MG/DL — HIGH (ref 7–23)
CALCIUM SERPL-MCNC: 9.3 MG/DL — SIGNIFICANT CHANGE UP (ref 8.4–10.5)
CHLORIDE SERPL-SCNC: 102 MMOL/L — SIGNIFICANT CHANGE UP (ref 96–108)
CO2 SERPL-SCNC: 28 MMOL/L — SIGNIFICANT CHANGE UP (ref 22–31)
CREAT SERPL-MCNC: 0.98 MG/DL — SIGNIFICANT CHANGE UP (ref 0.5–1.3)
EGFR: 56 ML/MIN/1.73M2 — LOW
EOSINOPHIL # BLD AUTO: 0.05 K/UL — SIGNIFICANT CHANGE UP (ref 0–0.5)
EOSINOPHIL NFR BLD AUTO: 0.3 % — SIGNIFICANT CHANGE UP (ref 0–6)
GAMMA INTERFERON BACKGROUND BLD IA-ACNC: 0.03 IU/ML — SIGNIFICANT CHANGE UP
GLUCOSE BLDC GLUCOMTR-MCNC: 128 MG/DL — HIGH (ref 70–99)
GLUCOSE BLDC GLUCOMTR-MCNC: 174 MG/DL — HIGH (ref 70–99)
GLUCOSE BLDC GLUCOMTR-MCNC: 194 MG/DL — HIGH (ref 70–99)
GLUCOSE SERPL-MCNC: 170 MG/DL — HIGH (ref 70–99)
HCT VFR BLD CALC: 35.5 % — SIGNIFICANT CHANGE UP (ref 34.5–45)
HGB BLD-MCNC: 10.8 G/DL — LOW (ref 11.5–15.5)
IMM GRANULOCYTES NFR BLD AUTO: 1 % — HIGH (ref 0–0.9)
INR BLD: 1 RATIO — SIGNIFICANT CHANGE UP (ref 0.85–1.18)
LYMPHOCYTES # BLD AUTO: 16.9 % — SIGNIFICANT CHANGE UP (ref 13–44)
LYMPHOCYTES # BLD AUTO: 2.6 K/UL — SIGNIFICANT CHANGE UP (ref 1–3.3)
M TB IFN-G BLD-IMP: ABNORMAL
M TB IFN-G CD4+ BCKGRND COR BLD-ACNC: 0 IU/ML — SIGNIFICANT CHANGE UP
M TB IFN-G CD4+CD8+ BCKGRND COR BLD-ACNC: 0 IU/ML — SIGNIFICANT CHANGE UP
MAGNESIUM SERPL-MCNC: 2.1 MG/DL — SIGNIFICANT CHANGE UP (ref 1.6–2.6)
MCHC RBC-ENTMCNC: 28.1 PG — SIGNIFICANT CHANGE UP (ref 27–34)
MCHC RBC-ENTMCNC: 30.4 GM/DL — LOW (ref 32–36)
MCV RBC AUTO: 92.4 FL — SIGNIFICANT CHANGE UP (ref 80–100)
MONOCYTES # BLD AUTO: 0.87 K/UL — SIGNIFICANT CHANGE UP (ref 0–0.9)
MONOCYTES NFR BLD AUTO: 5.7 % — SIGNIFICANT CHANGE UP (ref 2–14)
NEUTROPHILS # BLD AUTO: 11.62 K/UL — HIGH (ref 1.8–7.4)
NEUTROPHILS NFR BLD AUTO: 75.8 % — SIGNIFICANT CHANGE UP (ref 43–77)
NRBC # BLD: 0 /100 WBCS — SIGNIFICANT CHANGE UP (ref 0–0)
PHOSPHATE SERPL-MCNC: 2.8 MG/DL — SIGNIFICANT CHANGE UP (ref 2.5–4.5)
PLATELET # BLD AUTO: 312 K/UL — SIGNIFICANT CHANGE UP (ref 150–400)
POTASSIUM SERPL-MCNC: 4.1 MMOL/L — SIGNIFICANT CHANGE UP (ref 3.5–5.3)
POTASSIUM SERPL-SCNC: 4.1 MMOL/L — SIGNIFICANT CHANGE UP (ref 3.5–5.3)
PROT SERPL-MCNC: 6.1 G/DL — SIGNIFICANT CHANGE UP (ref 6–8.3)
PROTHROM AB SERPL-ACNC: 10.5 SEC — SIGNIFICANT CHANGE UP (ref 9.5–13)
QUANT TB PLUS MITOGEN MINUS NIL: 0.02 IU/ML — SIGNIFICANT CHANGE UP
RBC # BLD: 3.84 M/UL — SIGNIFICANT CHANGE UP (ref 3.8–5.2)
RBC # FLD: 15 % — HIGH (ref 10.3–14.5)
SODIUM SERPL-SCNC: 140 MMOL/L — SIGNIFICANT CHANGE UP (ref 135–145)
WBC # BLD: 15.34 K/UL — HIGH (ref 3.8–10.5)
WBC # FLD AUTO: 15.34 K/UL — HIGH (ref 3.8–10.5)

## 2024-03-19 PROCEDURE — 99239 HOSP IP/OBS DSCHRG MGMT >30: CPT

## 2024-03-19 PROCEDURE — 84540 ASSAY OF URINE/UREA-N: CPT

## 2024-03-19 PROCEDURE — 97161 PT EVAL LOW COMPLEX 20 MIN: CPT

## 2024-03-19 PROCEDURE — 85610 PROTHROMBIN TIME: CPT

## 2024-03-19 PROCEDURE — 84133 ASSAY OF URINE POTASSIUM: CPT

## 2024-03-19 PROCEDURE — 84156 ASSAY OF PROTEIN URINE: CPT

## 2024-03-19 PROCEDURE — 83935 ASSAY OF URINE OSMOLALITY: CPT

## 2024-03-19 PROCEDURE — 80053 COMPREHEN METABOLIC PANEL: CPT

## 2024-03-19 PROCEDURE — 83735 ASSAY OF MAGNESIUM: CPT

## 2024-03-19 PROCEDURE — 85025 COMPLETE CBC W/AUTO DIFF WBC: CPT

## 2024-03-19 PROCEDURE — 85027 COMPLETE CBC AUTOMATED: CPT

## 2024-03-19 PROCEDURE — 93998 UNLISTD NONINVAS VASC DX STD: CPT

## 2024-03-19 PROCEDURE — 88305 TISSUE EXAM BY PATHOLOGIST: CPT | Mod: 26

## 2024-03-19 PROCEDURE — 36415 COLL VENOUS BLD VENIPUNCTURE: CPT

## 2024-03-19 PROCEDURE — 85652 RBC SED RATE AUTOMATED: CPT

## 2024-03-19 PROCEDURE — 84100 ASSAY OF PHOSPHORUS: CPT

## 2024-03-19 PROCEDURE — 86480 TB TEST CELL IMMUN MEASURE: CPT

## 2024-03-19 PROCEDURE — 85730 THROMBOPLASTIN TIME PARTIAL: CPT

## 2024-03-19 PROCEDURE — 86704 HEP B CORE ANTIBODY TOTAL: CPT

## 2024-03-19 PROCEDURE — C1889: CPT

## 2024-03-19 PROCEDURE — 83550 IRON BINDING TEST: CPT

## 2024-03-19 PROCEDURE — 86900 BLOOD TYPING SEROLOGIC ABO: CPT

## 2024-03-19 PROCEDURE — 82570 ASSAY OF URINE CREATININE: CPT

## 2024-03-19 PROCEDURE — 82728 ASSAY OF FERRITIN: CPT

## 2024-03-19 PROCEDURE — 86850 RBC ANTIBODY SCREEN: CPT

## 2024-03-19 PROCEDURE — 86901 BLOOD TYPING SEROLOGIC RH(D): CPT

## 2024-03-19 PROCEDURE — 99232 SBSQ HOSP IP/OBS MODERATE 35: CPT | Mod: GC

## 2024-03-19 PROCEDURE — 97165 OT EVAL LOW COMPLEX 30 MIN: CPT

## 2024-03-19 PROCEDURE — 99285 EMERGENCY DEPT VISIT HI MDM: CPT

## 2024-03-19 PROCEDURE — 88313 SPECIAL STAINS GROUP 2: CPT

## 2024-03-19 PROCEDURE — 82962 GLUCOSE BLOOD TEST: CPT

## 2024-03-19 PROCEDURE — 88305 TISSUE EXAM BY PATHOLOGIST: CPT

## 2024-03-19 PROCEDURE — 87635 SARS-COV-2 COVID-19 AMP PRB: CPT

## 2024-03-19 PROCEDURE — 80074 ACUTE HEPATITIS PANEL: CPT

## 2024-03-19 PROCEDURE — 84300 ASSAY OF URINE SODIUM: CPT

## 2024-03-19 PROCEDURE — 37609 LIGATION/BX TEMPORAL ARTERY: CPT | Mod: RT

## 2024-03-19 PROCEDURE — 88313 SPECIAL STAINS GROUP 2: CPT | Mod: 26

## 2024-03-19 PROCEDURE — 83540 ASSAY OF IRON: CPT

## 2024-03-19 PROCEDURE — 81003 URINALYSIS AUTO W/O SCOPE: CPT

## 2024-03-19 PROCEDURE — 86140 C-REACTIVE PROTEIN: CPT

## 2024-03-19 PROCEDURE — 80048 BASIC METABOLIC PNL TOTAL CA: CPT

## 2024-03-19 DEVICE — CLIP APPLIER COVIDIEN SURGICLIP III 9" SM: Type: IMPLANTABLE DEVICE | Site: BILATERAL | Status: FUNCTIONAL

## 2024-03-19 RX ORDER — ONDANSETRON 8 MG/1
4 TABLET, FILM COATED ORAL ONCE
Refills: 0 | Status: DISCONTINUED | OUTPATIENT
Start: 2024-03-19 | End: 2024-03-19

## 2024-03-19 RX ORDER — FENTANYL CITRATE 50 UG/ML
25 INJECTION INTRAVENOUS
Refills: 0 | Status: DISCONTINUED | OUTPATIENT
Start: 2024-03-19 | End: 2024-03-19

## 2024-03-19 RX ADMIN — Medication 60 MILLIGRAM(S): at 05:16

## 2024-03-19 RX ADMIN — GABAPENTIN 300 MILLIGRAM(S): 400 CAPSULE ORAL at 05:16

## 2024-03-19 RX ADMIN — Medication 125 MICROGRAM(S): at 05:17

## 2024-03-19 RX ADMIN — NYSTATIN CREAM 1 APPLICATION(S): 100000 CREAM TOPICAL at 05:17

## 2024-03-19 NOTE — PROGRESS NOTE ADULT - ASSESSMENT
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 At [facility], patient received extensive work up including CTA H/N, MRI/MRA Brain and Orbits which was unremarkable. Her L eye sx resolved prior to discharge. On this admission, ophtho evaluation revealed bilateral optic disc edema with hemorrhages (R>L) and was started on Solu-medrol 1g/day 3/13 for concerns for GCA. Rheumatology consulted for evaluation for GCA.    #GCA, currently receiving steroids  -high suspicion  -ophthalmology exam shows bilateral optic disc edema with hemorrhages (R>L)  -imaging studies (CTA H/N, MRI/MRA Brain and Orbits, temporal artery US) unremarkable  -ESR/CRP elevated 101/153 on 3/6 with ESR downtrending since initiating steroids   -more specific and concerning features such as jaw claudication, lack of b/l temporal pulses, visual deficits are present  -comorbid PMR symptoms are absent  -pulse dose steroids 3/13-3/16 (missed 3/14 dose)  -S/p R temporal artery bx on 3/19  -Quant gold indeterminant (however collected after starting steroids), hepatitis negative     PLAN  ***Final recs pending***   -C/w prednisone 60 mg daily (3/17 - )   -please continue GI and PCP prophylaxis while on high dose steroids  -discussed steroid-sparing therapy with Actemra with patient and family (to be started outpatient).   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 At [facility], patient received extensive work up including CTA H/N, MRI/MRA Brain and Orbits which was unremarkable. Her L eye sx resolved prior to discharge. On this admission, ophtho evaluation revealed bilateral optic disc edema with hemorrhages (R>L) and was started on Solu-medrol 1g/day 3/13 for concerns for GCA. Rheumatology consulted for evaluation for GCA.    #GCA, currently receiving steroids  -high suspicion  -ophthalmology exam shows bilateral optic disc edema with hemorrhages (R>L)  -imaging studies (CTA H/N, MRI/MRA Brain and Orbits, temporal artery US) unremarkable  -ESR/CRP elevated 101/153 on 3/6 with ESR downtrending since initiating steroids   -more specific and concerning features such as jaw claudication, lack of b/l temporal pulses, visual deficits are present  -comorbid PMR symptoms are absent  -pulse dose steroids 3/13-3/16 (missed 3/14 dose)  -S/p R temporal artery bx on 3/19  -Quant gold indeterminant (however collected after starting steroids), hepatitis negative     PLAN  -C/w prednisone 60 mg daily (3/17 - ), will taper further in the outpt setting.   -please continue GI and PCP prophylaxis while on high dose steroids. Is currently on Vitamin D and calcium, should continue and will consider bisphosphonate therapy for further osteoporosis ppx in outpt setting.    -discussed steroid-sparing therapy with Actemra with patient and family (to be started outpatient).  -Will f/u temporal artery bx  -Okay for discharge from rheumatology perspective. Will assist in setting follow up with Dr. Mccoy at [facility] clinic in [place]. Please provide clinic info in discharge paperwork.     Discussed with Dr. Sudheer Zurita MD   PGY-4  Reachable on TEAMS  Pager [phone]  Rheumatology Fellow

## 2024-03-19 NOTE — CHART NOTE - NSCHARTNOTEFT_GEN_A_CORE
POST-OP NOTE    MARGARITA POWERS | [id number] | [facility] 5MON 504 D1    Procedure: s/p right temporal artery biopsy    Subjective: Patient seen and examined approximately 4 hours postoperatively. Reports feeling well after surgery. No nausea or vomiting. Pain is controlled.     Vital Signs Last 24 Hrs  T(C): 36.6 (19 Mar 2024 14:01), Max: 36.6 (19 Mar 2024 14:01)  T(F): 97.9 (19 Mar 2024 14:01), Max: 97.9 (19 Mar 2024 14:01)  HR: 71 (19 Mar 2024 14:01) (57 - 75)  BP: 134/54 (19 Mar 2024 14:01) (134/54 - 180/73)  BP(mean): 93 (19 Mar 2024 13:30) (93 - 107)  RR: 18 (19 Mar 2024 14:01) (16 - 18)  SpO2: 91% (19 Mar 2024 14:01) (91% - 99%)    Parameters below as of 19 Mar 2024 14:01  Patient On (Oxygen Delivery Method): room air      I&O's Summary    18 Mar 2024 07:01  -  19 Mar 2024 07:00  --------------------------------------------------------  IN: 500 mL / OUT: 0 mL / NET: 500 mL                            10.8   15.34 )-----------( 312      ( 19 Mar 2024 05:51 )             35.5     03-19    140  |  102  |  34<H>  ----------------------------<  170<H>  4.1   |  28  |  0.98    Ca    9.3      19 Mar 2024 05:51  Phos  2.8     03-19  Mg     2.1     03-19    TPro  6.1  /  Alb  3.2<L>  /  TBili  0.2  /  DBili  x   /  AST  17  /  ALT  15  /  AlkPhos  69  03-19   PT/INR - ( 19 Mar 2024 05:51 )   PT: 10.5 sec;   INR: 1.00 ratio         PTT - ( 19 Mar 2024 05:51 )  PTT:26.5 sec    PHYSICAL EXAM:  Gen: NAD  HEENT: Steri strips are c/d/i. Site is soft without evidence of swelling or hematoma.  Resp: breathing easily, no stridor  CV: RRR    Assessment: 88 yo F s/p right TAB    Plan: Ready for discharge from a vascular surgery perspective, care per primary team    Vascular Surgery  [phone]

## 2024-03-19 NOTE — PROGRESS NOTE ADULT - PROBLEM SELECTOR PROBLEM 1
GCA (giant cell arteritis)

## 2024-03-19 NOTE — PROGRESS NOTE ADULT - PROBLEM SELECTOR PLAN 11
DVT: SCDs iso planned procedure then lovenox subQ  Diet: DASH  Dispo: PT recc Home PT with rolling walker (patient has at home). Per family, patient does not want PT as had injury during PT session in the past. They also do not want any additional aides at home, both sons live with her  GI: pantoprazole 40 PO while on high dose steroids, switching back Bactrim for PCP prophylaxis given resolution of RESHMA   Lipitor 10 qhs for HLD  GOC: Full Code

## 2024-03-19 NOTE — DISCHARGE NOTE NURSING/CASE MANAGEMENT/SOCIAL WORK - NSDCFUADDAPPT_GEN_ALL_CORE_FT
APPTS ARE READY TO BE MADE: [X] YES  Best Family or Patient Contact (if needed):  Additional Information if needed:  1. ophthalmology f/u in 1 week  2. rheum f/u in 1 week       Other comments or requests:

## 2024-03-19 NOTE — PROGRESS NOTE ADULT - PROBLEM SELECTOR PLAN 10
Per patient's son (Favian), patient has had episodes of aggression/agitation in the past few weeks, especially during hospitalization at [facility]. May be exacerbated with steroid use  - C/w melatonin; continue home gabapentin  - Patient also taking acetaminophen with codeine at home, per son, "probably more than she should be" (iSTOP in chart) for generalized pain iso degenerative disc disease, knee arthritis

## 2024-03-19 NOTE — PROGRESS NOTE ADULT - PROBLEM SELECTOR PLAN 3
Potentially iso Hizentra use vs. reactive iso GCA  - CTM; patient with no signs of infection at this time  - improving

## 2024-03-19 NOTE — PROGRESS NOTE ADULT - SUBJECTIVE AND OBJECTIVE BOX
Yaa Vu MD   Internal Medicine, PGY 1  Contact via TEAMS.     SUBJECTIVE / OVERNIGHT EVENTS:  - Pt seen and examined at bedside  - BUCK    MEDICATIONS  (STANDING):  acetaminophen  300 mG/codeine 30 mG 1 Tablet(s) Oral <User Schedule>  atorvastatin 10 milliGRAM(s) Oral at bedtime  chlorhexidine 4% Liquid 1 Application(s) Topical daily  cholecalciferol 2000 Unit(s) Oral daily  dextrose 5%. 1000 milliLiter(s) (50 mL/Hr) IV Continuous <Continuous>  dextrose 5%. 1000 milliLiter(s) (100 mL/Hr) IV Continuous <Continuous>  dextrose 50% Injectable 25 Gram(s) IV Push once  dextrose 50% Injectable 12.5 Gram(s) IV Push once  dextrose 50% Injectable 25 Gram(s) IV Push once  gabapentin 300 milliGRAM(s) Oral three times a day  glucagon  Injectable 1 milliGRAM(s) IntraMuscular once  insulin lispro (ADMELOG) corrective regimen sliding scale   SubCutaneous three times a day before meals  insulin lispro (ADMELOG) corrective regimen sliding scale   SubCutaneous at bedtime  lactated ringers. 1000 milliLiter(s) (100 mL/Hr) IV Continuous <Continuous>  levothyroxine 125 MICROGram(s) Oral <User Schedule>  levothyroxine 125 MICROGram(s) Oral daily  melatonin 3 milliGRAM(s) Oral at bedtime  nystatin Powder 1 Application(s) Topical two times a day  pantoprazole    Tablet 40 milliGRAM(s) Oral before breakfast  predniSONE   Tablet 60 milliGRAM(s) Oral daily  trimethoprim  160 mG/sulfamethoxazole 800 mG 1 Tablet(s) Oral <User Schedule>    MEDICATIONS  (PRN):  dextrose Oral Gel 15 Gram(s) Oral once PRN Blood Glucose LESS THAN 70 milliGRAM(s)/deciliter        03-18-24 @ 07:01  -  03-19-24 @ 07:00  --------------------------------------------------------  IN: 500 mL / OUT: 0 mL / NET: 500 mL        PHYSICAL EXAM:  Vital Signs Last 24 Hrs  T(C): 36.3 (19 Mar 2024 05:38), Max: 36.6 (18 Mar 2024 12:24)  T(F): 97.4 (19 Mar 2024 05:38), Max: 97.9 (18 Mar 2024 12:24)  HR: 57 (19 Mar 2024 05:38) (51 - 57)  BP: 150/59 (19 Mar 2024 05:38) (139/62 - 179/70)  BP(mean): --  RR: 18 (19 Mar 2024 05:38) (18 - 18)  SpO2: 96% (19 Mar 2024 05:38) (95% - 97%)    Parameters below as of 19 Mar 2024 05:38  Patient On (Oxygen Delivery Method): room air        CAPILLARY BLOOD GLUCOSE      POCT Blood Glucose.: 142 mg/dL (18 Mar 2024 21:52)  POCT Blood Glucose.: 156 mg/dL (18 Mar 2024 17:28)  POCT Blood Glucose.: 152 mg/dL (18 Mar 2024 12:15)  POCT Blood Glucose.: 147 mg/dL (18 Mar 2024 08:35)    I&O's Summary    18 Mar 2024 07:01  -  19 Mar 2024 07:00  --------------------------------------------------------  IN: 500 mL / OUT: 0 mL / NET: 500 mL        CONSTITUTIONAL: NAD  HEENT: NC/AT  RESPIRATORY: Normal respiratory effort; lungs are clear to auscultation bilaterally  CARDIOVASCULAR: Regular rate and rhythm, normal S1 and S2, no murmur/rub/gallop; No lower extremity edema; Peripheral pulses are 2+ bilaterally  ABDOMEN: Nontender to palpation, normoactive bowel sounds, no rebound/guarding; No hepatosplenomegaly  MUSCLOSKELETAL: no clubbing or cyanosis of digits; no joint swelling or tenderness to palpation  EXTREMITIES: no peripheral edema, distal pulses intact   NEURO: no focal deficits   PSYCH: A+O to person, place, and time; affect appropriate    LABS:                        10.8   15.34 )-----------( 312      ( 19 Mar 2024 05:51 )             35.5     03-19    140  |  102  |  34<H>  ----------------------------<  170<H>  4.1   |  28  |  0.98    Ca    9.3      19 Mar 2024 05:51  Phos  2.8     03-19  Mg     2.1     03-19    TPro  6.1  /  Alb  3.2<L>  /  TBili  0.2  /  DBili  x   /  AST  17  /  ALT  15  /  AlkPhos  69  03-19    PT/INR - ( 19 Mar 2024 05:51 )   PT: 10.5 sec;   INR: 1.00 ratio         PTT - ( 19 Mar 2024 05:51 )  PTT:26.5 sec      Urinalysis Basic - ( 19 Mar 2024 05:51 )    Color: x / Appearance: x / SG: x / pH: x  Gluc: 170 mg/dL / Ketone: x  / Bili: x / Urobili: x   Blood: x / Protein: x / Nitrite: x   Leuk Esterase: x / RBC: x / WBC x   Sq Epi: x / Non Sq Epi: x / Bacteria: x          IMAGING:    [X] All pertinent imaging reviewed by me Yaa Vu MD   Internal Medicine, PGY 1  Contact via TEAMS.     SUBJECTIVE / OVERNIGHT EVENTS:  - Pt seen and examined at bedside  - BUCK. Pt feels well. No acute complaints.     MEDICATIONS  (STANDING):  acetaminophen  300 mG/codeine 30 mG 1 Tablet(s) Oral <User Schedule>  atorvastatin 10 milliGRAM(s) Oral at bedtime  chlorhexidine 4% Liquid 1 Application(s) Topical daily  cholecalciferol 2000 Unit(s) Oral daily  dextrose 5%. 1000 milliLiter(s) (50 mL/Hr) IV Continuous <Continuous>  dextrose 5%. 1000 milliLiter(s) (100 mL/Hr) IV Continuous <Continuous>  dextrose 50% Injectable 25 Gram(s) IV Push once  dextrose 50% Injectable 12.5 Gram(s) IV Push once  dextrose 50% Injectable 25 Gram(s) IV Push once  gabapentin 300 milliGRAM(s) Oral three times a day  glucagon  Injectable 1 milliGRAM(s) IntraMuscular once  insulin lispro (ADMELOG) corrective regimen sliding scale   SubCutaneous three times a day before meals  insulin lispro (ADMELOG) corrective regimen sliding scale   SubCutaneous at bedtime  lactated ringers. 1000 milliLiter(s) (100 mL/Hr) IV Continuous <Continuous>  levothyroxine 125 MICROGram(s) Oral <User Schedule>  levothyroxine 125 MICROGram(s) Oral daily  melatonin 3 milliGRAM(s) Oral at bedtime  nystatin Powder 1 Application(s) Topical two times a day  pantoprazole    Tablet 40 milliGRAM(s) Oral before breakfast  predniSONE   Tablet 60 milliGRAM(s) Oral daily  trimethoprim  160 mG/sulfamethoxazole 800 mG 1 Tablet(s) Oral <User Schedule>    MEDICATIONS  (PRN):  dextrose Oral Gel 15 Gram(s) Oral once PRN Blood Glucose LESS THAN 70 milliGRAM(s)/deciliter        03-18-24 @ 07:01  -  03-19-24 @ 07:00  --------------------------------------------------------  IN: 500 mL / OUT: 0 mL / NET: 500 mL        PHYSICAL EXAM:  Vital Signs Last 24 Hrs  T(C): 36.3 (19 Mar 2024 05:38), Max: 36.6 (18 Mar 2024 12:24)  T(F): 97.4 (19 Mar 2024 05:38), Max: 97.9 (18 Mar 2024 12:24)  HR: 57 (19 Mar 2024 05:38) (51 - 57)  BP: 150/59 (19 Mar 2024 05:38) (139/62 - 179/70)  BP(mean): --  RR: 18 (19 Mar 2024 05:38) (18 - 18)  SpO2: 96% (19 Mar 2024 05:38) (95% - 97%)    Parameters below as of 19 Mar 2024 05:38  Patient On (Oxygen Delivery Method): room air        CAPILLARY BLOOD GLUCOSE      POCT Blood Glucose.: 142 mg/dL (18 Mar 2024 21:52)  POCT Blood Glucose.: 156 mg/dL (18 Mar 2024 17:28)  POCT Blood Glucose.: 152 mg/dL (18 Mar 2024 12:15)  POCT Blood Glucose.: 147 mg/dL (18 Mar 2024 08:35)    I&O's Summary    18 Mar 2024 07:01  -  19 Mar 2024 07:00  --------------------------------------------------------  IN: 500 mL / OUT: 0 mL / NET: 500 mL        General: NAD, sitting up comfortably in chair   HEENT: no scleral icterus  CV: RRR, normal S1 and S2  Lungs: normal respiratory effort on RA, CTAB  Abd: soft, nontender, nondistended  Ext: no edema, warm, well perfused  Pysch: AAOx3, appropriate affect   Neuro: grossly non-focal, moving all extremities spontaneously     LABS:                        10.8   15.34 )-----------( 312      ( 19 Mar 2024 05:51 )             35.5     03-19    140  |  102  |  34<H>  ----------------------------<  170<H>  4.1   |  28  |  0.98    Ca    9.3      19 Mar 2024 05:51  Phos  2.8     03-19  Mg     2.1     03-19    TPro  6.1  /  Alb  3.2<L>  /  TBili  0.2  /  DBili  x   /  AST  17  /  ALT  15  /  AlkPhos  69  03-19    PT/INR - ( 19 Mar 2024 05:51 )   PT: 10.5 sec;   INR: 1.00 ratio         PTT - ( 19 Mar 2024 05:51 )  PTT:26.5 sec      Urinalysis Basic - ( 19 Mar 2024 05:51 )    Color: x / Appearance: x / SG: x / pH: x  Gluc: 170 mg/dL / Ketone: x  / Bili: x / Urobili: x   Blood: x / Protein: x / Nitrite: x   Leuk Esterase: x / RBC: x / WBC x   Sq Epi: x / Non Sq Epi: x / Bacteria: x          IMAGING:    [X] All pertinent imaging reviewed by me

## 2024-03-19 NOTE — PROGRESS NOTE ADULT - ATTENDING COMMENTS
The patient is a 88F with hx of primary immunodeficiency disorder on Hizentra (weekly, last dose Saturday), breast cancer s/p surgery/radiation on letrozole, pre-DM, HLD, HTN, hypothyroidism, s/p lens replacement in both eyes, recent admission at [facility] for fevers/weakness/SOB, infectious workup negative, also with L eye visual changes during that hospitalization (had for weeks), presenting for visual changes now in her R eye. Workup with elevated ESR/CRP and ophthalmologic exam was concerned for GCA/ temporal artery arteritis.     1. GCA  2. RESHMA- prerenal  3. 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  - d/c time 40 min  ** spoke to family at bedside.

## 2024-03-19 NOTE — PROGRESS NOTE ADULT - SUBJECTIVE AND OBJECTIVE BOX
MARGARITA POWERS  [id number]    INTERVAL HPI/OVERNIGHT EVENTS:  S/p R temporal artery bx this afternoon.   Overall doing well. Continues to report persistent R vision issues, denies issues with her L eye.   Denies current HA or jaw pain, but finds if she eats hard food she has jaw pain.   Denies girdle muscle pain/stiffness       Objective:   Vital Signs Last 24 Hrs  T(C): 36.6 (19 Mar 2024 14:01), Max: 36.6 (19 Mar 2024 14:01)  T(F): 97.9 (19 Mar 2024 14:01), Max: 97.9 (19 Mar 2024 14:01)  HR: 71 (19 Mar 2024 14:01) (57 - 75)  BP: 134/54 (19 Mar 2024 14:01) (134/54 - 180/73)  BP(mean): 93 (19 Mar 2024 13:30) (93 - 107)  RR: 18 (19 Mar 2024 14:01) (16 - 18)  SpO2: 91% (19 Mar 2024 14:01) (91% - 99%)    Parameters below as of 19 Mar 2024 14:01  Patient On (Oxygen Delivery Method): room air      Physical Exam:  General: NAD  HEENT: no temporal pain on palpation   Cardio: regular rate  Resp: no accessory muscle use  GI: non-distended   MSK: no joint swelling  Neuro: AAOx3  Psych: wnl    LABS:                        9.4    16.71 )-----------( 401      ( 15 Mar 2024 07:05 )             30.9     03-15    140  |  97  |  43<H>  ----------------------------<  115<H>  4.1   |  29  |  1.39<H>    Ca    10.1      15 Mar 2024 07:05  Phos  3.2     03-14  Mg     1.8     03-14    TPro  7.5  /  Alb  3.6  /  TBili  0.4  /  DBili  x   /  AST  12  /  ALT  6<L>  /  AlkPhos  74  03-13    PT/INR - ( 15 Mar 2024 07:05 )   PT: 11.2 sec;   INR: 1.02 ratio         PTT - ( 14 Mar 2024 06:47 )  PTT:29.0 sec  Urinalysis Basic - ( 15 Mar 2024 13:06 )    Color: Yellow / Appearance: Clear / S.013 / pH: x  Gluc: x / Ketone: Negative mg/dL  / Bili: Negative / Urobili: 0.2 mg/dL   Blood: x / Protein: Negative mg/dL / Nitrite: Negative   Leuk Esterase: Negative / RBC: x / WBC x   Sq Epi: x / Non Sq Epi: x / Bacteria: x    Rheumatology Work Up    Protein/Creatinine Ratio Calculation: 0.1 Ratio [0.0 - 0.2] (03-15-24 @ 13:07)  Sedimentation Rate, Erythrocyte: 45 mm/hr *H* [0 - 20] (24 @ 16:36)  C-Reactive Protein, Serum: 153 mg/L *H* [0 - 4] (24 @ 16:36)  Sedimentation Rate, Erythrocyte: 101 mm/Hr *H* [0 - 20] (24 @ 16:44)    RADIOLOGY & ADDITIONAL TESTS:  < from: MR Orbits w/wo IV Cont (24 @ 14:10) >  IMPRESSION:    1.  ORBITS:   Unremarkable MR of the orbits.    2.  BRAIN:  Meningioma right middle cranial fossa.  Ischemic white matter   disease and atrophy typical for age.    3. ANTERIOR CIRCULATION:   Intracranial atherosclerosis correlates   segments of the internal carotid arteries, mild. Left internal carotid   clinoid segment undersurface tiny contour deformity 0.2 cm saccular   aneurysm versus infundibulum of the left posterior communicating artery   otherwise not identified.    4. POSTERIOR CIRCULATION:  Intact.    < end of copied text >

## 2024-03-19 NOTE — PROGRESS NOTE ADULT - PROBLEM/PLAN-2
Airway  Date/Time: 7/6/2021 7:09 AM  Urgency: elective    Airway not difficult    General Information and Staff    Patient location during procedure: OR  Anesthesiologist: Shirin Romero MD  Performed: anesthesiologist     Indications and Patient Conditi
Arterial Line  Performed by: Jose Alberto Antonio MD  Authorized by: Jose Alberto Antonio MD     General Information and Staff    Procedure Start:  7/6/2021 7:00 AM  Procedure End:  7/6/2021 7:02 AM  Anesthesiologist:  Jose Alberto Antonio MD  Performed By:  Genesis Garnett
Peripheral IV  Date/Time: 7/6/2021 7:15 AM  Inserted by: Ole Rojo MD    Placement  Needle size: 16 G  Laterality: right  Location: forearm  Local anesthetic: none  Site prep: chlorhexidine  Technique: anatomical landmarks  Attempts: 1
DISPLAY PLAN FREE TEXT

## 2024-03-19 NOTE — PROGRESS NOTE ADULT - ASSESSMENT
Ms. Pulliam is an 87 YO woman with hx of primary immunodeficiency disorder on Hizentra (weekly, last dose Saturday), breast cancer s/p surgery/radiation on letrozole, pre-DM, HLD, HTN, hypothyroidism, s/p lens replacement in both eyes, recent admission at [facility] for fevers/weakness/SOB, infectious workup negative, also with L eye visual changes during that hospitalization (had for weeks), presenting for visual changes now in her R eye. Workup with elevated ESR/CRP and ophthalmologic exam concerning for GCA, started on high dose steroids pending temporal artery biopsy.

## 2024-03-19 NOTE — CHART NOTE - NSCHARTNOTEFT_GEN_A_CORE
Transport Wheelchair-Patient will require a transport wheelchair due patient's diagnosis of M31.6, M17.11, H53.9.  The beneficiary has a mobility limitation that significantly impairs his ability to participate in one or more MRADLs such as toileting, feeding, dressing, grooming, and bathing in customary locations in the home. The patient’s mobility limitation cannot be sufficiently resolved by the use of an appropriately fitted cane or walker. The patient is unable to ambulate with a walker. Use of a transport wheelchair will significantly improve the beneficiary’s ability to participate in MRADLs and the beneficiary will use it on a regular basis in the home. The beneficiary is able and willing to use the wheelchair in the home. The beneficiary has a caregiver who is available, willing, and able to provide assistance with the wheelchair. The patient is not able to self propel a standard or lightweight wheelchair.

## 2024-03-19 NOTE — PROGRESS NOTE ADULT - PROVIDER SPECIALTY LIST ADULT
Ophthalmology
Podiatry
Internal Medicine
Internal Medicine
Vascular Surgery
Ophthalmology
Internal Medicine
Rheumatology
Rheumatology
Vascular Surgery
Internal Medicine

## 2024-03-19 NOTE — PROGRESS NOTE ADULT - PROBLEM SELECTOR PLAN 2
Pt with RESHMA in the setting of poor PO intake. UA wnl, FeNa 0.9% c/w prerenal RESHMA iso poor PO intake, hypovolemic on exam. Trial of IVF NS with improvement in SCr. Continue to trend BMP, nonoliguric at this time.   Plan:   - CTM   - Dose medications as per eGFR, avoid NSAIDs ACEI/ARBS, RCA and nephrotoxins  - Improving

## 2024-03-19 NOTE — PROGRESS NOTE ADULT - ATTENDING COMMENTS
I personally interviewed and examined the patient. The patient has clinical picture highly suggestive of GCA, improved with steroids. s/p temporal artery bx. OK to d/c on prednisone 60 mg a day , ca supplementation , will need bisphosphonates for osteoporosis prevention and Bactrim for PCP prophylaxis. will fu in office in 1-2 weeks. consideration of Actemra as steroid sparing tx was discussed

## 2024-03-19 NOTE — PROGRESS NOTE ADULT - PROBLEM SELECTOR PLAN 1
B/l eye changes/jaw pain; L eye improved now with acute R eye vision loss, high ESR/CRP 3/6 now downtrending.   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 High risk for bone demineralization with concomitant use of aromatase inhibitor and prolonged use of high dose steroids B/l eye changes/jaw pain; L eye improved now with acute R eye vision loss, high ESR/CRP 3/6 now downtrending.   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 High risk for bone demineralization with concomitant use of aromatase inhibitor and prolonged use of high dose steroids

## 2024-03-19 NOTE — PROGRESS NOTE ADULT - REASON FOR ADMISSION
Vision changes

## 2024-03-19 NOTE — BRIEF OPERATIVE NOTE - COMMENTS
Cleared from vascular surgery perspective for discharge following post-operative check 16:00. Steri-strips can come off 2 weeks after procedure. Ok to shower over steri-strips, no scrubbing starting tomorrow. No requirement for outpatient follow up.

## 2024-03-20 ENCOUNTER — NON-APPOINTMENT (OUTPATIENT)
Age: 89
End: 2024-03-20

## 2024-03-20 LAB
GAMMA INTERFERON BACKGROUND BLD IA-ACNC: 0.04 IU/ML — SIGNIFICANT CHANGE UP
M TB IFN-G BLD-IMP: ABNORMAL
M TB IFN-G CD4+ BCKGRND COR BLD-ACNC: 0 IU/ML — SIGNIFICANT CHANGE UP
M TB IFN-G CD4+CD8+ BCKGRND COR BLD-ACNC: 0 IU/ML — SIGNIFICANT CHANGE UP
QUANT TB PLUS MITOGEN MINUS NIL: 0.15 IU/ML — SIGNIFICANT CHANGE UP

## 2024-03-27 ENCOUNTER — APPOINTMENT (OUTPATIENT)
Dept: OPHTHALMOLOGY | Facility: CLINIC | Age: 89
End: 2024-03-27
Payer: MEDICARE

## 2024-03-27 ENCOUNTER — NON-APPOINTMENT (OUTPATIENT)
Age: 89
End: 2024-03-27

## 2024-03-27 PROCEDURE — 92133 CPTRZD OPH DX IMG PST SGM ON: CPT

## 2024-03-27 PROCEDURE — 99204 OFFICE O/P NEW MOD 45 MIN: CPT

## 2024-03-27 PROCEDURE — 92083 EXTENDED VISUAL FIELD XM: CPT

## 2024-03-29 ENCOUNTER — APPOINTMENT (OUTPATIENT)
Dept: INTERNAL MEDICINE | Facility: CLINIC | Age: 89
End: 2024-03-29
Payer: MEDICARE

## 2024-03-29 VITALS — OXYGEN SATURATION: 96 % | HEART RATE: 83 BPM | HEIGHT: 69 IN | WEIGHT: 230 LBS | BODY MASS INDEX: 34.07 KG/M2

## 2024-03-29 VITALS — SYSTOLIC BLOOD PRESSURE: 124 MMHG | DIASTOLIC BLOOD PRESSURE: 48 MMHG

## 2024-03-29 PROCEDURE — 99495 TRANSJ CARE MGMT MOD F2F 14D: CPT | Mod: 25

## 2024-03-29 PROCEDURE — G2211 COMPLEX E/M VISIT ADD ON: CPT | Mod: NC

## 2024-03-29 RX ORDER — SENNOSIDES 8.6 MG TABLETS 8.6 MG/1
8.6 TABLET ORAL
Qty: 180 | Refills: 0 | Status: ACTIVE | COMMUNITY
Start: 2024-03-29 | End: 1900-01-01

## 2024-03-29 RX ORDER — POLYETHYLENE GLYCOL 3350 17 G/17G
17 POWDER, FOR SOLUTION ORAL DAILY
Qty: 30 | Refills: 0 | Status: ACTIVE | COMMUNITY
Start: 2024-03-29 | End: 1900-01-01

## 2024-03-29 NOTE — HISTORY OF PRESENT ILLNESS
[Post-hospitalization from ___ Hospital] : Post-hospitalization from [unfilled] Hospital [Admitted on: ___] : The patient was admitted on [unfilled] [Discharged on ___] : discharged on [unfilled] [Discharge Summary] : discharge summary [Discharge Med List] : discharge medication list [Pertinent Labs] : pertinent labs [Med Reconciliation] : medication reconciliation has been completed [Patient Contacted By: ____] : and contacted by [unfilled] [FreeTextEntry2] : Patient admitted for right eye vision loss. Admitted to medicine. Patient was evaluated by ophthalmology and rheumatology with findings, including elevated ESR/CRP, concerning for giant cell arteritis and was started on pulse IV steroids, then transitioned to oral prednisone with long for long taper vs. transition to steroid-sparing agent- to be determined  outpatient. Temporal artery duplex was performed and did not show evidence of GCA, however not sensitive. Hospital course complicated by acute RESHMA, likely prerenal iso poor PO intake, improved with trial of IVF. Temporal artery biopsy was performed by vascular on 3/19, positive for GCA.  Currently, she says her vision in left eye is near perfect, however, her right eye is minimally improved. Is currently on high-dose prednisone with plan to marcialh to Actemra.

## 2024-03-29 NOTE — PHYSICAL EXAM
[No Acute Distress] : no acute distress [Well Nourished] : well nourished [Well-Appearing] : well-appearing [Well Developed] : well developed [Normal Sclera/Conjunctiva] : normal sclera/conjunctiva [PERRL] : pupils equal round and reactive to light [EOMI] : extraocular movements intact [Normal Outer Ear/Nose] : the outer ears and nose were normal in appearance [Normal Oropharynx] : the oropharynx was normal [No JVD] : no jugular venous distention [No Lymphadenopathy] : no lymphadenopathy [Supple] : supple [Thyroid Normal, No Nodules] : the thyroid was normal and there were no nodules present [No Accessory Muscle Use] : no accessory muscle use [No Respiratory Distress] : no respiratory distress  [Clear to Auscultation] : lungs were clear to auscultation bilaterally [Normal Rate] : normal rate  [Regular Rhythm] : with a regular rhythm [Normal S1, S2] : normal S1 and S2 [No Murmur] : no murmur heard [No Carotid Bruits] : no carotid bruits [No Abdominal Bruit] : a ~M bruit was not heard ~T in the abdomen [No Varicosities] : no varicosities [Pedal Pulses Present] : the pedal pulses are present [No Edema] : there was no peripheral edema [No Palpable Aorta] : no palpable aorta [No Extremity Clubbing/Cyanosis] : no extremity clubbing/cyanosis [Soft] : abdomen soft [Non-distended] : non-distended [Non Tender] : non-tender [No Masses] : no abdominal mass palpated [No HSM] : no HSM [Normal Bowel Sounds] : normal bowel sounds [Normal Posterior Cervical Nodes] : no posterior cervical lymphadenopathy [Normal Anterior Cervical Nodes] : no anterior cervical lymphadenopathy [No CVA Tenderness] : no CVA  tenderness [No Spinal Tenderness] : no spinal tenderness [No Joint Swelling] : no joint swelling [Grossly Normal Strength/Tone] : grossly normal strength/tone [No Rash] : no rash [Coordination Grossly Intact] : coordination grossly intact [No Focal Deficits] : no focal deficits [Normal Gait] : normal gait [Normal Affect] : the affect was normal [Deep Tendon Reflexes (DTR)] : deep tendon reflexes were 2+ and symmetric [Normal Insight/Judgement] : insight and judgment were intact [de-identified] : right eye superior visual field deficit

## 2024-03-29 NOTE — ASSESSMENT
[FreeTextEntry1] : #GCA c/w prednisone therapy per rheum; plan on switching to Actemra discussed r/b of DEXA scan due to age, comorbidities, high-dose steroids with patient and son; at this time they want to hold off as patient has a lot of new things happening, seeing a lot of doctors, doing PT  #Constipation senna 2 tabs qhs; miralax 17g 1-2Xday prn  #HTN #HLD #Hypothyroid #Primary immunodeficiency syndrome c/w current meds  discussed with son

## 2024-04-01 ENCOUNTER — NON-APPOINTMENT (OUTPATIENT)
Age: 89
End: 2024-04-01

## 2024-04-02 ENCOUNTER — APPOINTMENT (OUTPATIENT)
Dept: RHEUMATOLOGY | Facility: CLINIC | Age: 89
End: 2024-04-02
Payer: MEDICARE

## 2024-04-02 VITALS
TEMPERATURE: 97.9 F | BODY MASS INDEX: 27.85 KG/M2 | DIASTOLIC BLOOD PRESSURE: 66 MMHG | HEIGHT: 69 IN | HEART RATE: 84 BPM | SYSTOLIC BLOOD PRESSURE: 155 MMHG | OXYGEN SATURATION: 96 % | WEIGHT: 188 LBS

## 2024-04-02 DIAGNOSIS — Z00.00 ENCOUNTER FOR GENERAL ADULT MEDICAL EXAMINATION W/OUT ABNORMAL FINDINGS: ICD-10-CM

## 2024-04-02 DIAGNOSIS — Z29.89 ENCOUNTER. FOR OTHER SPECIFIED PROPHYLACTIC MEASURES: ICD-10-CM

## 2024-04-02 PROCEDURE — G2211 COMPLEX E/M VISIT ADD ON: CPT

## 2024-04-02 PROCEDURE — 99215 OFFICE O/P EST HI 40 MIN: CPT

## 2024-04-03 LAB
ALBUMIN SERPL ELPH-MCNC: 4.2 G/DL
ALP BLD-CCNC: 65 U/L
ALT SERPL-CCNC: 25 U/L
ANION GAP SERPL CALC-SCNC: 15 MMOL/L
AST SERPL-CCNC: 21 U/L
BASOPHILS # BLD AUTO: 0.05 K/UL
BASOPHILS NFR BLD AUTO: 0.4 %
BILIRUB SERPL-MCNC: 0.4 MG/DL
BUN SERPL-MCNC: 24 MG/DL
CALCIUM SERPL-MCNC: 9.8 MG/DL
CHLORIDE SERPL-SCNC: 91 MMOL/L
CO2 SERPL-SCNC: 27 MMOL/L
CREAT SERPL-MCNC: 1.05 MG/DL
CRP SERPL-MCNC: <3 MG/L
EGFR: 51 ML/MIN/1.73M2
EOSINOPHIL # BLD AUTO: 0.05 K/UL
EOSINOPHIL NFR BLD AUTO: 0.4 %
ERYTHROCYTE [SEDIMENTATION RATE] IN BLOOD BY WESTERGREN METHOD: 12 MM/HR
GLUCOSE SERPL-MCNC: 134 MG/DL
HCT VFR BLD CALC: 38.1 %
HGB BLD-MCNC: 11.8 G/DL
IMM GRANULOCYTES NFR BLD AUTO: 0.6 %
LYMPHOCYTES # BLD AUTO: 1.03 K/UL
LYMPHOCYTES NFR BLD AUTO: 7.8 %
MAN DIFF?: NORMAL
MCHC RBC-ENTMCNC: 28.4 PG
MCHC RBC-ENTMCNC: 31 GM/DL
MCV RBC AUTO: 91.6 FL
MONOCYTES # BLD AUTO: 0.26 K/UL
MONOCYTES NFR BLD AUTO: 2 %
NEUTROPHILS # BLD AUTO: 11.82 K/UL
NEUTROPHILS NFR BLD AUTO: 88.8 %
PLATELET # BLD AUTO: 205 K/UL
POTASSIUM SERPL-SCNC: 5.1 MMOL/L
PROT SERPL-MCNC: 6.6 G/DL
RBC # BLD: 4.16 M/UL
RBC # FLD: 17.2 %
SODIUM SERPL-SCNC: 133 MMOL/L
WBC # FLD AUTO: 13.29 K/UL

## 2024-04-03 NOTE — PHYSICAL THERAPY INITIAL EVALUATION ADULT - WEIGHT-BEARING RESTRICTIONS: SIT/STAND, REHAB EVAL
Report from Sending RN:    Report From: Sunita ( RN)  Recent Surgery of Procedure: No  Baseline Level of Consciousness (LOC): a/o x 3/4  Oxygen Use: Yes, 2 liters  Type: nc  Diabetic: Yes, 111  Last BP Med Given Day of Dialysis: none  Last Pain Med Given: none  Lab Tests to be Obtained with Dialysis: Yes, cbc/rfp  Blood Transfusion to be Given During Dialysis: No  Available IV Access: Yes  Medications to be Administered During Dialysis: No  Continuous IV Infusion Running: No  Restraints on Currently or in the Last 24 Hours: No  Hand-Off Communication: No acute overnight or morning events; vss; Pt did not take morning medications; Pt will need labs; Pt is a DNR; Ela Morales RN.  Dialysis Catheter Dressin2024  Last Dressing Change: 2024    full weight-bearing

## 2024-04-17 ENCOUNTER — APPOINTMENT (OUTPATIENT)
Dept: OPHTHALMOLOGY | Facility: CLINIC | Age: 89
End: 2024-04-17
Payer: MEDICARE

## 2024-04-17 ENCOUNTER — NON-APPOINTMENT (OUTPATIENT)
Age: 89
End: 2024-04-17

## 2024-04-17 PROCEDURE — 99214 OFFICE O/P EST MOD 30 MIN: CPT

## 2024-04-17 PROCEDURE — 92083 EXTENDED VISUAL FIELD XM: CPT

## 2024-04-17 PROCEDURE — 92133 CPTRZD OPH DX IMG PST SGM ON: CPT

## 2024-04-22 NOTE — ASSESSMENT
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  Plan: -modified prednisone taper per [facility]CTA protocol -start Actemra SQ weekly -Labs to be checked in 4 weeks -GI and PCP ppx for now while prednisone > or =  20mg -RTC 1 month

## 2024-04-22 NOTE — HISTORY OF PRESENT ILLNESS
[FreeTextEntry1] : Interval History: 4/2/2024 since discharge no HA, scalp tenderness, jaw claudication, no acute vision changes. on prednisone 50mg daily.

## 2024-05-07 ENCOUNTER — APPOINTMENT (OUTPATIENT)
Dept: INTERNAL MEDICINE | Facility: CLINIC | Age: 89
End: 2024-05-07

## 2024-05-08 ENCOUNTER — APPOINTMENT (OUTPATIENT)
Dept: RHEUMATOLOGY | Facility: CLINIC | Age: 89
End: 2024-05-08
Payer: MEDICARE

## 2024-05-08 VITALS
BODY MASS INDEX: 28.88 KG/M2 | TEMPERATURE: 98.1 F | OXYGEN SATURATION: 98 % | HEART RATE: 93 BPM | SYSTOLIC BLOOD PRESSURE: 152 MMHG | RESPIRATION RATE: 16 BRPM | WEIGHT: 195 LBS | HEIGHT: 69 IN | DIASTOLIC BLOOD PRESSURE: 71 MMHG

## 2024-05-08 PROCEDURE — 99215 OFFICE O/P EST HI 40 MIN: CPT | Mod: 25

## 2024-05-08 PROCEDURE — 96401 CHEMO ANTI-NEOPL SQ/IM: CPT

## 2024-05-08 RX ORDER — TOCILIZUMAB 180 MG/ML
162 INJECTION, SOLUTION SUBCUTANEOUS
Refills: 0 | Status: COMPLETED | OUTPATIENT
Start: 2024-05-08

## 2024-05-08 RX ADMIN — TOCILIZUMAB 0 MG/0.9ML: 180 INJECTION, SOLUTION SUBCUTANEOUS at 00:00

## 2024-05-23 LAB
ALBUMIN SERPL ELPH-MCNC: 4.5 G/DL
ALP BLD-CCNC: 57 U/L
ALT SERPL-CCNC: 26 U/L
ANION GAP SERPL CALC-SCNC: 16 MMOL/L
AST SERPL-CCNC: 23 U/L
BASOPHILS # BLD AUTO: 0.01 K/UL
BASOPHILS NFR BLD AUTO: 0.1 %
BILIRUB SERPL-MCNC: 0.4 MG/DL
BUN SERPL-MCNC: 31 MG/DL
CALCIUM SERPL-MCNC: 10.2 MG/DL
CHLORIDE SERPL-SCNC: 95 MMOL/L
CHOLEST SERPL-MCNC: 275 MG/DL
CO2 SERPL-SCNC: 26 MMOL/L
CREAT SERPL-MCNC: 1.26 MG/DL
CRP SERPL-MCNC: <3 MG/L
EGFR: 41 ML/MIN/1.73M2
EOSINOPHIL # BLD AUTO: 0 K/UL
EOSINOPHIL NFR BLD AUTO: 0 %
ERYTHROCYTE [SEDIMENTATION RATE] IN BLOOD BY WESTERGREN METHOD: 12 MM/HR
GLUCOSE SERPL-MCNC: 197 MG/DL
HCT VFR BLD CALC: 39.2 %
HDLC SERPL-MCNC: 103 MG/DL
HGB BLD-MCNC: 12.5 G/DL
IMM GRANULOCYTES NFR BLD AUTO: 0.4 %
LDLC SERPL CALC-MCNC: 149 MG/DL
LYMPHOCYTES # BLD AUTO: 0.86 K/UL
LYMPHOCYTES NFR BLD AUTO: 11.1 %
MAN DIFF?: NORMAL
MCHC RBC-ENTMCNC: 30.3 PG
MCHC RBC-ENTMCNC: 31.9 GM/DL
MCV RBC AUTO: 94.9 FL
MONOCYTES # BLD AUTO: 0.2 K/UL
MONOCYTES NFR BLD AUTO: 2.6 %
NEUTROPHILS # BLD AUTO: 6.68 K/UL
NEUTROPHILS NFR BLD AUTO: 85.8 %
NONHDLC SERPL-MCNC: 172 MG/DL
PLATELET # BLD AUTO: 254 K/UL
POTASSIUM SERPL-SCNC: 5.4 MMOL/L
PROT SERPL-MCNC: 7 G/DL
RBC # BLD: 4.13 M/UL
RBC # FLD: 18.8 %
SODIUM SERPL-SCNC: 137 MMOL/L
TRIGL SERPL-MCNC: 136 MG/DL
WBC # FLD AUTO: 7.78 K/UL

## 2024-05-23 RX ORDER — TOCILIZUMAB 180 MG/ML
162 INJECTION, SOLUTION SUBCUTANEOUS
Qty: 4 | Refills: 6 | Status: ACTIVE | COMMUNITY
Start: 2024-03-29 | End: 1900-01-01

## 2024-05-24 NOTE — ASSESSMENT
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  Plan: -modified prednisone taper per GIACTA protocol -start Actemra SQ weekly (sample injection administered today) -Labs to be checked in 4 weeks  -GI ppx: PPI -PCP prophylaxis: Bactrim MWF -Infection screening: 3/2024 HBV HCV Quantiferon neg -Immunizations: COVID vaccine, COVID ab, flu vaccine, pneumonia vaccine, shingrix - discuss next visit -Bone health: DEXA, Vit D - refer  -RTC 1 month

## 2024-05-24 NOTE — HISTORY OF PRESENT ILLNESS
[FreeTextEntry1] : Interval History: 5/8/2024 Patient feels well.  On prednisone 40 mg.  Just recently submitted paperwork for Actemra injections 4/2/2024 since discharge no HA, scalp tenderness, jaw claudication, no acute vision changes. on prednisone 50mg daily.

## 2024-06-05 ENCOUNTER — APPOINTMENT (OUTPATIENT)
Dept: RHEUMATOLOGY | Facility: CLINIC | Age: 89
End: 2024-06-05

## 2024-06-05 VITALS
RESPIRATION RATE: 16 BRPM | WEIGHT: 195 LBS | DIASTOLIC BLOOD PRESSURE: 65 MMHG | SYSTOLIC BLOOD PRESSURE: 149 MMHG | HEART RATE: 90 BPM | OXYGEN SATURATION: 98 % | BODY MASS INDEX: 28.88 KG/M2 | HEIGHT: 69 IN | TEMPERATURE: 98.1 F

## 2024-06-05 PROCEDURE — G2211 COMPLEX E/M VISIT ADD ON: CPT

## 2024-06-05 PROCEDURE — 99215 OFFICE O/P EST HI 40 MIN: CPT

## 2024-06-06 NOTE — H&P PST ADULT - HARM RISK FACTORS
Called and spoke with pt wife she is not sure who called her, she stated that the voicemail just said return call regarding medication. She did state that she spoke with Yasmine Fong regarding the Lyrica rx. Did you happen to call pt Yasmine?    no

## 2024-06-07 LAB
ALBUMIN SERPL ELPH-MCNC: 4.4 G/DL
ALP BLD-CCNC: 57 U/L
ALT SERPL-CCNC: 24 U/L
ANION GAP SERPL CALC-SCNC: 12 MMOL/L
AST SERPL-CCNC: 25 U/L
BASOPHILS # BLD AUTO: 0.06 K/UL
BASOPHILS NFR BLD AUTO: 0.6 %
BILIRUB SERPL-MCNC: 0.3 MG/DL
BUN SERPL-MCNC: 27 MG/DL
CALCIUM SERPL-MCNC: 10.3 MG/DL
CHLORIDE SERPL-SCNC: 101 MMOL/L
CHOLEST SERPL-MCNC: 257 MG/DL
CO2 SERPL-SCNC: 29 MMOL/L
CREAT SERPL-MCNC: 1.09 MG/DL
CRP SERPL-MCNC: <3 MG/L
EGFR: 49 ML/MIN/1.73M2
EOSINOPHIL # BLD AUTO: 0.19 K/UL
EOSINOPHIL NFR BLD AUTO: 1.8 %
ERYTHROCYTE [SEDIMENTATION RATE] IN BLOOD BY WESTERGREN METHOD: 9 MM/HR
GLUCOSE SERPL-MCNC: 152 MG/DL
HCT VFR BLD CALC: 39.2 %
HDLC SERPL-MCNC: 71 MG/DL
HGB BLD-MCNC: 12.3 G/DL
IMM GRANULOCYTES NFR BLD AUTO: 1.4 %
LDLC SERPL CALC-MCNC: 151 MG/DL
LYMPHOCYTES # BLD AUTO: 1.33 K/UL
LYMPHOCYTES NFR BLD AUTO: 12.6 %
MAN DIFF?: NORMAL
MCHC RBC-ENTMCNC: 30.6 PG
MCHC RBC-ENTMCNC: 31.4 GM/DL
MCV RBC AUTO: 97.5 FL
MONOCYTES # BLD AUTO: 0.62 K/UL
MONOCYTES NFR BLD AUTO: 5.9 %
NEUTROPHILS # BLD AUTO: 8.21 K/UL
NEUTROPHILS NFR BLD AUTO: 77.7 %
NONHDLC SERPL-MCNC: 186 MG/DL
PLATELET # BLD AUTO: 211 K/UL
POTASSIUM SERPL-SCNC: 5.1 MMOL/L
PROT SERPL-MCNC: 6.7 G/DL
RBC # BLD: 4.02 M/UL
RBC # FLD: 18.5 %
SODIUM SERPL-SCNC: 142 MMOL/L
TRIGL SERPL-MCNC: 196 MG/DL
WBC # FLD AUTO: 10.56 K/UL

## 2024-06-09 RX ORDER — SULFAMETHOXAZOLE AND TRIMETHOPRIM 800; 160 MG/1; MG/1
800-160 TABLET ORAL DAILY
Qty: 12 | Refills: 2 | Status: DISCONTINUED | COMMUNITY
Start: 2024-04-02 | End: 2024-06-09

## 2024-06-24 ENCOUNTER — LABORATORY RESULT (OUTPATIENT)
Age: 89
End: 2024-06-24

## 2024-06-24 ENCOUNTER — APPOINTMENT (OUTPATIENT)
Dept: INTERNAL MEDICINE | Facility: CLINIC | Age: 89
End: 2024-06-24
Payer: MEDICARE

## 2024-06-24 ENCOUNTER — NON-APPOINTMENT (OUTPATIENT)
Age: 89
End: 2024-06-24

## 2024-06-24 VITALS — WEIGHT: 199 LBS | OXYGEN SATURATION: 95 % | HEIGHT: 69 IN | BODY MASS INDEX: 29.47 KG/M2 | HEART RATE: 99 BPM

## 2024-06-24 VITALS — DIASTOLIC BLOOD PRESSURE: 80 MMHG | SYSTOLIC BLOOD PRESSURE: 152 MMHG

## 2024-06-24 DIAGNOSIS — E78.00 PURE HYPERCHOLESTEROLEMIA, UNSPECIFIED: ICD-10-CM

## 2024-06-24 DIAGNOSIS — R79.89 OTHER SPECIFIED ABNORMAL FINDINGS OF BLOOD CHEMISTRY: ICD-10-CM

## 2024-06-24 DIAGNOSIS — R42 DIZZINESS AND GIDDINESS: ICD-10-CM

## 2024-06-24 DIAGNOSIS — I10 ESSENTIAL (PRIMARY) HYPERTENSION: ICD-10-CM

## 2024-06-24 DIAGNOSIS — N18.31 CHRONIC KIDNEY DISEASE, STAGE 3A: ICD-10-CM

## 2024-06-24 DIAGNOSIS — Z79.899 OTHER LONG TERM (CURRENT) DRUG THERAPY: ICD-10-CM

## 2024-06-24 DIAGNOSIS — E03.9 HYPOTHYROIDISM, UNSPECIFIED: ICD-10-CM

## 2024-06-24 DIAGNOSIS — Z79.52 LONG TERM (CURRENT) USE OF SYSTEMIC STEROIDS: ICD-10-CM

## 2024-06-24 DIAGNOSIS — Z91.148 PATIENT'S OTHER NONCOMPLIANCE WITH MEDICATION REGIMEN FOR OTHER REASON: ICD-10-CM

## 2024-06-24 DIAGNOSIS — H53.8 OTHER VISUAL DISTURBANCES: ICD-10-CM

## 2024-06-24 DIAGNOSIS — K59.00 CONSTIPATION, UNSPECIFIED: ICD-10-CM

## 2024-06-24 PROCEDURE — 99214 OFFICE O/P EST MOD 30 MIN: CPT

## 2024-06-24 PROCEDURE — 36415 COLL VENOUS BLD VENIPUNCTURE: CPT

## 2024-06-24 PROCEDURE — G2211 COMPLEX E/M VISIT ADD ON: CPT

## 2024-06-24 RX ORDER — POLYETHYLENE GLYCOL 3350 17 G/17G
17 POWDER, FOR SOLUTION ORAL DAILY
Qty: 30 | Refills: 2 | Status: ACTIVE | COMMUNITY
Start: 2024-03-13 | End: 1900-01-01

## 2024-06-24 RX ORDER — METFORMIN HYDROCHLORIDE 500 MG/1
500 TABLET, COATED ORAL
Qty: 180 | Refills: 0 | Status: ACTIVE | COMMUNITY
Start: 2020-10-27 | End: 1900-01-01

## 2024-06-24 RX ORDER — GABAPENTIN 100 MG/1
100 CAPSULE ORAL 3 TIMES DAILY
Qty: 810 | Refills: 2 | Status: ACTIVE | COMMUNITY
Start: 2018-02-21 | End: 1900-01-01

## 2024-06-24 RX ORDER — PANTOPRAZOLE 40 MG/1
40 TABLET, DELAYED RELEASE ORAL
Qty: 30 | Refills: 3 | Status: ACTIVE | COMMUNITY
Start: 2024-04-02 | End: 1900-01-01

## 2024-06-25 ENCOUNTER — APPOINTMENT (OUTPATIENT)
Dept: RADIOLOGY | Facility: CLINIC | Age: 89
End: 2024-06-25

## 2024-06-25 RX ORDER — PREDNISONE 2.5 MG/1
2.5 TABLET ORAL
Qty: 150 | Refills: 0 | Status: DISCONTINUED | COMMUNITY
Start: 2024-05-08 | End: 2024-06-25

## 2024-06-26 DIAGNOSIS — M31.6 OTHER GIANT CELL ARTERITIS: ICD-10-CM

## 2024-06-26 LAB
ESTIMATED AVERAGE GLUCOSE: 114 MG/DL
HBA1C MFR BLD HPLC: 5.6 %
TSH SERPL-ACNC: 39 UIU/ML

## 2024-07-09 ENCOUNTER — APPOINTMENT (OUTPATIENT)
Dept: RHEUMATOLOGY | Facility: CLINIC | Age: 89
End: 2024-07-09
Payer: MEDICARE

## 2024-07-09 VITALS — DIASTOLIC BLOOD PRESSURE: 75 MMHG | SYSTOLIC BLOOD PRESSURE: 150 MMHG

## 2024-07-09 VITALS
TEMPERATURE: 98 F | HEIGHT: 69 IN | HEART RATE: 84 BPM | DIASTOLIC BLOOD PRESSURE: 82 MMHG | OXYGEN SATURATION: 94 % | SYSTOLIC BLOOD PRESSURE: 187 MMHG

## 2024-07-09 DIAGNOSIS — Z79.899 OTHER LONG TERM (CURRENT) DRUG THERAPY: ICD-10-CM

## 2024-07-09 PROCEDURE — 99215 OFFICE O/P EST HI 40 MIN: CPT

## 2024-07-09 PROCEDURE — G2211 COMPLEX E/M VISIT ADD ON: CPT

## 2024-07-10 LAB
ALBUMIN SERPL ELPH-MCNC: 4.6 G/DL
ALP BLD-CCNC: 58 U/L
ALT SERPL-CCNC: 24 U/L
ANION GAP SERPL CALC-SCNC: 17 MMOL/L
AST SERPL-CCNC: 29 U/L
BASOPHILS # BLD AUTO: 0.07 K/UL
BILIRUB SERPL-MCNC: 0.5 MG/DL
BUN SERPL-MCNC: 26 MG/DL
CALCIUM SERPL-MCNC: 9.8 MG/DL
CHLORIDE SERPL-SCNC: 99 MMOL/L
CHOLEST SERPL-MCNC: 214 MG/DL
CO2 SERPL-SCNC: 28 MMOL/L
CREAT SERPL-MCNC: 0.93 MG/DL
CRP SERPL-MCNC: <3 MG/L
EGFR: 59 ML/MIN/1.73M2
EOSINOPHIL # BLD AUTO: 0.06 K/UL
EOSINOPHIL NFR BLD AUTO: 0.6 %
ERYTHROCYTE [SEDIMENTATION RATE] IN BLOOD BY WESTERGREN METHOD: 5 MM/HR
GLUCOSE SERPL-MCNC: 109 MG/DL
HCT VFR BLD CALC: 40.6 %
HDLC SERPL-MCNC: 70 MG/DL
IMM GRANULOCYTES NFR BLD AUTO: 0.4 %
LDLC SERPL CALC-MCNC: 106 MG/DL
LYMPHOCYTES # BLD AUTO: 1.4 K/UL
LYMPHOCYTES NFR BLD AUTO: 13.7 %
MAN DIFF?: NORMAL
MCHC RBC-ENTMCNC: 32.1 PG
MCHC RBC-ENTMCNC: 32.8 GM/DL
MCV RBC AUTO: 98.1 FL
MONOCYTES # BLD AUTO: 0.5 K/UL
MONOCYTES NFR BLD AUTO: 4.9 %
NEUTROPHILS # BLD AUTO: 8.17 K/UL
NEUTROPHILS NFR BLD AUTO: 79.7 %
NONHDLC SERPL-MCNC: 144 MG/DL
PLATELET # BLD AUTO: 184 K/UL
POTASSIUM SERPL-SCNC: 4.2 MMOL/L
PROT SERPL-MCNC: 7.2 G/DL
RBC # BLD: 4.14 M/UL
RBC # FLD: 15.9 %
SODIUM SERPL-SCNC: 144 MMOL/L
TRIGL SERPL-MCNC: 223 MG/DL
WBC # FLD AUTO: 10.24 K/UL

## 2024-07-17 ENCOUNTER — APPOINTMENT (OUTPATIENT)
Dept: OPHTHALMOLOGY | Facility: CLINIC | Age: 89
End: 2024-07-17
Payer: MEDICARE

## 2024-07-17 ENCOUNTER — NON-APPOINTMENT (OUTPATIENT)
Age: 89
End: 2024-07-17

## 2024-07-17 PROCEDURE — 99214 OFFICE O/P EST MOD 30 MIN: CPT

## 2024-07-17 PROCEDURE — 92133 CPTRZD OPH DX IMG PST SGM ON: CPT

## 2024-07-17 PROCEDURE — 92083 EXTENDED VISUAL FIELD XM: CPT

## 2024-07-20 NOTE — OCCUPATIONAL THERAPY INITIAL EVALUATION ADULT - UPPER BODY DRESSING, PREVIOUS LEVEL OF FUNCTION, OT EVAL
independent
As per parent pt with ear pain for a while. Dad stated pt had an ear infection that seemed to resolve on its own but now has come back. States pt had clear/white drainage that has now turned yellow/green. No drainage at this time. No meds given. Denies Vomiting/ diarrhea/ URI  PMH asthma, NKDA, IUTD

## 2024-07-22 ENCOUNTER — APPOINTMENT (OUTPATIENT)
Dept: INTERNAL MEDICINE | Facility: CLINIC | Age: 89
End: 2024-07-22
Payer: MEDICARE

## 2024-07-22 VITALS — HEART RATE: 98 BPM | HEIGHT: 69 IN | WEIGHT: 199 LBS | OXYGEN SATURATION: 95 % | BODY MASS INDEX: 29.47 KG/M2

## 2024-07-22 VITALS — DIASTOLIC BLOOD PRESSURE: 62 MMHG | SYSTOLIC BLOOD PRESSURE: 136 MMHG

## 2024-07-22 DIAGNOSIS — Z91.148 PATIENT'S OTHER NONCOMPLIANCE WITH MEDICATION REGIMEN FOR OTHER REASON: ICD-10-CM

## 2024-07-22 DIAGNOSIS — M31.6 OTHER GIANT CELL ARTERITIS: ICD-10-CM

## 2024-07-22 DIAGNOSIS — E03.9 HYPOTHYROIDISM, UNSPECIFIED: ICD-10-CM

## 2024-07-22 DIAGNOSIS — E78.00 PURE HYPERCHOLESTEROLEMIA, UNSPECIFIED: ICD-10-CM

## 2024-07-22 DIAGNOSIS — I10 ESSENTIAL (PRIMARY) HYPERTENSION: ICD-10-CM

## 2024-07-22 PROCEDURE — 99214 OFFICE O/P EST MOD 30 MIN: CPT

## 2024-07-22 PROCEDURE — G2211 COMPLEX E/M VISIT ADD ON: CPT

## 2024-07-22 PROCEDURE — 36415 COLL VENOUS BLD VENIPUNCTURE: CPT

## 2024-07-22 NOTE — HISTORY OF PRESENT ILLNESS
[FreeTextEntry1] : labs [de-identified] : Pt comes in for repeat labs. She had run out of her Synthroid about a week before last visit, and her TSH was 39, now back on Synthroid 125mcg qd except Sundays takes 2 tabs. Feels well. Following with rheum Dr. Mccoy and is on Actemra and Prednisone taper. Says her vision is stable. No complaints at this time.  No fevers/chills/CP/SOB/palpitations/abdominal pain/nausea/vomiting/diarrhea/recent illnesses/sick contacts.

## 2024-07-22 NOTE — PHYSICAL EXAM
[No Acute Distress] : no acute distress [Well Nourished] : well nourished [Well Developed] : well developed [Normal Sclera/Conjunctiva] : normal sclera/conjunctiva [PERRL] : pupils equal round and reactive to light [EOMI] : extraocular movements intact [Normal Outer Ear/Nose] : the outer ears and nose were normal in appearance [Normal Oropharynx] : the oropharynx was normal [No JVD] : no jugular venous distention [No Lymphadenopathy] : no lymphadenopathy [Supple] : supple [Thyroid Normal, No Nodules] : the thyroid was normal and there were no nodules present [No Respiratory Distress] : no respiratory distress  [No Accessory Muscle Use] : no accessory muscle use [Clear to Auscultation] : lungs were clear to auscultation bilaterally [Normal Rate] : normal rate  [Regular Rhythm] : with a regular rhythm [Normal S1, S2] : normal S1 and S2 [No Murmur] : no murmur heard [No Carotid Bruits] : no carotid bruits [No Abdominal Bruit] : a ~M bruit was not heard ~T in the abdomen [No Varicosities] : no varicosities [Pedal Pulses Present] : the pedal pulses are present [No Edema] : there was no peripheral edema [No Palpable Aorta] : no palpable aorta [No Extremity Clubbing/Cyanosis] : no extremity clubbing/cyanosis [Soft] : abdomen soft [Non Tender] : non-tender [Non-distended] : non-distended [No Masses] : no abdominal mass palpated [No HSM] : no HSM [Normal Bowel Sounds] : normal bowel sounds [Normal Posterior Cervical Nodes] : no posterior cervical lymphadenopathy [Normal Anterior Cervical Nodes] : no anterior cervical lymphadenopathy [No CVA Tenderness] : no CVA  tenderness [No Spinal Tenderness] : no spinal tenderness [No Joint Swelling] : no joint swelling [Grossly Normal Strength/Tone] : grossly normal strength/tone [No Rash] : no rash [Coordination Grossly Intact] : coordination grossly intact [Normal Gait] : normal gait [Deep Tendon Reflexes (DTR)] : deep tendon reflexes were 2+ and symmetric [Normal Affect] : the affect was normal [Normal Insight/Judgement] : insight and judgment were intact [de-identified] : chronically ill-appearing [de-identified] : right eye superior visual field deficit

## 2024-07-22 NOTE — ASSESSMENT
[FreeTextEntry1] : 1) HTN bp improved 136/62 c/w triamterene-hctz 37.5-25mg qd  2) Hypothyroid last TSH 39, fT4 0.06, had run out of her synthroid for at least a week when labs were drawn now on synthroid 125mcg qd except sundays takes 2 tabs repeat TSH, fT4  3) HLD last panel drawn by rheum on 7/9/24   HDL 70  currently on Lipitor 20mg qhs too early to repeat lipid panel will repeat in 2 months  4) Medication non-compliance asked patient to bring in list of meds today she says she forgot, told her and her son to bring them in next visit in 2 months  5) GCA following with rheum Dr. Mccoy on Actemra and Prednsione taper

## 2024-07-22 NOTE — HEALTH RISK ASSESSMENT
[Former] : Former [20 or more] : 20 or more [> 15 Years] : > 15 Years [de-identified] : quit 40 years ago

## 2024-07-24 LAB
T4 FREE SERPL-MCNC: 1.7 NG/DL
TSH SERPL-ACNC: 4.48 UIU/ML

## 2024-08-05 ENCOUNTER — APPOINTMENT (OUTPATIENT)
Dept: RHEUMATOLOGY | Facility: CLINIC | Age: 89
End: 2024-08-05

## 2024-08-05 PROCEDURE — 99441: CPT | Mod: 93

## 2024-08-21 ENCOUNTER — APPOINTMENT (OUTPATIENT)
Dept: RHEUMATOLOGY | Facility: CLINIC | Age: 89
End: 2024-08-21
Payer: MEDICARE

## 2024-08-21 VITALS
SYSTOLIC BLOOD PRESSURE: 133 MMHG | BODY MASS INDEX: 28.88 KG/M2 | DIASTOLIC BLOOD PRESSURE: 64 MMHG | OXYGEN SATURATION: 94 % | RESPIRATION RATE: 16 BRPM | HEIGHT: 69 IN | HEART RATE: 94 BPM | WEIGHT: 195 LBS

## 2024-08-21 DIAGNOSIS — M31.6 OTHER GIANT CELL ARTERITIS: ICD-10-CM

## 2024-08-21 LAB
ALBUMIN SERPL ELPH-MCNC: 4.7 G/DL
ALP BLD-CCNC: 64 U/L
ALT SERPL-CCNC: 25 U/L
ANION GAP SERPL CALC-SCNC: 15 MMOL/L
AST SERPL-CCNC: 33 U/L
BASOPHILS # BLD AUTO: 0.03 K/UL
BASOPHILS NFR BLD AUTO: 0.4 %
BILIRUB SERPL-MCNC: 0.4 MG/DL
BUN SERPL-MCNC: 32 MG/DL
CALCIUM SERPL-MCNC: 10.3 MG/DL
CHLORIDE SERPL-SCNC: 100 MMOL/L
CO2 SERPL-SCNC: 28 MMOL/L
CREAT SERPL-MCNC: 1.08 MG/DL
CRP SERPL-MCNC: <3 MG/L
EGFR: 49 ML/MIN/1.73M2
EOSINOPHIL # BLD AUTO: 0.12 K/UL
EOSINOPHIL NFR BLD AUTO: 1.7 %
ERYTHROCYTE [SEDIMENTATION RATE] IN BLOOD BY WESTERGREN METHOD: 4 MM/HR
GLUCOSE SERPL-MCNC: 117 MG/DL
HCT VFR BLD CALC: 43.3 %
HGB BLD-MCNC: 13.9 G/DL
IMM GRANULOCYTES NFR BLD AUTO: 0.3 %
LYMPHOCYTES # BLD AUTO: 1.51 K/UL
LYMPHOCYTES NFR BLD AUTO: 21.3 %
MAN DIFF?: NORMAL
MCHC RBC-ENTMCNC: 31.4 PG
MCHC RBC-ENTMCNC: 32.1 GM/DL
MCV RBC AUTO: 97.7 FL
MONOCYTES # BLD AUTO: 0.59 K/UL
MONOCYTES NFR BLD AUTO: 8.3 %
NEUTROPHILS # BLD AUTO: 4.81 K/UL
NEUTROPHILS NFR BLD AUTO: 68 %
PLATELET # BLD AUTO: 226 K/UL
POTASSIUM SERPL-SCNC: 4.9 MMOL/L
PROT SERPL-MCNC: 7.3 G/DL
RBC # BLD: 4.43 M/UL
RBC # FLD: 13.9 %
SODIUM SERPL-SCNC: 143 MMOL/L
WBC # FLD AUTO: 7.08 K/UL

## 2024-08-21 PROCEDURE — G2211 COMPLEX E/M VISIT ADD ON: CPT

## 2024-08-21 PROCEDURE — 99214 OFFICE O/P EST MOD 30 MIN: CPT

## 2024-08-21 NOTE — HISTORY OF PRESENT ILLNESS
[FreeTextEntry1] : GCA  Hospital admission 3/2024 89F w/ primary immunodeficiency on Hizentra, HTN, hypothyroidism, pre-diabetes and hx breast CA s/p surgery/radiation and recent admission to [facility] 3/6-3/10/2024 for fevers, SOB and 2 weeks of L eye visual changes, presenting to [facility] end of 3/2024 for R eye visual changes x1d. At [facility], patient received extensive work up including CTA H/N, MRI/MRA Brain and Orbits which was unremarkable. Her L eye sx resolved prior to discharge. On this admission, ophtho evaluation revealed bilateral optic disc edema with hemorrhages (R>L) and was started on Solu-medrol 1g/day 3/13 for concerns for GCA.  #GCA -high suspicion -ophthalmology exam shows bilateral optic disc edema with hemorrhages (R>L) -imaging studies (CTA H/N, MRI/MRA Brain and Orbits, temporal artery US) unremarkable -CTA chest abdomen pelvis no large vessel vasculitis -ESR/CRP elevated 101/153 on 3/6 with ESR downtrending since initiating steroids -more specific and concerning features such as jaw claudication, lack of b/l temporal pulses, visual deficits are present -comorbid PMR symptoms are absent -pulse dose steroids 3/13-3/16/2024  -S/p R temporal artery bx on 3/19/2024 consistent with GCA -Quant gold indeterminant (however collected after starting steroids), hepatitis negative

## 2024-08-21 NOTE — ASSESSMENT
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 Started on Actemra 6/5/2024  Plan: -modified prednisone taper per [facility]CTA protocol -cw Actemra SQ weekly to complete min 1 year off of prednisone (cleared by her immunologist as on Hizentra for primary immunodeficiency) -Hyperlipidemia on Actemra: lipitor increased by PCP -Labs to be checked in 4 weeks  -GI ppx: PPI -Infection screening: 3/2024 HBV HCV neg Quantiferon indeterminate (checked on steroids) - should be repeated -Immunizations: COVID vaccine utd, flu vaccine utd, pneumonia vaccine 2023, shingrix - discuss next visit -Bone health: DEXA, Vit D - refer  -RTC 1 month

## 2024-08-25 LAB
M TB IFN-G BLD-IMP: NEGATIVE
QUANTIFERON TB PLUS MITOGEN MINUS NIL: 3.78 IU/ML
QUANTIFERON TB PLUS NIL: 0.03 IU/ML
QUANTIFERON TB PLUS TB1 MINUS NIL: 0 IU/ML
QUANTIFERON TB PLUS TB2 MINUS NIL: 0 IU/ML

## 2024-08-26 NOTE — DIETITIAN INITIAL EVALUATION ADULT - CALCULATED TO (G/KG)
Spoke with pt. Mother to confirm pts. upcoming appt. with derm. next week. Pt. mother was requesting pts. RX's be resent to [facility].    Medication Requested:   tacrolimus (PROTOPIC) 0.03 % external ointment   Directions:Apply topically 2 times daily To affected areas on the hands after rashes have cleared to prevent the rashes from returning. - Topical   Quantity:30 g  Last Office Visit: 05/16/2024  Next Appointment Scheduled for: 09/05/2024  Last refill: Not stated.      Medication Requested: triamcinolone (KENALOG) 0.1 % external ointment   Directions:Apply topically at bedtime To rashes on the hands until clear. Apply Vaseline or Aquaphor on top. - Topical   Quantity:60 g  Last Office Visit: 05/16/2024  Next Appointment Scheduled for: 09/05/2024  Last refill: Not stated.    OSWALDO Winston             106.8

## 2024-09-10 ENCOUNTER — APPOINTMENT (OUTPATIENT)
Dept: RHEUMATOLOGY | Facility: CLINIC | Age: 89
End: 2024-09-10
Payer: MEDICARE

## 2024-09-10 PROCEDURE — 99441: CPT | Mod: 93

## 2024-09-20 ENCOUNTER — APPOINTMENT (OUTPATIENT)
Dept: INTERNAL MEDICINE | Facility: CLINIC | Age: 89
End: 2024-09-20
Payer: MEDICARE

## 2024-09-20 VITALS — DIASTOLIC BLOOD PRESSURE: 64 MMHG | SYSTOLIC BLOOD PRESSURE: 132 MMHG

## 2024-09-20 VITALS — WEIGHT: 195 LBS | BODY MASS INDEX: 30.61 KG/M2 | HEART RATE: 93 BPM | OXYGEN SATURATION: 93 % | HEIGHT: 67 IN

## 2024-09-20 DIAGNOSIS — R73.9 HYPERGLYCEMIA, UNSPECIFIED: ICD-10-CM

## 2024-09-20 DIAGNOSIS — R79.89 OTHER SPECIFIED ABNORMAL FINDINGS OF BLOOD CHEMISTRY: ICD-10-CM

## 2024-09-20 DIAGNOSIS — E03.9 HYPOTHYROIDISM, UNSPECIFIED: ICD-10-CM

## 2024-09-20 DIAGNOSIS — M17.12 UNILATERAL PRIMARY OSTEOARTHRITIS, LEFT KNEE: ICD-10-CM

## 2024-09-20 DIAGNOSIS — I10 ESSENTIAL (PRIMARY) HYPERTENSION: ICD-10-CM

## 2024-09-20 DIAGNOSIS — N18.31 CHRONIC KIDNEY DISEASE, STAGE 3A: ICD-10-CM

## 2024-09-20 DIAGNOSIS — Z23 ENCOUNTER FOR IMMUNIZATION: ICD-10-CM

## 2024-09-20 DIAGNOSIS — Z79.52 LONG TERM (CURRENT) USE OF SYSTEMIC STEROIDS: ICD-10-CM

## 2024-09-20 DIAGNOSIS — E78.00 PURE HYPERCHOLESTEROLEMIA, UNSPECIFIED: ICD-10-CM

## 2024-09-20 DIAGNOSIS — M31.6 OTHER GIANT CELL ARTERITIS: ICD-10-CM

## 2024-09-20 DIAGNOSIS — Z79.899 OTHER LONG TERM (CURRENT) DRUG THERAPY: ICD-10-CM

## 2024-09-20 PROCEDURE — G2211 COMPLEX E/M VISIT ADD ON: CPT

## 2024-09-20 PROCEDURE — 90662 IIV NO PRSV INCREASED AG IM: CPT

## 2024-09-20 PROCEDURE — 99214 OFFICE O/P EST MOD 30 MIN: CPT

## 2024-09-20 PROCEDURE — G0008: CPT

## 2024-09-20 PROCEDURE — 36415 COLL VENOUS BLD VENIPUNCTURE: CPT

## 2024-09-20 NOTE — ASSESSMENT
[FreeTextEntry1] : 1) HTN bp stable 132/64 renewed triamterene-hctz 37.5-25mg qd check cmp  2) Hypothyroid takes synthroid 125mcg qd except 2 tabs on sunday check tsh w/fT4  3) HLD check lipid panel c/w lipitor 20mg qhs  4) GCA follow with rheum Dr. Mccoy on Actemra and prednisone taper  5) Left knee OA/pain ISTOP reviewed and confirmed by me; no evidence of abuse/diversion. Renewed apap-hydrocodone xray left knee, ortho referral Dr. Delgado  6) HCM labs drawn flu shot given right deltoid

## 2024-09-20 NOTE — HISTORY OF PRESENT ILLNESS
[Family Member] : family member [FreeTextEntry1] : med renewal, labs, flu shot [de-identified] : Pt comes in for med renewal, labs, and flu shot. Says she has chronic left knee pain, was supposed to have it replaced but ended up doing revision on right one instead. Asking for refill of apap-hydrocodone. Feels well on current meds Risks and benefits of long term opioid use is discussed with the patient, and alternative therapies are offered. There is no evidence of abuse or diversion.  is reviewed 9/20/24 and is consistent. The use of the opioids had increased the patient's functional capacity. No other complaints at this time.  No fevers/chills/CP/SOB/palpitations/abdominal pain/nausea/vomiting/diarrhea/recent illnesses/sick contacts.

## 2024-09-20 NOTE — PHYSICAL EXAM
[No Acute Distress] : no acute distress [Well Nourished] : well nourished [Well Developed] : well developed [Normal Sclera/Conjunctiva] : normal sclera/conjunctiva [PERRL] : pupils equal round and reactive to light [EOMI] : extraocular movements intact [Normal Outer Ear/Nose] : the outer ears and nose were normal in appearance [Normal Oropharynx] : the oropharynx was normal [No JVD] : no jugular venous distention [No Lymphadenopathy] : no lymphadenopathy [Supple] : supple [Thyroid Normal, No Nodules] : the thyroid was normal and there were no nodules present [No Respiratory Distress] : no respiratory distress  [No Accessory Muscle Use] : no accessory muscle use [Clear to Auscultation] : lungs were clear to auscultation bilaterally [Normal Rate] : normal rate  [Regular Rhythm] : with a regular rhythm [Normal S1, S2] : normal S1 and S2 [No Murmur] : no murmur heard [No Carotid Bruits] : no carotid bruits [No Abdominal Bruit] : a ~M bruit was not heard ~T in the abdomen [No Varicosities] : no varicosities [Pedal Pulses Present] : the pedal pulses are present [No Edema] : there was no peripheral edema [No Palpable Aorta] : no palpable aorta [No Extremity Clubbing/Cyanosis] : no extremity clubbing/cyanosis [Soft] : abdomen soft [Non Tender] : non-tender [Non-distended] : non-distended [No Masses] : no abdominal mass palpated [No HSM] : no HSM [Normal Bowel Sounds] : normal bowel sounds [Normal Posterior Cervical Nodes] : no posterior cervical lymphadenopathy [Normal Anterior Cervical Nodes] : no anterior cervical lymphadenopathy [No CVA Tenderness] : no CVA  tenderness [No Spinal Tenderness] : no spinal tenderness [No Joint Swelling] : no joint swelling [Grossly Normal Strength/Tone] : grossly normal strength/tone [No Rash] : no rash [Coordination Grossly Intact] : coordination grossly intact [Normal Gait] : normal gait [Deep Tendon Reflexes (DTR)] : deep tendon reflexes were 2+ and symmetric [Normal Affect] : the affect was normal [Normal Insight/Judgement] : insight and judgment were intact [de-identified] : chronically ill-appearing [de-identified] : left knee crepitus  [de-identified] : right eye superior visual field deficit

## 2024-09-21 LAB
25(OH)D3 SERPL-MCNC: 49.3 NG/ML
ALBUMIN SERPL ELPH-MCNC: 4.4 G/DL
ALP BLD-CCNC: 62 U/L
ALT SERPL-CCNC: 20 U/L
ANION GAP SERPL CALC-SCNC: 14 MMOL/L
AST SERPL-CCNC: 27 U/L
BASOPHILS # BLD AUTO: 0.03 K/UL
BASOPHILS NFR BLD AUTO: 0.6 %
BILIRUB SERPL-MCNC: 0.3 MG/DL
BUN SERPL-MCNC: 31 MG/DL
CALCIUM SERPL-MCNC: 9.9 MG/DL
CHLORIDE SERPL-SCNC: 102 MMOL/L
CHOLEST SERPL-MCNC: 149 MG/DL
CO2 SERPL-SCNC: 29 MMOL/L
CREAT SERPL-MCNC: 1.18 MG/DL
EGFR: 44 ML/MIN/1.73M2
EOSINOPHIL # BLD AUTO: 0.1 K/UL
EOSINOPHIL NFR BLD AUTO: 1.9 %
ESTIMATED AVERAGE GLUCOSE: 126 MG/DL
GLUCOSE SERPL-MCNC: 125 MG/DL
HBA1C MFR BLD HPLC: 6 %
HCT VFR BLD CALC: 40.5 %
HDLC SERPL-MCNC: 58 MG/DL
HGB BLD-MCNC: 12.6 G/DL
IMM GRANULOCYTES NFR BLD AUTO: 0.4 %
LDLC SERPL CALC-MCNC: 68 MG/DL
LYMPHOCYTES # BLD AUTO: 1.45 K/UL
LYMPHOCYTES NFR BLD AUTO: 26.9 %
MAN DIFF?: NORMAL
MCHC RBC-ENTMCNC: 30.5 PG
MCHC RBC-ENTMCNC: 31.1 GM/DL
MCV RBC AUTO: 98.1 FL
MONOCYTES # BLD AUTO: 0.62 K/UL
MONOCYTES NFR BLD AUTO: 11.5 %
NEUTROPHILS # BLD AUTO: 3.17 K/UL
NEUTROPHILS NFR BLD AUTO: 58.7 %
NONHDLC SERPL-MCNC: 91 MG/DL
PLATELET # BLD AUTO: 192 K/UL
POTASSIUM SERPL-SCNC: 4.8 MMOL/L
PROT SERPL-MCNC: 6.8 G/DL
RBC # BLD: 4.13 M/UL
RBC # FLD: 13.4 %
SODIUM SERPL-SCNC: 145 MMOL/L
TRIGL SERPL-MCNC: 133 MG/DL
TSH SERPL-ACNC: 0.66 UIU/ML
WBC # FLD AUTO: 5.39 K/UL

## 2024-10-15 ENCOUNTER — APPOINTMENT (OUTPATIENT)
Dept: RHEUMATOLOGY | Facility: CLINIC | Age: 89
End: 2024-10-15
Payer: MEDICARE

## 2024-10-15 VITALS
HEART RATE: 75 BPM | HEIGHT: 67 IN | DIASTOLIC BLOOD PRESSURE: 68 MMHG | WEIGHT: 195 LBS | SYSTOLIC BLOOD PRESSURE: 135 MMHG | BODY MASS INDEX: 30.61 KG/M2 | OXYGEN SATURATION: 95 %

## 2024-10-15 VITALS
TEMPERATURE: 98.1 F | RESPIRATION RATE: 16 BRPM | OXYGEN SATURATION: 95 % | HEART RATE: 93 BPM | DIASTOLIC BLOOD PRESSURE: 79 MMHG | SYSTOLIC BLOOD PRESSURE: 171 MMHG

## 2024-10-15 DIAGNOSIS — M31.6 OTHER GIANT CELL ARTERITIS: ICD-10-CM

## 2024-10-15 DIAGNOSIS — Z79.899 OTHER LONG TERM (CURRENT) DRUG THERAPY: ICD-10-CM

## 2024-10-15 PROCEDURE — 99215 OFFICE O/P EST HI 40 MIN: CPT

## 2024-10-15 PROCEDURE — G2211 COMPLEX E/M VISIT ADD ON: CPT

## 2024-10-16 DIAGNOSIS — R76.8 OTHER SPECIFIED ABNORMAL IMMUNOLOGICAL FINDINGS IN SERUM: ICD-10-CM

## 2024-10-16 LAB
ALBUMIN SERPL ELPH-MCNC: 4.4 G/DL
ALP BLD-CCNC: 71 U/L
ALT SERPL-CCNC: 20 U/L
ANION GAP SERPL CALC-SCNC: 13 MMOL/L
AST SERPL-CCNC: 27 U/L
BASOPHILS # BLD AUTO: 0.05 K/UL
BASOPHILS NFR BLD AUTO: 0.8 %
BILIRUB SERPL-MCNC: 0.4 MG/DL
BUN SERPL-MCNC: 30 MG/DL
CALCIUM SERPL-MCNC: 10.4 MG/DL
CHLORIDE SERPL-SCNC: 104 MMOL/L
CO2 SERPL-SCNC: 31 MMOL/L
CREAT SERPL-MCNC: 1.09 MG/DL
CRP SERPL-MCNC: <3 MG/L
EGFR: 49 ML/MIN/1.73M2
EOSINOPHIL # BLD AUTO: 0.13 K/UL
EOSINOPHIL NFR BLD AUTO: 2.1 %
ERYTHROCYTE [SEDIMENTATION RATE] IN BLOOD BY WESTERGREN METHOD: < 2 MM/HR
HBV CORE IGG+IGM SER QL: REACTIVE
HBV SURFACE AG SER QL: NONREACTIVE
HCT VFR BLD CALC: 40.8 %
HCV AB SER QL: NONREACTIVE
HCV S/CO RATIO: 0.1 S/CO
HGB BLD-MCNC: 13.1 G/DL
IMM GRANULOCYTES NFR BLD AUTO: 0.2 %
LYMPHOCYTES # BLD AUTO: 1.61 K/UL
LYMPHOCYTES NFR BLD AUTO: 26.2 %
MAN DIFF?: NORMAL
MCHC RBC-ENTMCNC: 30.3 PG
MCHC RBC-ENTMCNC: 32.1 GM/DL
MCV RBC AUTO: 94.4 FL
MONOCYTES # BLD AUTO: 0.61 K/UL
MONOCYTES NFR BLD AUTO: 9.9 %
NEUTROPHILS # BLD AUTO: 3.74 K/UL
NEUTROPHILS NFR BLD AUTO: 60.8 %
PLATELET # BLD AUTO: 185 K/UL
POTASSIUM SERPL-SCNC: 4.7 MMOL/L
PROT SERPL-MCNC: 6.8 G/DL
RBC # BLD: 4.32 M/UL
RBC # FLD: 13.5 %
SODIUM SERPL-SCNC: 147 MMOL/L
WBC # FLD AUTO: 6.15 K/UL

## 2024-10-18 ENCOUNTER — APPOINTMENT (OUTPATIENT)
Dept: OPHTHALMOLOGY | Facility: CLINIC | Age: 89
End: 2024-10-18
Payer: MEDICARE

## 2024-10-18 ENCOUNTER — NON-APPOINTMENT (OUTPATIENT)
Age: 89
End: 2024-10-18

## 2024-10-18 PROCEDURE — 99214 OFFICE O/P EST MOD 30 MIN: CPT

## 2024-10-18 PROCEDURE — 92133 CPTRZD OPH DX IMG PST SGM ON: CPT

## 2024-10-18 PROCEDURE — 92083 EXTENDED VISUAL FIELD XM: CPT

## 2024-12-03 ENCOUNTER — APPOINTMENT (OUTPATIENT)
Dept: RHEUMATOLOGY | Facility: CLINIC | Age: 89
End: 2024-12-03
Payer: MEDICARE

## 2024-12-03 VITALS
HEIGHT: 67 IN | DIASTOLIC BLOOD PRESSURE: 68 MMHG | WEIGHT: 195 LBS | OXYGEN SATURATION: 93 % | HEART RATE: 68 BPM | BODY MASS INDEX: 30.61 KG/M2 | SYSTOLIC BLOOD PRESSURE: 155 MMHG

## 2024-12-03 DIAGNOSIS — M31.6 OTHER GIANT CELL ARTERITIS: ICD-10-CM

## 2024-12-03 DIAGNOSIS — Z79.899 OTHER LONG TERM (CURRENT) DRUG THERAPY: ICD-10-CM

## 2024-12-03 DIAGNOSIS — R76.8 OTHER SPECIFIED ABNORMAL IMMUNOLOGICAL FINDINGS IN SERUM: ICD-10-CM

## 2024-12-03 LAB
ALBUMIN SERPL ELPH-MCNC: 4.6 G/DL
ALP BLD-CCNC: 89 U/L
ALT SERPL-CCNC: 19 U/L
ANION GAP SERPL CALC-SCNC: 10 MMOL/L
AST SERPL-CCNC: 29 U/L
BASOPHILS # BLD AUTO: 0.05 K/UL
BASOPHILS NFR BLD AUTO: 0.8 %
BILIRUB SERPL-MCNC: 0.3 MG/DL
BUN SERPL-MCNC: 28 MG/DL
CALCIUM SERPL-MCNC: 10.4 MG/DL
CHLORIDE SERPL-SCNC: 101 MMOL/L
CO2 SERPL-SCNC: 30 MMOL/L
CREAT SERPL-MCNC: 1.05 MG/DL
EGFR: 51 ML/MIN/1.73M2
EOSINOPHIL # BLD AUTO: 0.21 K/UL
EOSINOPHIL NFR BLD AUTO: 3.5 %
HCT VFR BLD CALC: 42.4 %
HGB BLD-MCNC: 13.4 G/DL
IMM GRANULOCYTES NFR BLD AUTO: 0.3 %
LYMPHOCYTES # BLD AUTO: 1.78 K/UL
LYMPHOCYTES NFR BLD AUTO: 29.4 %
MAN DIFF?: NORMAL
MCHC RBC-ENTMCNC: 30.1 PG
MCHC RBC-ENTMCNC: 31.6 G/DL
MCV RBC AUTO: 95.3 FL
MONOCYTES # BLD AUTO: 0.54 K/UL
MONOCYTES NFR BLD AUTO: 8.9 %
NEUTROPHILS # BLD AUTO: 3.46 K/UL
NEUTROPHILS NFR BLD AUTO: 57.1 %
PLATELET # BLD AUTO: 192 K/UL
POTASSIUM SERPL-SCNC: 4.2 MMOL/L
PROT SERPL-MCNC: 7.1 G/DL
RBC # BLD: 4.45 M/UL
RBC # FLD: 14.1 %
SODIUM SERPL-SCNC: 141 MMOL/L
WBC # FLD AUTO: 6.06 K/UL

## 2024-12-03 PROCEDURE — G2211 COMPLEX E/M VISIT ADD ON: CPT

## 2024-12-03 PROCEDURE — 99214 OFFICE O/P EST MOD 30 MIN: CPT | Mod: GC

## 2024-12-20 ENCOUNTER — APPOINTMENT (OUTPATIENT)
Dept: INTERNAL MEDICINE | Facility: CLINIC | Age: 89
End: 2024-12-20
Payer: MEDICARE

## 2024-12-20 VITALS — DIASTOLIC BLOOD PRESSURE: 58 MMHG | SYSTOLIC BLOOD PRESSURE: 118 MMHG

## 2024-12-20 VITALS — WEIGHT: 195 LBS | HEART RATE: 87 BPM | HEIGHT: 67 IN | OXYGEN SATURATION: 94 % | BODY MASS INDEX: 30.61 KG/M2

## 2024-12-20 DIAGNOSIS — R79.89 OTHER SPECIFIED ABNORMAL FINDINGS OF BLOOD CHEMISTRY: ICD-10-CM

## 2024-12-20 DIAGNOSIS — I10 ESSENTIAL (PRIMARY) HYPERTENSION: ICD-10-CM

## 2024-12-20 DIAGNOSIS — N18.31 CHRONIC KIDNEY DISEASE, STAGE 3A: ICD-10-CM

## 2024-12-20 DIAGNOSIS — E78.00 PURE HYPERCHOLESTEROLEMIA, UNSPECIFIED: ICD-10-CM

## 2024-12-20 DIAGNOSIS — M17.12 UNILATERAL PRIMARY OSTEOARTHRITIS, LEFT KNEE: ICD-10-CM

## 2024-12-20 DIAGNOSIS — Z79.899 OTHER LONG TERM (CURRENT) DRUG THERAPY: ICD-10-CM

## 2024-12-20 DIAGNOSIS — E03.9 HYPOTHYROIDISM, UNSPECIFIED: ICD-10-CM

## 2024-12-20 DIAGNOSIS — E66.811 OBESITY, CLASS 1: ICD-10-CM

## 2024-12-20 DIAGNOSIS — M31.6 OTHER GIANT CELL ARTERITIS: ICD-10-CM

## 2024-12-20 DIAGNOSIS — Z79.52 LONG TERM (CURRENT) USE OF SYSTEMIC STEROIDS: ICD-10-CM

## 2024-12-20 PROCEDURE — 99214 OFFICE O/P EST MOD 30 MIN: CPT

## 2024-12-20 PROCEDURE — 36415 COLL VENOUS BLD VENIPUNCTURE: CPT

## 2024-12-20 PROCEDURE — G2211 COMPLEX E/M VISIT ADD ON: CPT

## 2024-12-23 LAB
25(OH)D3 SERPL-MCNC: 51.5 NG/ML
ALBUMIN SERPL ELPH-MCNC: 4.2 G/DL
ALP BLD-CCNC: 59 U/L
ALT SERPL-CCNC: 26 U/L
ANION GAP SERPL CALC-SCNC: 12 MMOL/L
AST SERPL-CCNC: 33 U/L
BASOPHILS # BLD AUTO: 0.04 K/UL
BASOPHILS NFR BLD AUTO: 1 %
BILIRUB SERPL-MCNC: 0.4 MG/DL
BUN SERPL-MCNC: 31 MG/DL
CALCIUM SERPL-MCNC: 10.2 MG/DL
CHLORIDE SERPL-SCNC: 102 MMOL/L
CHOLEST SERPL-MCNC: 145 MG/DL
CO2 SERPL-SCNC: 30 MMOL/L
CREAT SERPL-MCNC: 1.15 MG/DL
EGFR: 46 ML/MIN/1.73M2
EOSINOPHIL # BLD AUTO: 0.15 K/UL
EOSINOPHIL NFR BLD AUTO: 3.7 %
GLUCOSE SERPL-MCNC: 107 MG/DL
HCT VFR BLD CALC: 40.5 %
HDLC SERPL-MCNC: 57 MG/DL
HGB BLD-MCNC: 12.9 G/DL
IMM GRANULOCYTES NFR BLD AUTO: 0 %
LDLC SERPL CALC-MCNC: 68 MG/DL
LYMPHOCYTES # BLD AUTO: 1.44 K/UL
LYMPHOCYTES NFR BLD AUTO: 35.8 %
MAN DIFF?: NORMAL
MCHC RBC-ENTMCNC: 29.9 PG
MCHC RBC-ENTMCNC: 31.9 G/DL
MCV RBC AUTO: 93.8 FL
MONOCYTES # BLD AUTO: 0.38 K/UL
MONOCYTES NFR BLD AUTO: 9.5 %
NEUTROPHILS # BLD AUTO: 2.01 K/UL
NEUTROPHILS NFR BLD AUTO: 50 %
NONHDLC SERPL-MCNC: 88 MG/DL
PLATELET # BLD AUTO: 180 K/UL
POTASSIUM SERPL-SCNC: 4.1 MMOL/L
PROT SERPL-MCNC: 6.8 G/DL
RBC # BLD: 4.32 M/UL
RBC # FLD: 14.3 %
SODIUM SERPL-SCNC: 144 MMOL/L
TRIGL SERPL-MCNC: 110 MG/DL
TSH SERPL-ACNC: 2.94 UIU/ML
WBC # FLD AUTO: 4.02 K/UL

## 2025-01-16 NOTE — PATIENT PROFILE ADULT - FUNCTIONAL ASSESSMENT - DAILY ACTIVITY ASSESSMENT TYPE
Patient's (Body mass index is 30.91 kg/m².) indicates that they are obese (BMI >30) with health conditions that include none . Weight is unchanged. BMI  is above average; BMI management plan is completed. We discussed portion control and increasing exercise. Pt has not started phentermine yet.    Admission

## 2025-01-28 RX ORDER — TOCILIZUMAB-AAZG 162 MG/.9ML
162 INJECTION, SOLUTION SUBCUTANEOUS
Qty: 4 | Refills: 2 | Status: ACTIVE | COMMUNITY
Start: 2025-01-23

## 2025-02-04 ENCOUNTER — APPOINTMENT (OUTPATIENT)
Dept: RHEUMATOLOGY | Facility: CLINIC | Age: 89
End: 2025-02-04
Payer: MEDICARE

## 2025-02-04 VITALS
HEART RATE: 92 BPM | HEIGHT: 67 IN | DIASTOLIC BLOOD PRESSURE: 66 MMHG | SYSTOLIC BLOOD PRESSURE: 135 MMHG | OXYGEN SATURATION: 97 %

## 2025-02-04 DIAGNOSIS — M31.6 OTHER GIANT CELL ARTERITIS: ICD-10-CM

## 2025-02-04 DIAGNOSIS — Z79.899 OTHER LONG TERM (CURRENT) DRUG THERAPY: ICD-10-CM

## 2025-02-04 PROCEDURE — G2211 COMPLEX E/M VISIT ADD ON: CPT

## 2025-02-04 PROCEDURE — 99214 OFFICE O/P EST MOD 30 MIN: CPT | Mod: GC

## 2025-02-04 RX ADMIN — TOCILIZUMAB 520 MG/20ML: 20 INJECTION, SOLUTION, CONCENTRATE INTRAVENOUS at 00:00

## 2025-02-05 LAB
ALBUMIN SERPL ELPH-MCNC: 4.4 G/DL
ALP BLD-CCNC: 70 U/L
ALT SERPL-CCNC: 15 U/L
ANION GAP SERPL CALC-SCNC: 12 MMOL/L
AST SERPL-CCNC: 25 U/L
BASOPHILS # BLD AUTO: 0.05 K/UL
BASOPHILS NFR BLD AUTO: 0.7 %
BILIRUB SERPL-MCNC: 0.4 MG/DL
BUN SERPL-MCNC: 26 MG/DL
CALCIUM SERPL-MCNC: 9.9 MG/DL
CHLORIDE SERPL-SCNC: 101 MMOL/L
CO2 SERPL-SCNC: 29 MMOL/L
CREAT SERPL-MCNC: 1.05 MG/DL
CRP SERPL-MCNC: <3 MG/L
EGFR: 51 ML/MIN/1.73M2
EOSINOPHIL # BLD AUTO: 0.21 K/UL
EOSINOPHIL NFR BLD AUTO: 3.1 %
ERYTHROCYTE [SEDIMENTATION RATE] IN BLOOD BY WESTERGREN METHOD: 3 MM/HR
HCT VFR BLD CALC: 43.5 %
HGB BLD-MCNC: 13.5 G/DL
IMM GRANULOCYTES NFR BLD AUTO: 0.1 %
LYMPHOCYTES # BLD AUTO: 1.72 K/UL
LYMPHOCYTES NFR BLD AUTO: 25.6 %
MAN DIFF?: NORMAL
MCHC RBC-ENTMCNC: 29.8 PG
MCHC RBC-ENTMCNC: 31 G/DL
MCV RBC AUTO: 96 FL
MONOCYTES # BLD AUTO: 0.59 K/UL
MONOCYTES NFR BLD AUTO: 8.8 %
NEUTROPHILS # BLD AUTO: 4.13 K/UL
NEUTROPHILS NFR BLD AUTO: 61.7 %
PLATELET # BLD AUTO: 197 K/UL
POTASSIUM SERPL-SCNC: 4.5 MMOL/L
PROT SERPL-MCNC: 6.9 G/DL
RBC # BLD: 4.53 M/UL
RBC # FLD: 13.9 %
SODIUM SERPL-SCNC: 141 MMOL/L
WBC # FLD AUTO: 6.71 K/UL

## 2025-02-13 RX ORDER — TOCILIZUMAB 20 MG/ML
400 INJECTION, SOLUTION, CONCENTRATE INTRAVENOUS
Refills: 0 | Status: ACTIVE | OUTPATIENT
Start: 2025-02-04

## 2025-02-14 ENCOUNTER — APPOINTMENT (OUTPATIENT)
Dept: OPHTHALMOLOGY | Facility: CLINIC | Age: 89
End: 2025-02-14
Payer: MEDICARE

## 2025-02-14 ENCOUNTER — NON-APPOINTMENT (OUTPATIENT)
Age: 89
End: 2025-02-14

## 2025-02-14 PROCEDURE — 92083 EXTENDED VISUAL FIELD XM: CPT

## 2025-02-14 PROCEDURE — 92133 CPTRZD OPH DX IMG PST SGM ON: CPT

## 2025-02-14 PROCEDURE — 99214 OFFICE O/P EST MOD 30 MIN: CPT

## 2025-02-20 ENCOUNTER — APPOINTMENT (OUTPATIENT)
Dept: RHEUMATOLOGY | Facility: CLINIC | Age: 89
End: 2025-02-20
Payer: MEDICARE

## 2025-02-20 VITALS
HEART RATE: 73 BPM | SYSTOLIC BLOOD PRESSURE: 133 MMHG | RESPIRATION RATE: 16 BRPM | DIASTOLIC BLOOD PRESSURE: 65 MMHG | OXYGEN SATURATION: 93 %

## 2025-02-20 VITALS
RESPIRATION RATE: 16 BRPM | TEMPERATURE: 98.4 F | DIASTOLIC BLOOD PRESSURE: 71 MMHG | WEIGHT: 200 LBS | SYSTOLIC BLOOD PRESSURE: 145 MMHG | HEART RATE: 84 BPM | OXYGEN SATURATION: 93 % | BODY MASS INDEX: 31.32 KG/M2

## 2025-02-20 PROCEDURE — 96413 CHEMO IV INFUSION 1 HR: CPT

## 2025-02-20 RX ORDER — TOCILIZUMAB 20 MG/ML
400 INJECTION, SOLUTION, CONCENTRATE INTRAVENOUS
Qty: 1 | Refills: 0 | Status: COMPLETED
Start: 2025-02-04

## 2025-03-03 ENCOUNTER — NON-APPOINTMENT (OUTPATIENT)
Age: 89
End: 2025-03-03

## 2025-03-03 ENCOUNTER — APPOINTMENT (OUTPATIENT)
Dept: RHEUMATOLOGY | Facility: CLINIC | Age: 89
End: 2025-03-03
Payer: MEDICARE

## 2025-03-03 VITALS
RESPIRATION RATE: 15 BRPM | OXYGEN SATURATION: 94 % | BODY MASS INDEX: 31.39 KG/M2 | SYSTOLIC BLOOD PRESSURE: 147 MMHG | HEIGHT: 67 IN | HEART RATE: 89 BPM | WEIGHT: 200 LBS | DIASTOLIC BLOOD PRESSURE: 76 MMHG

## 2025-03-03 DIAGNOSIS — M31.6 OTHER GIANT CELL ARTERITIS: ICD-10-CM

## 2025-03-03 DIAGNOSIS — R76.8 OTHER SPECIFIED ABNORMAL IMMUNOLOGICAL FINDINGS IN SERUM: ICD-10-CM

## 2025-03-03 DIAGNOSIS — Z79.899 OTHER LONG TERM (CURRENT) DRUG THERAPY: ICD-10-CM

## 2025-03-03 PROCEDURE — G2211 COMPLEX E/M VISIT ADD ON: CPT

## 2025-03-03 PROCEDURE — 99214 OFFICE O/P EST MOD 30 MIN: CPT

## 2025-03-04 LAB
ALBUMIN SERPL ELPH-MCNC: 4.6 G/DL
ALP BLD-CCNC: 73 U/L
ALT SERPL-CCNC: 21 U/L
ANION GAP SERPL CALC-SCNC: 14 MMOL/L
AST SERPL-CCNC: 26 U/L
BASOPHILS # BLD AUTO: 0.04 K/UL
BASOPHILS NFR BLD AUTO: 0.8 %
BILIRUB SERPL-MCNC: 0.3 MG/DL
BUN SERPL-MCNC: 31 MG/DL
CALCIUM SERPL-MCNC: 10.3 MG/DL
CHLORIDE SERPL-SCNC: 101 MMOL/L
CO2 SERPL-SCNC: 29 MMOL/L
CREAT SERPL-MCNC: 1 MG/DL
EGFR: 54 ML/MIN/1.73M2
EOSINOPHIL # BLD AUTO: 0.18 K/UL
EOSINOPHIL NFR BLD AUTO: 3.4 %
HCT VFR BLD CALC: 43.8 %
HEPB DNA PCR INT: NOT DETECTED
HEPB DNA PCR LOG: NOT DETECTED LOGIU/ML
HGB BLD-MCNC: 14 G/DL
IMM GRANULOCYTES NFR BLD AUTO: 0 %
LYMPHOCYTES # BLD AUTO: 1.66 K/UL
LYMPHOCYTES NFR BLD AUTO: 31.7 %
MAN DIFF?: NORMAL
MCHC RBC-ENTMCNC: 31 PG
MCHC RBC-ENTMCNC: 32 G/DL
MCV RBC AUTO: 96.9 FL
MONOCYTES # BLD AUTO: 0.53 K/UL
MONOCYTES NFR BLD AUTO: 10.1 %
NEUTROPHILS # BLD AUTO: 2.83 K/UL
NEUTROPHILS NFR BLD AUTO: 54 %
PLATELET # BLD AUTO: 188 K/UL
POTASSIUM SERPL-SCNC: 4.2 MMOL/L
PROT SERPL-MCNC: 7 G/DL
RBC # BLD: 4.52 M/UL
RBC # FLD: 14.2 %
SODIUM SERPL-SCNC: 144 MMOL/L
WBC # FLD AUTO: 5.24 K/UL

## 2025-03-20 ENCOUNTER — APPOINTMENT (OUTPATIENT)
Dept: RHEUMATOLOGY | Facility: CLINIC | Age: 89
End: 2025-03-20
Payer: MEDICARE

## 2025-03-20 ENCOUNTER — MED ADMIN CHARGE (OUTPATIENT)
Age: 89
End: 2025-03-20

## 2025-03-20 VITALS — HEART RATE: 86 BPM | DIASTOLIC BLOOD PRESSURE: 82 MMHG | SYSTOLIC BLOOD PRESSURE: 153 MMHG | OXYGEN SATURATION: 96 %

## 2025-03-20 VITALS
OXYGEN SATURATION: 93 % | SYSTOLIC BLOOD PRESSURE: 152 MMHG | HEART RATE: 79 BPM | RESPIRATION RATE: 16 BRPM | DIASTOLIC BLOOD PRESSURE: 73 MMHG | TEMPERATURE: 98.3 F

## 2025-03-20 PROCEDURE — 96413 CHEMO IV INFUSION 1 HR: CPT

## 2025-03-20 RX ORDER — TOCILIZUMAB 20 MG/ML
400 INJECTION, SOLUTION, CONCENTRATE INTRAVENOUS
Qty: 1 | Refills: 0 | Status: COMPLETED
Start: 2025-02-04

## 2025-03-24 ENCOUNTER — LABORATORY RESULT (OUTPATIENT)
Age: 89
End: 2025-03-24

## 2025-03-24 ENCOUNTER — APPOINTMENT (OUTPATIENT)
Dept: INTERNAL MEDICINE | Facility: CLINIC | Age: 89
End: 2025-03-24
Payer: MEDICARE

## 2025-03-24 ENCOUNTER — INPATIENT (INPATIENT)
Facility: HOSPITAL | Age: 89
LOS: 2 days | Discharge: HOME | DRG: 204 | End: 2025-03-27
Attending: INTERNAL MEDICINE | Admitting: INTERNAL MEDICINE
Payer: MEDICARE

## 2025-03-24 VITALS
RESPIRATION RATE: 15 BRPM | DIASTOLIC BLOOD PRESSURE: 69 MMHG | HEART RATE: 88 BPM | HEIGHT: 66 IN | WEIGHT: 199.96 LBS | OXYGEN SATURATION: 92 % | SYSTOLIC BLOOD PRESSURE: 163 MMHG | TEMPERATURE: 98 F

## 2025-03-24 VITALS — DIASTOLIC BLOOD PRESSURE: 60 MMHG | SYSTOLIC BLOOD PRESSURE: 128 MMHG | TEMPERATURE: 98.1 F

## 2025-03-24 VITALS — OXYGEN SATURATION: 93 % | BODY MASS INDEX: 31.39 KG/M2 | WEIGHT: 200 LBS | HEART RATE: 87 BPM | HEIGHT: 67 IN

## 2025-03-24 DIAGNOSIS — E78.00 PURE HYPERCHOLESTEROLEMIA, UNSPECIFIED: ICD-10-CM

## 2025-03-24 DIAGNOSIS — E03.9 HYPOTHYROIDISM, UNSPECIFIED: ICD-10-CM

## 2025-03-24 DIAGNOSIS — Z90.12 ACQUIRED ABSENCE OF LEFT BREAST AND NIPPLE: Chronic | ICD-10-CM

## 2025-03-24 DIAGNOSIS — I10 ESSENTIAL (PRIMARY) HYPERTENSION: ICD-10-CM

## 2025-03-24 DIAGNOSIS — R06.02 SHORTNESS OF BREATH: ICD-10-CM

## 2025-03-24 DIAGNOSIS — Z98.890 OTHER SPECIFIED POSTPROCEDURAL STATES: Chronic | ICD-10-CM

## 2025-03-24 DIAGNOSIS — Z90.49 ACQUIRED ABSENCE OF OTHER SPECIFIED PARTS OF DIGESTIVE TRACT: Chronic | ICD-10-CM

## 2025-03-24 DIAGNOSIS — R05.9 COUGH, UNSPECIFIED: ICD-10-CM

## 2025-03-24 DIAGNOSIS — R73.9 HYPERGLYCEMIA, UNSPECIFIED: ICD-10-CM

## 2025-03-24 DIAGNOSIS — Z92.241 PERSONAL HISTORY OF SYSTEMIC STEROID THERAPY: Chronic | ICD-10-CM

## 2025-03-24 LAB
ALBUMIN SERPL ELPH-MCNC: 3.8 G/DL — SIGNIFICANT CHANGE UP (ref 3.3–5)
ALP SERPL-CCNC: 85 U/L — SIGNIFICANT CHANGE UP (ref 30–120)
ALT FLD-CCNC: 27 U/L — SIGNIFICANT CHANGE UP (ref 10–60)
ANION GAP SERPL CALC-SCNC: 4 MMOL/L — LOW (ref 5–17)
APPEARANCE UR: CLEAR — SIGNIFICANT CHANGE UP
APTT BLD: 28.2 SEC — SIGNIFICANT CHANGE UP (ref 24.5–35.6)
AST SERPL-CCNC: 30 U/L — SIGNIFICANT CHANGE UP (ref 10–40)
BACTERIA # UR AUTO: ABNORMAL /HPF
BASOPHILS # BLD AUTO: 0.03 K/UL — SIGNIFICANT CHANGE UP (ref 0–0.2)
BASOPHILS NFR BLD AUTO: 0.4 % — SIGNIFICANT CHANGE UP (ref 0–2)
BILIRUB SERPL-MCNC: 0.4 MG/DL — SIGNIFICANT CHANGE UP (ref 0.2–1.2)
BILIRUB UR-MCNC: NEGATIVE — SIGNIFICANT CHANGE UP
BUN SERPL-MCNC: 25 MG/DL — HIGH (ref 7–23)
CALCIUM SERPL-MCNC: 9.5 MG/DL — SIGNIFICANT CHANGE UP (ref 8.4–10.5)
CHLORIDE SERPL-SCNC: 101 MMOL/L — SIGNIFICANT CHANGE UP (ref 96–108)
CO2 SERPL-SCNC: 34 MMOL/L — HIGH (ref 22–31)
COLOR SPEC: YELLOW — SIGNIFICANT CHANGE UP
CREAT SERPL-MCNC: 1.03 MG/DL — SIGNIFICANT CHANGE UP (ref 0.5–1.3)
DIFF PNL FLD: NEGATIVE — SIGNIFICANT CHANGE UP
EGFR: 52 ML/MIN/1.73M2 — LOW
EGFR: 52 ML/MIN/1.73M2 — LOW
EOSINOPHIL # BLD AUTO: 0.19 K/UL — SIGNIFICANT CHANGE UP (ref 0–0.5)
EOSINOPHIL NFR BLD AUTO: 2.7 % — SIGNIFICANT CHANGE UP (ref 0–6)
EPI CELLS # UR: PRESENT
FLUAV AG NPH QL: SIGNIFICANT CHANGE UP
FLUBV AG NPH QL: SIGNIFICANT CHANGE UP
GLUCOSE SERPL-MCNC: 108 MG/DL — HIGH (ref 70–99)
GLUCOSE UR QL: NEGATIVE MG/DL — SIGNIFICANT CHANGE UP
HCT VFR BLD CALC: 42.7 % — SIGNIFICANT CHANGE UP (ref 34.5–45)
HGB BLD-MCNC: 13.7 G/DL — SIGNIFICANT CHANGE UP (ref 11.5–15.5)
IMM GRANULOCYTES NFR BLD AUTO: 0.4 % — SIGNIFICANT CHANGE UP (ref 0–0.9)
INR BLD: 0.94 RATIO — SIGNIFICANT CHANGE UP (ref 0.85–1.16)
KETONES UR-MCNC: NEGATIVE MG/DL — SIGNIFICANT CHANGE UP
LACTATE SERPL-SCNC: 1.9 MMOL/L — SIGNIFICANT CHANGE UP (ref 0.7–2)
LEUKOCYTE ESTERASE UR-ACNC: ABNORMAL
LYMPHOCYTES # BLD AUTO: 1.42 K/UL — SIGNIFICANT CHANGE UP (ref 1–3.3)
LYMPHOCYTES # BLD AUTO: 20.1 % — SIGNIFICANT CHANGE UP (ref 13–44)
MCHC RBC-ENTMCNC: 30.5 PG — SIGNIFICANT CHANGE UP (ref 27–34)
MCHC RBC-ENTMCNC: 32.1 G/DL — SIGNIFICANT CHANGE UP (ref 32–36)
MCV RBC AUTO: 95.1 FL — SIGNIFICANT CHANGE UP (ref 80–100)
MONOCYTES # BLD AUTO: 0.45 K/UL — SIGNIFICANT CHANGE UP (ref 0–0.9)
MONOCYTES NFR BLD AUTO: 6.4 % — SIGNIFICANT CHANGE UP (ref 2–14)
NEUTROPHILS # BLD AUTO: 4.95 K/UL — SIGNIFICANT CHANGE UP (ref 1.8–7.4)
NEUTROPHILS NFR BLD AUTO: 70 % — SIGNIFICANT CHANGE UP (ref 43–77)
NITRITE UR-MCNC: NEGATIVE — SIGNIFICANT CHANGE UP
NRBC BLD AUTO-RTO: 0 /100 WBCS — SIGNIFICANT CHANGE UP (ref 0–0)
NT-PROBNP SERPL-SCNC: 69 PG/ML — SIGNIFICANT CHANGE UP (ref 0–450)
PH UR: 7 — SIGNIFICANT CHANGE UP (ref 5–8)
PLATELET # BLD AUTO: 206 K/UL — SIGNIFICANT CHANGE UP (ref 150–400)
POTASSIUM SERPL-MCNC: 4.1 MMOL/L — SIGNIFICANT CHANGE UP (ref 3.5–5.3)
POTASSIUM SERPL-SCNC: 4.1 MMOL/L — SIGNIFICANT CHANGE UP (ref 3.5–5.3)
PROCALCITONIN SERPL-MCNC: 0.04 NG/ML — SIGNIFICANT CHANGE UP (ref 0.02–0.1)
PROT SERPL-MCNC: 7.3 G/DL — SIGNIFICANT CHANGE UP (ref 6–8.3)
PROT UR-MCNC: NEGATIVE MG/DL — SIGNIFICANT CHANGE UP
PROTHROM AB SERPL-ACNC: 10.9 SEC — SIGNIFICANT CHANGE UP (ref 9.9–13.4)
RAPID RVP RESULT: DETECTED
RBC # BLD: 4.49 M/UL — SIGNIFICANT CHANGE UP (ref 3.8–5.2)
RBC # FLD: 14.3 % — SIGNIFICANT CHANGE UP (ref 10.3–14.5)
RBC CASTS # UR COMP ASSIST: 0 /HPF — SIGNIFICANT CHANGE UP (ref 0–4)
RSV RNA NPH QL NAA+NON-PROBE: SIGNIFICANT CHANGE UP
RV+EV RNA SPEC QL NAA+PROBE: DETECTED
SARS-COV-2 RNA SPEC QL NAA+PROBE: SIGNIFICANT CHANGE UP
SARS-COV-2 RNA SPEC QL NAA+PROBE: SIGNIFICANT CHANGE UP
SODIUM SERPL-SCNC: 139 MMOL/L — SIGNIFICANT CHANGE UP (ref 135–145)
SOURCE RESPIRATORY: SIGNIFICANT CHANGE UP
SP GR SPEC: 1.01 — SIGNIFICANT CHANGE UP (ref 1–1.03)
TROPONIN I, HIGH SENSITIVITY RESULT: 29.2 NG/L — SIGNIFICANT CHANGE UP
UROBILINOGEN FLD QL: 0.2 MG/DL — SIGNIFICANT CHANGE UP (ref 0.2–1)
WBC # BLD: 7.07 K/UL — SIGNIFICANT CHANGE UP (ref 3.8–10.5)
WBC # FLD AUTO: 7.07 K/UL — SIGNIFICANT CHANGE UP (ref 3.8–10.5)
WBC UR QL: 1 /HPF — SIGNIFICANT CHANGE UP (ref 0–5)

## 2025-03-24 PROCEDURE — 99214 OFFICE O/P EST MOD 30 MIN: CPT

## 2025-03-24 PROCEDURE — G2211 COMPLEX E/M VISIT ADD ON: CPT

## 2025-03-24 PROCEDURE — 99285 EMERGENCY DEPT VISIT HI MDM: CPT

## 2025-03-24 PROCEDURE — 99222 1ST HOSP IP/OBS MODERATE 55: CPT | Mod: AI

## 2025-03-24 PROCEDURE — 36415 COLL VENOUS BLD VENIPUNCTURE: CPT | Mod: PD

## 2025-03-24 PROCEDURE — 93010 ELECTROCARDIOGRAM REPORT: CPT

## 2025-03-24 PROCEDURE — 71045 X-RAY EXAM CHEST 1 VIEW: CPT | Mod: 26

## 2025-03-24 PROCEDURE — 71250 CT THORAX DX C-: CPT | Mod: 26

## 2025-03-24 RX ORDER — LEVOTHYROXINE SODIUM 300 MCG
125 TABLET ORAL DAILY
Refills: 0 | Status: DISCONTINUED | OUTPATIENT
Start: 2025-03-24 | End: 2025-03-27

## 2025-03-24 RX ORDER — AZITHROMYCIN 250 MG
500 CAPSULE ORAL ONCE
Refills: 0 | Status: COMPLETED | OUTPATIENT
Start: 2025-03-24 | End: 2025-03-24

## 2025-03-24 RX ORDER — CEFTRIAXONE 500 MG/1
1000 INJECTION, POWDER, FOR SOLUTION INTRAMUSCULAR; INTRAVENOUS EVERY 24 HOURS
Refills: 0 | Status: DISCONTINUED | OUTPATIENT
Start: 2025-03-25 | End: 2025-03-27

## 2025-03-24 RX ORDER — MELATONIN 5 MG
3 TABLET ORAL AT BEDTIME
Refills: 0 | Status: DISCONTINUED | OUTPATIENT
Start: 2025-03-24 | End: 2025-03-27

## 2025-03-24 RX ORDER — TRIAMTERENE/HYDROCHLOROTHIAZID 50 MG-25MG
1 CAPSULE ORAL DAILY
Refills: 0 | Status: DISCONTINUED | OUTPATIENT
Start: 2025-03-24 | End: 2025-03-27

## 2025-03-24 RX ORDER — CEFTRIAXONE 500 MG/1
1000 INJECTION, POWDER, FOR SOLUTION INTRAMUSCULAR; INTRAVENOUS EVERY 24 HOURS
Refills: 0 | Status: DISCONTINUED | OUTPATIENT
Start: 2025-03-24 | End: 2025-03-24

## 2025-03-24 RX ORDER — ACETAMINOPHEN 500 MG/5ML
650 LIQUID (ML) ORAL EVERY 6 HOURS
Refills: 0 | Status: DISCONTINUED | OUTPATIENT
Start: 2025-03-24 | End: 2025-03-27

## 2025-03-24 RX ORDER — ONDANSETRON HCL/PF 4 MG/2 ML
4 VIAL (ML) INJECTION EVERY 8 HOURS
Refills: 0 | Status: DISCONTINUED | OUTPATIENT
Start: 2025-03-24 | End: 2025-03-27

## 2025-03-24 RX ORDER — POLYETHYLENE GLYCOL 3350 17 G/17G
17 POWDER, FOR SOLUTION ORAL AT BEDTIME
Refills: 0 | Status: DISCONTINUED | OUTPATIENT
Start: 2025-03-24 | End: 2025-03-27

## 2025-03-24 RX ORDER — AZITHROMYCIN 250 MG
CAPSULE ORAL
Refills: 0 | Status: DISCONTINUED | OUTPATIENT
Start: 2025-03-24 | End: 2025-03-27

## 2025-03-24 RX ORDER — MAGNESIUM, ALUMINUM HYDROXIDE 200-200 MG
30 TABLET,CHEWABLE ORAL EVERY 4 HOURS
Refills: 0 | Status: DISCONTINUED | OUTPATIENT
Start: 2025-03-24 | End: 2025-03-27

## 2025-03-24 RX ORDER — ALBUTEROL SULFATE 2.5 MG/3ML
2.5 VIAL, NEBULIZER (ML) INHALATION EVERY 12 HOURS
Refills: 0 | Status: DISCONTINUED | OUTPATIENT
Start: 2025-03-24 | End: 2025-03-27

## 2025-03-24 RX ORDER — ATORVASTATIN CALCIUM 80 MG/1
10 TABLET, FILM COATED ORAL AT BEDTIME
Refills: 0 | Status: DISCONTINUED | OUTPATIENT
Start: 2025-03-24 | End: 2025-03-27

## 2025-03-24 RX ORDER — AZITHROMYCIN 250 MG
500 CAPSULE ORAL EVERY 24 HOURS
Refills: 0 | Status: DISCONTINUED | OUTPATIENT
Start: 2025-03-25 | End: 2025-03-27

## 2025-03-24 RX ORDER — DEXTROMETHORPHAN HBR, GUAIFENESIN 200 MG/10ML
600 LIQUID ORAL EVERY 12 HOURS
Refills: 0 | Status: DISCONTINUED | OUTPATIENT
Start: 2025-03-24 | End: 2025-03-27

## 2025-03-24 RX ORDER — GABAPENTIN 400 MG/1
300 CAPSULE ORAL THREE TIMES A DAY
Refills: 0 | Status: DISCONTINUED | OUTPATIENT
Start: 2025-03-24 | End: 2025-03-27

## 2025-03-24 RX ORDER — CEFTRIAXONE 500 MG/1
1000 INJECTION, POWDER, FOR SOLUTION INTRAMUSCULAR; INTRAVENOUS ONCE
Refills: 0 | Status: COMPLETED | OUTPATIENT
Start: 2025-03-24 | End: 2025-03-24

## 2025-03-24 RX ADMIN — Medication 1000 MILLILITER(S): at 12:55

## 2025-03-24 RX ADMIN — Medication 1000 MILLILITER(S): at 13:55

## 2025-03-24 RX ADMIN — DEXTROMETHORPHAN HBR, GUAIFENESIN 600 MILLIGRAM(S): 200 LIQUID ORAL at 19:24

## 2025-03-24 RX ADMIN — Medication 250 MILLIGRAM(S): at 12:23

## 2025-03-24 RX ADMIN — CEFTRIAXONE 100 MILLIGRAM(S): 500 INJECTION, POWDER, FOR SOLUTION INTRAMUSCULAR; INTRAVENOUS at 11:50

## 2025-03-24 RX ADMIN — CEFTRIAXONE 1000 MILLIGRAM(S): 500 INJECTION, POWDER, FOR SOLUTION INTRAMUSCULAR; INTRAVENOUS at 12:20

## 2025-03-24 RX ADMIN — POLYETHYLENE GLYCOL 3350 17 GRAM(S): 17 POWDER, FOR SOLUTION ORAL at 23:27

## 2025-03-24 RX ADMIN — ATORVASTATIN CALCIUM 10 MILLIGRAM(S): 80 TABLET, FILM COATED ORAL at 21:43

## 2025-03-24 RX ADMIN — Medication 500 MILLIGRAM(S): at 13:23

## 2025-03-24 RX ADMIN — GABAPENTIN 300 MILLIGRAM(S): 400 CAPSULE ORAL at 21:43

## 2025-03-24 NOTE — ED PROVIDER NOTE - CONSTITUTIONAL, MLM
normal... Well appearing, awake, alert, oriented to person, place, time/situation and in no apparent distress. Dressing: dry sterile dressing

## 2025-03-24 NOTE — H&P ADULT - HISTORY OF PRESENT ILLNESS
Patient is an 89 year old female with a past medical history of hyperlipidemia, hypertension, hypothyroidism, osteoarthritis, breast cancer s/p lumpectomy treated with radiation and chemotherapy, giant cell arteritis, and primary immunodeficiency disorder on Hizentra who presents today after a referral from PMD with a persistent cough. Patient states that cough started around 2 weeks ago and has been consistent but has improved slightly over the past few days. Cough is productive with clear to cloudy sputum production. Denies chest pain, shortness of breath or difficulty breathing. Denies any nausea vomiting or dizziness.  Patient is an 89 year old female with a past medical history of hyperlipidemia, hypertension, hypothyroidism, osteoarthritis, breast cancer s/p lumpectomy treated with radiation and chemotherapy, giant cell arteritis, and primary immunodeficiency disorder on Hizentra who presents today after a referral from PMD with a persistent cough. Patient states that cough started around 2 weeks ago and has been consistent but has improved slightly over the past few days. Was taking over the counter cough medicine, no antibiotics. Cough is productive with clear to cloudy sputum production. Denies chest pain, shortness of breath or difficulty breathing. Denies any nausea vomiting or dizziness.

## 2025-03-24 NOTE — ED ADULT NURSE NOTE - OBJECTIVE STATEMENT
89 y/o female, a&o x4, coming in with a cough for 2 weeks, no fever, no N/V/D, slight SOB with a low O2, no oxygen at baseline.

## 2025-03-24 NOTE — ED PROVIDER NOTE - OBJECTIVE STATEMENT
Patient referred by PMD to rule out pneumonia versus CHF. Patient relates cough and shortness of breath the past two weeks. Patient complaining of chronic lower extremity edema. No fevers chest pain and headache nausea vomiting abdominal pain.  PMD Vicente

## 2025-03-24 NOTE — ED PROVIDER NOTE - CLINICAL SUMMARY MEDICAL DECISION MAKING FREE TEXT BOX
Patient referred by PMD to rule out pneumonia versus CHF. Patient relates cough and shortness of breath the past two weeks. Patient complaining of chronic lower extremity edema. No fevers chest pain and headache nausea vomiting abdominal pain.  PMDEDE Zhang    Discussed with Dr. Zhang (attending PMD) he relates patient had borderline O2 sat shortness of breath cough requests rule out pneumonia/infection versus CHF    Plan EKG chest x-ray labs ceftriaxone Zithromax will hold fluids until confirmed no CHF    Differential including but not limited to sepsis pneumonia bronchitis flu COVID RSV or other respiratory infection

## 2025-03-24 NOTE — ED ADULT NURSE NOTE - NSFALLHARMRISKINTERV_ED_ALL_ED

## 2025-03-24 NOTE — PATIENT PROFILE ADULT - FUNCTIONAL ASSESSMENT - BASIC MOBILITY ASSESSMENT TYPE
Received Choice form at 1500   Agency/Facility Name: [facility]  Referral sent per Choice form at  1505.  Choice obtained by IZA Andrews.     Admission

## 2025-03-24 NOTE — ED PROVIDER NOTE - PROGRESS NOTE DETAILS
Discussed with Dr. Zhang (attending PMD) he relates patient had borderline O2 sat shortness of breath cough requests rule out pneumonia/infection versus CHF D/W Dr crain (attending hospitalist) he will admit pt

## 2025-03-24 NOTE — H&P ADULT - NSHPREVIEWOFSYSTEMS_GEN_ALL_CORE
CONSTITUTIONAL: Denies fevers, chills, fatigue, dizziness, weakness, unexpected weight loss or gain  EYES: Denies vision changes  ENT: Denies ear pain, sore throat, endorses rhinorrhea  CARDIAC: Denies chest pain, heart palpitations  RESPIRATORY: Endorses productive cough for 2 weeks, no shortness of breath  GI: Denies abdominal pain, nausea, vomiting, diarrhea or constipation  : Denies painful urination, difficulty urinating or urgency  MSK: Denies muscle weakness or joint pain  NEURO: Denies any headache, confusion, numbness, tingling

## 2025-03-24 NOTE — H&P ADULT - NSHPPHYSICALEXAM_GEN_ALL_CORE
GENERAL: Laying comfortably in bed, no acute distress  EYES: EOMI, Blind in right eye, pupils equal and reactive  HEENT: Normocephalic atraumatic, moist mucous membranes  NECK: Neck supple, trachea midline, no lymphadenopathy  PULM: Breath sounds clear and equal bilaterally  CV: Regular rate and rhythm. +2 Bilateral lower extremity edema  ABDOMEN: Soft, non-distended, non-tender  MSK: Full range of motion  SKIN: No rash or ecchymosis, actinic keratosis on bilateral lower extremities  NEURO: A&Ox4, no focal neuro deficits  PSYCH: Normal affect Vital Signs Last 24 Hrs  T(C): 36.7 (24 Mar 2025 15:05), Max: 36.8 (24 Mar 2025 11:12)  T(F): 98 (24 Mar 2025 15:05), Max: 98.2 (24 Mar 2025 11:12)  HR: 64 (24 Mar 2025 15:05) (64 - 88)  BP: 144/58 (24 Mar 2025 15:05) (134/59 - 163/69)  BP(mean): --  RR: 22 (24 Mar 2025 15:05) (15 - 22)  SpO2: 95% (24 Mar 2025 15:05) (92% - 100%)    Parameters below as of 24 Mar 2025 15:05  Patient On (Oxygen Delivery Method): nasal cannula  O2 Flow (L/min): 2    GENERAL: Laying comfortably in bed, no acute distress  EYES: EOMI, Blind in right eye, pupils equal and reactive  HEENT: Normocephalic atraumatic, moist mucous membranes  NECK: Neck supple, trachea midline, no lymphadenopathy  PULM: Breath sounds clear and equal bilaterally no rales noted  CV: Regular rate and rhythm. + sys murmur  ABDOMEN: Soft, non-distended, non-tender  MSK: Full range of motion  SKIN:  actinic keratosis on bilateral lower extremities,  +2 Bilateral lower extremity edema  NEURO: A&Ox4, no focal neuro deficits CN intact  PSYCH: Normal affect

## 2025-03-24 NOTE — ED ADULT NURSE REASSESSMENT NOTE - COMFORT CARE
ambulated with own walker. skin care performed as pt had some urine get on her depends./assisted to bathroom

## 2025-03-24 NOTE — ED ADULT NURSE NOTE - NSICDXFAMILYHX_GEN_ALL_CORE_FT
FAMILY HISTORY:  Family history of hypertension in mother,   Family history of hypertension in son, son- living - age 59yo  Family history of lung cancer, sister-   Family history of lymphoma, brother-  Family history of ulcerative colitis, son- living- age 56yo  FH: dementia, father-   FHx: cerebral aneurysm, mother-  @ age 66

## 2025-03-24 NOTE — H&P ADULT - ASSESSMENT
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 In ED CXR showed small left lower consolidation and small left pleural effusion with a possible left apical pneumothorax.  CT chest showed no pneumo and re demonstrated left lower consolidations. Differential diagnosis includes but is not limited to CHF vs pneumonia. Pro-BNP is normal, no elevated troponin. Patient has chronic b/l lower extremity edema and lungs are clear on physical exam.  Most likely a small left lower pneumonia, possibly viral in origin. Given immunosuppressed state will admit overnight for observation and further workup,    Problem/Plan 1: Pneumonia  - Ceftriaxone and azithromycin  - Nasal Cannula, titrate O2 to maintain saturation >90%  - Guaifenesin ER for cough  - Tylenol for post-tussive pain/fever    Problem/Plan 2: Hypothyroid  - Continue synthroid 125mcg home regimen    Problem/Plan 3: Hypertension  - Continue triamterene/HCTZ 37.5/25mg home regimen    Problem/Plan 4: Hyperlipidemia  - Continue Lipitor 10mg    Problem/Plan 5: DVT prophylaxis  - Lovenox subQ     discussed with son Wilman at bedside   Patient is an 88 year old female with a 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 In ED CXR showed small left lower consolidation and small left pleural effusion with a possible left apical pneumothorax.  CT chest showed no pneumo and re demonstrated left lower consolidations. Differential diagnosis includes but is not limited to CHF vs pneumonia. Pro-BNP is normal, no elevated troponin. Patient has chronic b/l lower extremity edema and lungs are clear on physical exam.  Most likely a small left lower pneumonia, possibly viral in origin. Given immunosuppressed state will admit overnight for observation and further workup,    Problem/Plan 1: Pneumonia -possibly viral vs scarring - note previous CT chest in 2024 having similar findings in left base. PSA score of  79, admitted to medicine for observation given immunodeficiency status  -starting Ceftriaxone and azithromycin  - check procalcitonin, resp virus panel  - sating in 90's on room air  - pulm called by ED  - Guaifenesin ER for cough  - Tylenol for post-tussive pain/fever    Problem/Plan 2: Hypothyroid  - Continue synthroid 125mcg home regimen    Problem/Plan 3: Hypertension - bp stable  - Continue triamterene/HCTZ 37.5/25mg home regimen    Problem/Plan 4: Hyperlipidemia  - Continue Lipitor 10mg    Problem/Plan 5: DVT prophylaxis  - Lovenox subQ     discussed with son Wilman at bedside

## 2025-03-24 NOTE — ED PROVIDER NOTE - DIFFERENTIAL DIAGNOSIS
Differential including but not limited to sepsis pneumonia bronchitis flu COVID RSV or other respiratory infection Differential Diagnosis

## 2025-03-24 NOTE — ED ADULT TRIAGE NOTE - INTERNATIONAL TRAVEL
Medication(s) Requested:   Name from pharmacy: Vyvanse 30MG CAPS          Will file in chart as: Vyvanse 30 MG capsule     Possible duplicate: Hover to review recent actions on this medication    Sig: TAKE 1 CAPSULE BY MOUTH EVERY EVENING AT 3PM    Disp:  30 capsule    Refills:  Not specified    Start: 11/28/2021    Earliest Fill Date: 11/28/2021    Class: Eprescribe    Non-formulary For: Attention deficit hyperactivity disorder, combined type    To pharmacy: Requesting a Rx for the next fill please    Last ordered: 3 weeks ago by ANALY Torres Last refill: 11/10/2021    Rx #: [id number]       To be filled at: [address], [place] [id number] Yamini Amato       Last office visit: 9/30/21 with advised 3-4 month follow-up, and next appt not yet made.    Last refilled: 11/10/21    Is the patient due for refill of this medication(s): Yes    PDMP review: Criteria met. Forwarded to Physician/JIMMY for signature.     Patient requests medication to be FILLED at [place].     No

## 2025-03-24 NOTE — H&P ADULT - NSHPLABSRESULTS_GEN_ALL_CORE
13.7   7.07  )-----------( 206                     42.7       139  |  101  |  25[H]  ----------------------------<  108[H]  4.1   |  34[H]  |  1.03    Ca    9.5  TPro  7.3  /  Alb  3.8  /  TBili  0.4  /  DBili  x   /  AST  30  /  ALT  27  /  AlkPhos  85      Pro-Brain Natriuretic Peptide: 69 pg/mL          Lactate, Blood: 1.9 mmol/L    Troponin I, High Sensitivity Result: 29.2    FluA/FluB/RSV/COVID PCR  Influenza A Result: NotDetec  Influenza B Result: NotDetec  Resp Syn Virus Result: NotDetec       XR CHEST PORTABLE IMMED 1V     *** ADDENDUM # 1 ***    Question of a very tiny right apical pneumothorax. If clinically   indicated, follow-up radiograph or CT can be obtained.  --- End of Report ---  *** END OF ADDENDUM # 1 ***    PROCEDURE DATE:  03/24/2025    INTERPRETATION:  Sepsis cough.    IMPRESSION:  Stable heart mediastinum. Small left pleural effusion. Underlying left   basilar retrocardiac consolidation/atelectasis. Correlate clinically for   infection. No pneumothorax. Clips left lower chest.  --- End of Report --- 13.7   7.07  )-----------( 206                     42.7       139  |  101  |  25[H]  ----------------------------<  108[H]  4.1   |  34[H]  |  1.03    Ca    9.5  TPro  7.3  /  Alb  3.8  /  TBili  0.4  /  DBili  x   /  AST  30  /  ALT  27  /  AlkPhos  85      Pro-Brain Natriuretic Peptide: 69 pg/mL          Lactate, Blood: 1.9 mmol/L    Troponin I, High Sensitivity Result: 29.2    FluA/FluB/RSV/COVID PCR  Influenza A Result: NotDetec  Influenza B Result: NotDetec  Resp Syn Virus Result: NotDetec       XR CHEST PORTABLE IMMED 1V     *** ADDENDUM # 1 ***    Question of a very tiny right apical pneumothorax. If clinically   indicated, follow-up radiograph or CT can be obtained.  --- End of Report ---  *** END OF ADDENDUM # 1 ***    PROCEDURE DATE:  03/24/2025    INTERPRETATION:  Sepsis cough.    IMPRESSION:  Stable heart mediastinum. Small left pleural effusion. Underlying left   basilar retrocardiac consolidation/atelectasis. Correlate clinically for   infection. No pneumothorax. Clips left lower chest.  --- End of Report ---    EKG: SR, incomplete rbbb

## 2025-03-24 NOTE — PATIENT PROFILE ADULT - FALL HARM RISK - HARM RISK INTERVENTIONS

## 2025-03-25 ENCOUNTER — TRANSCRIPTION ENCOUNTER (OUTPATIENT)
Age: 89
End: 2025-03-25

## 2025-03-25 LAB
ALBUMIN SERPL ELPH-MCNC: 4.3 G/DL
ALP BLD-CCNC: 80 U/L
ALT SERPL-CCNC: 15 U/L
ANION GAP SERPL CALC-SCNC: 16 MMOL/L
ANION GAP SERPL CALC-SCNC: 2 MMOL/L — LOW (ref 5–17)
AST SERPL-CCNC: 22 U/L
BILIRUB SERPL-MCNC: 0.3 MG/DL
BUN SERPL-MCNC: 23 MG/DL — SIGNIFICANT CHANGE UP (ref 7–23)
BUN SERPL-MCNC: 24 MG/DL
CALCIUM SERPL-MCNC: 10.1 MG/DL
CALCIUM SERPL-MCNC: 9 MG/DL — SIGNIFICANT CHANGE UP (ref 8.4–10.5)
CHLORIDE SERPL-SCNC: 100 MMOL/L
CHLORIDE SERPL-SCNC: 103 MMOL/L — SIGNIFICANT CHANGE UP (ref 96–108)
CHOLEST SERPL-MCNC: 163 MG/DL
CO2 SERPL-SCNC: 25 MMOL/L
CO2 SERPL-SCNC: 36 MMOL/L — HIGH (ref 22–31)
CREAT SERPL-MCNC: 0.98 MG/DL — SIGNIFICANT CHANGE UP (ref 0.5–1.3)
CREAT SERPL-MCNC: 1.02 MG/DL
CRP SERPL-MCNC: <3 MG/L — SIGNIFICANT CHANGE UP
EGFR: 55 ML/MIN/1.73M2 — LOW
EGFR: 55 ML/MIN/1.73M2 — LOW
EGFRCR SERPLBLD CKD-EPI 2021: 53 ML/MIN/1.73M2
ESTIMATED AVERAGE GLUCOSE: 117 MG/DL
GLUCOSE SERPL-MCNC: 115 MG/DL
GLUCOSE SERPL-MCNC: 119 MG/DL — HIGH (ref 70–99)
HBA1C MFR BLD HPLC: 5.7 %
HCT VFR BLD CALC: 39.6 % — SIGNIFICANT CHANGE UP (ref 34.5–45)
HDLC SERPL-MCNC: 44 MG/DL
HGB BLD-MCNC: 12.6 G/DL — SIGNIFICANT CHANGE UP (ref 11.5–15.5)
LDLC SERPL-MCNC: 92 MG/DL
LEGIONELLA AG UR QL: NEGATIVE — SIGNIFICANT CHANGE UP
MCHC RBC-ENTMCNC: 30.8 PG — SIGNIFICANT CHANGE UP (ref 27–34)
MCHC RBC-ENTMCNC: 31.8 G/DL — LOW (ref 32–36)
MCV RBC AUTO: 96.8 FL — SIGNIFICANT CHANGE UP (ref 80–100)
MRSA PCR RESULT.: SIGNIFICANT CHANGE UP
NONHDLC SERPL-MCNC: 119 MG/DL
NRBC BLD AUTO-RTO: 0 /100 WBCS — SIGNIFICANT CHANGE UP (ref 0–0)
NT-PROBNP SERPL-MCNC: 63 PG/ML
PLATELET # BLD AUTO: 170 K/UL — SIGNIFICANT CHANGE UP (ref 150–400)
POTASSIUM SERPL-MCNC: 3.8 MMOL/L — SIGNIFICANT CHANGE UP (ref 3.5–5.3)
POTASSIUM SERPL-SCNC: 3.8 MMOL/L — SIGNIFICANT CHANGE UP (ref 3.5–5.3)
POTASSIUM SERPL-SCNC: 4.7 MMOL/L
PROCALCITONIN SERPL-MCNC: 0.04 NG/ML
PROT SERPL-MCNC: 6.9 G/DL
RBC # BLD: 4.09 M/UL — SIGNIFICANT CHANGE UP (ref 3.8–5.2)
RBC # FLD: 14.5 % — SIGNIFICANT CHANGE UP (ref 10.3–14.5)
S AUREUS DNA NOSE QL NAA+PROBE: SIGNIFICANT CHANGE UP
S PNEUM AG UR QL: NEGATIVE — SIGNIFICANT CHANGE UP
SODIUM SERPL-SCNC: 141 MMOL/L — SIGNIFICANT CHANGE UP (ref 135–145)
SODIUM SERPL-SCNC: 142 MMOL/L
TRIGL SERPL-MCNC: 154 MG/DL
TSH SERPL-ACNC: 4.5 UIU/ML
TSH SERPL-MCNC: 3.43 UIU/ML — SIGNIFICANT CHANGE UP (ref 0.27–4.2)
WBC # BLD: 4.47 K/UL — SIGNIFICANT CHANGE UP (ref 3.8–10.5)
WBC # FLD AUTO: 4.47 K/UL — SIGNIFICANT CHANGE UP (ref 3.8–10.5)

## 2025-03-25 PROCEDURE — 99233 SBSQ HOSP IP/OBS HIGH 50: CPT

## 2025-03-25 RX ORDER — NYSTATIN 100000 [USP'U]/G
1 CREAM TOPICAL
Refills: 0 | Status: DISCONTINUED | OUTPATIENT
Start: 2025-03-25 | End: 2025-03-27

## 2025-03-25 RX ORDER — ENOXAPARIN SODIUM 100 MG/ML
40 INJECTION SUBCUTANEOUS EVERY 24 HOURS
Refills: 0 | Status: DISCONTINUED | OUTPATIENT
Start: 2025-03-25 | End: 2025-03-27

## 2025-03-25 RX ADMIN — GABAPENTIN 300 MILLIGRAM(S): 400 CAPSULE ORAL at 21:20

## 2025-03-25 RX ADMIN — Medication 2.5 MILLIGRAM(S): at 07:55

## 2025-03-25 RX ADMIN — Medication 125 MICROGRAM(S): at 05:13

## 2025-03-25 RX ADMIN — ATORVASTATIN CALCIUM 10 MILLIGRAM(S): 80 TABLET, FILM COATED ORAL at 21:21

## 2025-03-25 RX ADMIN — GABAPENTIN 300 MILLIGRAM(S): 400 CAPSULE ORAL at 05:13

## 2025-03-25 RX ADMIN — ENOXAPARIN SODIUM 40 MILLIGRAM(S): 100 INJECTION SUBCUTANEOUS at 14:32

## 2025-03-25 RX ADMIN — GABAPENTIN 300 MILLIGRAM(S): 400 CAPSULE ORAL at 14:31

## 2025-03-25 RX ADMIN — Medication 1 TABLET(S): at 05:13

## 2025-03-25 RX ADMIN — DEXTROMETHORPHAN HBR, GUAIFENESIN 600 MILLIGRAM(S): 200 LIQUID ORAL at 05:13

## 2025-03-25 RX ADMIN — Medication 4 MILLILITER(S): at 07:56

## 2025-03-25 RX ADMIN — CEFTRIAXONE 100 MILLIGRAM(S): 500 INJECTION, POWDER, FOR SOLUTION INTRAMUSCULAR; INTRAVENOUS at 10:14

## 2025-03-25 RX ADMIN — DEXTROMETHORPHAN HBR, GUAIFENESIN 600 MILLIGRAM(S): 200 LIQUID ORAL at 18:06

## 2025-03-25 RX ADMIN — Medication 250 MILLIGRAM(S): at 14:32

## 2025-03-25 NOTE — CARE COORDINATION ASSESSMENT. - NSDCPLANSERVICES_GEN_ALL_CORE
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 In ED CXR showed small left lower consolidation and small left pleural effusion with a possible left apical pneumothorax.  CT chest showed no pneumo and re demonstrated left lower consolidations. Differential diagnosis includes but is not limited to CHF vs pneumonia. Pro-BNP is normal, no elevated troponin.      Patient receiving IV Zithromax for PNA  Patient receiving IV Rocephin for PNA.  Patient Blind in Right Eye.    CM met with patient at bedside. Patient is alert times 3. Patient lives at home with her son Wilman phone number [phone], CM spoke with him and he will be up shortly this morning. Patient stated has 3 steps to get into house and 6 steps to get to bedroom. Patient owns Rollator at  bedside. Patient does not wear oxygen at home.    CM verified:     PCP: Vicente phone number: [phone].   Pharmacy: [facility] phone number: [phone].   Insurance: [employer] / Medicare.  [place]: No.     CM explained about homecare services to patient and son Wilman over the phone with a verbal understanding. Patient declined any homecare services CM made Provider aware./Anticipated Needs Unclear at Present

## 2025-03-25 NOTE — PROGRESS NOTE ADULT - ASSESSMENT
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

## 2025-03-25 NOTE — PROGRESS NOTE ADULT - ASSESSMENT
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

## 2025-03-25 NOTE — CARE COORDINATION ASSESSMENT. - OTHER PERTINENT DISCHARGE PLANNING INFORMATION:
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 In ED CXR showed small left lower consolidation and small left pleural effusion with a possible left apical pneumothorax.  CT chest showed no pneumo and re demonstrated left lower consolidations. Differential diagnosis includes but is not limited to CHF vs pneumonia. Pro-BNP is normal, no elevated troponin. Met patient at bedside.  Explained role of CM, verbalized understanding. Pt was made aware a CM will remain available through hospitalization.  Contact information given in discharge/ transitions resource folder.

## 2025-03-25 NOTE — GOALS OF CARE CONVERSATION - ADVANCED CARE PLANNING - CONVERSATION DETAILS
STARS Writer met with pt at bedside. Reviewed patient's medical and social history as well as events leading to patient's hospitalization. Writer discussed patient's current diagnosis (PNA, skin ca, OA, HLD, obesity, breast ca,DJD, HTN,hypothyroid.), medical condition and management, prognosis, and life expectancy. Inquired about patient's wishes regarding extent of medical care to be provided including escalation of medical care into the ICU and use of vasopressor support. In addition, the writer inquired about thoughts regarding cardiopulmonary resuscitation, artificial nutrition and hydration including use of feeding tubes and IVF, antibiotics, and further investigative studies such as blood draws and radiology. Pt.  showed  insight into medical condition. All questions answered. Writer recommended she decide what she wants and designate a proxy. Psychosocial support provided.

## 2025-03-25 NOTE — DISCHARGE NOTE NURSING/CASE MANAGEMENT/SOCIAL WORK - FINANCIAL ASSISTANCE
[facility] provides services at a reduced cost to those who are determined to be eligible through [facility]’s financial assistance program. Information regarding [facility]’s financial assistance program can be found by going to [email or web address] or by calling [phone].

## 2025-03-25 NOTE — CARE COORDINATION ASSESSMENT. - PRO ARRIVE FROM
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 In ED CXR showed small left lower consolidation and small left pleural effusion with a possible left apical pneumothorax.  CT chest showed no pneumo and re demonstrated left lower consolidations. Differential diagnosis includes but is not limited to CHF vs pneumonia. Pro-BNP is normal, no elevated troponin.      Patient receiving IV Zithromax for PNA  Patient receiving IV Rocephin for PNA.  Patient Blind in Right Eye.    CM met with patient at bedside. Patient is alert times 3. Patient lives at home with her son Wilman phone number [phone], CM spoke with him and he will be up shortly this morning. Patient stated has 3 steps to get into house and 6 steps to get to bedroom. Patient owns Rollator at  bedside. Patient does not wear oxygen at home.    CM verified:     PCP: Vicente phone number: [phone].   Pharmacy: [facility] phone number: [phone].   Insurance: [employer] / Medicare.  [place]: No.     CM explained about homecare services to patient and son Wilman over the phone with a verbal understanding. Patient declined any homecare services CM made Provider aware./home

## 2025-03-25 NOTE — CASE MANAGEMENT PROGRESS NOTE - NSCMPROGRESSNOTE_GEN_ALL_CORE
RN/CM noted pt's case discussed during Interdisciplinary rounds, identified as a CMS STAR case for CHF. CM met with pt and noted that she is declining need for home care services upon DC. Pt was agreeable for CM to set-up post hospital stay appt. Appt was set up on MONDAY 03/31/2025 @ 11:15 am with DR Christopher Zhang @ [address] [facility], [place] [id number] [phone]- pt made aware. CM remains available and continues to follow case.

## 2025-03-25 NOTE — DISCHARGE NOTE NURSING/CASE MANAGEMENT/SOCIAL WORK - NSDCFUADDAPPT_GEN_ALL_CORE_FT
You have a post-hospitalization doctor appointment set-up for you on MONDAY 03/31/2025 @ 11:15 am with your doctor, DR Christopher Zhang @ [address] [facility], [place] [address] [phone], if there is any conflict with any previous appointment then you may call doctor's office and re-schedule @ your earliest convenience.

## 2025-03-25 NOTE — CARE COORDINATION ASSESSMENT. - NSCAREPROVIDERS_GEN_ALL_CORE_FT
CARE PROVIDERS:  Accepting Physician: Henri Chavez  Administration: Liza Mcguire  Admitting: Henri Chavez  Attending: Henri Chavez  Consultant: Ifeoma Oneal  Consultant: Vipul Mcfarland  Covering Team: Mariano Durbin  Covering Team: ADORE Albert  ED Attending: Candelario Tariq  ED Nurse: Debra Monteiro  ED Nurse 2: Keely Michelle  Nurse: Oriana Hernandez  Nurse: Teena Grove  Nurse: Rachael Contreras  Nurse: Titus Leon  Outpatient Provider: Christopher Zhang  Outpatient Provider: Tommy Barillas  Override: Titus Leon  Override: Denisse Mccarthy  PCA/Nursing Assistant: Iqra Plasencia  Primary Team: Henri Chavez  Registered Dietitian: Lucia Koehler  Respiratory Therapy: Jordan Poole  Respiratory Therapy: Williams Lopez  : Margo Li  Student: Favian Rodriguez  Team: [facility] Hospitalists, Team  UR// Supp. Assoc.: Suzi Ansari

## 2025-03-25 NOTE — DISCHARGE NOTE NURSING/CASE MANAGEMENT/SOCIAL WORK - PATIENT PORTAL LINK FT
You can access the FollowMyHealth Patient Portal offered by [facility] by registering at the following website: [email or web address]. By joining [facility]’s FollowMyHealth portal, you will also be able to view your health information using other applications (apps) compatible with our system.

## 2025-03-26 LAB
ALBUMIN SERPL ELPH-MCNC: 3.3 G/DL — SIGNIFICANT CHANGE UP (ref 3.3–5)
ALP SERPL-CCNC: 62 U/L — SIGNIFICANT CHANGE UP (ref 30–120)
ALT FLD-CCNC: 37 U/L — SIGNIFICANT CHANGE UP (ref 10–60)
ANION GAP SERPL CALC-SCNC: 5 MMOL/L — SIGNIFICANT CHANGE UP (ref 5–17)
AST SERPL-CCNC: 31 U/L — SIGNIFICANT CHANGE UP (ref 10–40)
BILIRUB SERPL-MCNC: 0.4 MG/DL — SIGNIFICANT CHANGE UP (ref 0.2–1.2)
BUN SERPL-MCNC: 25 MG/DL — HIGH (ref 7–23)
CALCIUM SERPL-MCNC: 9.2 MG/DL — SIGNIFICANT CHANGE UP (ref 8.4–10.5)
CHLORIDE SERPL-SCNC: 102 MMOL/L — SIGNIFICANT CHANGE UP (ref 96–108)
CO2 SERPL-SCNC: 33 MMOL/L — HIGH (ref 22–31)
CREAT SERPL-MCNC: 1.07 MG/DL — SIGNIFICANT CHANGE UP (ref 0.5–1.3)
EGFR: 50 ML/MIN/1.73M2 — LOW
EGFR: 50 ML/MIN/1.73M2 — LOW
GLUCOSE SERPL-MCNC: 113 MG/DL — HIGH (ref 70–99)
HCT VFR BLD CALC: 40.1 % — SIGNIFICANT CHANGE UP (ref 34.5–45)
HGB BLD-MCNC: 12.7 G/DL — SIGNIFICANT CHANGE UP (ref 11.5–15.5)
MAGNESIUM SERPL-MCNC: 1.8 MG/DL — SIGNIFICANT CHANGE UP (ref 1.6–2.6)
MCHC RBC-ENTMCNC: 30.6 PG — SIGNIFICANT CHANGE UP (ref 27–34)
MCHC RBC-ENTMCNC: 31.7 G/DL — LOW (ref 32–36)
MCV RBC AUTO: 96.6 FL — SIGNIFICANT CHANGE UP (ref 80–100)
NRBC BLD AUTO-RTO: 0 /100 WBCS — SIGNIFICANT CHANGE UP (ref 0–0)
PHOSPHATE SERPL-MCNC: 4.4 MG/DL — SIGNIFICANT CHANGE UP (ref 2.5–4.5)
PLATELET # BLD AUTO: 178 K/UL — SIGNIFICANT CHANGE UP (ref 150–400)
POTASSIUM SERPL-MCNC: 3.8 MMOL/L — SIGNIFICANT CHANGE UP (ref 3.5–5.3)
POTASSIUM SERPL-SCNC: 3.8 MMOL/L — SIGNIFICANT CHANGE UP (ref 3.5–5.3)
PROT SERPL-MCNC: 6.1 G/DL — SIGNIFICANT CHANGE UP (ref 6–8.3)
RBC # BLD: 4.15 M/UL — SIGNIFICANT CHANGE UP (ref 3.8–5.2)
RBC # FLD: 14.3 % — SIGNIFICANT CHANGE UP (ref 10.3–14.5)
SODIUM SERPL-SCNC: 140 MMOL/L — SIGNIFICANT CHANGE UP (ref 135–145)
WBC # BLD: 4.21 K/UL — SIGNIFICANT CHANGE UP (ref 3.8–10.5)
WBC # FLD AUTO: 4.21 K/UL — SIGNIFICANT CHANGE UP (ref 3.8–10.5)

## 2025-03-26 PROCEDURE — 99232 SBSQ HOSP IP/OBS MODERATE 35: CPT

## 2025-03-26 RX ORDER — BISACODYL 5 MG
5 TABLET, DELAYED RELEASE (ENTERIC COATED) ORAL EVERY 12 HOURS
Refills: 0 | Status: DISCONTINUED | OUTPATIENT
Start: 2025-03-26 | End: 2025-03-27

## 2025-03-26 RX ADMIN — Medication 1 TABLET(S): at 05:46

## 2025-03-26 RX ADMIN — DEXTROMETHORPHAN HBR, GUAIFENESIN 600 MILLIGRAM(S): 200 LIQUID ORAL at 05:46

## 2025-03-26 RX ADMIN — NYSTATIN 1 APPLICATION(S): 100000 CREAM TOPICAL at 05:46

## 2025-03-26 RX ADMIN — CEFTRIAXONE 100 MILLIGRAM(S): 500 INJECTION, POWDER, FOR SOLUTION INTRAMUSCULAR; INTRAVENOUS at 10:58

## 2025-03-26 RX ADMIN — Medication 125 MICROGRAM(S): at 05:46

## 2025-03-26 RX ADMIN — DEXTROMETHORPHAN HBR, GUAIFENESIN 600 MILLIGRAM(S): 200 LIQUID ORAL at 17:34

## 2025-03-26 RX ADMIN — Medication 2.5 MILLIGRAM(S): at 07:08

## 2025-03-26 RX ADMIN — NYSTATIN 1 APPLICATION(S): 100000 CREAM TOPICAL at 17:34

## 2025-03-26 RX ADMIN — Medication 5 MILLIGRAM(S): at 21:13

## 2025-03-26 RX ADMIN — POLYETHYLENE GLYCOL 3350 17 GRAM(S): 17 POWDER, FOR SOLUTION ORAL at 19:16

## 2025-03-26 RX ADMIN — GABAPENTIN 300 MILLIGRAM(S): 400 CAPSULE ORAL at 05:46

## 2025-03-26 RX ADMIN — Medication 250 MILLIGRAM(S): at 14:35

## 2025-03-26 RX ADMIN — Medication 2.5 MILLIGRAM(S): at 20:30

## 2025-03-26 RX ADMIN — GABAPENTIN 300 MILLIGRAM(S): 400 CAPSULE ORAL at 14:35

## 2025-03-26 RX ADMIN — Medication 4 MILLILITER(S): at 20:30

## 2025-03-26 RX ADMIN — ENOXAPARIN SODIUM 40 MILLIGRAM(S): 100 INJECTION SUBCUTANEOUS at 14:35

## 2025-03-26 RX ADMIN — Medication 4 MILLILITER(S): at 07:08

## 2025-03-26 RX ADMIN — GABAPENTIN 300 MILLIGRAM(S): 400 CAPSULE ORAL at 21:12

## 2025-03-26 RX ADMIN — ATORVASTATIN CALCIUM 10 MILLIGRAM(S): 80 TABLET, FILM COATED ORAL at 21:14

## 2025-03-26 NOTE — CASE MANAGEMENT PROGRESS NOTE - NSCMPROGRESSNOTE_GEN_ALL_CORE
RN/CM noted pt's case discussed during Interdisciplinary rounds, identified as a CMS STAR case for CHF, not cleared for transition home today 03/26/2025. CM noted that pt is declining need for home care services upon DC. Pt was agreeable for CM to set-up post hospital stay appt. Appt was set up on MONDAY 03/31/2025 @ 11:15 am with DR Christopher Zhang @ [address] [facility], [place] [address] [phone]- pt made aware. CM remains available and continues to follow case.

## 2025-03-26 NOTE — CONSULT NOTE ADULT - SUBJECTIVE AND OBJECTIVE BOX
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 Patient states that cough started around 2 weeks ago and has been consistent but has improved slightly over the past few days. Was taking over the counter cough medicine, no antibiotics. Cough is productive with clear to cloudy sputum production. Denies chest pain, shortness of breath or difficulty breathing. Denies any nausea vomiting or dizziness.        Review of Systems:  Review of Systems: CONSTITUTIONAL: Denies fevers, chills, fatigue, dizziness, weakness, unexpected weight loss or gain  EYES: Denies vision changes  ENT: Denies ear pain, sore throat, endorses rhinorrhea  CARDIAC: Denies chest pain, heart palpitations  RESPIRATORY: Endorses productive cough for 2 weeks, no shortness of breath  GI: Denies abdominal pain, nausea, vomiting, diarrhea or constipation  : Denies painful urination, difficulty urinating or urgency  MSK: Denies muscle weakness or joint pain  NEURO: Denies any headache, confusion, numbness, tingling  Other Review of Systems: All other review of systems negative, except as noted in HPI      Allergies and Intolerances:        Allergies:  	Shrimp: Food, Hives  	Vinegar- hands break out in a rash [freetext allergy; no alerts]: Food, Rash, Vinegar- hands break out in a rash  	iodine: Drug, Unknown, Reaction: Unknown  	soft shell seafood [freetext allergy; no alerts]: Miscellaneous, Unknown, soft shell seafood    Home Medications:   * Patient Currently Takes Medications as of 24-Mar-2025 12:48 documented in Structured Notes  · 	atorvastatin 10 mg oral tablet: Last Dose Taken:  , 1 tab(s) orally once a day (at bedtime)  · 	levothyroxine 125 mcg (0.125 mg) oral tablet: Last Dose Taken:  , 1 tab(s) orally once a day  · 	levothyroxine 125 mcg (0.125 mg) oral tablet: Last Dose Taken:  , 2 tab(s) orally every 7 days on   · 	triamterene-hydrochlorothiazide 37.5 mg-25 mg oral tablet: Last Dose Taken:  , 1 tab(s) orally once a day  · 	Vitamin D3 2000 intl units (50 mcg) oral tablet: Last Dose Taken:  , 1 tab(s) orally once a day  · 	gabapentin: Last Dose Taken:  , 300 milligram(s) orally 3 times a day    Patient History:    Past Medical, Past Surgical, and Family History:  PAST MEDICAL HISTORY:  Bronchitis     Cancer of female breast 2012- left breast- tx with surgery, radiation and Letrozole    Chronic cough     Class 2 obesity with body mass index (BMI) of 39.0 to 39.9 in adult     DDD (degenerative disc disease), lumbar     Environmental allergies     H/O actinic keratosis     H/O seborrheic keratosis     Hyperlipidemia     Hypothyroidism     Osteoarthritis     Pneumonia     Primary immunodeficiency disorder being treated with Hizentra    Skin cancer     Vitamin D deficiency.     PAST SURGICAL HISTORY:  S/P appendectomy 1950    S/P cataract surgery approx 2006- bilateral    S/P epidural steroid injection x2- Dr. Martin- for Lumbar Disc Disease    S/P partial mastectomy, left 2012    S/P skin cancer resection x11.     FAMILY HISTORY:  Family history of hypertension in mother,   Family history of hypertension in son, son- living - age 59yo  Family history of lung cancer, sister-   Family history of lymphoma, brother-  Family history of ulcerative colitis, son- living- age 58yo  FH: dementia, father-   FHx: cerebral aneurysm, mother-  @ age 66.     Social History:  · Substance use	No  · Social History (marital status, living situation, occupation, and sexual history)	Lives at home with two adult sons, retired   	     Tobacco Screening:  · Core Measure Site	No  · Has the patient used tobacco in the past 30 days?	No    Risk Assessment:    Present on Admission:  Deep Venous Thrombosis	no  Pulmonary Embolus	no  Urinary Catheter	no  Central Venous Catheter/PICC Line	no  Surgical Site Incision	no     HIV Screening:  · In accordance with [place] State law, we offer every patient who comes to our ED an HIV test. Would you like to be tested today?	Opt out     Hepatitis C Test Questions:  · In accordance with [place] State Law, we offer every patient a Hepatitis C test. Would you like to be tested today?	Opt out    PAST MEDICAL & SURGICAL HISTORY:  Obesity (BMI 30-39.9)    Skin cancer    Hypothyroidism    Bronchitis    Pneumonia    Chronic cough    Osteoarthritis    Environmental allergies    Vitamin D deficiency    Hyperlipidemia    Class 2 obesity with body mass index (BMI) of 39.0 to 39.9 in adult    Cancer of female breast  - left breast- tx with surgery, radiation and Letrozole    DDD (degenerative disc disease), lumbar    Primary immunodeficiency disorder  being treated with Hizentra    H/O actinic keratosis    H/O seborrheic keratosis    S/P cataract surgery  approx 2006- bilateral    S/P appendectomy  1950    S/P partial mastectomy, left  2012    S/P epidural steroid injection  x2- Dr. Martin- for Lumbar Disc Disease    S/P skin cancer resection  x11          Medication list         MEDICATIONS  (STANDING):  atorvastatin 10 milliGRAM(s) Oral at bedtime  azithromycin  IVPB      azithromycin  IVPB 500 milliGRAM(s) IV Intermittent once  gabapentin 300 milliGRAM(s) Oral three times a day  guaiFENesin  milliGRAM(s) Oral every 12 hours  levothyroxine 125 MICROGram(s) Oral daily  triamterene 37.5 mG/hydrochlorothiazide 25 mG Tablet 1 Tablet(s) Oral daily    MEDICATIONS  (PRN):  acetaminophen     Tablet .. 650 milliGRAM(s) Oral every 6 hours PRN Temp greater or equal to 38C (100.4F), Mild Pain (1 - 3)  aluminum hydroxide/magnesium hydroxide/simethicone Suspension 30 milliLiter(s) Oral every 4 hours PRN Dyspepsia  melatonin 3 milliGRAM(s) Oral at bedtime PRN Insomnia  ondansetron Injectable 4 milliGRAM(s) IV Push every 8 hours PRN Nausea and/or Vomiting         Vitals log        ICU Vital Signs Last 24 Hrs  T(C): 36.7 (24 Mar 2025 15:05), Max: 36.8 (24 Mar 2025 11:12)  T(F): 98 (24 Mar 2025 15:05), Max: 98.2 (24 Mar 2025 11:12)  HR: 64 (24 Mar 2025 15:05) (64 - 88)  BP: 144/58 (24 Mar 2025 15:05) (134/59 - 163/69)  BP(mean): --  ABP: --  ABP(mean): --  RR: 22 (24 Mar 2025 15:05) (15 - 22)  SpO2: 95% (24 Mar 2025 15:05) (92% - 100%)    O2 Parameters below as of 24 Mar 2025 15:05  Patient On (Oxygen Delivery Method): nasal cannula  O2 Flow (L/min): 2               Input and Output:  I&O's Detail      Lab Data                        13.7   7.07  )-----------( 206      ( 24 Mar 2025 11:47 )             42.7         139  |  101  |  25[H]  ----------------------------<  108[H]  4.1   |  34[H]  |  1.03    Ca    9.5      24 Mar 2025 11:47    TPro  7.3  /  Alb  3.8  /  TBili  0.4  /  DBili  x   /  AST  30  /  ALT  27  /  AlkPhos  85              Review of Systems	  sob  cough  weakness  LE edema      Objective     Physical Examination    heart s1s2  lung dc BS  head nc  head at  abd soft  cn grossly int      Pertinent Lab findings & Imaging      Welsh:  NO   Adequate UO     I&O's Detail           Discussed with:     Cultures:	        Radiology      ACC: [id number] EXAM:  XR CHEST PORTABLE IMMED 1V   ORDERED BY: SHAW CORTES     *** ADDENDUM # 1 ***    Question of a very tiny right apical pneumothorax. If clinically   indicated, follow-up radiograph or CT can be obtained.    Discussed by telephone with   at the time of this addendum    --- End of Report ---    *** END OF ADDENDUM # 1 ***      PROCEDURE DATE:  2025          INTERPRETATION:  Sepsis cough.    AP chest. Prior 3/6/2024.    IMPRESSION:    Stable heart mediastinum. Small left pleural effusion. Underlying left   basilar retrocardiac consolidation/atelectasis. Correlate clinically for   infection. No pneumothorax. Clips left lower chest.    --- End of Report ---    ***Please see the addendum at the top of this report. It may contain   additional important information or changes.****        CHARLES AUSTIN MD; Attending Radiologist  This document has been electronically signed. Mar 24 2025 12:24PM  1st Addendum: CHARLES AUSTIN MD; Attending Radiologist  The first addendum was electronically signed on: Mar 24 2025  1:02PM.                        
[facility]  MICHELLE Kuhn S. Shah, Y. Patel, G[facility]  [phone]    [place], [place]  89y, Female  [id number]    HPI--  HPI:  Patient is an 89 year old female with a past medical history of hyperlipidemia, hypertension, hypothyroidism, osteoarthritis, breast cancer s/p lumpectomy treated with radiation and chemotherapy, giant cell arteritis, and primary immunodeficiency disorder on Hizentra who presents today after a referral from PMD with a persistent cough. Patient states that cough started around 2 weeks ago and has been consistent but has improved slightly over the past few days. Was taking over the counter cough medicine, no antibiotics. Cough is productive with clear to cloudy sputum production. Denies chest pain, shortness of breath or difficulty breathing. Denies any nausea vomiting or dizziness.  (24 Mar 2025 14:19)    ID c/s for further evaluation      Active Medications--  acetaminophen     Tablet .. 650 milliGRAM(s) Oral every 6 hours PRN  albuterol    0.083% 2.5 milliGRAM(s) Nebulizer every 12 hours  aluminum hydroxide/magnesium hydroxide/simethicone Suspension 30 milliLiter(s) Oral every 4 hours PRN  atorvastatin 10 milliGRAM(s) Oral at bedtime  azithromycin  IVPB      azithromycin  IVPB 500 milliGRAM(s) IV Intermittent every 24 hours  cefTRIAXone   IVPB 1000 milliGRAM(s) IV Intermittent every 24 hours  enoxaparin Injectable 40 milliGRAM(s) SubCutaneous every 24 hours  gabapentin 300 milliGRAM(s) Oral three times a day  guaiFENesin  milliGRAM(s) Oral every 12 hours  levothyroxine 125 MICROGram(s) Oral daily  melatonin 3 milliGRAM(s) Oral at bedtime PRN  nystatin Powder 1 Application(s) Topical two times a day  ondansetron Injectable 4 milliGRAM(s) IV Push every 8 hours PRN  polyethylene glycol 3350 17 Gram(s) Oral at bedtime PRN  sodium chloride 3%  Inhalation 4 milliLiter(s) Inhalation every 12 hours  triamterene 37.5 mG/hydrochlorothiazide 25 mG Tablet 1 Tablet(s) Oral daily    Antimicrobials:   azithromycin  IVPB      azithromycin  IVPB 500 milliGRAM(s) IV Intermittent every 24 hours  cefTRIAXone   IVPB 1000 milliGRAM(s) IV Intermittent every 24 hours    Immunologic:     ROS:  CONSTITUTIONAL: No fevers or chills. No weakness or headache. No weight changes.  EYES/ENT: No visual or hearing changes. No sore throat or throat pain .  NECK: No pain or stiffness  RESPIRATORY: No cough, wheezing, or hemoptysis. No shortness of breath  CARDIOVASCULAR: No chest pain or palpitations  GASTROINTESTINAL: No abdominal pain. No nausea or vomiting. No diarrhea or constipation.  GENITOURINARY: No dysuria, frequency or hematuria  NEUROLOGICAL: No numbness or weakness  SKIN: No itching or rashes  PSYCHIATRIC: Pleasant. Appropriate affect    Allergies: Shrimp (Hives)  soft shell seafood (Unknown)  Vinegar- hands break out in a rash (Rash)  iodine (Unknown)    PMH -- Obesity (BMI 30-39.9)    Skin cancer    Hypothyroidism    Bronchitis    Pneumonia    Chronic cough    Osteoarthritis    Environmental allergies    Vitamin D deficiency    Hyperlipidemia    Class 2 obesity with body mass index (BMI) of 39.0 to 39.9 in adult    Cancer of female breast    DDD (degenerative disc disease), lumbar    Primary immunodeficiency disorder    H/O actinic keratosis    H/O seborrheic keratosis      PSH -- S/P cataract surgery    S/P appendectomy    S/P partial mastectomy, left    S/P epidural steroid injection    S/P skin cancer resection      FH -- FH: dementia    FHx: cerebral aneurysm    Family history of hypertension in mother    Family history of lung cancer    Family history of lymphoma    Family history of hypertension in son    Family history of ulcerative colitis      Social History --  EtOH: denies   Tobacco: denies   Drug Use: denies     Travel/Environmental/Occupational History:    Physical Exam--  Vital Signs Last 24 Hrs  T(F): 97.4 (26 Mar 2025 04:32), Max: 98.1 (25 Mar 2025 14:05)  HR: 67 (26 Mar 2025 07:28) (62 - 71)  BP: 145/63 (26 Mar 2025 04:32) (120/60 - 145/63)  RR: 18 (26 Mar 2025 04:32) (17 - 18)  SpO2: 94% (26 Mar 2025 07:28) (82% - 94%)  General: nontoxic-appearing, no acute distress  HEENT: NC/AT, EOMI  Lungs: decreased breath sounds  Heart: Regular rate and rhythm.   Abdomen: Soft. Nondistended. Nontender.   Extremities: No cyanosis or clubbing. No edema.   Skin: Warm. Dry. Good turgor. No rash.     Laboratory & Imaging Data:  CBC:                       12.7   4.21  )-----------( 178      ( 26 Mar 2025 07:58 )             40.1     CMP: 03-26    140  |  102  |  25[H]  ----------------------------<  113[H]  3.8   |  33[H]  |  1.07    Ca    9.2      26 Mar 2025 07:58  Phos  4.4     03-26  Mg     1.8     03-26    TPro  6.1  /  Alb  3.3  /  TBili  0.4  /  DBili  x   /  AST  31  /  ALT  37  /  AlkPhos  62  03-26    LIVER FUNCTIONS - ( 26 Mar 2025 07:58 )  Alb: 3.3 g/dL / Pro: 6.1 g/dL / ALK PHOS: 62 U/L / ALT: 37 U/L / AST: 31 U/L / GGT: x           Urinalysis Basic - ( 26 Mar 2025 07:58 )    Color: x / Appearance: x / SG: x / pH: x  Gluc: 113 mg/dL / Ketone: x  / Bili: x / Urobili: x   Blood: x / Protein: x / Nitrite: x   Leuk Esterase: x / RBC: x / WBC x   Sq Epi: x / Non Sq Epi: x / Bacteria: x        Microbiology: reviewed    Urinalysis with Rflx Culture (collected 03-24-25 @ 14:55)    Culture - Blood (collected 03-24-25 @ 11:47)  Source: Blood Blood-Peripheral  Preliminary Report (03-25-25 @ 17:02):    No growth at 24 hours    Culture - Blood (collected 03-24-25 @ 11:47)  Source: Blood Blood-Peripheral  Preliminary Report (03-25-25 @ 17:02):    No growth at 24 hours          Radiology: reviewed    < from: CT Chest No Cont (03.24.25 @ 13:55) >    ACC: [id number] EXAM:  CT CHEST   ORDERED BY: SHAW CORTES     PROCEDURE DATE:  03/24/2025          INTERPRETATION:  CLINICAL INFORMATION: 89 years  Female with r/o ptx.    COMPARISON: Chest x-ray 3/24/2025 and contrast-enhanced chest CT 3/7/2024    CONTRAST/COMPLICATIONS:  IV Contrast: NONE  Oral Contrast: NONE  .    PROCEDURE:  CT of the Chest was performed.  Sagittal and coronal reformats were performed.    LIMITATION: Evaluation of the vascular structures and adrianna is limited by   lack of IV contrast.    FINDINGS:    LUNGS AND LARGE AIRWAYS: Patent central airways. No pulmonary nodules.  Bilateral upper lobe groundglass infiltrates (3:43). Mild lingular   discoid atelectasis. New left lower lobe peribronchial airspace opacities   and bronchial wall thickening consistent with pneumonia. Mild right   basilar discoid atelectasis.  PLEURA: No pleural effusion. No pneumothorax.  VESSELS: Aortic calcifications. Coronary artery calcifications.  HEART: Heart size is normal. Trace pericardial effusion.  MEDIASTINUM AND ADRIANNA: No lymphadenopathy.  CHEST WALL AND LOWER NECK: Surgical clips in the left breast.  VISUALIZED UPPER ABDOMEN: Left renal cyst.  BONES: Degenerative changes.    IMPRESSION:  No pneumothorax.    New left lower lobepneumonia. New bilateral upper lobe groundglass   pneumonia. Recommend follow-up to ensure resolution.        --- End of Report ---            CECILIA SIMENTAL MD; Attending Radiologist  This document has been electronically signed. Mar 24 2025  5:01PM    < end of copied text >

## 2025-03-26 NOTE — CONSULT NOTE ADULT - ASSESSMENT
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 New bilateral upper lobe groundglass pneumonia.     Rhinovirus + superimposed bacterial PNA  CT chest reviewed - GGO and LLL PNA  SARS-Cov-V-2 and influenza negative; 3/24 + rhino/enterovirus  urine legionella, strep pneumo negative  BCx NGTD    Recommendations:  C/w ceftriaxone x5 day course  C/w azithromycin x3 day course, last dose today  trend temps/WBC  O2 support as needed  Additional care per primary team    Patient evaluated with face-to-face time in addition to reviewing history, labs, microbiology, and imaging.   Antibiotic stewardship, local antibiogram, infection control strategies and potential transmission issues taken into consideration at time of treatment decision making process.   Thank you for allowing us to participate in the care of your patient.  Infectious Diseases will follow. Please call with any questions.  Lorin Hewitt M.D.  Available on [employer] TEAMS -- *[facility]*  [place] [facility] [phone]  For after 5 P.M. and weekends, please call [phone]  
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

## 2025-03-26 NOTE — PROGRESS NOTE ADULT - ASSESSMENT
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

## 2025-03-26 NOTE — PROGRESS NOTE ADULT - ASSESSMENT
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 admitted for cough and generalized weakness      LLL PNA without sepsis syndrome  Rhinovirus URI   Overall clinically improving   CT chest reviewed - GGO and LLL PNA  SARS-Cov-V-2 and influenza negative  Urine legionella Ag, strep pneumo Ag negative, MRSA PCR negative  Blood cultures ngtd   Plan:  Continue ceftriaxone and azithromycin - day 2 of 5  Continue guaifenesin and PRN nebs   Assess for ambulatory oxygen need    HLD  Continue atorvastatin     Hypothyroidism  Continue synthroid    HTN  Stable  Continue triamterene-hctz     VTE ppx: Lovenox      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

## 2025-03-27 ENCOUNTER — TRANSCRIPTION ENCOUNTER (OUTPATIENT)
Age: 89
End: 2025-03-27

## 2025-03-27 VITALS — OXYGEN SATURATION: 95 %

## 2025-03-27 LAB
ALBUMIN SERPL ELPH-MCNC: 3.4 G/DL — SIGNIFICANT CHANGE UP (ref 3.3–5)
ALP SERPL-CCNC: 69 U/L — SIGNIFICANT CHANGE UP (ref 30–120)
ALT FLD-CCNC: 42 U/L — SIGNIFICANT CHANGE UP (ref 10–60)
ANION GAP SERPL CALC-SCNC: 6 MMOL/L — SIGNIFICANT CHANGE UP (ref 5–17)
AST SERPL-CCNC: 38 U/L — SIGNIFICANT CHANGE UP (ref 10–40)
BASOPHILS # BLD AUTO: 0.03 K/UL — SIGNIFICANT CHANGE UP (ref 0–0.2)
BASOPHILS NFR BLD AUTO: 0.8 % — SIGNIFICANT CHANGE UP (ref 0–2)
BILIRUB SERPL-MCNC: 0.3 MG/DL — SIGNIFICANT CHANGE UP (ref 0.2–1.2)
BUN SERPL-MCNC: 28 MG/DL — HIGH (ref 7–23)
CALCIUM SERPL-MCNC: 9.3 MG/DL — SIGNIFICANT CHANGE UP (ref 8.4–10.5)
CHLORIDE SERPL-SCNC: 101 MMOL/L — SIGNIFICANT CHANGE UP (ref 96–108)
CO2 SERPL-SCNC: 33 MMOL/L — HIGH (ref 22–31)
CREAT SERPL-MCNC: 0.94 MG/DL — SIGNIFICANT CHANGE UP (ref 0.5–1.3)
EGFR: 58 ML/MIN/1.73M2 — LOW
EGFR: 58 ML/MIN/1.73M2 — LOW
EOSINOPHIL # BLD AUTO: 0.26 K/UL — SIGNIFICANT CHANGE UP (ref 0–0.5)
EOSINOPHIL NFR BLD AUTO: 6.5 % — HIGH (ref 0–6)
GLUCOSE SERPL-MCNC: 107 MG/DL — HIGH (ref 70–99)
HCT VFR BLD CALC: 42.2 % — SIGNIFICANT CHANGE UP (ref 34.5–45)
HGB BLD-MCNC: 13.5 G/DL — SIGNIFICANT CHANGE UP (ref 11.5–15.5)
IMM GRANULOCYTES NFR BLD AUTO: 0.3 % — SIGNIFICANT CHANGE UP (ref 0–0.9)
LYMPHOCYTES # BLD AUTO: 1.14 K/UL — SIGNIFICANT CHANGE UP (ref 1–3.3)
LYMPHOCYTES # BLD AUTO: 28.6 % — SIGNIFICANT CHANGE UP (ref 13–44)
MAGNESIUM SERPL-MCNC: 2 MG/DL — SIGNIFICANT CHANGE UP (ref 1.6–2.6)
MCHC RBC-ENTMCNC: 30.8 PG — SIGNIFICANT CHANGE UP (ref 27–34)
MCHC RBC-ENTMCNC: 32 G/DL — SIGNIFICANT CHANGE UP (ref 32–36)
MCV RBC AUTO: 96.3 FL — SIGNIFICANT CHANGE UP (ref 80–100)
MONOCYTES # BLD AUTO: 0.43 K/UL — SIGNIFICANT CHANGE UP (ref 0–0.9)
MONOCYTES NFR BLD AUTO: 10.8 % — SIGNIFICANT CHANGE UP (ref 2–14)
NEUTROPHILS # BLD AUTO: 2.12 K/UL — SIGNIFICANT CHANGE UP (ref 1.8–7.4)
NEUTROPHILS NFR BLD AUTO: 53 % — SIGNIFICANT CHANGE UP (ref 43–77)
NRBC BLD AUTO-RTO: 0 /100 WBCS — SIGNIFICANT CHANGE UP (ref 0–0)
PHOSPHATE SERPL-MCNC: 4.7 MG/DL — HIGH (ref 2.5–4.5)
PLATELET # BLD AUTO: 151 K/UL — SIGNIFICANT CHANGE UP (ref 150–400)
POTASSIUM SERPL-MCNC: 4.1 MMOL/L — SIGNIFICANT CHANGE UP (ref 3.5–5.3)
POTASSIUM SERPL-SCNC: 4.1 MMOL/L — SIGNIFICANT CHANGE UP (ref 3.5–5.3)
PROT SERPL-MCNC: 6.5 G/DL — SIGNIFICANT CHANGE UP (ref 6–8.3)
RBC # BLD: 4.38 M/UL — SIGNIFICANT CHANGE UP (ref 3.8–5.2)
RBC # FLD: 14.1 % — SIGNIFICANT CHANGE UP (ref 10.3–14.5)
SODIUM SERPL-SCNC: 140 MMOL/L — SIGNIFICANT CHANGE UP (ref 135–145)
WBC # BLD: 3.99 K/UL — SIGNIFICANT CHANGE UP (ref 3.8–10.5)
WBC # FLD AUTO: 3.99 K/UL — SIGNIFICANT CHANGE UP (ref 3.8–10.5)

## 2025-03-27 PROCEDURE — 71045 X-RAY EXAM CHEST 1 VIEW: CPT

## 2025-03-27 PROCEDURE — 80053 COMPREHEN METABOLIC PANEL: CPT

## 2025-03-27 PROCEDURE — 84145 PROCALCITONIN (PCT): CPT

## 2025-03-27 PROCEDURE — 87449 NOS EACH ORGANISM AG IA: CPT

## 2025-03-27 PROCEDURE — 86140 C-REACTIVE PROTEIN: CPT

## 2025-03-27 PROCEDURE — 0225U NFCT DS DNA&RNA 21 SARSCOV2: CPT

## 2025-03-27 PROCEDURE — 87040 BLOOD CULTURE FOR BACTERIA: CPT

## 2025-03-27 PROCEDURE — 87899 AGENT NOS ASSAY W/OPTIC: CPT

## 2025-03-27 PROCEDURE — 99239 HOSP IP/OBS DSCHRG MGMT >30: CPT

## 2025-03-27 PROCEDURE — 80048 BASIC METABOLIC PNL TOTAL CA: CPT

## 2025-03-27 PROCEDURE — 83880 ASSAY OF NATRIURETIC PEPTIDE: CPT

## 2025-03-27 PROCEDURE — 93005 ELECTROCARDIOGRAM TRACING: CPT

## 2025-03-27 PROCEDURE — 96365 THER/PROPH/DIAG IV INF INIT: CPT

## 2025-03-27 PROCEDURE — 85730 THROMBOPLASTIN TIME PARTIAL: CPT

## 2025-03-27 PROCEDURE — 87641 MR-STAPH DNA AMP PROBE: CPT

## 2025-03-27 PROCEDURE — 0241U: CPT

## 2025-03-27 PROCEDURE — 96367 TX/PROPH/DG ADDL SEQ IV INF: CPT

## 2025-03-27 PROCEDURE — 85610 PROTHROMBIN TIME: CPT

## 2025-03-27 PROCEDURE — 85025 COMPLETE CBC W/AUTO DIFF WBC: CPT

## 2025-03-27 PROCEDURE — 36415 COLL VENOUS BLD VENIPUNCTURE: CPT

## 2025-03-27 PROCEDURE — 94640 AIRWAY INHALATION TREATMENT: CPT

## 2025-03-27 PROCEDURE — 71250 CT THORAX DX C-: CPT | Mod: MC

## 2025-03-27 PROCEDURE — 84443 ASSAY THYROID STIM HORMONE: CPT

## 2025-03-27 PROCEDURE — 83605 ASSAY OF LACTIC ACID: CPT

## 2025-03-27 PROCEDURE — 81001 URINALYSIS AUTO W/SCOPE: CPT

## 2025-03-27 PROCEDURE — 87640 STAPH A DNA AMP PROBE: CPT

## 2025-03-27 PROCEDURE — 99285 EMERGENCY DEPT VISIT HI MDM: CPT | Mod: 25

## 2025-03-27 PROCEDURE — 83735 ASSAY OF MAGNESIUM: CPT

## 2025-03-27 PROCEDURE — 87637 SARSCOV2&INF A&B&RSV AMP PRB: CPT

## 2025-03-27 PROCEDURE — 84100 ASSAY OF PHOSPHORUS: CPT

## 2025-03-27 PROCEDURE — 94760 N-INVAS EAR/PLS OXIMETRY 1: CPT

## 2025-03-27 PROCEDURE — 84484 ASSAY OF TROPONIN QUANT: CPT

## 2025-03-27 PROCEDURE — 85027 COMPLETE CBC AUTOMATED: CPT

## 2025-03-27 RX ORDER — ALBUTEROL SULFATE 2.5 MG/3ML
1 VIAL, NEBULIZER (ML) INHALATION
Qty: 3 | Refills: 0
Start: 2025-03-27 | End: 2025-03-29

## 2025-03-27 RX ORDER — ALBUTEROL SULFATE 2.5 MG/3ML
1 VIAL, NEBULIZER (ML) INHALATION
Qty: 25 | Refills: 0
Start: 2025-03-27

## 2025-03-27 RX ORDER — CEFUROXIME SODIUM 1.5 G
1 VIAL (EA) INJECTION
Qty: 4 | Refills: 0
Start: 2025-03-27 | End: 2025-03-28

## 2025-03-27 RX ORDER — CEFUROXIME SODIUM 1.5 G
500 VIAL (EA) INJECTION EVERY 12 HOURS
Refills: 0 | Status: DISCONTINUED | OUTPATIENT
Start: 2025-03-27 | End: 2025-03-27

## 2025-03-27 RX ORDER — CEFUROXIME SODIUM 1.5 G
1 VIAL (EA) INJECTION
Qty: 0 | Refills: 0 | DISCHARGE
Start: 2025-03-27

## 2025-03-27 RX ADMIN — GABAPENTIN 300 MILLIGRAM(S): 400 CAPSULE ORAL at 05:52

## 2025-03-27 RX ADMIN — Medication 500 MILLIGRAM(S): at 11:02

## 2025-03-27 RX ADMIN — NYSTATIN 1 APPLICATION(S): 100000 CREAM TOPICAL at 05:53

## 2025-03-27 RX ADMIN — Medication 125 MICROGRAM(S): at 05:52

## 2025-03-27 RX ADMIN — Medication 2.5 MILLIGRAM(S): at 07:14

## 2025-03-27 RX ADMIN — Medication 4 MILLILITER(S): at 07:14

## 2025-03-27 RX ADMIN — DEXTROMETHORPHAN HBR, GUAIFENESIN 600 MILLIGRAM(S): 200 LIQUID ORAL at 05:52

## 2025-03-27 RX ADMIN — Medication 1 TABLET(S): at 05:52

## 2025-03-27 NOTE — PROGRESS NOTE ADULT - ASSESSMENT
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 New bilateral upper lobe groundglass pneumonia.     Rhinovirus + superimposed bacterial PNA  CT chest reviewed - GGO and LLL PNA  SARS-Cov-V-2 and influenza negative; 3/24 + rhino/enterovirus  urine legionella, strep pneumo negative  BCx NGTD    S/p azithromycin x3 day course    Recommendations:  Can switch to PO ceftin 500mg BID to complete course tomorrow 3/28/25  trend temps/WBC  O2 support as needed  Additional care per primary team    Patient evaluated with face-to-face time in addition to reviewing history, labs, microbiology, and imaging.   Antibiotic stewardship, local antibiogram, infection control strategies and potential transmission issues taken into consideration at time of treatment decision making process.   Thank you for allowing us to participate in the care of your patient.  D/w Dr. Albert  Infectious Diseases will follow. Please call with any questions.  Lorin Hewitt M.D.  Available on [employer] TEAMS -- *[facility]*  [place] [facility] [phone]  For after 5 P.M. and weekends, please call [phone]

## 2025-03-27 NOTE — DISCHARGE NOTE PROVIDER - ATTENDING DISCHARGE PHYSICAL EXAMINATION:
Appears well in NAD  S1, S2 present no obvious murmurs or rubs, no LE edema   CTA BL, on room air, in no resp distress  +BS soft non tender  AAOx3 no focal deficits  Hearing impaired

## 2025-03-27 NOTE — CASE MANAGEMENT PROGRESS NOTE - NSCMPROGRESSNOTE_GEN_ALL_CORE
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  CT showing New left lower lobe pneumonia. New bilateral upper lobe ground glass pneumonia. Plan for Discharge Today. Patient declining homecare services MARIEL made DR. Albert aware. MARIEL called luis Stovall phone number [phone] and will be picking patient up after lunch. Appt was set up on MONDAY 03/31/2025 @ 11:15 am with DR Christohper Zhang @ [address] [place] [id number] [phone]- pt made aware. CM reviewed Important Message from Medicare with patient with a verbal understanding. Will continue to follow patient.

## 2025-03-27 NOTE — DISCHARGE NOTE PROVIDER - NSDCFUADDAPPT_GEN_ALL_CORE_FT
You have a post-hospitalization doctor appointment set-up for you on MONDAY 03/31/2025 @ 11:15 am with your doctor, DR Christopher Zhang @ [address] [facility], [place] [address] [phone], if there is any conflict with any previous appointment then you may call doctor's office and re-schedule @ your earliest convenience.

## 2025-03-27 NOTE — DISCHARGE NOTE PROVIDER - HOSPITAL COURSE
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 Rhinovirus   2. Superimposed bacterial PNA   Pt was treated with 3 day course of azithromycin and ceftriaxone during hospitalization   She has clinically improved with improvement in cough, dyspnea and weakness  Initially required 2-3L/min supplemental oxygen via NC which has been weaned down to room air  She demonstrated no need for ambulatory oxygen   Tolerating po, ambulating short distances   By day of dc, pt is stable from resp standpoint, on room air and clinically improved and will be dc on       to complete    course   Updated son, Wilman      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 Rhinovirus   2. Superimposed bacterial PNA   Pt was treated with 3 day course of azithromycin and ceftriaxone during hospitalization   She has clinically improved with improvement in cough, dyspnea and weakness  Initially required 2-3L/min supplemental oxygen via NC which has been weaned down to room air  She demonstrated no need for ambulatory oxygen   Tolerating po, ambulating short distances   By day of dc, pt is stable from resp standpoint, on room air and clinically improved and will be dc on  ceftin 500mg BID x2 days to complete 5 day total abx course   Follow up with PCP in 3-5 days   Updated son, Wilman

## 2025-03-27 NOTE — DISCHARGE NOTE PROVIDER - NSDCMRMEDTOKEN_GEN_ALL_CORE_FT
albuterol 2.5 mg/3 mL (0.083%) inhalation solution: 1 inhaler(s) inhaled every 6 hours as needed for  shortness of breath and/or wheezing  atorvastatin 10 mg oral tablet: 1 tab(s) orally once a day (at bedtime)  gabapentin: 300 milligram(s) orally 3 times a day  levothyroxine 125 mcg (0.125 mg) oral tablet: 1 tab(s) orally once a day  levothyroxine 125 mcg (0.125 mg) oral tablet: 2 tab(s) orally every 7 days on Sunday  triamterene-hydrochlorothiazide 37.5 mg-25 mg oral tablet: 1 tab(s) orally once a day   albuterol 2.5 mg/3 mL (0.083%) inhalation solution: 1 inhaler(s) inhaled every 6 hours as needed for  shortness of breath and/or wheezing  atorvastatin 10 mg oral tablet: 1 tab(s) orally once a day (at bedtime)  cefuroxime 500 mg oral tablet: 1 tab(s) orally twice a day after breakfast and dinner  cefuroxime 500 mg oral tablet: 1 tab(s) orally every 12 hours  gabapentin: 300 milligram(s) orally 3 times a day  levothyroxine 125 mcg (0.125 mg) oral tablet: 1 tab(s) orally once a day  levothyroxine 125 mcg (0.125 mg) oral tablet: 2 tab(s) orally every 7 days on Sunday  triamterene-hydrochlorothiazide 37.5 mg-25 mg oral tablet: 1 tab(s) orally once a day   atorvastatin 10 mg oral tablet: 1 tab(s) orally once a day (at bedtime)  cefuroxime 500 mg oral tablet: 1 tab(s) orally every 12 hours  cefuroxime 500 mg oral tablet: 1 tab(s) orally 2 times a day x 2 days  gabapentin: 300 milligram(s) orally 3 times a day  levothyroxine 125 mcg (0.125 mg) oral tablet: 1 tab(s) orally once a day  levothyroxine 125 mcg (0.125 mg) oral tablet: 2 tab(s) orally every 7 days on Sunday  triamterene-hydrochlorothiazide 37.5 mg-25 mg oral tablet: 1 tab(s) orally once a day

## 2025-03-27 NOTE — PROGRESS NOTE ADULT - ASSESSMENT
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

## 2025-03-27 NOTE — DISCHARGE NOTE PROVIDER - NSDCFUSCHEDAPPT_GEN_ALL_CORE_FT
[facility]ianne [facility]  [address]  Scheduled Appointment: 03/31/2025    Rhoda Bernal  [facility]shiv[facility]  [facility] [address]  Scheduled Appointment: 06/09/2025    Rhoda Bernal  [facility]  [address]  Scheduled Appointment: 06/10/2025

## 2025-03-27 NOTE — PROGRESS NOTE ADULT - SUBJECTIVE AND OBJECTIVE BOX
Date/Time Patient Seen:  		  Referring MD:   Data Reviewed	       Patient is a 89y old  Female who presents with a chief complaint of Pneumonia (24 Mar 2025 15:43)      Subjective/HPI     PAST MEDICAL & SURGICAL HISTORY:  Obesity (BMI 30-39.9)    Skin cancer    Hypothyroidism    Bronchitis    Pneumonia    Chronic cough    Osteoarthritis    Environmental allergies    Vitamin D deficiency    Hyperlipidemia    Class 2 obesity with body mass index (BMI) of 39.0 to 39.9 in adult    Cancer of female breast  2012- left breast- tx with surgery, radiation and Letrozole    DDD (degenerative disc disease), lumbar    Primary immunodeficiency disorder  being treated with Hizentra    H/O actinic keratosis    H/O seborrheic keratosis    S/P cataract surgery  approx 2006- bilateral    S/P appendectomy  1950    S/P partial mastectomy, left  2012    S/P epidural steroid injection  x2- Dr. Martin- for Lumbar Disc Disease    S/P skin cancer resection  x11          Medication list         MEDICATIONS  (STANDING):  albuterol    0.083% 2.5 milliGRAM(s) Nebulizer every 12 hours  atorvastatin 10 milliGRAM(s) Oral at bedtime  azithromycin  IVPB      azithromycin  IVPB 500 milliGRAM(s) IV Intermittent every 24 hours  cefTRIAXone   IVPB 1000 milliGRAM(s) IV Intermittent every 24 hours  gabapentin 300 milliGRAM(s) Oral three times a day  guaiFENesin  milliGRAM(s) Oral every 12 hours  levothyroxine 125 MICROGram(s) Oral daily  sodium chloride 3%  Inhalation 4 milliLiter(s) Inhalation every 12 hours  triamterene 37.5 mG/hydrochlorothiazide 25 mG Tablet 1 Tablet(s) Oral daily    MEDICATIONS  (PRN):  acetaminophen     Tablet .. 650 milliGRAM(s) Oral every 6 hours PRN Temp greater or equal to 38C (100.4F), Mild Pain (1 - 3)  aluminum hydroxide/magnesium hydroxide/simethicone Suspension 30 milliLiter(s) Oral every 4 hours PRN Dyspepsia  melatonin 3 milliGRAM(s) Oral at bedtime PRN Insomnia  ondansetron Injectable 4 milliGRAM(s) IV Push every 8 hours PRN Nausea and/or Vomiting  polyethylene glycol 3350 17 Gram(s) Oral at bedtime PRN Constipation         Vitals log        ICU Vital Signs Last 24 Hrs  T(C): 37.1 (25 Mar 2025 05:05), Max: 37.1 (25 Mar 2025 05:05)  T(F): 98.7 (25 Mar 2025 05:05), Max: 98.7 (25 Mar 2025 05:05)  HR: 73 (25 Mar 2025 05:05) (64 - 88)  BP: 138/62 (25 Mar 2025 05:05) (134/59 - 163/69)  BP(mean): --  ABP: --  ABP(mean): --  RR: 18 (25 Mar 2025 05:05) (15 - 22)  SpO2: 99% (25 Mar 2025 05:05) (92% - 100%)    O2 Parameters below as of 25 Mar 2025 05:05  Patient On (Oxygen Delivery Method): nasal cannula  O2 Flow (L/min): 2               Input and Output:  I&O's Detail      Lab Data                        13.7   7.07  )-----------( 206      ( 24 Mar 2025 11:47 )             42.7     03-24    139  |  101  |  25[H]  ----------------------------<  108[H]  4.1   |  34[H]  |  1.03    Ca    9.5      24 Mar 2025 11:47    TPro  7.3  /  Alb  3.8  /  TBili  0.4  /  DBili  x   /  AST  30  /  ALT  27  /  AlkPhos  85  03-24            Review of Systems	      Objective     Physical Examination    heart s1s2  lung d cBS  hea dc  head at  abd sot      Pertinent Lab findings & Imaging      Blaise:  NO   Adequate UO     I&O's Detail           Discussed with:     Cultures:	        Radiology                            
No acute overnight events  Pt reports feeling better but not back to baseline  Coughing productive of yellow/white phlegm   Denies fever or chills     MEDICATIONS  (STANDING):  albuterol    0.083% 2.5 milliGRAM(s) Nebulizer every 12 hours  atorvastatin 10 milliGRAM(s) Oral at bedtime  azithromycin  IVPB      azithromycin  IVPB 500 milliGRAM(s) IV Intermittent every 24 hours  cefTRIAXone   IVPB 1000 milliGRAM(s) IV Intermittent every 24 hours  gabapentin 300 milliGRAM(s) Oral three times a day  guaiFENesin  milliGRAM(s) Oral every 12 hours  levothyroxine 125 MICROGram(s) Oral daily  sodium chloride 3%  Inhalation 4 milliLiter(s) Inhalation every 12 hours  triamterene 37.5 mG/hydrochlorothiazide 25 mG Tablet 1 Tablet(s) Oral daily    MEDICATIONS  (PRN):  acetaminophen     Tablet .. 650 milliGRAM(s) Oral every 6 hours PRN Temp greater or equal to 38C (100.4F), Mild Pain (1 - 3)  aluminum hydroxide/magnesium hydroxide/simethicone Suspension 30 milliLiter(s) Oral every 4 hours PRN Dyspepsia  melatonin 3 milliGRAM(s) Oral at bedtime PRN Insomnia  ondansetron Injectable 4 milliGRAM(s) IV Push every 8 hours PRN Nausea and/or Vomiting  polyethylene glycol 3350 17 Gram(s) Oral at bedtime PRN Constipation      Allergies    Shrimp (Hives)  soft shell seafood (Unknown)  Vinegar- hands break out in a rash (Rash)  iodine (Unknown)    Intolerances        REVIEW OF SYSTEMS:  negative other than aforementioned        Vital Signs Last 24 Hrs  T(C): 37.1 (25 Mar 2025 05:05), Max: 37.1 (25 Mar 2025 05:05)  T(F): 98.7 (25 Mar 2025 05:05), Max: 98.7 (25 Mar 2025 05:05)  HR: 59 (25 Mar 2025 07:55) (59 - 88)  BP: 138/62 (25 Mar 2025 05:05) (134/59 - 163/69)  BP(mean): --  RR: 16 (25 Mar 2025 08:55) (15 - 22)  SpO2: 98% (25 Mar 2025 08:55) (91% - 100%)    Parameters below as of 25 Mar 2025 08:55  Patient On (Oxygen Delivery Method): room air        PHYSICAL EXAM:  GENERAL: Elderly female in NAD   HEAD:  Atraumatic, Normocephalic  NERVOUS SYSTEM:  Hearing impaired. Awake and alert, answering simple questions and following simple commands appropriately  CHEST/LUNG: Decreased breath sounds bl lung fields   HEART: Regular rate and rhythm; No murmurs, rubs, or gallops  ABDOMEN: Soft, Nontender, Nondistended; Bowel sounds present  EXTREMITIES:  2+ Peripheral Pulses, No clubbing or cyanosis       LABS:                        12.6   4.47  )-----------( 170      ( 25 Mar 2025 07:57 )             39.6     25 Mar 2025 07:57    141    |  103    |  23     ----------------------------<  119    3.8     |  36     |  0.98     Ca    9.0        25 Mar 2025 07:57    TPro  7.3    /  Alb  3.8    /  TBili  0.4    /  DBili  x      /  AST  30     /  ALT  27     /  AlkPhos  85     24 Mar 2025 11:47    PT/INR - ( 24 Mar 2025 11:47 )   PT: 10.9 sec;   INR: 0.94 ratio         PTT - ( 24 Mar 2025 11:47 )  PTT:28.2 sec  Urinalysis Basic - ( 25 Mar 2025 07:57 )    Color: x / Appearance: x / SG: x / pH: x  Gluc: 119 mg/dL / Ketone: x  / Bili: x / Urobili: x   Blood: x / Protein: x / Nitrite: x   Leuk Esterase: x / RBC: x / WBC x   Sq Epi: x / Non Sq Epi: x / Bacteria: x      CAPILLARY BLOOD GLUCOSE          RADIOLOGY & ADDITIONAL TESTS:    Imaging Personally Reviewed:      [ ] Consultant(s) Notes Reviewed  [x] Care Discussed with Consultants/Other Providers:  Ortho PA- plan of care
Date/Time Patient Seen:  		  Referring MD:   Data Reviewed	       Patient is a 89y old  Female who presents with a chief complaint of Pneumonia (25 Mar 2025 11:06)      Subjective/HPI     PAST MEDICAL & SURGICAL HISTORY:  Obesity (BMI 30-39.9)    Skin cancer    Hypothyroidism    Bronchitis    Pneumonia    Chronic cough    Osteoarthritis    Environmental allergies    Vitamin D deficiency    Hyperlipidemia    Class 2 obesity with body mass index (BMI) of 39.0 to 39.9 in adult    Cancer of female breast  2012- left breast- tx with surgery, radiation and Letrozole    DDD (degenerative disc disease), lumbar    Primary immunodeficiency disorder  being treated with Hizentra    H/O actinic keratosis    H/O seborrheic keratosis    S/P cataract surgery  approx 2006- bilateral    S/P appendectomy  1950    S/P partial mastectomy, left  2012    S/P epidural steroid injection  x2- Dr. Martin- for Lumbar Disc Disease    S/P skin cancer resection  x11          Medication list         MEDICATIONS  (STANDING):  albuterol    0.083% 2.5 milliGRAM(s) Nebulizer every 12 hours  atorvastatin 10 milliGRAM(s) Oral at bedtime  azithromycin  IVPB      azithromycin  IVPB 500 milliGRAM(s) IV Intermittent every 24 hours  cefTRIAXone   IVPB 1000 milliGRAM(s) IV Intermittent every 24 hours  enoxaparin Injectable 40 milliGRAM(s) SubCutaneous every 24 hours  gabapentin 300 milliGRAM(s) Oral three times a day  guaiFENesin  milliGRAM(s) Oral every 12 hours  levothyroxine 125 MICROGram(s) Oral daily  nystatin Powder 1 Application(s) Topical two times a day  sodium chloride 3%  Inhalation 4 milliLiter(s) Inhalation every 12 hours  triamterene 37.5 mG/hydrochlorothiazide 25 mG Tablet 1 Tablet(s) Oral daily    MEDICATIONS  (PRN):  acetaminophen     Tablet .. 650 milliGRAM(s) Oral every 6 hours PRN Temp greater or equal to 38C (100.4F), Mild Pain (1 - 3)  aluminum hydroxide/magnesium hydroxide/simethicone Suspension 30 milliLiter(s) Oral every 4 hours PRN Dyspepsia  melatonin 3 milliGRAM(s) Oral at bedtime PRN Insomnia  ondansetron Injectable 4 milliGRAM(s) IV Push every 8 hours PRN Nausea and/or Vomiting  polyethylene glycol 3350 17 Gram(s) Oral at bedtime PRN Constipation         Vitals log        ICU Vital Signs Last 24 Hrs  T(C): 36.3 (26 Mar 2025 04:32), Max: 36.7 (25 Mar 2025 14:05)  T(F): 97.4 (26 Mar 2025 04:32), Max: 98.1 (25 Mar 2025 14:05)  HR: 71 (26 Mar 2025 04:32) (59 - 71)  BP: 145/63 (26 Mar 2025 04:32) (120/60 - 145/63)  BP(mean): --  ABP: --  ABP(mean): --  RR: 18 (26 Mar 2025 04:32) (16 - 18)  SpO2: 94% (26 Mar 2025 04:32) (91% - 98%)    O2 Parameters below as of 26 Mar 2025 04:32  Patient On (Oxygen Delivery Method): room air                 Input and Output:  I&O's Detail    25 Mar 2025 07:01  -  26 Mar 2025 05:24  --------------------------------------------------------  IN:    IV PiggyBack: 50 mL    IV PiggyBack: 250 mL  Total IN: 300 mL    OUT:  Total OUT: 0 mL    Total NET: 300 mL          Lab Data                        12.6   4.47  )-----------( 170      ( 25 Mar 2025 07:57 )             39.6     03-25    141  |  103  |  23  ----------------------------<  119[H]  3.8   |  36[H]  |  0.98    Ca    9.0      25 Mar 2025 07:57    TPro  7.3  /  Alb  3.8  /  TBili  0.4  /  DBili  x   /  AST  30  /  ALT  27  /  AlkPhos  85  03-24            Review of Systems	      Objective     Physical Examination    heart s1s2  lung dc bS  head nc  head at      Pertinent Lab findings & Imaging      Blaise:  NO   Adequate UO     I&O's Detail    25 Mar 2025 07:01  -  26 Mar 2025 05:24  --------------------------------------------------------  IN:    IV PiggyBack: 50 mL    IV PiggyBack: 250 mL  Total IN: 300 mL    OUT:  Total OUT: 0 mL    Total NET: 300 mL               Discussed with:     Cultures:	        Radiology                            
Date/Time Patient Seen:  		  Referring MD:   Data Reviewed	       Patient is a 89y old  Female who presents with a chief complaint of Pneumonia (26 Mar 2025 11:22)      Subjective/HPI     PAST MEDICAL & SURGICAL HISTORY:  Obesity (BMI 30-39.9)    Skin cancer    Hypothyroidism    Bronchitis    Pneumonia    Chronic cough    Osteoarthritis    Environmental allergies    Vitamin D deficiency    Hyperlipidemia    Class 2 obesity with body mass index (BMI) of 39.0 to 39.9 in adult    Cancer of female breast  2012- left breast- tx with surgery, radiation and Letrozole    DDD (degenerative disc disease), lumbar    Primary immunodeficiency disorder  being treated with Hizentra    H/O actinic keratosis    H/O seborrheic keratosis    S/P cataract surgery  approx 2006- bilateral    S/P appendectomy  1950    S/P partial mastectomy, left  2012    S/P epidural steroid injection  x2- Dr. Martin- for Lumbar Disc Disease    S/P skin cancer resection  x11          Medication list         MEDICATIONS  (STANDING):  albuterol    0.083% 2.5 milliGRAM(s) Nebulizer every 12 hours  atorvastatin 10 milliGRAM(s) Oral at bedtime  azithromycin  IVPB      azithromycin  IVPB 500 milliGRAM(s) IV Intermittent every 24 hours  cefTRIAXone   IVPB 1000 milliGRAM(s) IV Intermittent every 24 hours  enoxaparin Injectable 40 milliGRAM(s) SubCutaneous every 24 hours  gabapentin 300 milliGRAM(s) Oral three times a day  guaiFENesin  milliGRAM(s) Oral every 12 hours  levothyroxine 125 MICROGram(s) Oral daily  nystatin Powder 1 Application(s) Topical two times a day  sodium chloride 3%  Inhalation 4 milliLiter(s) Inhalation every 12 hours  triamterene 37.5 mG/hydrochlorothiazide 25 mG Tablet 1 Tablet(s) Oral daily    MEDICATIONS  (PRN):  acetaminophen     Tablet .. 650 milliGRAM(s) Oral every 6 hours PRN Temp greater or equal to 38C (100.4F), Mild Pain (1 - 3)  aluminum hydroxide/magnesium hydroxide/simethicone Suspension 30 milliLiter(s) Oral every 4 hours PRN Dyspepsia  bisacodyl 5 milliGRAM(s) Oral every 12 hours PRN Constipation  melatonin 3 milliGRAM(s) Oral at bedtime PRN Insomnia  ondansetron Injectable 4 milliGRAM(s) IV Push every 8 hours PRN Nausea and/or Vomiting  polyethylene glycol 3350 17 Gram(s) Oral at bedtime PRN Constipation         Vitals log        ICU Vital Signs Last 24 Hrs  T(C): 36.6 (27 Mar 2025 04:53), Max: 36.6 (26 Mar 2025 20:34)  T(F): 97.9 (27 Mar 2025 04:53), Max: 97.9 (27 Mar 2025 04:53)  HR: 59 (27 Mar 2025 04:53) (59 - 77)  BP: 131/50 (27 Mar 2025 04:53) (120/61 - 131/68)  BP(mean): --  ABP: --  ABP(mean): --  RR: 18 (27 Mar 2025 04:53) (18 - 20)  SpO2: 94% (27 Mar 2025 04:53) (92% - 98%)    O2 Parameters below as of 26 Mar 2025 20:31  Patient On (Oxygen Delivery Method): room air                 Input and Output:  I&O's Detail    25 Mar 2025 07:01  -  26 Mar 2025 07:00  --------------------------------------------------------  IN:    IV PiggyBack: 50 mL    IV PiggyBack: 250 mL  Total IN: 300 mL    OUT:  Total OUT: 0 mL    Total NET: 300 mL          Lab Data                        12.7   4.21  )-----------( 178      ( 26 Mar 2025 07:58 )             40.1     03-26    140  |  102  |  25[H]  ----------------------------<  113[H]  3.8   |  33[H]  |  1.07    Ca    9.2      26 Mar 2025 07:58  Phos  4.4     03-26  Mg     1.8     03-26    TPro  6.1  /  Alb  3.3  /  TBili  0.4  /  DBili  x   /  AST  31  /  ALT  37  /  AlkPhos  62  03-26            Review of Systems	      Objective     Physical Examination    heart s1s2  lung dc BS  head nc  head at      Pertinent Lab findings & Imaging      Blaise:  NO   Adequate UO     I&O's Detail    25 Mar 2025 07:01  -  26 Mar 2025 07:00  --------------------------------------------------------  IN:    IV PiggyBack: 50 mL    IV PiggyBack: 250 mL  Total IN: 300 mL    OUT:  Total OUT: 0 mL    Total NET: 300 mL               Discussed with:     Cultures:	        Radiology                            
[facility]  MICHELLE Kuhn Y. Patel, S. Shah, G. [facility]  [phone]    Name: MARGARITA POWERS  Age: 89y  Gender: Female  MRN: [id number]    Interval History:  Patient seen and examined at bedside  No acute overnight events.   Notes reviewed    Antibiotics:  cefTRIAXone   IVPB 1000 milliGRAM(s) IV Intermittent every 24 hours      Medications:  acetaminophen     Tablet .. 650 milliGRAM(s) Oral every 6 hours PRN  albuterol    0.083% 2.5 milliGRAM(s) Nebulizer every 12 hours  aluminum hydroxide/magnesium hydroxide/simethicone Suspension 30 milliLiter(s) Oral every 4 hours PRN  atorvastatin 10 milliGRAM(s) Oral at bedtime  bisacodyl 5 milliGRAM(s) Oral every 12 hours PRN  cefTRIAXone   IVPB 1000 milliGRAM(s) IV Intermittent every 24 hours  enoxaparin Injectable 40 milliGRAM(s) SubCutaneous every 24 hours  gabapentin 300 milliGRAM(s) Oral three times a day  guaiFENesin  milliGRAM(s) Oral every 12 hours  levothyroxine 125 MICROGram(s) Oral daily  melatonin 3 milliGRAM(s) Oral at bedtime PRN  nystatin Powder 1 Application(s) Topical two times a day  ondansetron Injectable 4 milliGRAM(s) IV Push every 8 hours PRN  polyethylene glycol 3350 17 Gram(s) Oral at bedtime PRN  sodium chloride 3%  Inhalation 4 milliLiter(s) Inhalation every 12 hours  triamterene 37.5 mG/hydrochlorothiazide 25 mG Tablet 1 Tablet(s) Oral daily      Review of Systems:  A 10-point review of systems was obtained.     Pertinent positives and negatives--  Constitutional: No fevers. No Chills. No Rigors.   Cardiovascular: No chest pain. No palpitations.  Respiratory: No shortness of breath. No cough.  Gastrointestinal: No nausea or vomiting. No diarrhea or constipation.   Psychiatric: Pleasant. Appropriate affect.    Review of systems otherwise negative except as previously noted.    Allergies: Shrimp (Hives)  soft shell seafood (Unknown)  Vinegar- hands break out in a rash (Rash)  iodine (Unknown)    For details regarding the patient's past medical history, social history, family history, and other miscellaneous elements, please refer the initial infectious diseases consultation and/or the admitting history and physical examination for this admission.    Objective:  Vitals:   T(C): 36.6 (03-27-25 @ 04:53), Max: 36.6 (03-26-25 @ 20:34)  HR: 59 (03-27-25 @ 07:14) (59 - 77)  BP: 131/50 (03-27-25 @ 04:53) (120/61 - 131/68)  RR: 18 (03-27-25 @ 04:53) (18 - 20)  SpO2: 95% (03-27-25 @ 07:14) (92% - 98%)    Physical Examination:  General: no acute distress  HEENT: NC/AT, EOMI  Cardio: S1, S2 heard, RRR, no murmurs  Resp: breath sounds heard bilaterally  Abd: soft, NT, ND  Ext: no edema or cyanosis  Skin: warm, dry, no visible rash      Laboratory Studies:  CBC:                       13.5   3.99  )-----------( 151      ( 27 Mar 2025 08:21 )             42.2     CMP: 03-27    140  |  101  |  28[H]  ----------------------------<  107[H]  4.1   |  33[H]  |  0.94    Ca    9.3      27 Mar 2025 08:21  Phos  4.7     03-27  Mg     2.0     03-27    TPro  6.5  /  Alb  3.4  /  TBili  0.3  /  DBili  x   /  AST  38  /  ALT  42  /  AlkPhos  69  03-27    LIVER FUNCTIONS - ( 27 Mar 2025 08:21 )  Alb: 3.4 g/dL / Pro: 6.5 g/dL / ALK PHOS: 69 U/L / ALT: 42 U/L / AST: 38 U/L / GGT: x           Urinalysis Basic - ( 27 Mar 2025 08:21 )    Color: x / Appearance: x / SG: x / pH: x  Gluc: 107 mg/dL / Ketone: x  / Bili: x / Urobili: x   Blood: x / Protein: x / Nitrite: x   Leuk Esterase: x / RBC: x / WBC x   Sq Epi: x / Non Sq Epi: x / Bacteria: x        Microbiology: reviewed    Urinalysis with Rflx Culture (collected 03-24-25 @ 14:55)    Culture - Blood (collected 03-24-25 @ 11:47)  Source: Blood Blood-Peripheral  Preliminary Report (03-26-25 @ 17:01):    No growth at 48 Hours    Culture - Blood (collected 03-24-25 @ 11:47)  Source: Blood Blood-Peripheral  Preliminary Report (03-26-25 @ 17:01):    No growth at 48 Hours          Radiology: reviewed      
Pt reports cough, poor po intake and generalized weakness       MEDICATIONS  (STANDING):  albuterol    0.083% 2.5 milliGRAM(s) Nebulizer every 12 hours  atorvastatin 10 milliGRAM(s) Oral at bedtime  azithromycin  IVPB      azithromycin  IVPB 500 milliGRAM(s) IV Intermittent every 24 hours  cefTRIAXone   IVPB 1000 milliGRAM(s) IV Intermittent every 24 hours  gabapentin 300 milliGRAM(s) Oral three times a day  guaiFENesin  milliGRAM(s) Oral every 12 hours  levothyroxine 125 MICROGram(s) Oral daily  sodium chloride 3%  Inhalation 4 milliLiter(s) Inhalation every 12 hours  triamterene 37.5 mG/hydrochlorothiazide 25 mG Tablet 1 Tablet(s) Oral daily    MEDICATIONS  (PRN):  acetaminophen     Tablet .. 650 milliGRAM(s) Oral every 6 hours PRN Temp greater or equal to 38C (100.4F), Mild Pain (1 - 3)  aluminum hydroxide/magnesium hydroxide/simethicone Suspension 30 milliLiter(s) Oral every 4 hours PRN Dyspepsia  melatonin 3 milliGRAM(s) Oral at bedtime PRN Insomnia  ondansetron Injectable 4 milliGRAM(s) IV Push every 8 hours PRN Nausea and/or Vomiting  polyethylene glycol 3350 17 Gram(s) Oral at bedtime PRN Constipation      Allergies    Shrimp (Hives)  soft shell seafood (Unknown)  Vinegar- hands break out in a rash (Rash)  iodine (Unknown)    Intolerances        REVIEW OF SYSTEMS:  negative other than aforementioned        Vital Signs Last 24 Hrs  T(C): 37.1 (25 Mar 2025 05:05), Max: 37.1 (25 Mar 2025 05:05)  T(F): 98.7 (25 Mar 2025 05:05), Max: 98.7 (25 Mar 2025 05:05)  HR: 59 (25 Mar 2025 07:55) (59 - 88)  BP: 138/62 (25 Mar 2025 05:05) (134/59 - 163/69)  BP(mean): --  RR: 16 (25 Mar 2025 08:55) (15 - 22)  SpO2: 98% (25 Mar 2025 08:55) (91% - 100%)    Parameters below as of 25 Mar 2025 08:55  Patient On (Oxygen Delivery Method): room air        PHYSICAL EXAM:  GENERAL: elderly weak  HEAD:  Atraumatic, Normocephalic  NERVOUS SYSTEM:  Hearing impaired. Awake and alert, answering simple questions and following simple commands appropriately  CHEST/LUNG: Decreased breath sounds bl lung fields   HEART: Regular rate and rhythm; No murmurs, rubs, or gallops  ABDOMEN: Soft, Nontender, Nondistended; Bowel sounds present  EXTREMITIES:  2+ Peripheral Pulses, No clubbing or cyanosis       LABS:                        12.6   4.47  )-----------( 170      ( 25 Mar 2025 07:57 )             39.6     25 Mar 2025 07:57    141    |  103    |  23     ----------------------------<  119    3.8     |  36     |  0.98     Ca    9.0        25 Mar 2025 07:57    TPro  7.3    /  Alb  3.8    /  TBili  0.4    /  DBili  x      /  AST  30     /  ALT  27     /  AlkPhos  85     24 Mar 2025 11:47    PT/INR - ( 24 Mar 2025 11:47 )   PT: 10.9 sec;   INR: 0.94 ratio         PTT - ( 24 Mar 2025 11:47 )  PTT:28.2 sec  Urinalysis Basic - ( 25 Mar 2025 07:57 )    Color: x / Appearance: x / SG: x / pH: x  Gluc: 119 mg/dL / Ketone: x  / Bili: x / Urobili: x   Blood: x / Protein: x / Nitrite: x   Leuk Esterase: x / RBC: x / WBC x   Sq Epi: x / Non Sq Epi: x / Bacteria: x      CAPILLARY BLOOD GLUCOSE          RADIOLOGY & ADDITIONAL TESTS:    Imaging Personally Reviewed:      [ ] Consultant(s) Notes Reviewed  [x] Care Discussed with Consultants/Other Providers:  Ortho PA- plan of care

## 2025-03-28 ENCOUNTER — TRANSCRIPTION ENCOUNTER (OUTPATIENT)
Age: 89
End: 2025-03-28

## 2025-03-28 DIAGNOSIS — J15.9 UNSPECIFIED BACTERIAL PNEUMONIA: ICD-10-CM

## 2025-03-28 RX ORDER — CEFUROXIME AXETIL 500 MG/1
500 TABLET, FILM COATED ORAL TWICE DAILY
Qty: 3 | Refills: 0 | Status: ACTIVE | COMMUNITY
Start: 2025-03-28 | End: 1900-01-01

## 2025-03-31 ENCOUNTER — APPOINTMENT (OUTPATIENT)
Dept: INTERNAL MEDICINE | Facility: CLINIC | Age: 89
End: 2025-03-31
Payer: MEDICARE

## 2025-03-31 VITALS — WEIGHT: 200 LBS | HEART RATE: 90 BPM | BODY MASS INDEX: 31.39 KG/M2 | OXYGEN SATURATION: 95 % | HEIGHT: 67 IN

## 2025-03-31 VITALS — SYSTOLIC BLOOD PRESSURE: 118 MMHG | DIASTOLIC BLOOD PRESSURE: 58 MMHG

## 2025-03-31 DIAGNOSIS — D84.89 OTHER IMMUNODEFICIENCIES: ICD-10-CM

## 2025-03-31 DIAGNOSIS — J18.9 PNEUMONIA, UNSPECIFIED ORGANISM: ICD-10-CM

## 2025-03-31 DIAGNOSIS — Z09 ENCOUNTER FOR FOLLOW-UP EXAMINATION AFTER COMPLETED TREATMENT FOR CONDITIONS OTHER THAN MALIGNANT NEOPLASM: ICD-10-CM

## 2025-03-31 PROCEDURE — 99496 TRANSJ CARE MGMT HIGH F2F 7D: CPT

## 2025-04-03 ENCOUNTER — APPOINTMENT (OUTPATIENT)
Dept: CARE COORDINATION | Facility: HOME HEALTH | Age: 89
End: 2025-04-03

## 2025-04-04 ENCOUNTER — TRANSCRIPTION ENCOUNTER (OUTPATIENT)
Age: 89
End: 2025-04-04

## 2025-04-08 ENCOUNTER — TRANSCRIPTION ENCOUNTER (OUTPATIENT)
Age: 89
End: 2025-04-08

## 2025-04-17 ENCOUNTER — MED ADMIN CHARGE (OUTPATIENT)
Age: 89
End: 2025-04-17

## 2025-04-17 ENCOUNTER — APPOINTMENT (OUTPATIENT)
Dept: RHEUMATOLOGY | Facility: CLINIC | Age: 89
End: 2025-04-17
Payer: MEDICARE

## 2025-04-17 VITALS
OXYGEN SATURATION: 94 % | DIASTOLIC BLOOD PRESSURE: 73 MMHG | RESPIRATION RATE: 16 BRPM | SYSTOLIC BLOOD PRESSURE: 159 MMHG | HEART RATE: 86 BPM | TEMPERATURE: 98.3 F

## 2025-04-17 VITALS — OXYGEN SATURATION: 95 % | HEART RATE: 70 BPM | SYSTOLIC BLOOD PRESSURE: 122 MMHG | DIASTOLIC BLOOD PRESSURE: 66 MMHG

## 2025-04-17 PROCEDURE — 96413 CHEMO IV INFUSION 1 HR: CPT

## 2025-04-17 RX ORDER — TOCILIZUMAB 20 MG/ML
400 INJECTION, SOLUTION, CONCENTRATE INTRAVENOUS
Qty: 1 | Refills: 0 | Status: COMPLETED
Start: 2025-02-04

## 2025-04-21 ENCOUNTER — TRANSCRIPTION ENCOUNTER (OUTPATIENT)
Age: 89
End: 2025-04-21

## 2025-04-28 ENCOUNTER — TRANSCRIPTION ENCOUNTER (OUTPATIENT)
Age: 89
End: 2025-04-28

## 2025-04-29 ENCOUNTER — APPOINTMENT (OUTPATIENT)
Dept: RHEUMATOLOGY | Facility: CLINIC | Age: 89
End: 2025-04-29
Payer: MEDICARE

## 2025-04-29 VITALS
OXYGEN SATURATION: 96 % | HEART RATE: 93 BPM | HEIGHT: 67 IN | SYSTOLIC BLOOD PRESSURE: 155 MMHG | DIASTOLIC BLOOD PRESSURE: 72 MMHG

## 2025-04-29 DIAGNOSIS — Z79.899 OTHER LONG TERM (CURRENT) DRUG THERAPY: ICD-10-CM

## 2025-04-29 PROCEDURE — 99214 OFFICE O/P EST MOD 30 MIN: CPT | Mod: GC

## 2025-04-29 PROCEDURE — G2211 COMPLEX E/M VISIT ADD ON: CPT

## 2025-04-30 ENCOUNTER — APPOINTMENT (OUTPATIENT)
Dept: INTERNAL MEDICINE | Facility: CLINIC | Age: 89
End: 2025-04-30
Payer: MEDICARE

## 2025-04-30 VITALS
WEIGHT: 200 LBS | HEIGHT: 67 IN | BODY MASS INDEX: 31.39 KG/M2 | OXYGEN SATURATION: 93 % | HEART RATE: 87 BPM | TEMPERATURE: 98.4 F

## 2025-04-30 VITALS — SYSTOLIC BLOOD PRESSURE: 130 MMHG | OXYGEN SATURATION: 92 % | DIASTOLIC BLOOD PRESSURE: 58 MMHG

## 2025-04-30 PROBLEM — R09.02 HYPOXIA: Status: ACTIVE | Noted: 2025-04-30

## 2025-04-30 PROCEDURE — 36415 COLL VENOUS BLD VENIPUNCTURE: CPT

## 2025-04-30 PROCEDURE — 99214 OFFICE O/P EST MOD 30 MIN: CPT

## 2025-04-30 PROCEDURE — G2211 COMPLEX E/M VISIT ADD ON: CPT

## 2025-04-30 RX ORDER — DOXYCYCLINE HYCLATE 100 MG/1
100 TABLET ORAL
Qty: 14 | Refills: 0 | Status: ACTIVE | COMMUNITY
Start: 2025-04-30 | End: 1900-01-01

## 2025-04-30 RX ORDER — PROMETHAZINE HYDROCHLORIDE 6.25 MG/5ML
6.25 SOLUTION ORAL
Qty: 35 | Refills: 0 | Status: ACTIVE | COMMUNITY
Start: 2025-04-30 | End: 1900-01-01

## 2025-04-30 RX ORDER — AMOXICILLIN AND CLAVULANATE POTASSIUM 875; 125 MG/1; MG/1
875-125 TABLET, COATED ORAL
Qty: 14 | Refills: 0 | Status: ACTIVE | COMMUNITY
Start: 2025-04-30 | End: 1900-01-01

## 2025-04-30 RX ORDER — GUAIFENESIN AND DEXTROMETHORPHAN HYDROBROMIDE 1200; 60 MG/1; MG/1
60-1200 TABLET, EXTENDED RELEASE ORAL
Qty: 14 | Refills: 0 | Status: ACTIVE | COMMUNITY
Start: 2025-04-30 | End: 1900-01-01

## 2025-05-01 LAB
ALBUMIN SERPL ELPH-MCNC: 4.3 G/DL
ALP BLD-CCNC: 60 U/L
ALT SERPL-CCNC: 32 U/L
ANION GAP SERPL CALC-SCNC: 14 MMOL/L
AST SERPL-CCNC: 34 U/L
BASOPHILS # BLD AUTO: 0.03 K/UL
BASOPHILS NFR BLD AUTO: 0.6 %
BILIRUB SERPL-MCNC: 0.4 MG/DL
BUN SERPL-MCNC: 24 MG/DL
CALCIUM SERPL-MCNC: 9.8 MG/DL
CHLORIDE SERPL-SCNC: 102 MMOL/L
CO2 SERPL-SCNC: 27 MMOL/L
CREAT SERPL-MCNC: 1.03 MG/DL
EGFRCR SERPLBLD CKD-EPI 2021: 52 ML/MIN/1.73M2
EOSINOPHIL # BLD AUTO: 0.14 K/UL
EOSINOPHIL NFR BLD AUTO: 2.7 %
GLUCOSE SERPL-MCNC: 115 MG/DL
HCT VFR BLD CALC: 41.3 %
HGB BLD-MCNC: 12.8 G/DL
IMM GRANULOCYTES NFR BLD AUTO: 0.2 %
LYMPHOCYTES # BLD AUTO: 1.61 K/UL
LYMPHOCYTES NFR BLD AUTO: 31 %
MAN DIFF?: NORMAL
MCHC RBC-ENTMCNC: 29.7 PG
MCHC RBC-ENTMCNC: 31 G/DL
MCV RBC AUTO: 95.8 FL
MONOCYTES # BLD AUTO: 0.57 K/UL
MONOCYTES NFR BLD AUTO: 11 %
NEUTROPHILS # BLD AUTO: 2.84 K/UL
NEUTROPHILS NFR BLD AUTO: 54.5 %
NT-PROBNP SERPL-MCNC: 81 PG/ML
PLATELET # BLD AUTO: 192 K/UL
POTASSIUM SERPL-SCNC: 4.5 MMOL/L
PROCALCITONIN SERPL-MCNC: 0.03 NG/ML
PROT SERPL-MCNC: 6.6 G/DL
RBC # BLD: 4.31 M/UL
RBC # FLD: 13.2 %
SODIUM SERPL-SCNC: 143 MMOL/L
WBC # FLD AUTO: 5.2 K/UL

## 2025-05-06 NOTE — ED PROVIDER NOTE - DISPOSITION TYPE
Post-Op Assessment Note    CV Status:  Stable  Pain Score: 0    Pain management: adequate       Mental Status:  Sleepy   Hydration Status:  Stable   PONV Controlled:  None   Airway Patency:  Patent and adequate  Two or more mitigation strategies used for obstructive sleep apnea   Post Op Vitals Reviewed: Yes    No anethesia notable event occurred.    Staff: CRNA           Last Filed PACU Vitals:  Vitals Value Taken Time   Temp     Pulse     BP     Resp     SpO2                 ADMIT

## 2025-05-07 ENCOUNTER — APPOINTMENT (OUTPATIENT)
Dept: INTERNAL MEDICINE | Facility: CLINIC | Age: 89
End: 2025-05-07
Payer: MEDICARE

## 2025-05-07 VITALS — BODY MASS INDEX: 32.02 KG/M2 | WEIGHT: 204 LBS | HEART RATE: 96 BPM | OXYGEN SATURATION: 94 % | HEIGHT: 67 IN

## 2025-05-07 VITALS — DIASTOLIC BLOOD PRESSURE: 60 MMHG | SYSTOLIC BLOOD PRESSURE: 112 MMHG

## 2025-05-07 DIAGNOSIS — D84.89 OTHER IMMUNODEFICIENCIES: ICD-10-CM

## 2025-05-07 DIAGNOSIS — R05.9 COUGH, UNSPECIFIED: ICD-10-CM

## 2025-05-07 DIAGNOSIS — E66.811 OBESITY, CLASS 1: ICD-10-CM

## 2025-05-07 DIAGNOSIS — M31.6 OTHER GIANT CELL ARTERITIS: ICD-10-CM

## 2025-05-07 DIAGNOSIS — J15.9 UNSPECIFIED BACTERIAL PNEUMONIA: ICD-10-CM

## 2025-05-07 DIAGNOSIS — R09.02 HYPOXEMIA: ICD-10-CM

## 2025-05-07 DIAGNOSIS — G47.30 SLEEP APNEA, UNSPECIFIED: ICD-10-CM

## 2025-05-07 DIAGNOSIS — J40 BRONCHITIS, NOT SPECIFIED AS ACUTE OR CHRONIC: ICD-10-CM

## 2025-05-07 DIAGNOSIS — R06.2 WHEEZING: ICD-10-CM

## 2025-05-07 DIAGNOSIS — J18.9 PNEUMONIA, UNSPECIFIED ORGANISM: ICD-10-CM

## 2025-05-07 PROCEDURE — G2211 COMPLEX E/M VISIT ADD ON: CPT

## 2025-05-07 PROCEDURE — 99214 OFFICE O/P EST MOD 30 MIN: CPT

## 2025-05-07 RX ORDER — BUDESONIDE AND FORMOTEROL FUMARATE DIHYDRATE 160; 4.5 UG/1; UG/1
160-4.5 AEROSOL RESPIRATORY (INHALATION) TWICE DAILY
Qty: 1 | Refills: 1 | Status: ACTIVE | COMMUNITY
Start: 2025-05-07 | End: 1900-01-01

## 2025-05-09 ENCOUNTER — APPOINTMENT (OUTPATIENT)
Dept: CT IMAGING | Facility: CLINIC | Age: 89
End: 2025-05-09

## 2025-05-15 ENCOUNTER — APPOINTMENT (OUTPATIENT)
Dept: RHEUMATOLOGY | Facility: CLINIC | Age: 89
End: 2025-05-15
Payer: MEDICARE

## 2025-05-15 ENCOUNTER — MED ADMIN CHARGE (OUTPATIENT)
Age: 89
End: 2025-05-15

## 2025-05-15 VITALS
RESPIRATION RATE: 16 BRPM | HEART RATE: 79 BPM | OXYGEN SATURATION: 96 % | DIASTOLIC BLOOD PRESSURE: 64 MMHG | SYSTOLIC BLOOD PRESSURE: 116 MMHG | TEMPERATURE: 98.2 F | SYSTOLIC BLOOD PRESSURE: 116 MMHG | HEART RATE: 79 BPM | RESPIRATION RATE: 16 BRPM | DIASTOLIC BLOOD PRESSURE: 64 MMHG | TEMPERATURE: 98.2 F | OXYGEN SATURATION: 96 %

## 2025-05-15 VITALS — DIASTOLIC BLOOD PRESSURE: 70 MMHG | SYSTOLIC BLOOD PRESSURE: 134 MMHG | HEART RATE: 66 BPM | OXYGEN SATURATION: 95 %

## 2025-05-15 PROCEDURE — 96413 CHEMO IV INFUSION 1 HR: CPT

## 2025-05-15 RX ORDER — TOCILIZUMAB 20 MG/ML
400 INJECTION, SOLUTION, CONCENTRATE INTRAVENOUS
Qty: 1 | Refills: 0 | Status: COMPLETED
Start: 2025-02-04

## 2025-06-10 ENCOUNTER — APPOINTMENT (OUTPATIENT)
Dept: RHEUMATOLOGY | Facility: CLINIC | Age: 89
End: 2025-06-10

## 2025-06-12 ENCOUNTER — APPOINTMENT (OUTPATIENT)
Dept: RHEUMATOLOGY | Facility: CLINIC | Age: 89
End: 2025-06-12
Payer: MEDICARE

## 2025-06-12 ENCOUNTER — MED ADMIN CHARGE (OUTPATIENT)
Age: 89
End: 2025-06-12

## 2025-06-12 VITALS
DIASTOLIC BLOOD PRESSURE: 71 MMHG | RESPIRATION RATE: 16 BRPM | OXYGEN SATURATION: 97 % | HEART RATE: 77 BPM | SYSTOLIC BLOOD PRESSURE: 139 MMHG

## 2025-06-12 VITALS
OXYGEN SATURATION: 94 % | SYSTOLIC BLOOD PRESSURE: 156 MMHG | TEMPERATURE: 98.3 F | DIASTOLIC BLOOD PRESSURE: 66 MMHG | RESPIRATION RATE: 16 BRPM | HEART RATE: 89 BPM

## 2025-06-12 PROCEDURE — 96413 CHEMO IV INFUSION 1 HR: CPT

## 2025-06-12 RX ORDER — TOCILIZUMAB 20 MG/ML
400 INJECTION, SOLUTION, CONCENTRATE INTRAVENOUS
Qty: 1 | Refills: 0 | Status: COMPLETED
Start: 2025-02-04

## 2025-06-13 NOTE — ED ADULT NURSE NOTE - CHPI ED NUR DURATION
Pt. c/o severe 10/10 L neck pain near shoulder, denies trauma or strenuous activity. Pt. tense and anxious on exam. Pt. able to move all extremities, denies CP and SOB.
day(s)

## 2025-06-24 ENCOUNTER — APPOINTMENT (OUTPATIENT)
Dept: RHEUMATOLOGY | Facility: CLINIC | Age: 89
End: 2025-06-24
Payer: MEDICARE

## 2025-06-24 VITALS
DIASTOLIC BLOOD PRESSURE: 72 MMHG | BODY MASS INDEX: 36.1 KG/M2 | HEIGHT: 67 IN | SYSTOLIC BLOOD PRESSURE: 159 MMHG | HEART RATE: 74 BPM | WEIGHT: 230 LBS | RESPIRATION RATE: 14 BRPM | OXYGEN SATURATION: 93 %

## 2025-06-24 LAB
ALBUMIN SERPL ELPH-MCNC: 4.3 G/DL
ALP BLD-CCNC: 62 U/L
ALT SERPL-CCNC: 28 U/L
ANION GAP SERPL CALC-SCNC: 12 MMOL/L
AST SERPL-CCNC: 34 U/L
BILIRUB SERPL-MCNC: 0.4 MG/DL
BUN SERPL-MCNC: 25 MG/DL
CALCIUM SERPL-MCNC: 9.8 MG/DL
CHLORIDE SERPL-SCNC: 101 MMOL/L
CO2 SERPL-SCNC: 28 MMOL/L
CREAT SERPL-MCNC: 1.15 MG/DL
EGFRCR SERPLBLD CKD-EPI 2021: 46 ML/MIN/1.73M2
POTASSIUM SERPL-SCNC: 4.8 MMOL/L
PROT SERPL-MCNC: 6.8 G/DL
SODIUM SERPL-SCNC: 141 MMOL/L

## 2025-06-24 PROCEDURE — 99215 OFFICE O/P EST HI 40 MIN: CPT

## 2025-06-24 PROCEDURE — G2211 COMPLEX E/M VISIT ADD ON: CPT

## 2025-06-25 LAB
BASOPHILS # BLD AUTO: 0.05 K/UL
BASOPHILS NFR BLD AUTO: 0.9 %
EOSINOPHIL # BLD AUTO: 0.2 K/UL
EOSINOPHIL NFR BLD AUTO: 3.4 %
HBV DNA # SERPL NAA+PROBE: NOT DETECTED IU/ML
HCT VFR BLD CALC: 40.9 %
HCV AB SER QL: NONREACTIVE
HCV S/CO RATIO: 0.12 S/CO
HEPB DNA PCR INT: NOT DETECTED
HEPB DNA PCR LOG: NOT DETECTED LOGIU/ML
HGB BLD-MCNC: 12.7 G/DL
IMM GRANULOCYTES NFR BLD AUTO: 0.3 %
LYMPHOCYTES # BLD AUTO: 1.41 K/UL
LYMPHOCYTES NFR BLD AUTO: 24.1 %
MAN DIFF?: NORMAL
MCHC RBC-ENTMCNC: 30 PG
MCHC RBC-ENTMCNC: 31.1 G/DL
MCV RBC AUTO: 96.5 FL
MONOCYTES # BLD AUTO: 0.56 K/UL
MONOCYTES NFR BLD AUTO: 9.6 %
NEUTROPHILS # BLD AUTO: 3.6 K/UL
NEUTROPHILS NFR BLD AUTO: 61.7 %
PLATELET # BLD AUTO: 156 K/UL
RBC # BLD: 4.24 M/UL
RBC # FLD: 14.6 %
WBC # FLD AUTO: 5.84 K/UL

## 2025-06-26 ENCOUNTER — APPOINTMENT (OUTPATIENT)
Dept: CT IMAGING | Facility: CLINIC | Age: 89
End: 2025-06-26

## 2025-06-26 ENCOUNTER — OUTPATIENT (OUTPATIENT)
Dept: OUTPATIENT SERVICES | Facility: HOSPITAL | Age: 89
LOS: 1 days | End: 2025-06-26
Payer: MEDICARE

## 2025-06-26 DIAGNOSIS — Z98.890 OTHER SPECIFIED POSTPROCEDURAL STATES: Chronic | ICD-10-CM

## 2025-06-26 DIAGNOSIS — J18.9 PNEUMONIA, UNSPECIFIED ORGANISM: ICD-10-CM

## 2025-06-26 DIAGNOSIS — Z92.241 PERSONAL HISTORY OF SYSTEMIC STEROID THERAPY: Chronic | ICD-10-CM

## 2025-06-26 LAB
M TB IFN-G BLD-IMP: NEGATIVE
QUANTIFERON TB PLUS MITOGEN MINUS NIL: 4.56 IU/ML
QUANTIFERON TB PLUS NIL: 0.02 IU/ML
QUANTIFERON TB PLUS TB1 MINUS NIL: 0 IU/ML
QUANTIFERON TB PLUS TB2 MINUS NIL: 0 IU/ML

## 2025-06-26 PROCEDURE — 71250 CT THORAX DX C-: CPT | Mod: 26

## 2025-06-26 PROCEDURE — 71250 CT THORAX DX C-: CPT

## 2025-06-27 RX ORDER — TOCILIZUMAB-AAZG 162 MG/.9ML
162 INJECTION, SOLUTION SUBCUTANEOUS
Qty: 2 | Refills: 2 | Status: ACTIVE | COMMUNITY
Start: 2025-06-24

## 2025-06-30 ENCOUNTER — APPOINTMENT (OUTPATIENT)
Dept: INTERNAL MEDICINE | Facility: CLINIC | Age: 89
End: 2025-06-30
Payer: MEDICARE

## 2025-06-30 ENCOUNTER — LABORATORY RESULT (OUTPATIENT)
Age: 89
End: 2025-06-30

## 2025-06-30 VITALS
OXYGEN SATURATION: 97 % | WEIGHT: 230 LBS | SYSTOLIC BLOOD PRESSURE: 128 MMHG | HEIGHT: 67 IN | HEART RATE: 95 BPM | DIASTOLIC BLOOD PRESSURE: 58 MMHG | BODY MASS INDEX: 36.1 KG/M2

## 2025-06-30 PROCEDURE — G2211 COMPLEX E/M VISIT ADD ON: CPT

## 2025-06-30 PROCEDURE — 36415 COLL VENOUS BLD VENIPUNCTURE: CPT

## 2025-06-30 PROCEDURE — 99214 OFFICE O/P EST MOD 30 MIN: CPT

## 2025-07-01 LAB
CHOLEST SERPL-MCNC: 183 MG/DL
ESTIMATED AVERAGE GLUCOSE: 120 MG/DL
HBA1C MFR BLD HPLC: 5.8 %
HDLC SERPL-MCNC: 57 MG/DL
LDLC SERPL-MCNC: 105 MG/DL
NONHDLC SERPL-MCNC: 126 MG/DL
TRIGL SERPL-MCNC: 112 MG/DL
TSH SERPL-ACNC: 6.13 UIU/ML

## 2025-07-11 ENCOUNTER — APPOINTMENT (OUTPATIENT)
Dept: RHEUMATOLOGY | Facility: CLINIC | Age: 89
End: 2025-07-11
Payer: MEDICARE

## 2025-07-11 ENCOUNTER — MED ADMIN CHARGE (OUTPATIENT)
Age: 89
End: 2025-07-11

## 2025-07-11 VITALS
TEMPERATURE: 98.7 F | RESPIRATION RATE: 16 BRPM | OXYGEN SATURATION: 96 % | HEART RATE: 70 BPM | DIASTOLIC BLOOD PRESSURE: 70 MMHG | SYSTOLIC BLOOD PRESSURE: 147 MMHG

## 2025-07-11 VITALS
OXYGEN SATURATION: 95 % | HEART RATE: 70 BPM | SYSTOLIC BLOOD PRESSURE: 135 MMHG | RESPIRATION RATE: 16 BRPM | DIASTOLIC BLOOD PRESSURE: 69 MMHG

## 2025-07-11 PROCEDURE — 96413 CHEMO IV INFUSION 1 HR: CPT

## 2025-07-11 RX ORDER — TOCILIZUMAB 20 MG/ML
400 INJECTION, SOLUTION, CONCENTRATE INTRAVENOUS
Qty: 1 | Refills: 0 | Status: COMPLETED
Start: 2025-02-04

## 2025-08-07 ENCOUNTER — MED ADMIN CHARGE (OUTPATIENT)
Age: 89
End: 2025-08-07

## 2025-08-07 ENCOUNTER — APPOINTMENT (OUTPATIENT)
Dept: RHEUMATOLOGY | Facility: CLINIC | Age: 89
End: 2025-08-07
Payer: MEDICARE

## 2025-08-07 VITALS — SYSTOLIC BLOOD PRESSURE: 133 MMHG | DIASTOLIC BLOOD PRESSURE: 72 MMHG | HEART RATE: 77 BPM

## 2025-08-07 VITALS
OXYGEN SATURATION: 95 % | RESPIRATION RATE: 16 BRPM | HEART RATE: 81 BPM | DIASTOLIC BLOOD PRESSURE: 70 MMHG | SYSTOLIC BLOOD PRESSURE: 149 MMHG | TEMPERATURE: 98.2 F

## 2025-08-07 PROCEDURE — 96413 CHEMO IV INFUSION 1 HR: CPT

## 2025-08-07 RX ORDER — TOCILIZUMAB 20 MG/ML
400 INJECTION, SOLUTION, CONCENTRATE INTRAVENOUS
Qty: 1 | Refills: 0 | Status: COMPLETED
Start: 2025-02-04

## 2025-08-08 ENCOUNTER — APPOINTMENT (OUTPATIENT)
Age: 89
End: 2025-08-08
Payer: MEDICARE

## 2025-08-08 VITALS
WEIGHT: 230 LBS | DIASTOLIC BLOOD PRESSURE: 60 MMHG | BODY MASS INDEX: 36.1 KG/M2 | HEART RATE: 95 BPM | HEIGHT: 67 IN | SYSTOLIC BLOOD PRESSURE: 120 MMHG | OXYGEN SATURATION: 96 %

## 2025-08-08 DIAGNOSIS — M17.11 UNILATERAL PRIMARY OSTEOARTHRITIS, RIGHT KNEE: ICD-10-CM

## 2025-08-08 DIAGNOSIS — I10 ESSENTIAL (PRIMARY) HYPERTENSION: ICD-10-CM

## 2025-08-08 DIAGNOSIS — J47.9 BRONCHIECTASIS, UNCOMPLICATED: ICD-10-CM

## 2025-08-08 DIAGNOSIS — R05.9 COUGH, UNSPECIFIED: ICD-10-CM

## 2025-08-08 DIAGNOSIS — M17.12 UNILATERAL PRIMARY OSTEOARTHRITIS, LEFT KNEE: ICD-10-CM

## 2025-08-08 PROCEDURE — 99214 OFFICE O/P EST MOD 30 MIN: CPT

## 2025-08-08 PROCEDURE — G2211 COMPLEX E/M VISIT ADD ON: CPT

## 2025-08-15 ENCOUNTER — APPOINTMENT (OUTPATIENT)
Dept: OPHTHALMOLOGY | Facility: CLINIC | Age: 89
End: 2025-08-15
Payer: MEDICARE

## 2025-08-15 ENCOUNTER — NON-APPOINTMENT (OUTPATIENT)
Age: 89
End: 2025-08-15

## 2025-08-15 PROCEDURE — 92083 EXTENDED VISUAL FIELD XM: CPT

## 2025-08-15 PROCEDURE — 92133 CPTRZD OPH DX IMG PST SGM ON: CPT

## 2025-08-15 PROCEDURE — 99214 OFFICE O/P EST MOD 30 MIN: CPT

## 2025-08-18 ENCOUNTER — APPOINTMENT (OUTPATIENT)
Dept: RHEUMATOLOGY | Facility: CLINIC | Age: 89
End: 2025-08-18
Payer: MEDICARE

## 2025-08-18 DIAGNOSIS — M31.6 OTHER GIANT CELL ARTERITIS: ICD-10-CM

## 2025-08-18 DIAGNOSIS — Z79.899 OTHER LONG TERM (CURRENT) DRUG THERAPY: ICD-10-CM

## 2025-08-18 LAB
ALBUMIN SERPL ELPH-MCNC: 4.5 G/DL
ALP BLD-CCNC: 55 U/L
ALT SERPL-CCNC: 29 U/L
ANION GAP SERPL CALC-SCNC: 12 MMOL/L
AST SERPL-CCNC: 38 U/L
BASOPHILS # BLD AUTO: 0.05 K/UL
BASOPHILS NFR BLD AUTO: 0.9 %
BILIRUB SERPL-MCNC: 0.5 MG/DL
BUN SERPL-MCNC: 28 MG/DL
CALCIUM SERPL-MCNC: 9.7 MG/DL
CHLORIDE SERPL-SCNC: 104 MMOL/L
CO2 SERPL-SCNC: 27 MMOL/L
CREAT SERPL-MCNC: 1.05 MG/DL
EGFRCR SERPLBLD CKD-EPI 2021: 51 ML/MIN/1.73M2
EOSINOPHIL # BLD AUTO: 0.3 K/UL
EOSINOPHIL NFR BLD AUTO: 5.3 %
HCT VFR BLD CALC: 41.9 %
HGB BLD-MCNC: 13.4 G/DL
IMM GRANULOCYTES NFR BLD AUTO: 0.2 %
LYMPHOCYTES # BLD AUTO: 1.23 K/UL
LYMPHOCYTES NFR BLD AUTO: 21.7 %
MAN DIFF?: NORMAL
MCHC RBC-ENTMCNC: 30.3 PG
MCHC RBC-ENTMCNC: 32 G/DL
MCV RBC AUTO: 94.8 FL
MONOCYTES # BLD AUTO: 0.52 K/UL
MONOCYTES NFR BLD AUTO: 9.2 %
NEUTROPHILS # BLD AUTO: 3.56 K/UL
NEUTROPHILS NFR BLD AUTO: 62.7 %
PLATELET # BLD AUTO: 160 K/UL
POTASSIUM SERPL-SCNC: 4.5 MMOL/L
PROT SERPL-MCNC: 6.9 G/DL
RBC # BLD: 4.42 M/UL
RBC # FLD: 14.9 %
SODIUM SERPL-SCNC: 143 MMOL/L
WBC # FLD AUTO: 5.67 K/UL

## 2025-08-18 PROCEDURE — 99214 OFFICE O/P EST MOD 30 MIN: CPT

## 2025-08-18 PROCEDURE — G2211 COMPLEX E/M VISIT ADD ON: CPT

## 2025-09-04 ENCOUNTER — MED ADMIN CHARGE (OUTPATIENT)
Age: 89
End: 2025-09-04

## 2025-09-04 ENCOUNTER — APPOINTMENT (OUTPATIENT)
Dept: RHEUMATOLOGY | Facility: CLINIC | Age: 89
End: 2025-09-04
Payer: MEDICARE

## 2025-09-04 VITALS
SYSTOLIC BLOOD PRESSURE: 128 MMHG | RESPIRATION RATE: 16 BRPM | TEMPERATURE: 98.3 F | OXYGEN SATURATION: 97 % | DIASTOLIC BLOOD PRESSURE: 57 MMHG | HEART RATE: 78 BPM

## 2025-09-04 VITALS — HEART RATE: 64 BPM | SYSTOLIC BLOOD PRESSURE: 113 MMHG | DIASTOLIC BLOOD PRESSURE: 70 MMHG

## 2025-09-04 PROCEDURE — 96413 CHEMO IV INFUSION 1 HR: CPT

## 2025-09-04 RX ORDER — TOCILIZUMAB 20 MG/ML
400 INJECTION, SOLUTION, CONCENTRATE INTRAVENOUS
Qty: 1 | Refills: 0 | Status: COMPLETED
Start: 2025-02-04

## (undated) DEVICE — VENODYNE/SCD SLEEVE CALF MEDIUM

## (undated) DEVICE — SPECIMEN CONTAINER 100ML

## (undated) DEVICE — DRAPE 1/2 SHEET 40X57"

## (undated) DEVICE — DRAPE MAGNETIC INSTRUMENT MEDIUM

## (undated) DEVICE — MARKING PEN W RULER

## (undated) DEVICE — GLV 6.5 PROTEXIS (WHITE)

## (undated) DEVICE — POSITIONER FOAM EGG CRATE ULNAR 2PCS (PINK)

## (undated) DEVICE — BLADE SCALPEL SAFETYLOCK #11

## (undated) DEVICE — MEDICATION LABELS W MARKER

## (undated) DEVICE — SUT BIOSYN 4-0 18" P-12

## (undated) DEVICE — SYR LUER LOK 10CC

## (undated) DEVICE — SOL IRR POUR NS 0.9% 500ML

## (undated) DEVICE — DRSG STERISTRIPS 0.5 X 4"

## (undated) DEVICE — BLADE SCALPEL SAFETYLOCK #10

## (undated) DEVICE — DRAPE TOWEL BLUE 17" X 24"

## (undated) DEVICE — DRAPE MAYO STAND 30"

## (undated) DEVICE — PACK GENERAL MINOR

## (undated) DEVICE — SUCTION YANKAUER NO CONTROL VENT

## (undated) DEVICE — DRSG OPSITE 13.75 X 4"

## (undated) DEVICE — DRAPE INSTRUMENT POUCH 6.75" X 11"

## (undated) DEVICE — BLADE SCALPEL SAFETYLOCK #15

## (undated) DEVICE — WARMING BLANKET LOWER ADULT

## (undated) DEVICE — SOL IRR POUR H2O 250ML

## (undated) DEVICE — SUT POLYSORB 3-0 30" V-20 UNDYED

## (undated) DEVICE — DRAPE 3/4 SHEET W REINFORCEMENT 56X77"

## (undated) DEVICE — DRAPE MINOR PROCEDURE

## (undated) DEVICE — SUT SOFSILK 3-0 18" TIES

## (undated) DEVICE — SUT SOFSILK 4-0 18" TIES

## (undated) DEVICE — DRAPE LIGHT HANDLE COVER (BLUE)

## (undated) DEVICE — PREP BETADINE KIT

## (undated) DEVICE — GLV 6 PROTEXIS (WHITE)

## (undated) DEVICE — STAPLER SKIN VISI-STAT 35 WIDE

## (undated) DEVICE — DRSG TEGADERM 6"X8"